# Patient Record
Sex: MALE | Race: WHITE | Employment: OTHER | URBAN - METROPOLITAN AREA
[De-identification: names, ages, dates, MRNs, and addresses within clinical notes are randomized per-mention and may not be internally consistent; named-entity substitution may affect disease eponyms.]

---

## 2017-03-25 ENCOUNTER — HOSPITAL ENCOUNTER (INPATIENT)
Age: 62
LOS: 2 days | Discharge: HOME | DRG: 282 | End: 2017-03-27
Attending: EMERGENCY MEDICINE | Admitting: INTERNAL MEDICINE
Payer: MEDICARE

## 2017-03-25 ENCOUNTER — APPOINTMENT (OUTPATIENT)
Dept: GENERAL RADIOLOGY | Age: 62
DRG: 282 | End: 2017-03-25
Attending: EMERGENCY MEDICINE
Payer: MEDICARE

## 2017-03-25 DIAGNOSIS — I24.9 ACS (ACUTE CORONARY SYNDROME) (HCC): Primary | ICD-10-CM

## 2017-03-25 PROBLEM — R07.9 CHEST PAIN: Status: ACTIVE | Noted: 2017-03-25

## 2017-03-25 LAB
ALBUMIN SERPL BCP-MCNC: 3.9 G/DL (ref 3.4–5)
ALBUMIN SERPL BCP-MCNC: 4.1 G/DL (ref 3.4–5)
ALBUMIN/GLOB SERPL: 1.2 {RATIO} (ref 0.8–1.7)
ALBUMIN/GLOB SERPL: 1.2 {RATIO} (ref 0.8–1.7)
ALP SERPL-CCNC: 70 U/L (ref 45–117)
ALP SERPL-CCNC: 78 U/L (ref 45–117)
ALT SERPL-CCNC: 31 U/L (ref 16–61)
ALT SERPL-CCNC: 36 U/L (ref 16–61)
ANION GAP BLD CALC-SCNC: 10 MMOL/L (ref 3–18)
ANION GAP BLD CALC-SCNC: 9 MMOL/L (ref 3–18)
APTT PPP: 107.4 SEC (ref 23–36.4)
APTT PPP: 30.2 SEC (ref 23–36.4)
AST SERPL W P-5'-P-CCNC: 18 U/L (ref 15–37)
AST SERPL W P-5'-P-CCNC: 60 U/L (ref 15–37)
BASOPHILS # BLD AUTO: 0 K/UL (ref 0–0.06)
BASOPHILS # BLD AUTO: 0.1 K/UL (ref 0–0.06)
BASOPHILS # BLD: 0 % (ref 0–2)
BASOPHILS # BLD: 1 % (ref 0–2)
BILIRUB DIRECT SERPL-MCNC: 0.1 MG/DL (ref 0–0.2)
BILIRUB SERPL-MCNC: 0.3 MG/DL (ref 0.2–1)
BILIRUB SERPL-MCNC: 0.5 MG/DL (ref 0.2–1)
BUN SERPL-MCNC: 17 MG/DL (ref 7–18)
BUN SERPL-MCNC: 18 MG/DL (ref 7–18)
BUN/CREAT SERPL: 18 (ref 12–20)
BUN/CREAT SERPL: 19 (ref 12–20)
CALCIUM SERPL-MCNC: 9.2 MG/DL (ref 8.5–10.1)
CALCIUM SERPL-MCNC: 9.6 MG/DL (ref 8.5–10.1)
CHLORIDE SERPL-SCNC: 104 MMOL/L (ref 100–108)
CHLORIDE SERPL-SCNC: 104 MMOL/L (ref 100–108)
CHOLEST SERPL-MCNC: 233 MG/DL
CK MB CFR SERPL CALC: 1.8 % (ref 0–4)
CK MB SERPL-MCNC: 2.1 NG/ML (ref 5–25)
CK SERPL-CCNC: 119 U/L (ref 39–308)
CO2 SERPL-SCNC: 26 MMOL/L (ref 21–32)
CO2 SERPL-SCNC: 28 MMOL/L (ref 21–32)
CREAT SERPL-MCNC: 0.94 MG/DL (ref 0.6–1.3)
CREAT SERPL-MCNC: 0.96 MG/DL (ref 0.6–1.3)
D DIMER PPP FEU-MCNC: 0.28 UG/ML(FEU)
DIFFERENTIAL METHOD BLD: ABNORMAL
DIFFERENTIAL METHOD BLD: ABNORMAL
EOSINOPHIL # BLD: 0 K/UL (ref 0–0.4)
EOSINOPHIL # BLD: 0.2 K/UL (ref 0–0.4)
EOSINOPHIL NFR BLD: 0 % (ref 0–5)
EOSINOPHIL NFR BLD: 2 % (ref 0–5)
ERYTHROCYTE [DISTWIDTH] IN BLOOD BY AUTOMATED COUNT: 12.7 % (ref 11.6–14.5)
ERYTHROCYTE [DISTWIDTH] IN BLOOD BY AUTOMATED COUNT: 12.7 % (ref 11.6–14.5)
EST. AVERAGE GLUCOSE BLD GHB EST-MCNC: 114 MG/DL
GLOBULIN SER CALC-MCNC: 3.3 G/DL (ref 2–4)
GLOBULIN SER CALC-MCNC: 3.4 G/DL (ref 2–4)
GLUCOSE SERPL-MCNC: 108 MG/DL (ref 74–99)
GLUCOSE SERPL-MCNC: 145 MG/DL (ref 74–99)
HBA1C MFR BLD: 5.6 % (ref 4.5–5.6)
HCT VFR BLD AUTO: 48.4 % (ref 36–48)
HCT VFR BLD AUTO: 48.7 % (ref 36–48)
HDLC SERPL-MCNC: 34 MG/DL (ref 40–60)
HDLC SERPL: 6.9 {RATIO} (ref 0–5)
HGB BLD-MCNC: 16.3 G/DL (ref 13–16)
HGB BLD-MCNC: 16.7 G/DL (ref 13–16)
INR PPP: 0.9 (ref 0.8–1.2)
INR PPP: 1 (ref 0.8–1.2)
LDLC SERPL CALC-MCNC: 163.6 MG/DL (ref 0–100)
LIPID PROFILE,FLP: ABNORMAL
LYMPHOCYTES # BLD AUTO: 12 % (ref 21–52)
LYMPHOCYTES # BLD AUTO: 14 % (ref 21–52)
LYMPHOCYTES # BLD: 1.1 K/UL (ref 0.9–3.6)
LYMPHOCYTES # BLD: 1.4 K/UL (ref 0.9–3.6)
MCH RBC QN AUTO: 31.3 PG (ref 24–34)
MCH RBC QN AUTO: 31.6 PG (ref 24–34)
MCHC RBC AUTO-ENTMCNC: 33.7 G/DL (ref 31–37)
MCHC RBC AUTO-ENTMCNC: 34.3 G/DL (ref 31–37)
MCV RBC AUTO: 92.1 FL (ref 74–97)
MCV RBC AUTO: 92.9 FL (ref 74–97)
MONOCYTES # BLD: 0.6 K/UL (ref 0.05–1.2)
MONOCYTES # BLD: 0.8 K/UL (ref 0.05–1.2)
MONOCYTES NFR BLD AUTO: 6 % (ref 3–10)
MONOCYTES NFR BLD AUTO: 9 % (ref 3–10)
NEUTS SEG # BLD: 7.6 K/UL (ref 1.8–8)
NEUTS SEG # BLD: 7.9 K/UL (ref 1.8–8)
NEUTS SEG NFR BLD AUTO: 77 % (ref 40–73)
NEUTS SEG NFR BLD AUTO: 79 % (ref 40–73)
PLATELET # BLD AUTO: 270 K/UL (ref 135–420)
PLATELET # BLD AUTO: 280 K/UL (ref 135–420)
PMV BLD AUTO: 9 FL (ref 9.2–11.8)
PMV BLD AUTO: 9.1 FL (ref 9.2–11.8)
POTASSIUM SERPL-SCNC: 4.1 MMOL/L (ref 3.5–5.5)
POTASSIUM SERPL-SCNC: 4.2 MMOL/L (ref 3.5–5.5)
PROT SERPL-MCNC: 7.2 G/DL (ref 6.4–8.2)
PROT SERPL-MCNC: 7.5 G/DL (ref 6.4–8.2)
PROTHROMBIN TIME: 12.3 SEC (ref 11.5–15.2)
PROTHROMBIN TIME: 12.9 SEC (ref 11.5–15.2)
RBC # BLD AUTO: 5.21 M/UL (ref 4.7–5.5)
RBC # BLD AUTO: 5.29 M/UL (ref 4.7–5.5)
SODIUM SERPL-SCNC: 140 MMOL/L (ref 136–145)
SODIUM SERPL-SCNC: 141 MMOL/L (ref 136–145)
TRIGL SERPL-MCNC: 177 MG/DL (ref ?–150)
TROPONIN I SERPL-MCNC: 0.25 NG/ML (ref 0–0.06)
TROPONIN I SERPL-MCNC: 22.43 NG/ML (ref 0–0.06)
TROPONIN I SERPL-MCNC: 9.91 NG/ML (ref 0–0.06)
VLDLC SERPL CALC-MCNC: 35.4 MG/DL
WBC # BLD AUTO: 10.1 K/UL (ref 4.6–13.2)
WBC # BLD AUTO: 9.6 K/UL (ref 4.6–13.2)

## 2017-03-25 PROCEDURE — 4A023N7 MEASUREMENT OF CARDIAC SAMPLING AND PRESSURE, LEFT HEART, PERCUTANEOUS APPROACH: ICD-10-PCS | Performed by: INTERNAL MEDICINE

## 2017-03-25 PROCEDURE — 74011000250 HC RX REV CODE- 250: Performed by: INTERNAL MEDICINE

## 2017-03-25 PROCEDURE — 85730 THROMBOPLASTIN TIME PARTIAL: CPT | Performed by: INTERNAL MEDICINE

## 2017-03-25 PROCEDURE — 74011250636 HC RX REV CODE- 250/636: Performed by: INTERNAL MEDICINE

## 2017-03-25 PROCEDURE — 71010 XR CHEST PORT: CPT

## 2017-03-25 PROCEDURE — 85610 PROTHROMBIN TIME: CPT | Performed by: EMERGENCY MEDICINE

## 2017-03-25 PROCEDURE — 85025 COMPLETE CBC W/AUTO DIFF WBC: CPT | Performed by: EMERGENCY MEDICINE

## 2017-03-25 PROCEDURE — 82550 ASSAY OF CK (CPK): CPT | Performed by: EMERGENCY MEDICINE

## 2017-03-25 PROCEDURE — 65660000000 HC RM CCU STEPDOWN

## 2017-03-25 PROCEDURE — 80061 LIPID PANEL: CPT | Performed by: EMERGENCY MEDICINE

## 2017-03-25 PROCEDURE — 74011250637 HC RX REV CODE- 250/637: Performed by: INTERNAL MEDICINE

## 2017-03-25 PROCEDURE — 74011636320 HC RX REV CODE- 636/320: Performed by: INTERNAL MEDICINE

## 2017-03-25 PROCEDURE — 74011250637 HC RX REV CODE- 250/637

## 2017-03-25 PROCEDURE — 80053 COMPREHEN METABOLIC PANEL: CPT | Performed by: EMERGENCY MEDICINE

## 2017-03-25 PROCEDURE — B2111ZZ FLUOROSCOPY OF MULTIPLE CORONARY ARTERIES USING LOW OSMOLAR CONTRAST: ICD-10-PCS | Performed by: INTERNAL MEDICINE

## 2017-03-25 PROCEDURE — 94762 N-INVAS EAR/PLS OXIMTRY CONT: CPT

## 2017-03-25 PROCEDURE — 4A033BC MEASUREMENT OF ARTERIAL PRESSURE, CORONARY, PERCUTANEOUS APPROACH: ICD-10-PCS | Performed by: INTERNAL MEDICINE

## 2017-03-25 PROCEDURE — 74011000258 HC RX REV CODE- 258: Performed by: INTERNAL MEDICINE

## 2017-03-25 PROCEDURE — 80048 BASIC METABOLIC PNL TOTAL CA: CPT | Performed by: INTERNAL MEDICINE

## 2017-03-25 PROCEDURE — 80076 HEPATIC FUNCTION PANEL: CPT | Performed by: INTERNAL MEDICINE

## 2017-03-25 PROCEDURE — 36415 COLL VENOUS BLD VENIPUNCTURE: CPT | Performed by: INTERNAL MEDICINE

## 2017-03-25 PROCEDURE — 74011250637 HC RX REV CODE- 250/637: Performed by: EMERGENCY MEDICINE

## 2017-03-25 PROCEDURE — 83036 HEMOGLOBIN GLYCOSYLATED A1C: CPT | Performed by: INTERNAL MEDICINE

## 2017-03-25 PROCEDURE — 85730 THROMBOPLASTIN TIME PARTIAL: CPT | Performed by: EMERGENCY MEDICINE

## 2017-03-25 PROCEDURE — 85379 FIBRIN DEGRADATION QUANT: CPT | Performed by: EMERGENCY MEDICINE

## 2017-03-25 PROCEDURE — C1894 INTRO/SHEATH, NON-LASER: HCPCS

## 2017-03-25 PROCEDURE — 99284 EMERGENCY DEPT VISIT MOD MDM: CPT

## 2017-03-25 PROCEDURE — 84443 ASSAY THYROID STIM HORMONE: CPT | Performed by: INTERNAL MEDICINE

## 2017-03-25 PROCEDURE — 74011250636 HC RX REV CODE- 250/636: Performed by: EMERGENCY MEDICINE

## 2017-03-25 PROCEDURE — 93005 ELECTROCARDIOGRAM TRACING: CPT

## 2017-03-25 RX ORDER — HEPARIN SODIUM 1000 [USP'U]/ML
4000 INJECTION, SOLUTION INTRAVENOUS; SUBCUTANEOUS
Status: DISCONTINUED | OUTPATIENT
Start: 2017-03-25 | End: 2017-03-25 | Stop reason: HOSPADM

## 2017-03-25 RX ORDER — ADHESIVE BANDAGE
30 BANDAGE TOPICAL DAILY PRN
Status: DISCONTINUED | OUTPATIENT
Start: 2017-03-25 | End: 2017-03-27 | Stop reason: HOSPADM

## 2017-03-25 RX ORDER — HEPARIN SODIUM 200 [USP'U]/100ML
500 INJECTION, SOLUTION INTRAVENOUS
Status: DISCONTINUED | OUTPATIENT
Start: 2017-03-25 | End: 2017-03-25 | Stop reason: HOSPADM

## 2017-03-25 RX ORDER — MORPHINE SULFATE 4 MG/ML
4 INJECTION, SOLUTION INTRAMUSCULAR; INTRAVENOUS
Status: COMPLETED | OUTPATIENT
Start: 2017-03-25 | End: 2017-03-25

## 2017-03-25 RX ORDER — METOPROLOL TARTRATE 25 MG/1
25 TABLET, FILM COATED ORAL EVERY 6 HOURS
Status: DISCONTINUED | OUTPATIENT
Start: 2017-03-25 | End: 2017-03-25 | Stop reason: SDUPTHER

## 2017-03-25 RX ORDER — SODIUM CHLORIDE 9 MG/ML
75 INJECTION, SOLUTION INTRAVENOUS CONTINUOUS
Status: DISCONTINUED | OUTPATIENT
Start: 2017-03-25 | End: 2017-03-27 | Stop reason: HOSPADM

## 2017-03-25 RX ORDER — DOCUSATE SODIUM 100 MG/1
100 CAPSULE, LIQUID FILLED ORAL 2 TIMES DAILY
Status: DISCONTINUED | OUTPATIENT
Start: 2017-03-25 | End: 2017-03-27 | Stop reason: HOSPADM

## 2017-03-25 RX ORDER — MELATONIN
5000
COMMUNITY

## 2017-03-25 RX ORDER — GUAIFENESIN 100 MG/5ML
LIQUID (ML) ORAL ONCE
Status: ON HOLD | COMMUNITY
End: 2017-03-25

## 2017-03-25 RX ORDER — SODIUM CHLORIDE 0.9 % (FLUSH) 0.9 %
5-10 SYRINGE (ML) INJECTION AS NEEDED
Status: DISCONTINUED | OUTPATIENT
Start: 2017-03-25 | End: 2017-03-27 | Stop reason: HOSPADM

## 2017-03-25 RX ORDER — HEPARIN SODIUM 10000 [USP'U]/100ML
12-25 INJECTION, SOLUTION INTRAVENOUS
Status: DISCONTINUED | OUTPATIENT
Start: 2017-03-25 | End: 2017-03-25

## 2017-03-25 RX ORDER — LIDOCAINE HYDROCHLORIDE 10 MG/ML
3-30 INJECTION INFILTRATION; PERINEURAL
Status: DISCONTINUED | OUTPATIENT
Start: 2017-03-25 | End: 2017-03-25 | Stop reason: HOSPADM

## 2017-03-25 RX ORDER — MIDAZOLAM HYDROCHLORIDE 1 MG/ML
.5-2 INJECTION, SOLUTION INTRAMUSCULAR; INTRAVENOUS
Status: DISCONTINUED | OUTPATIENT
Start: 2017-03-25 | End: 2017-03-25 | Stop reason: HOSPADM

## 2017-03-25 RX ORDER — CALCIUM CARBONATE 200(500)MG
2 TABLET,CHEWABLE ORAL ONCE
Status: ON HOLD | COMMUNITY
End: 2017-03-25

## 2017-03-25 RX ORDER — HEPARIN SODIUM 10000 [USP'U]/100ML
12-25 INJECTION, SOLUTION INTRAVENOUS
Status: DISCONTINUED | OUTPATIENT
Start: 2017-03-25 | End: 2017-03-25 | Stop reason: SDUPTHER

## 2017-03-25 RX ORDER — ATORVASTATIN CALCIUM 20 MG/1
40 TABLET, FILM COATED ORAL DAILY
Status: DISCONTINUED | OUTPATIENT
Start: 2017-03-26 | End: 2017-03-27 | Stop reason: HOSPADM

## 2017-03-25 RX ORDER — ACETAMINOPHEN 325 MG/1
650 TABLET ORAL
Status: DISCONTINUED | OUTPATIENT
Start: 2017-03-25 | End: 2017-03-27 | Stop reason: HOSPADM

## 2017-03-25 RX ORDER — CLOPIDOGREL 300 MG/1
300 TABLET, FILM COATED ORAL
Status: COMPLETED | OUTPATIENT
Start: 2017-03-25 | End: 2017-03-25

## 2017-03-25 RX ORDER — SODIUM CHLORIDE 0.9 % (FLUSH) 0.9 %
5-10 SYRINGE (ML) INJECTION EVERY 8 HOURS
Status: DISCONTINUED | OUTPATIENT
Start: 2017-03-25 | End: 2017-03-26 | Stop reason: SDUPTHER

## 2017-03-25 RX ORDER — GUAIFENESIN 100 MG/5ML
81 LIQUID (ML) ORAL DAILY
Status: DISCONTINUED | OUTPATIENT
Start: 2017-03-26 | End: 2017-03-27 | Stop reason: HOSPADM

## 2017-03-25 RX ORDER — AA/PROT/LYSINE/METHIO/VIT C/B6 50-12.5 MG
TABLET ORAL
COMMUNITY
End: 2022-10-31 | Stop reason: CLARIF

## 2017-03-25 RX ORDER — HEPARIN SODIUM 1000 [USP'U]/ML
55.1 INJECTION, SOLUTION INTRAVENOUS; SUBCUTANEOUS ONCE
Status: COMPLETED | OUTPATIENT
Start: 2017-03-25 | End: 2017-03-25

## 2017-03-25 RX ORDER — ATORVASTATIN CALCIUM 20 MG/1
20 TABLET, FILM COATED ORAL
Status: DISCONTINUED | OUTPATIENT
Start: 2017-03-25 | End: 2017-03-26

## 2017-03-25 RX ORDER — NITROGLYCERIN 0.4 MG/1
0.4 TABLET SUBLINGUAL
Status: DISCONTINUED | OUTPATIENT
Start: 2017-03-25 | End: 2017-03-27 | Stop reason: HOSPADM

## 2017-03-25 RX ORDER — GLUCOSAMINE/CHONDR SU A SOD 167-133 MG
500 CAPSULE ORAL
COMMUNITY
End: 2017-03-27

## 2017-03-25 RX ORDER — METOPROLOL TARTRATE 25 MG/1
25 TABLET, FILM COATED ORAL EVERY 6 HOURS
Status: DISCONTINUED | OUTPATIENT
Start: 2017-03-25 | End: 2017-03-26

## 2017-03-25 RX ORDER — HEPARIN SODIUM 1000 [USP'U]/ML
60 INJECTION, SOLUTION INTRAVENOUS; SUBCUTANEOUS ONCE
Status: DISCONTINUED | OUTPATIENT
Start: 2017-03-25 | End: 2017-03-25 | Stop reason: SDUPTHER

## 2017-03-25 RX ORDER — FENTANYL CITRATE 50 UG/ML
25-100 INJECTION, SOLUTION INTRAMUSCULAR; INTRAVENOUS
Status: DISCONTINUED | OUTPATIENT
Start: 2017-03-25 | End: 2017-03-25 | Stop reason: HOSPADM

## 2017-03-25 RX ORDER — GUAIFENESIN 100 MG/5ML
324 LIQUID (ML) ORAL
Status: DISCONTINUED | OUTPATIENT
Start: 2017-03-25 | End: 2017-03-25

## 2017-03-25 RX ORDER — LISINOPRIL 5 MG/1
5 TABLET ORAL DAILY
Status: DISCONTINUED | OUTPATIENT
Start: 2017-03-26 | End: 2017-03-27 | Stop reason: HOSPADM

## 2017-03-25 RX ORDER — ACETAMINOPHEN 500 MG
500 TABLET ORAL
Status: DISCONTINUED | OUTPATIENT
Start: 2017-03-25 | End: 2017-03-27 | Stop reason: HOSPADM

## 2017-03-25 RX ORDER — DIPHENHYDRAMINE HCL 25 MG
25 CAPSULE ORAL
COMMUNITY
End: 2017-03-27

## 2017-03-25 RX ORDER — LANOLIN ALCOHOL/MO/W.PET/CERES
500 CREAM (GRAM) TOPICAL AS NEEDED
COMMUNITY

## 2017-03-25 RX ORDER — METOPROLOL TARTRATE 25 MG/1
12.5 TABLET, FILM COATED ORAL
Status: COMPLETED | OUTPATIENT
Start: 2017-03-25 | End: 2017-03-25

## 2017-03-25 RX ORDER — CLOPIDOGREL 300 MG/1
TABLET, FILM COATED ORAL
Status: COMPLETED
Start: 2017-03-25 | End: 2017-03-25

## 2017-03-25 RX ORDER — DICLOFENAC SODIUM 75 MG/1
75 TABLET, DELAYED RELEASE ORAL 2 TIMES DAILY
COMMUNITY
End: 2017-03-27

## 2017-03-25 RX ORDER — CLOPIDOGREL BISULFATE 75 MG/1
75 TABLET ORAL DAILY
Status: DISCONTINUED | OUTPATIENT
Start: 2017-03-26 | End: 2017-03-27 | Stop reason: HOSPADM

## 2017-03-25 RX ORDER — SIMETHICONE 80 MG
80 TABLET,CHEWABLE ORAL ONCE
COMMUNITY
End: 2017-03-27

## 2017-03-25 RX ADMIN — Medication 5 ML: at 17:00

## 2017-03-25 RX ADMIN — HEPARIN SODIUM 4000 UNITS: 1000 INJECTION, SOLUTION INTRAVENOUS; SUBCUTANEOUS at 16:30

## 2017-03-25 RX ADMIN — HEPARIN SODIUM AND DEXTROSE 12 UNITS/KG/HR: 10000; 5 INJECTION INTRAVENOUS at 16:36

## 2017-03-25 RX ADMIN — CLOPIDOGREL 300 MG: 300 TABLET, FILM COATED ORAL at 21:08

## 2017-03-25 RX ADMIN — LIDOCAINE HYDROCHLORIDE 5 ML: 10 INJECTION, SOLUTION INFILTRATION; PERINEURAL at 20:15

## 2017-03-25 RX ADMIN — SODIUM CHLORIDE 75 ML/HR: 900 INJECTION, SOLUTION INTRAVENOUS at 18:20

## 2017-03-25 RX ADMIN — METOPROLOL TARTRATE 12.5 MG: 25 TABLET ORAL at 16:24

## 2017-03-25 RX ADMIN — CLOPIDOGREL BISULFATE 300 MG: 300 TABLET, FILM COATED ORAL at 21:08

## 2017-03-25 RX ADMIN — VERAPAMIL HYDROCHLORIDE 3 ML: 2.5 INJECTION INTRAVENOUS at 20:17

## 2017-03-25 RX ADMIN — FENTANYL CITRATE 25 MCG: 50 INJECTION, SOLUTION INTRAMUSCULAR; INTRAVENOUS at 20:43

## 2017-03-25 RX ADMIN — BIVALIRUDIN 1.75 MG/KG/HR: 250 INJECTION, POWDER, LYOPHILIZED, FOR SOLUTION INTRAVENOUS at 20:41

## 2017-03-25 RX ADMIN — MAGNESIUM HYDROXIDE 30 ML: 400 SUSPENSION ORAL at 23:09

## 2017-03-25 RX ADMIN — HEPARIN SODIUM 2000 UNITS: 200 INJECTION, SOLUTION INTRAVENOUS at 20:19

## 2017-03-25 RX ADMIN — IOPAMIDOL 100 ML: 612 INJECTION, SOLUTION INTRAVENOUS at 20:58

## 2017-03-25 RX ADMIN — MIDAZOLAM HYDROCHLORIDE 0.5 MG: 1 INJECTION, SOLUTION INTRAMUSCULAR; INTRAVENOUS at 20:43

## 2017-03-25 RX ADMIN — NITROGLYCERIN 1 INCH: 20 OINTMENT TOPICAL at 16:25

## 2017-03-25 RX ADMIN — FENTANYL CITRATE 25 MCG: 50 INJECTION, SOLUTION INTRAMUSCULAR; INTRAVENOUS at 20:56

## 2017-03-25 RX ADMIN — MIDAZOLAM HYDROCHLORIDE 1 MG: 1 INJECTION, SOLUTION INTRAMUSCULAR; INTRAVENOUS at 20:18

## 2017-03-25 RX ADMIN — MIDAZOLAM HYDROCHLORIDE 0.5 MG: 1 INJECTION, SOLUTION INTRAMUSCULAR; INTRAVENOUS at 20:56

## 2017-03-25 RX ADMIN — Medication 4 MG: at 15:42

## 2017-03-25 RX ADMIN — FENTANYL CITRATE 50 MCG: 50 INJECTION, SOLUTION INTRAMUSCULAR; INTRAVENOUS at 20:18

## 2017-03-25 NOTE — H&P
HISTORY AND PHYSICAL EXAMINATION      Assessment:     Patient Active Problem List    Diagnosis Date Noted    Chest pain 03/25/2017     1) ACS/ NSTEMI with elevated Trop  2) Nicotine use    Plan:   Admit to tele  IV hep, asa, b-blocker and statins, NTP  Cardiology consult - D/w Dr. Franklin Stroud  Further recommendation per cardiology  echo  Serial cardiac enzymes and labs as ordered  Smoking cessation d/w pt at length      GI/DVT Prophylaxis  hep  Code Status: full  D/w daughter at bedside    Subjective:     Ramo Denton is a 58 y.o. male being admitted to the hospital with chest pain. He states he started having CP and left arm pain 3-4 hours ago . He is currently cp free.  He continues to smoke    Past Medical History:   Diagnosis Date    Arthritis     Fibromyalgia     Heart attack [facility])        Past Surgical History:   Procedure Laterality Date    HX HERNIA REPAIR         Allergies   Allergen Reactions    Other Medication Rash     Unsure which medication, pain medication for knee pain       Current Facility-Administered Medications   Medication Dose Route Frequency Provider Last Rate Last Dose    [START ON 3/26/2017] atorvastatin (LIPITOR) tablet 40 mg  40 mg Oral DAILY Carole Reid MD        heparin 25,000 units in D5W 250 ml infusion  12-25 Units/kg/hr IntraVENous TITRATE Carole Reid MD        sodium chloride (NS) flush 5-10 mL  5-10 mL IntraVENous Q8H Tavares Terrell MD        sodium chloride (NS) flush 5-10 mL  5-10 mL IntraVENous PRN Tavares Terrell MD        magnesium hydroxide (MILK OF MAGNESIA) 400 mg/5 mL oral suspension 30 mL  30 mL Oral DAILY PRN Tavares Terrell MD        docusate sodium (COLACE) capsule 100 mg  100 mg Oral BID Tavares Terrell MD        0.9% sodium chloride infusion  75 mL/hr IntraVENous CONTINUOUS Tavares Terrell MD        acetaminophen (TYLENOL) tablet 500 mg  500 mg Oral Q6H PRN Tavares Terrell MD        nitroglycerin (NITROBID) 2 % ointment 1 Inch  1 Inch Topical Q6H Landon J Luis Elder MD        nitroglycerin (NITROSTAT) tablet 0.4 mg  0.4 mg SubLINGual Q5MIN PRN Abhilash Tang MD        metoprolol tartrate (LOPRESSOR) tablet 25 mg  25 mg Oral Q6H Abhilash Tang MD        heparin (porcine) 1,000 unit/mL injection 4,360 Units  60 Units/kg IntraVENous ONCE Abhilash Tang MD        heparin 25,000 units in D5W 250 ml infusion  12-25 Units/kg/hr IntraVENous TITRATE Abhilash Tang MD        [START ON 3/26/2017] aspirin chewable tablet 81 mg  81 mg Oral DAILY Abhilash Tang MD        [START ON 3/26/2017] clopidogrel (PLAVIX) tablet 75 mg  75 mg Oral DAILY Abhilash Tang MD       [facility] ON 3/26/2017] lisinopril (PRINIVIL, ZESTRIL) tablet 5 mg  5 mg Oral DAILY Abhilash Tang MD        atorvastatin (LIPITOR) tablet 20 mg  20 mg Oral QHS Abhilash Tang MD        acetaminophen (TYLENOL) tablet 650 mg  650 mg Oral Q4H PRN Abhilash Tang MD           Prior to Admission Medications   Prescriptions Last Dose Informant Patient Reported? Taking?   aspirin 81 mg chewable tablet 3/25/2017 at 1200  Yes Yes   Sig: Take  by mouth once. calcium carbonate (TUMS) 200 mg calcium (500 mg) chew 3/25/2017 at 1200  Yes Yes   Sig: Take 2 Tabs by mouth once. cholecalciferol (VITAMIN D3) 1,000 unit tablet   Yes Yes   Sig: Take 5,000 Units by mouth every seven (7) days. coenzyme q10 (CO Q-10) 10 mg cap   Yes Yes   Sig: Take  by mouth.   cyanocobalamin (VITAMIN B-12) 500 mcg tablet   Yes Yes   Sig: Take 500 mcg by mouth as needed. diclofenac EC (VOLTAREN) 75 mg EC tablet 3/25/2017 at Unknown time  Yes Yes   Sig: Take 75 mg by mouth two (2) times a day. diphenhydrAMINE (BENADRYL) 25 mg capsule 3/25/2017 at Unknown time  Yes Yes   Sig: Take 25 mg by mouth every six (6) hours as needed. nicotinic acid (NIACIN) 500 mg tablet   Yes Yes   Sig: Take 500 mg by mouth Daily (before breakfast). simethicone (GAS-X) 80 mg chewable tablet 3/25/2017 at 1200  Yes Yes   Sig: Take 80 mg by mouth once. Facility-Administered Medications: None       Social History     Social History    Marital status:      Spouse name: N/A    Number of children: N/A    Years of education: N/A     Occupational History    Not on file. Social History Main Topics    Smoking status: Current Every Day Smoker     Packs/day: 1.00    Smokeless tobacco: Not on file    Alcohol use No    Drug use: No    Sexual activity: Not on file     Other Topics Concern    Not on file     Social History Narrative    No narrative on file       History reviewed. No pertinent family history. Constitutional: negative for chills, fever and malaise/fatigue   Skin: negative for rash   HENT: negative for congestion, ear pain and headaches   Eyes: Negative for blurred vision   Cardiovascular: negative for, leg swelling, orthopnea   Respiratory: negative for cough, shortness of breath, sputum production or wheezing   Gastointestinal: Negative for abdominal pain, constipation, diarrhea, nausea and vomiting   Genitourinary: not assessed   Musculoskeletal: negative for back pain, falls and myalgias   Endo: negative for polydipsia and polyuria.    Heme: negative for anemia   Allergies: negative for environmental allergies and hay fever   Neurological: negative for dizziness and focal weakness   Psychiatric:  Negative for depression, insomnia and anxious         Objective:     Patient Vitals for the past 24 hrs:   BP Temp Pulse Resp SpO2 Height Weight   03/25/17 1609 124/86 98 °F (36.7 °C) 82 10 98 % - -   03/25/17 1428 (!) 145/91 - 65 20 98 % - -   03/25/17 1328 (!) 149/95 97.6 °F (36.4 °C) 65 20 98 % 5' 6\" (1.676 m) 72.6 kg (160 lb)           Extended / Orthostatic Vitals:    Vital Signs  Level of Consciousness: Alert (03/25/17 1609)  Temp: 98 °F (36.7 °C) (03/25/17 1609)  Temp Source: Oral (03/25/17 1609)  Pulse (Heart Rate): 82 (03/25/17 1609)  Cardiac Rhythm: Normal sinus rhythm (03/25/17 1613)  Resp Rate: 10 (03/25/17 1609)  BP: 124/86 (03/25/17 1609)  MAP (Calculated): 99 (03/25/17 1609)  BP 1 Location: Left arm (03/25/17 1609)  BP 1 Method: Automatic (03/25/17 1609)  BP Patient Position: Sitting (03/25/17 1609)  MEWS Score: 0 (03/25/17 1609)         Oxygen Therapy  O2 Sat (%): 98 % (03/25/17 1609)  O2 Device: Room air (03/25/17 1613)    General Appearance:   Appears in nacute distress. ,  Skin:   No rash, No jaundice,   Lymph: There is no lymphadenopathy,   HEENT:   PERRLA, EOMI, Dry oral mucous membranes, Sclera clear,   Neck:   Supple, Without masses, Without thyromegaly, Without bruits,   Lungs:   Clear,   Heart:   Regular rate and rhythm, No murmurs,   Breasts:   normal,   Abdomen:   Soft , Non-distended, Normal bowel sounds and No organomegaly or mass,   Extremities:   No edema of legs, Normal pedal and radial pulses,   Neuro:   alert, oriented, affect appropriate and speech fluent,       Laboratory:     Recent Results (from the past 24 hour(s))   EKG, 12 LEAD, INITIAL    Collection Time: 03/25/17 12:48 PM   Result Value Ref Range    Ventricular Rate 64 BPM    Atrial Rate 64 BPM    P-R Interval 152 ms    QRS Duration 104 ms    Q-T Interval 394 ms    QTC Calculation (Bezet) 406 ms    Calculated P Axis 62 degrees    Calculated R Axis -43 degrees    Calculated T Axis 57 degrees    Diagnosis       Normal sinus rhythm  Left axis deviation  Abnormal ECG  No previous ECGs available     CBC WITH AUTOMATED DIFF    Collection Time: 03/25/17  1:24 PM   Result Value Ref Range    WBC 10.1 4.6 - 13.2 K/uL    RBC 5.21 4.70 - 5.50 M/uL    HGB 16.3 (H) 13.0 - 16.0 g/dL    HCT 48.4 (H) 36.0 - 48.0 %    MCV 92.9 74.0 - 97.0 FL    MCH 31.3 24.0 - 34.0 PG    MCHC 33.7 31.0 - 37.0 g/dL    RDW 12.7 11.6 - 14.5 %    PLATELET 790 402 - 870 K/uL    MPV 9.0 (L) 9.2 - 11.8 FL    NEUTROPHILS 77 (H) 40 - 73 %    LYMPHOCYTES 14 (L) 21 - 52 %    MONOCYTES 6 3 - 10 %    EOSINOPHILS 2 0 - 5 %    BASOPHILS 1 0 - 2 %    ABS. NEUTROPHILS 7.9 1.8 - 8.0 K/UL    ABS.  LYMPHOCYTES 1.4 0.9 - 3.6 K/UL    ABS. MONOCYTES 0.6 0.05 - 1.2 K/UL    ABS. EOSINOPHILS 0.2 0.0 - 0.4 K/UL    ABS. BASOPHILS 0.1 (H) 0.0 - 0.06 K/UL    DF AUTOMATED     METABOLIC PANEL, COMPREHENSIVE    Collection Time: 03/25/17  1:24 PM   Result Value Ref Range    Sodium 141 136 - 145 mmol/L    Potassium 4.1 3.5 - 5.5 mmol/L    Chloride 104 100 - 108 mmol/L    CO2 28 21 - 32 mmol/L    Anion gap 9 3.0 - 18 mmol/L    Glucose 145 (H) 74 - 99 mg/dL    BUN 18 7.0 - 18 MG/DL    Creatinine 0.94 0.6 - 1.3 MG/DL    BUN/Creatinine ratio 19 12 - 20      GFR est AA >60 >60 ml/min/1.73m2    GFR est non-AA >60 >60 ml/min/1.73m2    Calcium 9.2 8.5 - 10.1 MG/DL    Bilirubin, total 0.3 0.2 - 1.0 MG/DL    ALT (SGPT) 31 16 - 61 U/L    AST (SGOT) 18 15 - 37 U/L    Alk.  phosphatase 70 45 - 117 U/L    Protein, total 7.2 6.4 - 8.2 g/dL    Albumin 3.9 3.4 - 5.0 g/dL    Globulin 3.3 2.0 - 4.0 g/dL    A-G Ratio 1.2 0.8 - 1.7     CARDIAC PANEL,(CK, CKMB & TROPONIN)    Collection Time: 03/25/17  1:24 PM   Result Value Ref Range     39 - 308 U/L    CK - MB 2.1 <3.6 ng/ml    CK-MB Index 1.8 0.0 - 4.0 %    Troponin-I, Qt. 0.25 (H) 0.00 - 0.06 NG/ML   PTT    Collection Time: 03/25/17  1:24 PM   Result Value Ref Range    aPTT 30.2 23.0 - 36.4 SEC   PROTHROMBIN TIME + INR    Collection Time: 03/25/17  1:24 PM   Result Value Ref Range    Prothrombin time 12.3 11.5 - 15.2 sec    INR 0.9 0.8 - 1.2     D DIMER    Collection Time: 03/25/17  1:24 PM   Result Value Ref Range    D DIMER 0.28 <0.46 ug/ml(FEU)   LIPID PANEL    Collection Time: 03/25/17  1:24 PM   Result Value Ref Range    LIPID PROFILE          Cholesterol, total 233 (H) <200 MG/DL    Triglyceride 177 (H) <150 MG/DL    HDL Cholesterol 34 (L) 40 - 60 MG/DL    LDL, calculated 163.6 (H) 0 - 100 MG/DL    VLDL, calculated 35.4 MG/DL    CHOL/HDL Ratio 6.9 (H) 0 - 5.0           Imaging/Procedures:     Chest X-ray:  SANDRA Bell MD    3/25/2017, 4:39 PM

## 2017-03-25 NOTE — ED TRIAGE NOTES
C/o midsternal CP radiating to LUE onset about 30 minutes PTA while pt was in shower. Denies SOB. States did break out into a sweat when pain started. Denies dizziness or nausea. Spouse gave pt two 81 mg aspirin prior to arrival.    Sepsis Screening completed    (  )Patient meets SIRS criteria. ( x )Patient does not meet SIRS criteria.       SIRS Criteria is achieved when two or more of the following are present   Temperature < 96.8°F (36°C) or > 100.9°F (38.3°C)   Heart Rate > 90 beats per minute   Respiratory Rate > 20 breaths per minute   WBC count > 12,000 or <4,000 or > 10% bands

## 2017-03-25 NOTE — ROUTINE PROCESS
TRANSFER - OUT REPORT:    Verbal report given to [facility], RN on [employer]  being transferred to [facility] for routine progression of care       Report consisted of patients Situation, Background, Assessment and   Recommendations(SBAR). Information from the following report(s) SBAR, ED Summary, [facility] and Cardiac Rhythm NSR was reviewed with the receiving nurse. Lines:   Peripheral IV 03/25/17 Right Antecubital (Active)   Site Assessment Clean, dry, & intact 3/25/2017  1:32 PM   Phlebitis Assessment 0 3/25/2017  1:32 PM   Infiltration Assessment 0 3/25/2017  1:32 PM   Dressing Status Clean, dry, & intact 3/25/2017  1:32 PM   Dressing Type Transparent 3/25/2017  1:32 PM   Hub Color/Line Status Pink 3/25/2017  1:32 PM        Opportunity for questions and clarification was provided.       Patient transported with:   Monitor  O2 @ 2 liters  Tech

## 2017-03-25 NOTE — ED NOTES
Patient resting quietly on stretcher in low and locked position. Call bell in reach, side rail up x1. Wife at bedside. Labs reviewed. Plan of care reviewed. Patient continues to experience pressure pain to left/ mid sternal region radiating to left shoulder. Also states that pain radiates through to the back. C/o bilateral lower arm pain described as similar to carpel tunnel pain associated with numbness.

## 2017-03-25 NOTE — PROGRESS NOTES
Cardiac Cath Lab:  Pre Procedure Chart Check    Patients chart was accessed and reviewed for possible and/or scheduled procedure. Creatinine Clearance:  CREATININE: 0.96 MG/DL (03/25/17 1800)  Estimated creatinine clearance: 72 mL/min    Total Contrast  Load:  3 x estimated clearance amount=   216  ml    75% of Contrast Load:  0.75 x Total Contrast Load=     162   ml    Recent Labs      03/25/17   1800  03/25/17   1324   WBC  9.6  10.1   RBC  5.29  5.21   HCT  48.7*  48.4*   HGB  16.7*  16.3*   PLT  280  270   INR  1.0  0.9   APTT  107.4*  30.2   PTP  12.9  12.3   NA  140  141   K  4.2  4.1   BUN  17  18   CREA  0.96  0.94   GFRAA  >60  >60   GFRNA  >60  >60   CA  9.6  9.2   CPK   --   119   CKMB   --   2.1   CKND1   --   1.8   TROIQ  9.91*  0.25*       BMI: Body mass index is 25.82 kg/(m^2). ALLERGIES:   Allergies   Allergen Reactions    Other Medication Rash     Unsure which medication, pain medication for knee pain       Lines:        Peripheral IV 03/25/17 Right Antecubital (Active)   Site Assessment Clean, dry, & intact 3/25/2017  6:18 PM   Phlebitis Assessment 0 3/25/2017  6:18 PM   Infiltration Assessment 0 3/25/2017  6:18 PM   Dressing Status Clean, dry, & intact 3/25/2017  6:18 PM   Dressing Type Tape;Transparent 3/25/2017  6:18 PM   Hub Color/Line Status Pink; Infusing 3/25/2017  6:18 PM   Action Taken Open ports on tubing capped 3/25/2017  6:18 PM   Alcohol Cap Used Yes 3/25/2017  6:18 PM       Peripheral IV 03/25/17 Right Forearm (Active)   Site Assessment Clean, dry, & intact 3/25/2017  6:18 PM   Phlebitis Assessment 0 3/25/2017  6:18 PM   Infiltration Assessment 0 3/25/2017  6:18 PM   Dressing Status Clean, dry, & intact 3/25/2017  6:18 PM   Dressing Type Tape;Transparent 3/25/2017  6:18 PM   Hub Color/Line Status Pink;Capped;Flushed 3/25/2017  6:18 PM   Action Taken Open ports on tubing capped 3/25/2017  6:18 PM   Alcohol Cap Used Yes 3/25/2017  6:18 PM          History:    Past Medical History:   Diagnosis Date    Arthritis     Fibromyalgia     Heart attack [facility])      Past Surgical History:   Procedure Laterality Date    HX HERNIA REPAIR       Patient Active Problem List   Diagnosis Code    Chest pain R07.9

## 2017-03-25 NOTE — PROGRESS NOTES
1600 Pt arrived to unit from ER alert and oriented. Introduced to room and surroundings. No complains voiced. 1630 Heparin drip initiated and bolus as per order  complete admission assessment and data obtained. 1845 Call from the lab with critical value of 9.91, same called to Dr Belén Villanueva and will come in to take pt to the cath lab. 1850 Pt made aware and informed of possible cath   this pm.   1900 Pt taken to the cath lab with Dr Belén Villanueva and the two cath lab on call via bed in stable condition. Left with heparin drip in place, rate verified with Meme GARCIA.

## 2017-03-25 NOTE — ED TRIAGE NOTES
Patient c/o pressure pain to midsternal area that began approx 45 minutes ago while stepping out of shower. States he didn't feel good when he got into the shower, unable to clarify. States that pain radiates down left arm with tingling.

## 2017-03-25 NOTE — ROUTINE PROCESS
TRANSFER - IN REPORT:    Verbal report received from Kathryn Pfeiffer RN(name) on Cono A Jordana  being received from ER(unit) for routine progression of care      Report consisted of patients Situation, Background, Assessment and   Recommendations(SBAR). Information from the following report(s) SBAR, Kardex, ED Summary and Recent Results was reviewed with the receiving nurse. Opportunity for questions and clarification was provided. Assessment completed upon patients arrival to unit and care assumed.

## 2017-03-25 NOTE — ED PROVIDER NOTES
HPI Comments: 12:49 PM  Jackeline Silveira is a 58 y.o. male presenting to the ED c/o midsternal, pressure-like CP radiating to the left arm onset approximately 30-45 minutes ago after showering. Pt has had 2 81 mg ASA pta. Pt states he thinks it is stress. Reports pt this has happened in the past and he was not evaluated at that time. Denies hx of MI or Cardiac STENTs. Associated sxs include diaphoresis and upper back pain. Reports he last saw a cardiologist 5 years ago, he had a stress test done, but was unable to complete due to other ailments. PMHx include arthritis, fibromyalgia, stenosis, and OCD. Admits to smoking 1 ppd since age 13. Denies SOB, lightheadedness, dizziness, HA, abd pain, nvd, and any other sxs or complaints. The history is provided by the patient. Past Medical History:   Diagnosis Date    Arthritis     Fibromyalgia     Heart attack [facility])        Past Surgical History:   Procedure Laterality Date    HX HERNIA REPAIR           History reviewed. No pertinent family history. Social History     Social History    Marital status:      Spouse name: N/A    Number of children: N/A    Years of education: N/A     Occupational History    Not on file. Social History Main Topics    Smoking status: Current Every Day Smoker     Packs/day: 1.00    Smokeless tobacco: Not on file    Alcohol use No    Drug use: No    Sexual activity: Not on file     Other Topics Concern    Not on file     Social History Narrative    No narrative on file         ALLERGIES: Other medication    Review of Systems   Constitutional: Positive for diaphoresis. Respiratory: Negative for shortness of breath. Cardiovascular: Positive for chest pain. Gastrointestinal: Negative for abdominal pain, diarrhea, nausea and vomiting. Musculoskeletal: Positive for back pain and myalgias. Skin: Negative for wound. Neurological: Negative for dizziness and headaches.    All other systems reviewed and are negative. Vitals:    03/25/17 1328 03/25/17 1428   BP: (!) 149/95 (!) 145/91   Pulse: 65 65   Resp: 20 20   Temp: 97.6 °F (36.4 °C)    SpO2: 98% 98%   Weight: 72.6 kg (160 lb)    Height: 5' 6\" (1.676 m)             Physical Exam   Constitutional: He is oriented to person, place, and time. He appears well-developed and well-nourished. No distress. HENT:   Head: Normocephalic and atraumatic. Head is without right periorbital erythema and without left periorbital erythema. Right Ear: External ear normal. No drainage or swelling. Tympanic membrane is not perforated, not erythematous and not bulging. Left Ear: External ear normal. No drainage or swelling. Tympanic membrane is not perforated, not erythematous and not bulging. Nose: Nose normal. No mucosal edema or rhinorrhea. Right sinus exhibits no maxillary sinus tenderness and no frontal sinus tenderness. Left sinus exhibits no maxillary sinus tenderness and no frontal sinus tenderness. Mouth/Throat: Uvula is midline, oropharynx is clear and moist and mucous membranes are normal. No oral lesions. No trismus in the jaw. No dental abscesses or uvula swelling. No oropharyngeal exudate, posterior oropharyngeal edema, posterior oropharyngeal erythema or tonsillar abscesses. Eyes: Conjunctivae are normal. Right eye exhibits no discharge. Left eye exhibits no discharge. No scleral icterus. Neck: Normal range of motion. Neck supple. Cardiovascular: Normal rate, regular rhythm, normal heart sounds and intact distal pulses. Exam reveals no gallop and no friction rub. No murmur heard. Pulmonary/Chest: Effort normal and breath sounds normal. No accessory muscle usage. No tachypnea. No respiratory distress. He has no decreased breath sounds. He has no wheezes. He has no rhonchi. He has no rales. Abdominal: Soft. Bowel sounds are normal. He exhibits no distension. There is no tenderness. Musculoskeletal: Normal range of motion.  He exhibits no edema or tenderness. Lymphadenopathy:     He has no cervical adenopathy. Neurological: He is alert and oriented to person, place, and time. Skin: Skin is warm and dry. He is not diaphoretic. Psychiatric: He has a normal mood and affect. Judgment normal.   Nursing note and vitals reviewed. RESULTS:    CARDIAC MONITOR NOTE:  Cardiac Rhythm: NSR  Rate: 65 bpm     PULSE OXIMETRY NOTE:  Pulse-ox is 100% on room air  Interpretation: wnl     EKG interpretation: (Preliminary)  Rate 64 bpm. NSR. Left axis deviation. EKG read by Gila Hardy MD at 12:48 PM    XR CHEST PORT   Final Result   IMPRESSION:     No acute cardiopulmonary findings. As read by the radiologist.         Labs Reviewed   CBC WITH AUTOMATED DIFF - Abnormal; Notable for the following:        Result Value    HGB 16.3 (*)     HCT 48.4 (*)     MPV 9.0 (*)     NEUTROPHILS 77 (*)     LYMPHOCYTES 14 (*)     ABS.  BASOPHILS 0.1 (*)     All other components within normal limits   METABOLIC PANEL, COMPREHENSIVE - Abnormal; Notable for the following:     Glucose 145 (*)     All other components within normal limits   CARDIAC PANEL,(CK, CKMB & TROPONIN) - Abnormal; Notable for the following:     Troponin-I, Qt. 0.25 (*)     All other components within normal limits   PTT   PROTHROMBIN TIME + INR   D DIMER   HEPATIC FUNCTION PANEL   METABOLIC PANEL, BASIC   LIPID PANEL       Recent Results (from the past 12 hour(s))   EKG, 12 LEAD, INITIAL    Collection Time: 03/25/17 12:48 PM   Result Value Ref Range    Ventricular Rate 64 BPM    Atrial Rate 64 BPM    P-R Interval 152 ms    QRS Duration 104 ms    Q-T Interval 394 ms    QTC Calculation (Bezet) 406 ms    Calculated P Axis 62 degrees    Calculated R Axis -43 degrees    Calculated T Axis 57 degrees    Diagnosis       Normal sinus rhythm  Left axis deviation  Abnormal ECG  No previous ECGs available     CBC WITH AUTOMATED DIFF    Collection Time: 03/25/17  1:24 PM   Result Value Ref Range    WBC 10.1 4.6 - 13.2 K/uL RBC 5.21 4.70 - 5.50 M/uL    HGB 16.3 (H) 13.0 - 16.0 g/dL    HCT 48.4 (H) 36.0 - 48.0 %    MCV 92.9 74.0 - 97.0 FL    MCH 31.3 24.0 - 34.0 PG    MCHC 33.7 31.0 - 37.0 g/dL    RDW 12.7 11.6 - 14.5 %    PLATELET 019 727 - 127 K/uL    MPV 9.0 (L) 9.2 - 11.8 FL    NEUTROPHILS 77 (H) 40 - 73 %    LYMPHOCYTES 14 (L) 21 - 52 %    MONOCYTES 6 3 - 10 %    EOSINOPHILS 2 0 - 5 %    BASOPHILS 1 0 - 2 %    ABS. NEUTROPHILS 7.9 1.8 - 8.0 K/UL    ABS. LYMPHOCYTES 1.4 0.9 - 3.6 K/UL    ABS. MONOCYTES 0.6 0.05 - 1.2 K/UL    ABS. EOSINOPHILS 0.2 0.0 - 0.4 K/UL    ABS. BASOPHILS 0.1 (H) 0.0 - 0.06 K/UL    DF AUTOMATED     METABOLIC PANEL, COMPREHENSIVE    Collection Time: 03/25/17  1:24 PM   Result Value Ref Range    Sodium 141 136 - 145 mmol/L    Potassium 4.1 3.5 - 5.5 mmol/L    Chloride 104 100 - 108 mmol/L    CO2 28 21 - 32 mmol/L    Anion gap 9 3.0 - 18 mmol/L    Glucose 145 (H) 74 - 99 mg/dL    BUN 18 7.0 - 18 MG/DL    Creatinine 0.94 0.6 - 1.3 MG/DL    BUN/Creatinine ratio 19 12 - 20      GFR est AA >60 >60 ml/min/1.73m2    GFR est non-AA >60 >60 ml/min/1.73m2    Calcium 9.2 8.5 - 10.1 MG/DL    Bilirubin, total 0.3 0.2 - 1.0 MG/DL    ALT (SGPT) 31 16 - 61 U/L    AST (SGOT) 18 15 - 37 U/L    Alk.  phosphatase 70 45 - 117 U/L    Protein, total 7.2 6.4 - 8.2 g/dL    Albumin 3.9 3.4 - 5.0 g/dL    Globulin 3.3 2.0 - 4.0 g/dL    A-G Ratio 1.2 0.8 - 1.7     CARDIAC PANEL,(CK, CKMB & TROPONIN)    Collection Time: 03/25/17  1:24 PM   Result Value Ref Range     39 - 308 U/L    CK - MB 2.1 <3.6 ng/ml    CK-MB Index 1.8 0.0 - 4.0 %    Troponin-I, Qt. 0.25 (H) 0.00 - 0.06 NG/ML   PTT    Collection Time: 03/25/17  1:24 PM   Result Value Ref Range    aPTT 30.2 23.0 - 36.4 SEC   PROTHROMBIN TIME + INR    Collection Time: 03/25/17  1:24 PM   Result Value Ref Range    Prothrombin time 12.3 11.5 - 15.2 sec    INR 0.9 0.8 - 1.2     D DIMER    Collection Time: 03/25/17  1:24 PM   Result Value Ref Range    D DIMER 0.28 <0.46 ug/ml(FEU) MDM  Number of Diagnoses or Management Options  Diagnosis management comments: DDx - MI, PE, musculoskeletal pain, Aortic dissection        Amount and/or Complexity of Data Reviewed  Clinical lab tests: ordered and reviewed  Tests in the radiology section of CPT®: ordered and reviewed (CXR)  Tests in the medicine section of CPT®: ordered and reviewed (EKG)      ED Course     MEDICATIONS GIVEN:  Medications   nitroglycerin (NITROBID) 2 % ointment 1 Inch (not administered)   metoprolol tartrate (LOPRESSOR) tablet 12.5 mg (not administered)   atorvastatin (LIPITOR) tablet 40 mg (not administered)   heparin (porcine) 1,000 unit/mL injection 4,000 Units (not administered)   heparin 25,000 units in D5W 250 ml infusion (not administered)   morphine injection 4 mg (4 mg IntraVENous Given 3/25/17 1542)       Procedures    PROGRESS NOTE:  12:49 PM  Initial assessment performed. CONSULT NOTE:   3:11 PM  Luz Elena Torres MD spoke with Sudarshan Pfeiffer. Graciela Mcintyre MD   Specialty: Internal Medicine  Discussed pt's hx, disposition, and available diagnostic and imaging results. Reviewed care plans. Consulting physician agrees to admit to telemetry. ADMISSION NOTE:  3:11 PM  Patient is being admitted to the hospital by Sudarshan Pfeiffer. Graciela Mcintyre MD. The results of their tests and reasons for their admission have been discussed with them and/or available family. They convey agreement and understanding for the need to be admitted and for their admission diagnosis. CONSULT NOTE:   3:29 PM  Luz Elena Torres MD spoke with Valerie Ace MD    Specialty: Cardiology  Discussed pt's hx, disposition, and available diagnostic and imaging results over the telephone. Reviewed care plans. Consulting physician would like pt to be started on Heparin. CONDITIONS ON ADMISSION:  Deep Vein Thrombosis is not present at the time of admission. Thrombosis is not present at the time of admission. Pneumonia is not present at the time of admission.  MRSA is not present at the time of admission. Wound infection is not present at the time of admission. Pressure Ulcer is not present at the time of admission. CLINICAL IMPRESSION    No diagnosis found. SCRIBE ATTESTATION STATEMENT  Documented by:Shemar Aquino for and in the presence of Bian John MD.     PROVIDER ATTESTATION STATEMENT  I personally performed the services described in the documentation, reviewed the documentation, as recorded by the scribe in my presence, and it accurately and completely records my words and actions.   Bina John MD.

## 2017-03-25 NOTE — IP AVS SNAPSHOT
[address] [address] 
[phone] Patient: Manjeet Armendariz 
MRN: [id number] You are allergic to the following Allergen Reactions Other Medication Rash Unsure which medication, pain medication for knee pain Recent Documentation Height Weight BMI Smoking Status 1.676 m 70.8 kg 25.18 kg/m2 Current Every Day Smoker Emergency Contacts Name Discharge Info Relation Home Work Mobile [address] CAREGIVER [3] Spouse [3] [phone] About your hospitalization You were admitted on:  March 25, 2017 You last received care in the:  [facility] You were discharged on:  March 27, 2017 Unit phone number:  [phone] Why you were hospitalized Your primary diagnosis was:  Not on File Your diagnoses also included:  Chest Pain, Cad (Coronary Artery Disease), Hyperlipidemia, Nstemi (Non-St Elevated Myocardial Infarction) ([facility]) Providers Seen During Your Hospitalizations Provider Role Specialty Primary office phone Karmen Treadwell MD Attending Provider Emergency Medicine [phone] Fatoumata Gonzalez MD Attending Provider Internal Medicine [phone] Your Primary Care Physician (PCP) Primary Care Physician Office Phone Office Fax OTHER, PHYS ** None ** ** None ** Follow-up Information Follow up With Details Comments Contact Info Duran Haque MD   Patient can only remember the practice name and not the physician Current Discharge Medication List  
  
START taking these medications Dose & Instructions Dispensing Information Comments Morning Noon Evening Bedtime  
 aspirin 81 mg chewable tablet Your last dose was: Your next dose is:    
   
   
 Dose:  81 mg Take 1 Tab by mouth daily. Quantity:  30 Tab Refills:  2 atorvastatin 40 mg tablet Commonly known as:  LIPITOR Your last dose was: Your next dose is:    
   
   
 Dose:  40 mg Take 1 Tab by mouth daily. Quantity:  30 Tab Refills:  2  
     
   
   
   
  
 clopidogrel 75 mg Tab Commonly known as:  PLAVIX Your last dose was: Your next dose is:    
   
   
 Dose:  75 mg Take 1 Tab by mouth daily. Quantity:  30 Tab Refills:  2  
     
   
   
   
  
 famotidine 20 mg tablet Commonly known as:  PEPCID Your last dose was: Your next dose is:    
   
   
 Dose:  20 mg Take 1 Tab by mouth two (2) times a day. Quantity:  60 Tab Refills:  2  
     
   
   
   
  
 lisinopril 5 mg tablet Commonly known as:  Mollie Ripa Your last dose was: Your next dose is:    
   
   
 Dose:  5 mg Take 1 Tab by mouth daily. Quantity:  30 Tab Refills:  2  
     
   
   
   
  
 metoprolol tartrate 25 mg tablet Commonly known as:  LOPRESSOR Your last dose was: Your next dose is:    
   
   
 Dose:  12.5 mg Take 0.5 Tabs by mouth every twelve (12) hours. Quantity:  30 Tab Refills:  2 CONTINUE these medications which have NOT CHANGED Dose & Instructions Dispensing Information Comments Morning Noon Evening Bedtime CO Q-10 10 mg Cap Generic drug:  coenzyme q10 Your last dose was: Your next dose is: Take  by mouth. Refills:  0  
     
   
   
   
  
 VITAMIN B-12 500 mcg tablet Generic drug:  cyanocobalamin Your last dose was: Your next dose is:    
   
   
 Dose:  500 mcg Take 500 mcg by mouth as needed. Refills:  0  
     
   
   
   
  
 VITAMIN D3 1,000 unit tablet Generic drug:  cholecalciferol Your last dose was: Your next dose is:    
   
   
 Dose:  5000 Units Take 5,000 Units by mouth every seven (7) days. Refills:  0 STOP taking these medications BENADRYL 25 mg capsule Generic drug:  diphenhydrAMINE  
   
  
 diclofenac EC 75 mg EC tablet Commonly known as:  VOLTAREN  
   
  
 GAS-X 80 mg chewable tablet Generic drug:  simethicone  
   
  
 nicotinic acid 500 mg tablet Commonly known as:  NIACIN Where to Get Your Medications Information on where to get these meds will be given to you by the nurse or doctor. ! Ask your nurse or doctor about these medications  
  aspirin 81 mg chewable tablet  
 atorvastatin 40 mg tablet  
 clopidogrel 75 mg Tab  
 famotidine 20 mg tablet  
 lisinopril 5 mg tablet  
 metoprolol tartrate 25 mg tablet Discharge Instructions DISCHARGE SUMMARY from Nurse The following personal items are in your possession at time of discharge: 
 
Dental Appliances: None Visual Aid: Glasses, With patient Home Medications: None Jewelry: None Clothing: Pants, Shirt, Undergarments Other Valuables: None Personal Items Sent to Safe: n/a PATIENT INSTRUCTIONS: 
 
 
F-face looks uneven A-arms unable to move or move unevenly S-speech slurred or non-existent T-time-call 911 as soon as signs and symptoms begin-DO NOT go Back to bed or wait to see if you get better-TIME IS BRAIN. Warning Signs of HEART ATTACK Call 911 if you have these symptoms: 
? Chest discomfort. Most heart attacks involve discomfort in the center of the chest that lasts more than a few minutes, or that goes away and comes back. It can feel like uncomfortable pressure, squeezing, fullness, or pain. ? Discomfort in other areas of the upper body.  Symptoms can include pain or discomfort in one or both arms, the back, neck, jaw, or stomach. ? Shortness of breath with or without chest discomfort. ? Other signs may include breaking out in a cold sweat, nausea, or lightheadedness. Don't wait more than five minutes to call [address]! Fast action can save your life. Calling 911 is almost always the fastest way to get lifesaving treatment. Emergency Medical Services staff can begin treatment when they arrive  up to an hour sooner than if someone gets to the hospital by car. The discharge information has been reviewed with the patient. The patient verbalized understanding. Discharge medications reviewed with the patient and appropriate educational materials and side effects teaching were provided. Patient armband removed and shredded Discharge Instructions Attachments/References ANGINA (ENGLISH) CHEST PAIN (ENGLISH) CORONARY ARTERY DISEASE (ENGLISH) Discharge Orders None [employer] Announcement We are excited to announce that we are making your provider's discharge notes available to you in [employer]. You will see these notes when they are completed and signed by the physician that discharged you from your recent hospital stay. If you have any questions or concerns about any information you see in [employer], please call the Health Information Department where you were seen or reach out to your Primary Care Provider for more information about your plan of care. Introducing \[facility]\"" & HEALTH SERVICES! [facility] introduces [employer] patient portal. Now you can access parts of your medical record, email your doctor's office, and request medication refills online. 1. In your internet browser, go to [email or web address] 2. Click on the First Time User? Click Here link in the Sign In box. You will see the New Member Sign Up page. 3. Enter your [employer] Access Code exactly as it appears below.  You will not need to use this code after youve completed the sign-up process. If you do not sign up before the expiration date, you must request a new code. · Soum Access Code: [email or web address] Expires: 6/23/2017 12:52 PM 
 
4. Enter the last four digits of your Social Security Number (xxxx) and Date of Birth (mm/dd/yyyy) as indicated and click Submit. You will be taken to the next sign-up page. 5. Create a Soum ID. This will be your Soum login ID and cannot be changed, so think of one that is secure and easy to remember. 6. Create a Soum password. You can change your password at any time. 7. Enter your Password Reset Question and Answer. This can be used at a later time if you forget your password. 8. Enter your e-mail address. You will receive e-mail notification when new information is available in [address] 9. Click Sign Up. You can now view and download portions of your medical record. 10. Click the Download Summary menu link to download a portable copy of your medical information. If you have questions, please visit the Frequently Asked Questions section of the Soum website. Remember, Soum is NOT to be used for urgent needs. For medical emergencies, dial 911. Now available from your iPhone and Android! General Information Please provide this summary of care documentation to your next provider. Patient Signature:  ____________________________________________________________ Date:  ____________________________________________________________  
  
Tennille Valle Provider Signature:  ____________________________________________________________ Date:  ____________________________________________________________ More Information Angina: Care Instructions Your Care Instructions You have a problem called angina. Angina happens when there is not enough blood flow to your heart muscle.  Angina is a sign of coronary artery disease (CAD). CAD occurs when blood vessels that supply the heart become narrowed. Having CAD increases your risk of a heart attack. Chest pain or pressure is the most common symptom of angina. But some people have other symptoms, like: 
· Pain, pressure, or a strange feeling in the back, neck, jaw, or upper belly, or in one or both shoulders or arms. · Shortness of breath. · Nausea or vomiting. · Lightheadedness or sudden weakness. · Fast or irregular heartbeat. Women are somewhat more likely than men to have angina symptoms like shortness of breath, nausea, and back or jaw pain. Angina can be dangerous. That's why it is important to pay attention to your symptoms. Know what is typical for you, learn how to control your symptoms, and understand when you need to get treatment. A change in your usual pattern of symptoms is an emergency. It may mean that you are having a heart attack. The doctor has checked you carefully, but problems can develop later. If you notice any problems or new symptoms, get medical treatment right away. Follow-up care is a key part of your treatment and safety. Be sure to make and go to all appointments, and call your doctor if you are having problems. It's also a good idea to know your test results and keep a list of the medicines you take. How can you care for yourself at home? Medicines · If your doctor has given you nitroglycerin for angina symptoms, keep it with you at all times. If you have symptoms, sit down and rest, and take the first dose of nitroglycerin as directed. If your symptoms get worse or are not getting better within 5 minutes, call 911 right away. Stay on the phone. The emergency  will give you further instructions. · If your doctor advises it, take 1 low-dose aspirin a day to prevent heart attack. · Be safe with medicines. Take your medicines exactly as prescribed. Call your doctor if you think you are having a problem with your medicine.  You will get more details on the specific medicines your doctor prescribes. Lifestyle changes · Do not smoke. If you need help quitting, talk to your doctor about stop-smoking programs and medicines. These can increase your chances of quitting for good. · Eat a heart-healthy diet that is low in saturated fat and salt, and is high in fiber. Talk to your doctor or a dietitian about healthy eating. · Stay at a healthy weight. Or lose weight if you need to. Activity · Talk to your doctor about a level of activity that is safe for you. · If an activity causes angina symptoms, stop and rest. 
When should you call for help? Call 911 anytime you think you may need emergency care. For example, call if: 
· You passed out (lost consciousness). · You have symptoms of a heart attack. These may include: ¨ Chest pain or pressure, or a strange feeling in the chest. 
¨ Sweating. ¨ Shortness of breath. ¨ Nausea or vomiting. ¨ Pain, pressure, or a strange feeling in the back, neck, jaw, or upper belly or in one or both shoulders or arms. ¨ Lightheadedness or sudden weakness. ¨ A fast or irregular heartbeat. After you call 911, the  may tell you to chew 1 adult-strength or 2 to 4 low-dose aspirin. Wait for an ambulance. Do not try to drive yourself. · You have angina symptoms that do not go away with rest or are not getting better within 5 minutes after you take a dose of nitroglycerin. Call your doctor now or seek immediate medical care if: 
· You are having angina symptoms more often than usual, or they are different or worse than usual. 
· You feel dizzy or lightheaded, or you feel like you may faint. Watch closely for changes in your health, and be sure to contact your doctor if you have any problems. Where can you learn more? Go to http://jean-pierre-sherif.info/. Enter H129 in the search box to learn more about \"Angina: Care Instructions. \" Current as of: January 27, 2016 Content Version: 11.1 © 4946-1146 [employer]. Care instructions adapted under license by [email or web address] (which disclaims liability or warranty for this information). If you have questions about a medical condition or this instruction, always ask your healthcare professional. [address] any warranty or liability for your use of this information. Chest Pain: Care Instructions Your Care Instructions There are many things that can cause chest pain. Some are not serious and will get better on their own in a few days. But some kinds of chest pain need more testing and treatment. Your doctor may have recommended a follow-up visit in the next 8 to 12 hours. If you are not getting better, you may need more tests or treatment. Even though your doctor has released you, you still need to watch for any problems. The doctor carefully checked you, but sometimes problems can develop later. If you have new symptoms or if your symptoms do not get better, get medical care right away. If you have worse or different chest pain or pressure that lasts more than 5 minutes or you passed out (lost consciousness), call 911 or seek other emergency help right away. A medical visit is only one step in your treatment. Even if you feel better, you still need to do what your doctor recommends, such as going to all suggested follow-up appointments and taking medicines exactly as directed. This will help you recover and help prevent future problems. How can you care for yourself at home? · Rest until you feel better. · Take your medicine exactly as prescribed. Call your doctor if you think you are having a problem with your medicine. · Do not drive after taking a prescription pain medicine. When should you call for help? Call 911 if: 
· You passed out (lost consciousness). · You have severe difficulty breathing. · You have symptoms of a heart attack. These may include: ¨ Chest pain or pressure, or a strange feeling in your chest. 
¨ Sweating. ¨ Shortness of breath. ¨ Nausea or vomiting. ¨ Pain, pressure, or a strange feeling in your back, neck, jaw, or upper belly or in one or both shoulders or arms. ¨ Lightheadedness or sudden weakness. ¨ A fast or irregular heartbeat. After you call 911, the  may tell you to chew 1 adult-strength or 2 to 4 low-dose aspirin. Wait for an ambulance. Do not try to drive yourself. Call your doctor today if: 
· You have any trouble breathing. · Your chest pain gets worse. · You are dizzy or lightheaded, or you feel like you may faint. · You are not getting better as expected. · You are having new or different chest pain. Where can you learn more? Go to http://jean-pierre-sherif.info/. Enter A120 in the search box to learn more about \"Chest Pain: Care Instructions. \" Current as of: May 27, 2016 Content Version: 11.1 © 6352-7648 [employer]. Care instructions adapted under license by [employer] (which disclaims liability or warranty for this information). If you have questions about a medical condition or this instruction, always ask your healthcare professional. [address] any warranty or liability for your use of this information. Coronary Artery Disease: Care Instructions Your Care Instructions The heart is a muscle, and like any muscle, it needs blood to work well. Coronary artery disease occurs when the arteries that bring oxygen-rich blood to your heart have a buildup of plaquedeposits of fats and other substances. Plaque can reduce blood flow to the heart muscle. This can cause angina symptoms such as chest pain or pressure. A heart attack can happen if blood flow is completely blocked. You can do a lot to improve your health and prevent a heart attack.  Eating healthy food, not smoking, getting regular exercise, and taking your medicine are the main things you can do every day to stay healthy. Follow-up care is a key part of your treatment and safety. Be sure to make and go to all appointments, and call your doctor if you are having problems. It's also a good idea to know your test results and keep a list of the medicines you take. How can you care for yourself at home? Medicines · Be safe with medicines. Take your medicines exactly as prescribed. Call your doctor if you think you are having a problem with your medicine. You will get more details on the specific medicines your doctor prescribes. You may need several medicines. ¨ Angiotensin-converting enzyme (ACE) inhibitors, angiotensin II receptor blockers (ARBs), beta-blockers, and statins can help prevent a heart attack. ACE inhibitors, ARBs, and beta-blockers help lower your blood pressure. Statins help lower cholesterol, which is a type of fat that can clog your arteries. ¨ Nitrates can help make chest pain happen less often. ¨ Aspirin and other blood thinners help prevent heart attacks and strokes. · If your doctor has given you nitroglycerin for angina symptoms (such as chest pain or pressure) keep it with you at all times. If you have symptoms, sit down and rest, and take the first dose of nitroglycerin as directed. If your symptoms get worse or are not getting better within 5 minutes, call 911 right away. Stay on the phone. The emergency  will give you further instructions. · Be sure to tell your doctor about any angina symptoms that you have had, even if they went away. · Do not take any over-the-counter medicines, vitamins, or herbal products without talking to your doctor first. 
Lifestyle Ask your doctor if a cardiac rehab program is right for you. Cardiac rehab can help you make lifestyle changes. In cardiac rehab, a team of health professionals provides education and support to help you make new, healthy habits. · Do not smoke. Avoid secondhand smoke too. Smoking can increase your risk of a heart attack or stroke. If you need help quitting, talk to your doctor about stop-smoking programs and medicines. These can increase your chances of quitting for good. · Eat a heart-healthy diet that is high in fiber and low in saturated fat and sodium. ¨ Learn what a serving is. A \"serving\" and a \"portion\" are not always the same thing. Make sure that you are not eating larger portions than recommended. For example, a serving of pasta is ½ cup. A serving size of meat is 2 to 3 ounces; a 3-ounce serving is about the size of a deck of cards. ¨ Eat a variety of grain products every day. Include whole-grain foods such as oats, whole wheat bread, and brown rice. ¨ Eat fish, skinless poultry, lean meats, and soy products such as tofu instead of high-fat meats. Cut out all visible fat when you prepare meat. Eat at least 2 servings of fish a week. ¨ Eat a variety of fruit and vegetables every day. They have lots of nutrients that help protect against heart disease, and they have littleif anyfat. Keep carrots, celery, and other veggies handy for snacks. Buy fruit that is in season and store it where you can see it so that you will be tempted to eat it. Cook dishes that have a lot of veggies in them, such as stir-fried dishes and soups. ¨ Read food labels and try to avoid saturated fat and trans fat. They increase your risk of heart disease. Bake, broil, grill, or steam foods instead of frying them. Use olive or canola oil when you cook. Try cholesterol-lowering spreads, such as Benecol or Take Control. ¨ Limit sodium. Your doctor may recommend that you limit sodium to less than 1,500 mg a day. ¨ Limit processed foods, including cookies and crackers. ¨ Limit drinks and foods with added sugar. ¨ Choose low-fat or fat-free milk and dairy products. ¨ Limit alcohol to 2 drinks a day for men and 1 drink a day for women.  Too much alcohol can cause health problems. · If your doctor recommends it, get more exercise. Walking is a good choice. Bit by bit, increase the amount you walk every day. Try for at least 30 minutes on most days of the week. You also may want to swim, bike, or do other activities. · Stay at a healthy weight. Lose weight if you need to. · Talk to your family, friends, or a therapist about your feelings. It is normal to feel upset about having this disease and to feel afraid of having a heart attack. Talking openly about your feelings can help you cope. If you think you have symptoms of depression, talk to your doctor. · Avoid colds and flu. Get a pneumococcal vaccine shot. If you have had one before, ask your doctor whether you need another dose. Get a flu vaccine every year. If you must be around people with colds or flu, wash your hands often. When should you call for help? Call 911 anytime you think you may need emergency care. For example, call if: 
· You have symptoms of a heart attack. These may include: ¨ Chest pain or pressure, or a strange feeling in the chest. 
¨ Sweating. ¨ Shortness of breath. ¨ Nausea or vomiting. ¨ Pain, pressure, or a strange feeling in the back, neck, jaw, or upper belly or in one or both shoulders or arms. ¨ Lightheadedness or sudden weakness. ¨ A fast or irregular heartbeat. After you call 911, the  may tell you to chew 1 adult-strength or 2 to 4 low-dose aspirin. Wait for an ambulance. Do not try to drive yourself. · You have angina symptoms (such as chest pain or pressure) that do not go away with rest or are not getting better within 5 minutes after you take a dose of nitroglycerin. · You passed out (lost consciousness). Call your doctor now or seek immediate medical care if: 
· You are having angina symptoms, such as chest pain or pressure, more often than usual, or they are different or worse than usual. 
· You have new or increased shortness of breath. · You are dizzy or lightheaded, or you feel like you may faint. Watch closely for changes in your health, and be sure to contact your doctor if you have any problems. Where can you learn more? Go to http://jean-pierre-sherif.info/. Enter M187 in the search box to learn more about \"Coronary Artery Disease: Care Instructions. \" Current as of: January 27, 2016 Content Version: 11.1 © [id number] [employer]. Care instructions adapted under license by [employer] (which disclaims liability or warranty for this information). If you have questions about a medical condition or this instruction, always ask your healthcare professional. [address] any warranty or liability for your use of this information.

## 2017-03-26 LAB
CHOLEST SERPL-MCNC: 204 MG/DL
HDLC SERPL-MCNC: 36 MG/DL (ref 40–60)
HDLC SERPL: 5.7 {RATIO} (ref 0–5)
LDLC SERPL CALC-MCNC: 150.2 MG/DL (ref 0–100)
LIPID PROFILE,FLP: ABNORMAL
TRIGL SERPL-MCNC: 89 MG/DL (ref ?–150)
TROPONIN I SERPL-MCNC: 23.22 NG/ML (ref 0–0.06)
TSH SERPL DL<=0.05 MIU/L-ACNC: 0.93 UIU/ML (ref 0.36–3.74)
VLDLC SERPL CALC-MCNC: 17.8 MG/DL

## 2017-03-26 PROCEDURE — 80061 LIPID PANEL: CPT | Performed by: INTERNAL MEDICINE

## 2017-03-26 PROCEDURE — 74011250636 HC RX REV CODE- 250/636: Performed by: INTERNAL MEDICINE

## 2017-03-26 PROCEDURE — 65660000000 HC RM CCU STEPDOWN

## 2017-03-26 PROCEDURE — 36415 COLL VENOUS BLD VENIPUNCTURE: CPT | Performed by: INTERNAL MEDICINE

## 2017-03-26 PROCEDURE — 74011250637 HC RX REV CODE- 250/637: Performed by: EMERGENCY MEDICINE

## 2017-03-26 PROCEDURE — 93306 TTE W/DOPPLER COMPLETE: CPT

## 2017-03-26 PROCEDURE — 74011250637 HC RX REV CODE- 250/637: Performed by: INTERNAL MEDICINE

## 2017-03-26 PROCEDURE — 84484 ASSAY OF TROPONIN QUANT: CPT | Performed by: INTERNAL MEDICINE

## 2017-03-26 RX ORDER — ENOXAPARIN SODIUM 100 MG/ML
1 INJECTION SUBCUTANEOUS EVERY 12 HOURS
Status: DISCONTINUED | OUTPATIENT
Start: 2017-03-26 | End: 2017-03-27 | Stop reason: HOSPADM

## 2017-03-26 RX ORDER — METOPROLOL TARTRATE 25 MG/1
12.5 TABLET, FILM COATED ORAL EVERY 12 HOURS
Status: DISCONTINUED | OUTPATIENT
Start: 2017-03-26 | End: 2017-03-27 | Stop reason: HOSPADM

## 2017-03-26 RX ORDER — FAMOTIDINE 20 MG/1
20 TABLET, FILM COATED ORAL 2 TIMES DAILY
Status: DISCONTINUED | OUTPATIENT
Start: 2017-03-26 | End: 2017-03-27 | Stop reason: HOSPADM

## 2017-03-26 RX ORDER — SODIUM CHLORIDE 0.9 % (FLUSH) 0.9 %
5-10 SYRINGE (ML) INJECTION AS NEEDED
Status: DISCONTINUED | OUTPATIENT
Start: 2017-03-26 | End: 2017-03-26 | Stop reason: SDUPTHER

## 2017-03-26 RX ORDER — ENOXAPARIN SODIUM 100 MG/ML
40 INJECTION SUBCUTANEOUS EVERY 24 HOURS
Status: DISCONTINUED | OUTPATIENT
Start: 2017-03-26 | End: 2017-03-26

## 2017-03-26 RX ORDER — SODIUM CHLORIDE 0.9 % (FLUSH) 0.9 %
5-10 SYRINGE (ML) INJECTION EVERY 8 HOURS
Status: DISCONTINUED | OUTPATIENT
Start: 2017-03-26 | End: 2017-03-27 | Stop reason: HOSPADM

## 2017-03-26 RX ADMIN — Medication 10 ML: at 01:42

## 2017-03-26 RX ADMIN — ASPIRIN 81 MG: 81 TABLET, CHEWABLE ORAL at 10:03

## 2017-03-26 RX ADMIN — Medication 10 ML: at 00:18

## 2017-03-26 RX ADMIN — NITROGLYCERIN 1 INCH: 20 OINTMENT TOPICAL at 07:14

## 2017-03-26 RX ADMIN — FAMOTIDINE 20 MG: 20 TABLET ORAL at 01:42

## 2017-03-26 RX ADMIN — Medication 10 ML: at 07:15

## 2017-03-26 RX ADMIN — DOCUSATE SODIUM 100 MG: 100 CAPSULE, LIQUID FILLED ORAL at 00:16

## 2017-03-26 RX ADMIN — DOCUSATE SODIUM 100 MG: 100 CAPSULE, LIQUID FILLED ORAL at 10:03

## 2017-03-26 RX ADMIN — CLOPIDOGREL BISULFATE 75 MG: 75 TABLET, FILM COATED ORAL at 10:03

## 2017-03-26 RX ADMIN — FAMOTIDINE 20 MG: 20 TABLET ORAL at 22:06

## 2017-03-26 RX ADMIN — ENOXAPARIN SODIUM 70 MG: 80 INJECTION SUBCUTANEOUS at 13:55

## 2017-03-26 RX ADMIN — SODIUM CHLORIDE 75 ML/HR: 900 INJECTION, SOLUTION INTRAVENOUS at 14:34

## 2017-03-26 RX ADMIN — ATORVASTATIN CALCIUM 40 MG: 20 TABLET, FILM COATED ORAL at 10:03

## 2017-03-26 RX ADMIN — FAMOTIDINE 20 MG: 20 TABLET ORAL at 10:03

## 2017-03-26 RX ADMIN — DOCUSATE SODIUM 100 MG: 100 CAPSULE, LIQUID FILLED ORAL at 22:06

## 2017-03-26 RX ADMIN — METOPROLOL TARTRATE 25 MG: 25 TABLET ORAL at 13:54

## 2017-03-26 RX ADMIN — SODIUM CHLORIDE 75 ML/HR: 900 INJECTION, SOLUTION INTRAVENOUS at 10:06

## 2017-03-26 RX ADMIN — NITROGLYCERIN 1 INCH: 20 OINTMENT TOPICAL at 13:54

## 2017-03-26 RX ADMIN — Medication 10 ML: at 17:37

## 2017-03-26 RX ADMIN — METOPROLOL TARTRATE 25 MG: 25 TABLET ORAL at 07:14

## 2017-03-26 RX ADMIN — Medication 10 ML: at 22:06

## 2017-03-26 RX ADMIN — ENOXAPARIN SODIUM 70 MG: 80 INJECTION SUBCUTANEOUS at 01:41

## 2017-03-26 RX ADMIN — ACETAMINOPHEN 650 MG: 325 TABLET ORAL at 00:27

## 2017-03-26 RX ADMIN — METOPROLOL TARTRATE 12.5 MG: 25 TABLET, FILM COATED ORAL at 22:06

## 2017-03-26 RX ADMIN — ATORVASTATIN CALCIUM 20 MG: 20 TABLET, FILM COATED ORAL at 00:17

## 2017-03-26 RX ADMIN — NITROGLYCERIN 1 INCH: 20 OINTMENT TOPICAL at 00:17

## 2017-03-26 RX ADMIN — ACETAMINOPHEN 500 MG: 500 TABLET ORAL at 10:02

## 2017-03-26 RX ADMIN — METOPROLOL TARTRATE 25 MG: 25 TABLET ORAL at 00:16

## 2017-03-26 RX ADMIN — LISINOPRIL 5 MG: 5 TABLET ORAL at 10:03

## 2017-03-26 RX ADMIN — ACETAMINOPHEN 650 MG: 325 TABLET ORAL at 14:34

## 2017-03-26 NOTE — PROCEDURES
[address] [facility] CATH LAB    Name:  Maggie Snyder  MR#:  [id number]  :  1955  Account #:  [de-identified]  Date of Adm:  2017  Date of Service:  2017      ESTIMATED BLOOD LOSS: none    SPECIMENS REMOVED: none    REASON FOR PROCEDURE: Non-ST elevation myocardial infarction. PROCEDURES PERFORMED  1. Left heart catheterization. 2. Selective coronary angiogram.  3. Fractional flow reserve measurement of the mid left circumflex  artery, which was nonischemic 0.90, IFFR. COUNSELING: Risks, benefits, and alternatives were discussed in  detail with the patient and wife and family. They all agreed to proceed. COUNSELING: Risks, benefits, and alternatives were discussed in  detail with the patient. The patient agreed to proceed for the above  procedure. PROCEDURE AND TECHNIQUE: The patient was brought to the  cardiac catheterization lab in a fasting and nonsedated state. The  patient was prepped and draped in the usual sterilized fashion. The  right radial area was anesthetized with local anesthetic. A 6-Tamazight  sheath was placed under fluoroscopic guidance without any  complication, and a 5-Tamazight JR4 catheter was used to perform the left  heart catheterization and LV gram. LVEDP was 8 mmHg, ejection  fraction was 50% to 55% without any obvious wall motion abnormality. A 5-Tamazight JL3.5 diagnostic catheter was used to perform the  selective angiography of the left coronary system, and multiple  angiographic views were acquired. The patient was given Angiomax  during the procedure, and then EBU 6-Tamazight 3.0 guide catheter was  used to engage the left coronary system. Standard FFR wire protocol  was used to normalize  and then FFR wire was placed distal to the  mid left circumflex lesion. IFFR was measured multiple times and  came back nonischemic, 0.90. On pullback, there was not a major  gradient as well.     The procedure was completed without any complication. Radial sheath  was pulled out. TR band was applied. FINDINGS  1. This is a right dominant coronary anatomy. LVEDP is 8 to 10 mmHg  without any gradient from LV to aorta. 2. The left main artery is normal in size without any disease, divides  into LAD and left circumflex artery. Left main artery is patent in its  ostium, body, and at the site of bifurcation. Left main artery trifurcates  into large LAD, a very small ramus intermedius branch, and a  moderate-sized left circumflex artery. 3. The LAD is patent in its ostium, proximal mid and distal area, with  mild luminal irregularity. Mid LAD has mild bridging, and small diagonal  branches also have D1, D2, and D3 has minimal ostial disease. 4. Left circumflex artery is a moderate-sized vessel, patent in ostium,  proximal area, very tortuous vessel. In the mid left circumflex artery,  there is a 50% to 60% lesion, and distal left circumflex artery and large  OM branch is patent with minimal luminal irregularity. 5. Right coronary artery. The RCA is a large vessel, patent in ostium,  proximal area. Mid area, there is a 20% to 30% lesion. Distal RCA is  patent, divides into small PLV and PDA branches, which are also  patent. FINAL IMPRESSION  1. Moderate disease in the mid left circumflex artery with a  nonischemic fractional flow reserve of 0.90 IFFR. 2. Mild plaquing in 3 coronary arteries. RECOMMENDATIONS: At this point, continue aspirin, Plavix, statin,  beta blocker, cessation of smoking, and risk factor management. If the  patient has recurrent chest pain, we will consider a possible stress test  in future versus fixing the left circumflex artery in future if the patient  has symptoms on maximal anti-ischemic treatment. Treatment plan  was discussed with the patient and family.         Dereje Barajas MD MA / ANA  D:  03/25/2017   21:07  T:  03/26/2017   05:34  Job #:  [id number]

## 2017-03-26 NOTE — PROGRESS NOTES
1930: Assumed patient care, he was resting quietly in bed with no signs of distress noted. Patient was alert and orientated to person, place, time and situation. Respiratory status remained stable on room air. Vital signs remained stable. MEWS score was a one. Patient denied any pain, discomfort, nausea, vomiting, dizziness or anxiety. White board was updated and explained. Bed was locked and in lowest position. Call bell, water and personal belongings were within reach. Patient had no questions comments or concerns after bedside shift report. Patient was taken to the Cath Lab during bedside shift report. Heparin was rate verified with JERARDO Moser, the off going nurse. 2215: Patient returned from the Cath Lab. Respiratory status, vital signs and MEWS score were stable. Patient had no complaints of pain. Cath site was clean, dry and intact with a wrist immobilizer and TR band in place. No signs of active bleeding or hematoma noted. 0700: Patient had an uneventful shift. Respiratory status, vital signs and MEWS score remain stable. Bed was locked and in lowest position. Patient had no questions, comments or concerns after bedside shift report.

## 2017-03-26 NOTE — CONSULTS
[address] [place] [address] Rd    Name:  Herberth Bush  MR#:  [id number]  :  1955  Account #:  [de-identified]  Date of Adm:  2017  Date of Consultation:  2017      PRIMARY CARE PHYSICIAN: The patient does not have any primary  care physician. REASON FOR CONSULTATION: Chest pain with troponin elevation of  0.25 with normal CK-MB. HISTORY OF PRESENT ILLNESS: This patient is a 40-year-old  gentleman with history of smoking. He has been smoking more than a  pack per day from the age of 16+ years and has some history of  arthritis, has not seen any primary care physician in many years. [facility]ses  [facility] on as-needed basis. Came to the hospital when he had  chest pain at 11:00, precordial chest pain, chest tightness, associated  with mild sweating. At this point, the patient is almost chest pain free. He is anxious. He initially expressed the wish to go home, and at this  point, denied fever, cough, or pleuritic chest pain, denied any swelling  in the lower extremities, also. The chest pain was dull in character,  precordial, with minimal back radiation, with mild sweating. The patient  is at this point chest pain free, comfortable, not in any distress, not  using any accessory muscles of respiration. PAST MEDICAL HISTORY: Significant for smoking history, and  arthritis. ALLERGIES: REVIEWED AND DISCUSSED WITH THE PATIENT. REVIEW OF SYSTEMS: A 10-point review of systems completed and  positive pertinent findings discussed in the history of present illness. SOCIAL HISTORY: He has been smoking at the age of 12. Denied  any alcohol or any drug abuse in the past also. FAMILY HISTORY: Noncontributory for premature coronary artery  disease. Parents  because of heart attack in their 62s and 76s. PHYSICAL EXAMINATION  VITAL SIGNS: Temperature is 98, heart rate is 82, respiration rate is  12 per minute, blood pressure is 124/86, weight is 72.6 kg.  Height is 5  feet 6 inches. HEENT: Head is atraumatic, normocephalic. NECK: Supple. No JVD, no carotid bruits. HEART: S1, S2 audible. LUNGS: Bilateral air entry positive, no added sound. ABDOMEN: Soft, nontender. Bowel sounds audible. EXTREMITIES: No edema. Pulses are palpable. NEUROLOGIC: No focal motor or sensory deficit. DERMATOLOGIC: No skin rash. MUSCULOSKELETAL: No obvious joint deformity. LABORATORY DATA: EKG sinus rhythm, no significant ST and T  changes except borderline ST changes in V1, V2, and mild left axis  deviation. Troponin is 0.25, CK-MB is 2.1, total CK is 119. Creatinine is  0.94. Cholesterol is 233, LDL is 163. ASSESSMENT AND PLAN  1. Chest pain. 2. Acute coronary syndrome/non ST elevation myocardial infarction. 3. Dyslipidemia. 4. Arthritis. 5. Chronic smoking history. RECOMMENDATIONS: We will treat the patient as acute coronary  syndrome with antiplatelet treatment with aspirin, heparin, nitroglycerin  paste, beta blocker. Will get the echocardiogram, serial cardiac  troponin. I have discussed in detail with the patient and wife and the  family, if troponin trending down, we may consider possible stress test;  if troponin continues to trend up, and depending upon the  echocardiogram, then we will consider possible cardiac catheterization. They all agreed to proceed. Will monitor the patient in the hospital.  Treatment plan was discussed also with Dr. Cori Arellano.         Rashmi Mccarthy MD MA / ANA  D:  03/25/2017   17:34  T:  03/25/2017   23:12  Job #:  [id number]

## 2017-03-26 NOTE — PROGRESS NOTES
0800 Assumed pt care. Pt alert and oriented. Denied any complains @ this time. Pt S/P Cardia cath , Rt wrist accessed. Same dry and intact, immobilizer in,place and Tr band. Good peripheral check. 1000 Pt called c/o HA 5/10, medicated with tylenol. Pt on nitro paste. [address] is resolved    1430 Pt HA returned 5/10, tylenol adm as per MAR.     1530 Pt's BRUSH relieved. Dr Rickey Montgomery, D/cd nitropaste. Immobilizer removed from Rt wrist. Transparent dressing dry and intact, good pulses. B/P 80's/50's Pt asymptomatic-Dr Grajeda notified and made some changes to meds. 1830 Pt left in bed resting, maintained on ASA and Plavix post cath. No further CP. HA is relieved well. No acute distress.

## 2017-03-26 NOTE — PROGRESS NOTES
Hospitalist Progress Note    Patient: Vanessa Escalera MRN: [id number]  CSN: [id number]    YOB: 1955  Age: 58 y.o. Sex: male    DOA: 3/25/2017 LOS:  LOS: 1 day              IMPRESSION and Plan:    Vanessa Escalera is a 58 y.o. male with   Patient Active Problem List    Diagnosis Date Noted    Chest pain 03/25/2017     Active Problems:    Chest pain (3/25/2017)      1) ACS/ NSTEMI with elevated Trop  - s/p cath -- moderate left cirumflex ds and mild plaques in 3 coronary arteries. Cont curretn rx with plavix, asa, staign, and b-blocker. Ntp. Await cardiology input. Echo today    2) Nicotine use  -- extensive d/w pt regarding cessation. 3) HTN - cont current rx    4) HLD - lipitor started    Patient's condition is improving        Recommend to continue hospitalization. Discussed with patient. Chief Complaints:   Chief Complaint   Patient presents with    Chest Pain     SUBJECTIVE:  Pt is seen and examined. He states he had cp early this am. Currently cp free    No SOB  No Fever, chills, Nausea, vomitting. Review of systems:    General: No fevers or chills. Cardiovascular: No chest pain or pressure. No palpitations. Pulmonary: shortness of breath.    Gastrointestinal: No nausea, vomiting    PE:  Patient Vitals for the past 24 hrs:   BP Temp Pulse Resp SpO2 Height Weight   03/26/17 0238 108/68 98.2 °F (36.8 °C) 64 16 97 % - 73 kg (161 lb)   03/25/17 2230 118/78 98.1 °F (36.7 °C) 64 15 100 % - -   03/25/17 2215 106/76 98.3 °F (36.8 °C) 62 23 98 % 5' 6\" (1.676 m) 73 kg (161 lb)   03/25/17 2102 115/80 98.1 °F (36.7 °C) 61 15 97 % - -   03/25/17 1930 - - - - 100 % 5' 6\" (1.676 m) 72.6 kg (160 lb)   03/25/17 1609 124/86 98 °F (36.7 °C) 82 10 98 % - -   03/25/17 1428 (!) 145/91 - 65 20 98 % - -   03/25/17 1328 (!) 149/95 97.6 °F (36.4 °C) 65 20 98 % 5' 6\" (1.676 m) 72.6 kg (160 lb)       Intake/Output Summary (Last 24 hours) at 03/26/17 0801  Last data filed at 03/26/17 9389   Gross per 24 hour   Intake              480 ml   Output              200 ml   Net              280 ml     Patient Vitals for the past 120 hrs:   Weight   03/25/17 1328 72.6 kg (160 lb)   03/25/17 1930 72.6 kg (160 lb)   03/25/17 2215 73 kg (161 lb)   03/26/17 0238 73 kg (161 lb)           HEENT: Perrla, EOMI; oral mucosa well prefused; Conjunctiva not injected  Neck: No JVD, Negative carotid bruits, normal pulses; No thyromegaly  Resp: CTA bilaterally; No wheezes or rales  CV: RRR s1s2 No murmur; No rubs; PMI not displaced  Abd: Positive Bowel Sounds, Soft, Nontender  Ext: No clubbing; No cyanosis;  No edema  Neuro: Alert and oriented; Nonfocal  Skin: Warm, Dry, Intact  Pulses: 2+ DP/PT/Rad      Intake and Output:  Current Shift:     Last three shifts:  03/24 1901 - 03/26 0700  In: 480 [P.O.:480]  Out: 200 [Urine:200]    Lab/Data Reviewed:  Recent Results (from the past 8 hour(s))   TROPONIN I    Collection Time: 03/26/17  3:00 AM   Result Value Ref Range    Troponin-I, Qt. 23.22 (HH) 0.00 - 0.06 NG/ML   LIPID PANEL    Collection Time: 03/26/17  3:00 AM   Result Value Ref Range    LIPID PROFILE          Cholesterol, total 204 (H) <200 MG/DL    Triglyceride 89 <150 MG/DL    HDL Cholesterol 36 (L) 40 - 60 MG/DL    LDL, calculated 150.2 (H) 0 - 100 MG/DL    VLDL, calculated 17.8 MG/DL    CHOL/HDL Ratio 5.7 (H) 0 - 5.0       Medications:  Current Facility-Administered Medications   Medication Dose Route Frequency    sodium chloride (NS) flush 5-10 mL  5-10 mL IntraVENous Q8H    sodium chloride (NS) flush 5-10 mL  5-10 mL IntraVENous PRN    enoxaparin (LOVENOX) injection 70 mg  1 mg/kg SubCUTAneous Q12H    famotidine (PEPCID) tablet 20 mg  20 mg Oral BID    atorvastatin (LIPITOR) tablet 40 mg  40 mg Oral DAILY    sodium chloride (NS) flush 5-10 mL  5-10 mL IntraVENous Q8H    sodium chloride (NS) flush 5-10 mL  5-10 mL IntraVENous PRN    magnesium hydroxide (MILK OF MAGNESIA) 400 mg/5 mL oral suspension 30 mL  30 mL Oral DAILY PRN    docusate sodium (COLACE) capsule 100 mg  100 mg Oral BID    0.9% sodium chloride infusion  75 mL/hr IntraVENous CONTINUOUS    acetaminophen (TYLENOL) tablet 500 mg  500 mg Oral Q6H PRN    nitroglycerin (NITROSTAT) tablet 0.4 mg  0.4 mg SubLINGual Q5MIN PRN    aspirin chewable tablet 81 mg  81 mg Oral DAILY    clopidogrel (PLAVIX) tablet 75 mg  75 mg Oral DAILY    lisinopril (PRINIVIL, ZESTRIL) tablet 5 mg  5 mg Oral DAILY    atorvastatin (LIPITOR) tablet 20 mg  20 mg Oral QHS    acetaminophen (TYLENOL) tablet 650 mg  650 mg Oral Q4H PRN    nitroglycerin (NITROBID) 2 % ointment 1 Inch  1 Inch Topical Q6H    metoprolol tartrate (LOPRESSOR) tablet 25 mg  25 mg Oral Q6H       Recent Results (from the past 24 hour(s))   EKG, 12 LEAD, INITIAL    Collection Time: 03/25/17 12:48 PM   Result Value Ref Range    Ventricular Rate 64 BPM    Atrial Rate 64 BPM    P-R Interval 152 ms    QRS Duration 104 ms    Q-T Interval 394 ms    QTC Calculation (Bezet) 406 ms    Calculated P Axis 62 degrees    Calculated R Axis -43 degrees    Calculated T Axis 57 degrees    Diagnosis       Normal sinus rhythm  Left axis deviation  Abnormal ECG  No previous ECGs available     CBC WITH AUTOMATED DIFF    Collection Time: 03/25/17  1:24 PM   Result Value Ref Range    WBC 10.1 4.6 - 13.2 K/uL    RBC 5.21 4.70 - 5.50 M/uL    HGB 16.3 (H) 13.0 - 16.0 g/dL    HCT 48.4 (H) 36.0 - 48.0 %    MCV 92.9 74.0 - 97.0 FL    MCH 31.3 24.0 - 34.0 PG    MCHC 33.7 31.0 - 37.0 g/dL    RDW 12.7 11.6 - 14.5 %    PLATELET 354 156 - 937 K/uL    MPV 9.0 (L) 9.2 - 11.8 FL    NEUTROPHILS 77 (H) 40 - 73 %    LYMPHOCYTES 14 (L) 21 - 52 %    MONOCYTES 6 3 - 10 %    EOSINOPHILS 2 0 - 5 %    BASOPHILS 1 0 - 2 %    ABS. NEUTROPHILS 7.9 1.8 - 8.0 K/UL    ABS. LYMPHOCYTES 1.4 0.9 - 3.6 K/UL    ABS. MONOCYTES 0.6 0.05 - 1.2 K/UL    ABS. EOSINOPHILS 0.2 0.0 - 0.4 K/UL    ABS.  BASOPHILS 0.1 (H) 0.0 - 0.06 K/UL    DF AUTOMATED     METABOLIC PANEL, COMPREHENSIVE Collection Time: 03/25/17  1:24 PM   Result Value Ref Range    Sodium 141 136 - 145 mmol/L    Potassium 4.1 3.5 - 5.5 mmol/L    Chloride 104 100 - 108 mmol/L    CO2 28 21 - 32 mmol/L    Anion gap 9 3.0 - 18 mmol/L    Glucose 145 (H) 74 - 99 mg/dL    BUN 18 7.0 - 18 MG/DL    Creatinine 0.94 0.6 - 1.3 MG/DL    BUN/Creatinine ratio 19 12 - 20      GFR est AA >60 >60 ml/min/1.73m2    GFR est non-AA >60 >60 ml/min/1.73m2    Calcium 9.2 8.5 - 10.1 MG/DL    Bilirubin, total 0.3 0.2 - 1.0 MG/DL    ALT (SGPT) 31 16 - 61 U/L    AST (SGOT) 18 15 - 37 U/L    Alk.  phosphatase 70 45 - 117 U/L    Protein, total 7.2 6.4 - 8.2 g/dL    Albumin 3.9 3.4 - 5.0 g/dL    Globulin 3.3 2.0 - 4.0 g/dL    A-G Ratio 1.2 0.8 - 1.7     CARDIAC PANEL,(CK, CKMB & TROPONIN)    Collection Time: 03/25/17  1:24 PM   Result Value Ref Range     39 - 308 U/L    CK - MB 2.1 <3.6 ng/ml    CK-MB Index 1.8 0.0 - 4.0 %    Troponin-I, Qt. 0.25 (H) 0.00 - 0.06 NG/ML   PTT    Collection Time: 03/25/17  1:24 PM   Result Value Ref Range    aPTT 30.2 23.0 - 36.4 SEC   PROTHROMBIN TIME + INR    Collection Time: 03/25/17  1:24 PM   Result Value Ref Range    Prothrombin time 12.3 11.5 - 15.2 sec    INR 0.9 0.8 - 1.2     D DIMER    Collection Time: 03/25/17  1:24 PM   Result Value Ref Range    D DIMER 0.28 <0.46 ug/ml(FEU)   LIPID PANEL    Collection Time: 03/25/17  1:24 PM   Result Value Ref Range    LIPID PROFILE          Cholesterol, total 233 (H) <200 MG/DL    Triglyceride 177 (H) <150 MG/DL    HDL Cholesterol 34 (L) 40 - 60 MG/DL    LDL, calculated 163.6 (H) 0 - 100 MG/DL    VLDL, calculated 35.4 MG/DL    CHOL/HDL Ratio 6.9 (H) 0 - 5.0     METABOLIC PANEL, BASIC    Collection Time: 03/25/17  6:00 PM   Result Value Ref Range    Sodium 140 136 - 145 mmol/L    Potassium 4.2 3.5 - 5.5 mmol/L    Chloride 104 100 - 108 mmol/L    CO2 26 21 - 32 mmol/L    Anion gap 10 3.0 - 18 mmol/L    Glucose 108 (H) 74 - 99 mg/dL    BUN 17 7.0 - 18 MG/DL    Creatinine 0.96 0.6 - 1.3 MG/DL    BUN/Creatinine ratio 18 12 - 20      GFR est AA >60 >60 ml/min/1.73m2    GFR est non-AA >60 >60 ml/min/1.73m2    Calcium 9.6 8.5 - 10.1 MG/DL   HEPATIC FUNCTION PANEL    Collection Time: 03/25/17  6:00 PM   Result Value Ref Range    Protein, total 7.5 6.4 - 8.2 g/dL    Albumin 4.1 3.4 - 5.0 g/dL    Globulin 3.4 2.0 - 4.0 g/dL    A-G Ratio 1.2 0.8 - 1.7      Bilirubin, total 0.5 0.2 - 1.0 MG/DL    Bilirubin, direct 0.1 0.0 - 0.2 MG/DL    Alk. phosphatase 78 45 - 117 U/L    AST (SGOT) 60 (H) 15 - 37 U/L    ALT (SGPT) 36 16 - 61 U/L   TROPONIN I    Collection Time: 03/25/17  6:00 PM   Result Value Ref Range    Troponin-I, Qt. 9.91 (HH) 0.00 - 0.06 NG/ML   PROTHROMBIN TIME + INR    Collection Time: 03/25/17  6:00 PM   Result Value Ref Range    Prothrombin time 12.9 11.5 - 15.2 sec    INR 1.0 0.8 - 1.2     CBC WITH AUTOMATED DIFF    Collection Time: 03/25/17  6:00 PM   Result Value Ref Range    WBC 9.6 4.6 - 13.2 K/uL    RBC 5.29 4.70 - 5.50 M/uL    HGB 16.7 (H) 13.0 - 16.0 g/dL    HCT 48.7 (H) 36.0 - 48.0 %    MCV 92.1 74.0 - 97.0 FL    MCH 31.6 24.0 - 34.0 PG    MCHC 34.3 31.0 - 37.0 g/dL    RDW 12.7 11.6 - 14.5 %    PLATELET 228 153 - 695 K/uL    MPV 9.1 (L) 9.2 - 11.8 FL    NEUTROPHILS 79 (H) 40 - 73 %    LYMPHOCYTES 12 (L) 21 - 52 %    MONOCYTES 9 3 - 10 %    EOSINOPHILS 0 0 - 5 %    BASOPHILS 0 0 - 2 %    ABS. NEUTROPHILS 7.6 1.8 - 8.0 K/UL    ABS. LYMPHOCYTES 1.1 0.9 - 3.6 K/UL    ABS. MONOCYTES 0.8 0.05 - 1.2 K/UL    ABS. EOSINOPHILS 0.0 0.0 - 0.4 K/UL    ABS.  BASOPHILS 0.0 0.0 - 0.06 K/UL    DF AUTOMATED     PTT    Collection Time: 03/25/17  6:00 PM   Result Value Ref Range    aPTT 107.4 (H) 23.0 - 36.4 SEC   HEMOGLOBIN A1C WITH EAG    Collection Time: 03/25/17  6:00 PM   Result Value Ref Range    Hemoglobin A1c 5.6 4.5 - 5.6 %    Est. average glucose 114 mg/dL   TROPONIN I    Collection Time: 03/25/17 11:00 PM   Result Value Ref Range    Troponin-I, Qt. 22.43 (HH) 0.00 - 0.06 NG/ML   TROPONIN I Collection Time: 03/26/17  3:00 AM   Result Value Ref Range    Troponin-I, Qt. 23.22 (HH) 0.00 - 0.06 NG/ML   LIPID PANEL    Collection Time: 03/26/17  3:00 AM   Result Value Ref Range    LIPID PROFILE          Cholesterol, total 204 (H) <200 MG/DL    Triglyceride 89 <150 MG/DL    HDL Cholesterol 36 (L) 40 - 60 MG/DL    LDL, calculated 150.2 (H) 0 - 100 MG/DL    VLDL, calculated 17.8 MG/DL    CHOL/HDL Ratio 5.7 (H) 0 - 5.0     Cath:  FINAL IMPRESSION  1. Moderate disease in the mid left circumflex artery with a  nonischemic fractional flow reserve of 0.90 IFFR. 2. Mild plaquing in 3 coronary arteries.   Procedures/imaging: see electronic medical records for all procedures/Xrays and details which were not copied into this note but were reviewed prior to creation of Peng Lewis MD   3/26/2017, 8:01 AM

## 2017-03-26 NOTE — ROUTINE PROCESS
Bedside and Verbal shift change report given to Titus Jackson RN (oncoming nurse) by Belen Brady RN  (offgoing nurse).  Report included the following information SBAR, Kardex and Cardiac Rhythm SR.

## 2017-03-26 NOTE — PROGRESS NOTES
TRANSFER - OUT REPORT:  Verbal report given to Dario Chen RN on [employer] being transferred to  for routine progression of care   Report consisted of patients Situation, Background, Assessment and   Recommendations(SBAR). Information from the following report(s) Procedure Summary was reviewed with the receiving nurse. Cath Lab Report:    Procedure:  [x ] LHC  [ ] RHC  [ ] PTCA   [ ] Peripheral   [ ] Pacemaker [ ] JACINTA  [ ] [address]    Access site:   [x ] Radial     [ ] Brachial     [ ] Femoral    [ ] Jugular   [ ] Chest Wall       Sheath:           [x ] Pulled in Cath Lab   [ ] In place   [ ] To be pulled after:         Closure:          [x ] TR Band [ ] Radial Band  [x ] Right [ ] Left    [ ] Manual Pressure     [ ] Samanta Scarlet     [ ] Star Close    [ ] Per Close    [ ] Safe Guard    Site Assessment:   [x ] Clean, Dry, No bleeding    [ ] Minor oozing          [ ] Hematoma: Description:    Stents(s) Placement:  [ ] Left Main:                 [ ] LAD:                [ ] Circ:                [ ] RCA:                [ ] EF:     [ ] Peripheral:      [x ] N/A    Infusion [ ]Angiomax [ ] Integrelin [x ] Heparin d/c'd:  1947    Intra procedure Medications:    Fentanyl:  100 mcg  Versed: 2 mg  Other: Plavix 300 mg  Antiplatelet:    Lines:        Peripheral IV 03/25/17 Right Antecubital (Active)   Site Assessment Clean, dry, & intact 3/25/2017  6:18 PM   Phlebitis Assessment 0 3/25/2017  6:18 PM   Infiltration Assessment 0 3/25/2017  6:18 PM   Dressing Status Clean, dry, & intact 3/25/2017  6:18 PM   Dressing Type Tape;Transparent 3/25/2017  6:18 PM   Hub Color/Line Status Pink; Infusing 3/25/2017  6:18 PM   Action Taken Open ports on tubing capped 3/25/2017  6:18 PM   Alcohol Cap Used Yes 3/25/2017  6:18 PM       Peripheral IV 03/25/17 Left Forearm (Active)   Site Assessment Clean, dry, & intact 3/25/2017  6:18 PM   Phlebitis Assessment 0 3/25/2017  6:18 PM   Infiltration Assessment 0 3/25/2017  6:18 PM   Dressing Status Clean, dry, & intact 3/25/2017  6:18 PM   Dressing Type Tape;Transparent 3/25/2017  6:18 PM   Hub Color/Line Status Pink;Capped;Flushed 3/25/2017  6:18 PM   Action Taken Open ports on tubing capped 3/25/2017  6:18 PM   Alcohol Cap Used Yes 3/25/2017  6:18 PM          Patient Vitals for the past 4 hrs:   Temp Pulse Resp BP SpO2   03/25/17 2102 98.1 °F (36.7 °C) 61 15 115/80 97 %         Extended / Orthostatic Vitals:    Vital Signs  Level of Consciousness: Alert (03/25/17 2102)  Temp: 98.1 °F (36.7 °C) (03/25/17 2102)  Temp Source: Oral (03/25/17 2102)  Pulse (Heart Rate): 61 (03/25/17 2102)  Heart Rate Source: Monitor (03/25/17 2102)  Cardiac Rhythm: Normal sinus rhythm (03/25/17 2102)  Resp Rate: 15 (03/25/17 2102)  BP: 115/80 (03/25/17 2102)  MAP (Calculated): 92 (03/25/17 2102)  BP 1 Location: Left arm (03/25/17 2102)  BP 1 Method: Automatic (03/25/17 2102)  BP Patient Position: At rest;Supine (03/25/17 2102)  MEWS Score: 1 (03/25/17 2102)         Oxygen Therapy  O2 Sat (%): 97 % (03/25/17 2102)  O2 Device: Room air (03/25/17 1613)          Opportunity for questions and clarification was provided.

## 2017-03-26 NOTE — PROGRESS NOTES
Cardiology Progress Note        Patient: Jackeline Silveira        Sex: male          DOA: 3/25/2017  YOB: 1955      Age:  58 y.o.        LOS:  LOS: 1 day   Assessment/Plan     Active Problems:    Chest pain (3/25/2017)      NSTEMI  Plan:  [address],  sinus bradycardia  CAD  Etiology of NSTEMI is spasm with CAD  Moderate disease in mid LCX and small vessel CAD. Continue with aspirin and Plavix, statin, metoprolol and life style changes with smoking cessation. EF 55%  Possible DC home in AM  Discussed with pt and wife. Subjective:    cc:  NO and SOB      REVIEW OF SYSTEMS: NO CP, SOB, N,V,D, F,C  Objective:      Visit Vitals    BP (!) 83/57 (BP 1 Location: Right arm, BP Patient Position: Sitting)    Pulse 60    Temp 98.1 °F (36.7 °C)    Resp 18    Ht 5' 6\" (1.676 m)    Wt 73 kg (161 lb)    SpO2 97%    BMI 25.99 kg/m2     Body mass index is 25.99 kg/(m^2). Physical Exam:  General Appearance: Comfortable, not using accessory muscles of respiration. HEENT: JASPER. HEAD: Atraumatic  NECK: No JVD,   LUNGS: Clear bilaterally. HEART: S1+S2 audible, no murmur, no pericardial rub. ABD: Non-tender, BS Audible    EXT: No edema, and no cysnosis. VASCULAR EXAM: Pulses are intact. PSYCHIATRIC EXAM: Mood is appropriate. Right radial no hematoma, good pulse.     Medication:  Current Facility-Administered Medications   Medication Dose Route Frequency    sodium chloride (NS) flush 5-10 mL  5-10 mL IntraVENous Q8H    enoxaparin (LOVENOX) injection 70 mg  1 mg/kg SubCUTAneous Q12H    famotidine (PEPCID) tablet 20 mg  20 mg Oral BID    atorvastatin (LIPITOR) tablet 40 mg  40 mg Oral DAILY    sodium chloride (NS) flush 5-10 mL  5-10 mL IntraVENous PRN    magnesium hydroxide (MILK OF MAGNESIA) 400 mg/5 mL oral suspension 30 mL  30 mL Oral DAILY PRN    docusate sodium (COLACE) capsule 100 mg  100 mg Oral BID    0.9% sodium chloride infusion 75 mL/hr IntraVENous CONTINUOUS    acetaminophen (TYLENOL) tablet 500 mg  500 mg Oral Q6H PRN    nitroglycerin (NITROSTAT) tablet 0.4 mg  0.4 mg SubLINGual Q5MIN PRN    aspirin chewable tablet 81 mg  81 mg Oral DAILY    clopidogrel (PLAVIX) tablet 75 mg  75 mg Oral DAILY    lisinopril (PRINIVIL, ZESTRIL) tablet 5 mg  5 mg Oral DAILY    acetaminophen (TYLENOL) tablet 650 mg  650 mg Oral Q4H PRN    metoprolol tartrate (LOPRESSOR) tablet 25 mg  25 mg Oral Q6H               Lab/Data Reviewed: All lab results for the last 24 hours reviewed.      Recent Labs      03/25/17   1800  03/25/17   1324   WBC  9.6  10.1   HGB  16.7*  16.3*   HCT  48.7*  48.4*   PLT  280  270     Recent Labs      03/25/17   1800  03/25/17   1324   NA  140  141   K  4.2  4.1   CL  104  104   CO2  26  28   GLU  108*  145*   BUN  17  18   CREA  0.96  0.94   CA  9.6  9.2       Signed By: Thera Sicard, MD     March 26, 2017

## 2017-03-26 NOTE — ROUTINE PROCESS
Bedside and Verbal shift change report given to FRANCINE Spangler RN (oncoming nurse) by Renuka Rodriguez RN  (offgoing nurse). Report included the following information SBAR, Kardex, Cardiac Rhythm SR and Alarm Parameters .

## 2017-03-27 VITALS
TEMPERATURE: 97.4 F | OXYGEN SATURATION: 99 % | DIASTOLIC BLOOD PRESSURE: 58 MMHG | WEIGHT: 156 LBS | HEART RATE: 60 BPM | SYSTOLIC BLOOD PRESSURE: 90 MMHG | RESPIRATION RATE: 17 BRPM | BODY MASS INDEX: 25.07 KG/M2 | HEIGHT: 66 IN

## 2017-03-27 PROBLEM — I21.4 NSTEMI (NON-ST ELEVATED MYOCARDIAL INFARCTION) (HCC): Status: ACTIVE | Noted: 2017-03-27

## 2017-03-27 PROBLEM — I25.10 CAD (CORONARY ARTERY DISEASE): Status: ACTIVE | Noted: 2017-03-27

## 2017-03-27 PROBLEM — E78.5 HYPERLIPIDEMIA: Status: ACTIVE | Noted: 2017-03-27

## 2017-03-27 LAB
ALBUMIN SERPL BCP-MCNC: 3 G/DL (ref 3.4–5)
ALBUMIN/GLOB SERPL: 1 {RATIO} (ref 0.8–1.7)
ALP SERPL-CCNC: 68 U/L (ref 45–117)
ALT SERPL-CCNC: 33 U/L (ref 16–61)
ANION GAP BLD CALC-SCNC: 3 MMOL/L (ref 3–18)
AST SERPL W P-5'-P-CCNC: 84 U/L (ref 15–37)
BASOPHILS # BLD AUTO: 0 K/UL (ref 0–0.06)
BASOPHILS # BLD: 1 % (ref 0–2)
BILIRUB SERPL-MCNC: 0.7 MG/DL (ref 0.2–1)
BUN SERPL-MCNC: 11 MG/DL (ref 7–18)
BUN/CREAT SERPL: 13 (ref 12–20)
CALCIUM SERPL-MCNC: 8.1 MG/DL (ref 8.5–10.1)
CHLORIDE SERPL-SCNC: 108 MMOL/L (ref 100–108)
CK MB CFR SERPL CALC: 6.9 % (ref 0–4)
CK MB SERPL-MCNC: 27.7 NG/ML (ref 5–25)
CK SERPL-CCNC: 402 U/L (ref 39–308)
CO2 SERPL-SCNC: 28 MMOL/L (ref 21–32)
CREAT SERPL-MCNC: 0.88 MG/DL (ref 0.6–1.3)
DIFFERENTIAL METHOD BLD: ABNORMAL
EOSINOPHIL # BLD: 0.2 K/UL (ref 0–0.4)
EOSINOPHIL NFR BLD: 2 % (ref 0–5)
ERYTHROCYTE [DISTWIDTH] IN BLOOD BY AUTOMATED COUNT: 12.8 % (ref 11.6–14.5)
GLOBULIN SER CALC-MCNC: 2.9 G/DL (ref 2–4)
GLUCOSE SERPL-MCNC: 97 MG/DL (ref 74–99)
HCT VFR BLD AUTO: 42.8 % (ref 36–48)
HGB BLD-MCNC: 13.9 G/DL (ref 13–16)
LYMPHOCYTES # BLD AUTO: 18 % (ref 21–52)
LYMPHOCYTES # BLD: 1.6 K/UL (ref 0.9–3.6)
MCH RBC QN AUTO: 30.5 PG (ref 24–34)
MCHC RBC AUTO-ENTMCNC: 32.5 G/DL (ref 31–37)
MCV RBC AUTO: 93.9 FL (ref 74–97)
MONOCYTES # BLD: 1.1 K/UL (ref 0.05–1.2)
MONOCYTES NFR BLD AUTO: 13 % (ref 3–10)
NEUTS SEG # BLD: 5.7 K/UL (ref 1.8–8)
NEUTS SEG NFR BLD AUTO: 66 % (ref 40–73)
PHOSPHATE SERPL-MCNC: 2.1 MG/DL (ref 2.5–4.9)
PLATELET # BLD AUTO: 221 K/UL (ref 135–420)
PMV BLD AUTO: 9.1 FL (ref 9.2–11.8)
POTASSIUM SERPL-SCNC: 4.5 MMOL/L (ref 3.5–5.5)
PROT SERPL-MCNC: 5.9 G/DL (ref 6.4–8.2)
RBC # BLD AUTO: 4.56 M/UL (ref 4.7–5.5)
SODIUM SERPL-SCNC: 139 MMOL/L (ref 136–145)
TROPONIN I SERPL-MCNC: 14.33 NG/ML (ref 0–0.06)
TROPONIN I SERPL-MCNC: ABNORMAL NG/ML (ref 0–0.04)
WBC # BLD AUTO: 8.6 K/UL (ref 4.6–13.2)

## 2017-03-27 PROCEDURE — 74011250637 HC RX REV CODE- 250/637: Performed by: INTERNAL MEDICINE

## 2017-03-27 PROCEDURE — 74011250637 HC RX REV CODE- 250/637: Performed by: EMERGENCY MEDICINE

## 2017-03-27 PROCEDURE — 84100 ASSAY OF PHOSPHORUS: CPT | Performed by: INTERNAL MEDICINE

## 2017-03-27 PROCEDURE — 85025 COMPLETE CBC W/AUTO DIFF WBC: CPT | Performed by: INTERNAL MEDICINE

## 2017-03-27 PROCEDURE — 74011250636 HC RX REV CODE- 250/636: Performed by: INTERNAL MEDICINE

## 2017-03-27 PROCEDURE — 84484 ASSAY OF TROPONIN QUANT: CPT | Performed by: INTERNAL MEDICINE

## 2017-03-27 PROCEDURE — 82550 ASSAY OF CK (CPK): CPT | Performed by: INTERNAL MEDICINE

## 2017-03-27 PROCEDURE — 80053 COMPREHEN METABOLIC PANEL: CPT | Performed by: INTERNAL MEDICINE

## 2017-03-27 PROCEDURE — 36415 COLL VENOUS BLD VENIPUNCTURE: CPT | Performed by: INTERNAL MEDICINE

## 2017-03-27 RX ORDER — CLOPIDOGREL BISULFATE 75 MG/1
75 TABLET ORAL DAILY
Qty: 30 TAB | Refills: 2 | Status: SHIPPED | OUTPATIENT
Start: 2017-03-27 | End: 2022-10-31 | Stop reason: CLARIF

## 2017-03-27 RX ORDER — FAMOTIDINE 20 MG/1
20 TABLET, FILM COATED ORAL 2 TIMES DAILY
Qty: 60 TAB | Refills: 2 | Status: SHIPPED | OUTPATIENT
Start: 2017-03-27

## 2017-03-27 RX ORDER — LISINOPRIL 5 MG/1
5 TABLET ORAL DAILY
Qty: 30 TAB | Refills: 2 | Status: SHIPPED | OUTPATIENT
Start: 2017-03-27 | End: 2022-10-31 | Stop reason: CLARIF

## 2017-03-27 RX ORDER — METOPROLOL TARTRATE 25 MG/1
12.5 TABLET, FILM COATED ORAL EVERY 12 HOURS
Qty: 30 TAB | Refills: 2 | Status: SHIPPED | OUTPATIENT
Start: 2017-03-27 | End: 2022-10-31 | Stop reason: CLARIF

## 2017-03-27 RX ORDER — ATORVASTATIN CALCIUM 40 MG/1
40 TABLET, FILM COATED ORAL DAILY
Qty: 30 TAB | Refills: 2 | Status: SHIPPED | OUTPATIENT
Start: 2017-03-27

## 2017-03-27 RX ORDER — GUAIFENESIN 100 MG/5ML
81 LIQUID (ML) ORAL DAILY
Qty: 30 TAB | Refills: 2 | Status: SHIPPED | OUTPATIENT
Start: 2017-03-27 | End: 2022-10-31 | Stop reason: CLARIF

## 2017-03-27 RX ADMIN — ENOXAPARIN SODIUM 70 MG: 80 INJECTION SUBCUTANEOUS at 01:37

## 2017-03-27 RX ADMIN — Medication 10 ML: at 07:02

## 2017-03-27 RX ADMIN — METOPROLOL TARTRATE 12.5 MG: 25 TABLET, FILM COATED ORAL at 09:37

## 2017-03-27 RX ADMIN — ASPIRIN 81 MG: 81 TABLET, CHEWABLE ORAL at 09:37

## 2017-03-27 RX ADMIN — LISINOPRIL 5 MG: 5 TABLET ORAL at 09:37

## 2017-03-27 RX ADMIN — FAMOTIDINE 20 MG: 20 TABLET ORAL at 09:37

## 2017-03-27 RX ADMIN — CLOPIDOGREL BISULFATE 75 MG: 75 TABLET, FILM COATED ORAL at 09:38

## 2017-03-27 RX ADMIN — ATORVASTATIN CALCIUM 40 MG: 20 TABLET, FILM COATED ORAL at 09:37

## 2017-03-27 RX ADMIN — DOCUSATE SODIUM 100 MG: 100 CAPSULE, LIQUID FILLED ORAL at 09:38

## 2017-03-27 NOTE — PROGRESS NOTES
Dual AVS reviewed with Ar Kent RN. All medications reviewed individually with patient. Opportunities for questions and concerns provided. Patient discharged via (mode of transport ie. Car, ambulance or air transport) car  Patient's arm band appropriately discarded.

## 2017-03-27 NOTE — DISCHARGE SUMMARY
Discharge Summary    Patient: Manjeet Armendariz MRN: [id number]  CSN: [id number]    YOB: 1955  Age: 58 y.o. Sex: male    DOA: 3/25/2017 LOS:  LOS: 2 days   Discharge Date:      Primary Care Provider:  Duran Haque MD    Admission Diagnoses: Chest pain    Discharge Diagnoses:    Problem List as of 3/27/2017  Never Reviewed          Codes Class Noted - Resolved    CAD (coronary artery disease) ICD-10-CM: I25.10  ICD-9-CM: 414.00  3/27/2017 - Present        Hyperlipidemia ICD-10-CM: E78.5  ICD-9-CM: 272.4  3/27/2017 - Present        NSTEMI (non-ST elevated myocardial infarction) [facility]) ICD-10-CM: I21.4  ICD-9-CM: 410.70  3/27/2017 - Present        Chest pain ICD-10-CM: R07.9  ICD-9-CM: 786.50  3/25/2017 - Present              Discharge Medications:     Current Discharge Medication List      START taking these medications    Details   aspirin 81 mg chewable tablet Take 1 Tab by mouth daily. Qty: 30 Tab, Refills: 2      atorvastatin (LIPITOR) 40 mg tablet Take 1 Tab by mouth daily. Qty: 30 Tab, Refills: 2      clopidogrel (PLAVIX) 75 mg tab Take 1 Tab by mouth daily. Qty: 30 Tab, Refills: 2      lisinopril (PRINIVIL, ZESTRIL) 5 mg tablet Take 1 Tab by mouth daily. Qty: 30 Tab, Refills: 2      metoprolol tartrate (LOPRESSOR) 25 mg tablet Take 0.5 Tabs by mouth every twelve (12) hours. Qty: 30 Tab, Refills: 2      famotidine (PEPCID) 20 mg tablet Take 1 Tab by mouth two (2) times a day. Qty: 60 Tab, Refills: 2         CONTINUE these medications which have NOT CHANGED    Details   coenzyme q10 (CO Q-10) 10 mg cap Take  by mouth. cholecalciferol (VITAMIN D3) 1,000 unit tablet Take 5,000 Units by mouth every seven (7) days. cyanocobalamin (VITAMIN B-12) 500 mcg tablet Take 500 mcg by mouth as needed.          STOP taking these medications       simethicone (GAS-X) 80 mg chewable tablet Comments:   Reason for Stopping:         diclofenac EC (VOLTAREN) 75 mg EC tablet Comments:   Reason for Stopping:         diphenhydrAMINE (BENADRYL) 25 mg capsule Comments:   Reason for Stopping:         nicotinic acid (NIACIN) 500 mg tablet Comments:   Reason for Stopping:               Discharge Condition: Good    Procedures : cardiac cath    Consults: Cardiology      PHYSICAL EXAM   Visit Vitals    BP 90/58 (BP 1 Location: Left arm, BP Patient Position: At rest)    Pulse 60    Temp 97.4 °F (36.3 °C)    Resp 17    Ht 5' 6\" (1.676 m)    Wt 70.8 kg (156 lb)    SpO2 99%    BMI 25.18 kg/m2     General: Awake, cooperative, no acute distress    HEENT: NC, Atraumatic. PERRLA, EOMI. Anicteric sclerae. Lungs:  CTA Bilaterally. No Wheezing/Rhonchi/Rales. Heart:  Regular  rhythm,  No murmur, No Rubs, No Gallops  Abdomen: Soft, Non distended, Non tender. +Bowel sounds,   Extremities: No c/c/e  Psych:   Not anxious or agitated. Neurologic:  No acute neurological deficits. Admission HPI : per Dr. Diane Edward is a 58 y.o. male being admitted to the hospital with chest pain. He states he started having CP and left arm pain 3-4 hours ago . He is currently cp free. He continues to smoke. Hospital Course :   NSTEMI - patient admitted to telemetry, he was seen and followed by cardiology, his troponin peaked at 23. He was started on aspirin, plavix, full dose lovenox, beta blocker and lisinopril He underwent cardiac cath with findings of moderate disease in mid LCX and small vessel CAD. Cardiology recommended continuing on aspirin, plavix, beta blocker and lisinopril. Strongly advised to stop smoking. Etiology of his NSTEMI is spasm with CAD. He had mildly elevated lipid panel, started on statin.    He will follow up with PCP and cardiology     Activity: Activity as tolerated    Diet: Cardiac Diet    Follow-up: PCP, cardiology    Disposition: home    Minutes spent on discharge: 45       Labs: Results:       Chemistry Recent Labs      03/27/17   0234  03/25/17   1800 03/25/17   1324   GLU  97  108*  145*   NA  139  140  141   K  4.5  4.2  4.1   CL  108  104  104   CO2  28  26  28   BUN  11  17  18   CREA  0.88  0.96  0.94   CA  8.1*  9.6  9.2   AGAP  3  10  9   BUCR  13  18  19   AP  68  78  70   TP  5.9*  7.5  7.2   ALB  3.0*  4.1  3.9   GLOB  2.9  3.4  3.3   AGRAT  1.0  1.2  1.2      CBC w/Diff Recent Labs      03/27/17   0234  03/25/17   1800  03/25/17   1324   WBC  8.6  9.6  10.1   RBC  4.56*  5.29  5.21   HGB  13.9  16.7*  16.3*   HCT  42.8  48.7*  48.4*   PLT  221  280  270   GRANS  66  79*  77*   LYMPH  18*  12*  14*   EOS  2  0  2      Cardiac Enzymes Recent Labs      03/27/17   0234  03/25/17   1324   CPK  402*  119   CKND1  6.9*  1.8      Coagulation Recent Labs      03/25/17   1800  03/25/17   1324   PTP  12.9  12.3   INR  1.0  0.9   APTT  107.4*  30.2       Lipid Panel Lab Results   Component Value Date/Time    Cholesterol, total 204 03/26/2017 03:00 AM    HDL Cholesterol 36 03/26/2017 03:00 AM    LDL, calculated 150.2 03/26/2017 03:00 AM    VLDL, calculated 17.8 03/26/2017 03:00 AM    Triglyceride 89 03/26/2017 03:00 AM    CHOL/HDL Ratio 5.7 03/26/2017 03:00 AM      BNP No results for input(s): BNPP in the last 72 hours. Liver Enzymes Recent Labs      03/27/17   0234   TP  5.9*   ALB  3.0*   AP  68   SGOT  84*      Thyroid Studies Lab Results   Component Value Date/Time    TSH 0.93 03/25/2017 06:00 PM            Significant Diagnostic Studies: Xr Chest Port    Result Date: 3/25/2017  EXAM: AP portable view of the chest INDICATION: Chest pain COMPARISON: None. _______________ FINDINGS: Cardiac silhouette is normal in size. Normal mediastinal contour and pulmonary vasculature. The lungs are without focal airspace consolidation, large pleural effusion, or pneumothoraces. There are degenerative changes within the right acromioclavicular joint. _______________     IMPRESSION: No acute cardiopulmonary findings.          No results found for this or any previous visit.        CC: Duran Haque MD

## 2017-03-27 NOTE — PROGRESS NOTES
0730 Assumed care of pt from Deepak Knight RN. Pt resting quietly in bed eating breakfast , no signs of distress, call bell within reach. Assessment completed, denies pain, RA, AOx4, pt states he's \"excited to go home\" rt radial wrist +2 pulse, no hematoma or bleeding noted, tegaderm clean dry and intact, pt to walk around nurses station after breakfast.     0937 Pt in and out of room to ambulate around nurses station denies any chest pain, shortness of breath, fatigue or feeling light headed, educated pt of medication and smoking cessation pt states \"he is done with cigarettes, and stopping cold turkey\" declines Colace, had BM this morning    1113 IDR at bedside, pt did not have any additional questions at this time. Shift Summary- Shift uneventful. Pt did not complain of chest pain, discomfort, shortness of breath or dizziness. Spent majority of the shift walking the nurses station waiting to get discharged.

## 2017-03-27 NOTE — DISCHARGE INSTRUCTIONS
DISCHARGE SUMMARY from Nurse    The following personal items are in your possession at time of discharge:    Dental Appliances: None  Visual Aid: Glasses, With patient     Home Medications: None  Jewelry: None  Clothing: Pants, Shirt, Undergarments  Other Valuables: None  Personal Items Sent to Safe: n/a          PATIENT INSTRUCTIONS:    After general anesthesia or intravenous sedation, for 24 hours or while taking prescription Narcotics:  · Limit your activities  · Do not drive and operate hazardous machinery  · Do not make important personal or business decisions  · Do  not drink alcoholic beverages  · If you have not urinated within 8 hours after discharge, please contact your surgeon on call. Report the following to your surgeon:  · Excessive pain, swelling, redness or odor of or around the surgical area  · Temperature over 100.5  · Nausea and vomiting lasting longer than 4 hours or if unable to take medications  · Any signs of decreased circulation or nerve impairment to extremity: change in color, persistent  numbness, tingling, coldness or increase pain  · Any questions        What to do at Home:  Recommended activity: Activity as tolerated     please follow up with primary care physician and cardiologist in two weeks. *  Please give a list of your current medications to your Primary Care Provider. *  Please update this list whenever your medications are discontinued, doses are      changed, or new medications (including over-the-counter products) are added. *  Please carry medication information at all times in case of emergency situations. These are general instructions for a healthy lifestyle:    No smoking/ No tobacco products/ Avoid exposure to second hand smoke    Surgeon General's Warning:  Quitting smoking now greatly reduces serious risk to your health.     Obesity, smoking, and sedentary lifestyle greatly increases your risk for illness    A healthy diet, regular physical exercise & weight monitoring are important for maintaining a healthy lifestyle    You may be retaining fluid if you have a history of heart failure or if you experience any of the following symptoms:  Weight gain of 3 pounds or more overnight or 5 pounds in a week, increased swelling in our hands or feet or shortness of breath while lying flat in bed. Please call your doctor as soon as you notice any of these symptoms; do not wait until your next office visit. Recognize signs and symptoms of STROKE:    F-face looks uneven    A-arms unable to move or move unevenly    S-speech slurred or non-existent    T-time-call 911 as soon as signs and symptoms begin-DO NOT go       Back to bed or wait to see if you get better-TIME IS BRAIN. Warning Signs of HEART ATTACK     Call 911 if you have these symptoms:   Chest discomfort. Most heart attacks involve discomfort in the center of the chest that lasts more than a few minutes, or that goes away and comes back. It can feel like uncomfortable pressure, squeezing, fullness, or pain.  Discomfort in other areas of the upper body. Symptoms can include pain or discomfort in one or both arms, the back, neck, jaw, or stomach.  Shortness of breath with or without chest discomfort.  Other signs may include breaking out in a cold sweat, nausea, or lightheadedness. Don't wait more than five minutes to call 911 - MINUTES MATTER! Fast action can save your life. Calling 911 is almost always the fastest way to get lifesaving treatment. Emergency Medical Services staff can begin treatment when they arrive -- up to an hour sooner than if someone gets to the hospital by car. The discharge information has been reviewed with the patient. The patient verbalized understanding. Discharge medications reviewed with the patient and appropriate educational materials and side effects teaching were provided.     Patient armband removed and shredded

## 2017-03-27 NOTE — PROGRESS NOTES
Patient discharged will be returning back home,plans to follow up with cardiac rehab as recommended pt also states he will be following up with patient first,no further cm needs addressed. Care Management Interventions  PCP Verified by CM: Yes  Palliative Care Consult (Criteria: CHF and RRAT>21): No  Reason for No Palliative Care Consult: Other (see comment)  Mode of Transport at Discharge: Self  Transition of Care Consult (CM Consult): Discharge Planning  MyChart Signup: No  Discharge Durable Medical Equipment: No  Health Maintenance Reviewed: Yes  Physical Therapy Consult: No  Occupational Therapy Consult: No  Speech Therapy Consult: No  Current Support Network:  Other  Confirm Follow Up Transport: Self  Plan discussed with Pt/Family/Caregiver: Yes  Discharge Location  Discharge Placement: Home

## 2017-03-27 NOTE — PROGRESS NOTES
1930: Assumed patient care, he was resting quietly in bed with no signs of distress noted. Patient was alert and orientated to person, place, time and situation. Respiratory status remained stable on room air. Vital signs remained stable. MEWS score was a one. Right radial cath site was clean, dry and intact with no active bleeding or signs of infection noted. Patient denied any pain, discomfort, nausea, vomiting, dizziness or anxiety. White board was updated and explained. Bed was locked and in lowest position. Call bell, water and personal belongings were within reach. Patient had no questions comments or concerns after bedside shift report. 0700: Patient had an uneventful shift. Respiratory status, vital signs and MEWS score remain stable. Bed was locked and in lowest position. Patient had no questions, comments or concerns after bedside shift report.

## 2017-04-02 LAB
ATRIAL RATE: 64 BPM
CALCULATED P AXIS, ECG09: 62 DEGREES
CALCULATED R AXIS, ECG10: -43 DEGREES
CALCULATED T AXIS, ECG11: 57 DEGREES
DIAGNOSIS, 93000: NORMAL
P-R INTERVAL, ECG05: 152 MS
Q-T INTERVAL, ECG07: 394 MS
QRS DURATION, ECG06: 104 MS
QTC CALCULATION (BEZET), ECG08: 406 MS
VENTRICULAR RATE, ECG03: 64 BPM

## 2019-10-06 NOTE — IP AVS SNAPSHOT
Summary of Care Report The Summary of Care report has been created to help improve care coordination. Users with access to [employer] or [address] (Web-based application) may access additional patient information including the Discharge Summary. If you are not currently a [address] user and need more information, please call the number listed below in the [place] [address] section and ask to be connected with Medical Records. Facility Information Name Address Phone [address] [address] [phone] Patient Information Patient Name Sex  Ana Lennon ([id number]) Male 1955 Discharge Information Admitting Provider Service Area Unit Ashwin Rick MD / [address] [facility]/Med / [phone] Discharge Provider Discharge Date/Time Discharge Disposition Destination (none) 3/27/2017 (Pending) AHR (none) Patient Language Language ENGLISH [13] Hospital Problems as of 3/27/2017  Never Reviewed Class Noted - Resolved Last Modified POA Active Problems Chest pain  3/25/2017 - Present 3/25/2017 by Hans Patel MD Unknown Entered by Hans Patel MD  
  CAD (coronary artery disease)  3/27/2017 - Present 3/27/2017 by Caroline Lewis MD Unknown Entered by Caroline Lewis MD  
  Hyperlipidemia  3/27/2017 - Present 3/27/2017 by Caroline Lewis MD Unknown Entered by Caroline Lewis MD  
  NSTEMI (non-ST elevated myocardial infarction) ([facility] [address]  3/27/2017 - Present 3/27/2017 by Caroline Lewis MD Unknown Entered by Caroline Lewis MD  
  
You are allergic to the following Allergen Reactions Other Medication Rash Unsure which medication, pain medication for knee pain Current Discharge Medication List  
  
START taking these medications Dose & Instructions Dispensing Information Comments  
 aspirin 81 mg chewable tablet Dose:  81 mg Take 1 Tab by mouth daily. Quantity:  30 Tab Refills:  2  
   
 atorvastatin 40 mg tablet Commonly known as:  LIPITOR Dose:  40 mg Take 1 Tab by mouth daily. Quantity:  30 Tab Refills:  2  
   
 clopidogrel 75 mg Tab Commonly known as:  PLAVIX Dose:  75 mg Take 1 Tab by mouth daily. Quantity:  30 Tab Refills:  2  
   
 famotidine 20 mg tablet Commonly known as:  PEPCID Dose:  20 mg Take 1 Tab by mouth two (2) times a day. Quantity:  60 Tab Refills:  2  
   
 lisinopril 5 mg tablet Commonly known as:  [place] Dose:  5 mg Take 1 Tab by mouth daily. Quantity:  30 Tab Refills:  2  
   
 metoprolol tartrate 25 mg tablet Commonly known as:  LOPRESSOR Dose:  12.5 mg Take 0.5 Tabs by mouth every twelve (12) hours. Quantity:  30 Tab Refills:  2 CONTINUE these medications which have NOT CHANGED Dose & Instructions Dispensing Information Comments CO Q-10 10 mg Cap Generic drug:  coenzyme q10 Take  by mouth. Refills:  0  
   
 VITAMIN B-12 500 mcg tablet Generic drug:  cyanocobalamin Dose:  500 mcg Take 500 mcg by mouth as needed. Refills:  0  
   
 VITAMIN D3 1,000 unit tablet Generic drug:  cholecalciferol Dose:  5000 Units Take 5,000 Units by mouth every seven (7) days. Refills:  0 STOP taking these medications Comments BENADRYL 25 mg capsule Generic drug:  diphenhydrAMINE  
   
   
 diclofenac EC 75 mg EC tablet Commonly known as:  VOLTAREN  
   
   
 GAS-X 80 mg chewable tablet Generic drug:  simethicone  
   
   
 nicotinic acid 500 mg tablet Commonly known as:  NIACIN Current Immunizations Name Date Influenza Vaccine 9/25/2016 Follow-up Information Follow up With Details Comments Contact Info Duran Haque MD   Patient can only remember the practice name and not the physician Discharge Instructions DISCHARGE SUMMARY from Nurse The following personal items are in your possession at time of discharge: 
 
Dental Appliances: None Visual Aid: Glasses, With patient Home Medications: None Jewelry: None Clothing: Pants, Shirt, Undergarments Other Valuables: None Personal Items Sent to Safe: n/a PATIENT INSTRUCTIONS: 
 
After general anesthesia or intravenous sedation, for 24 hours or while taking prescription Narcotics: · Limit your activities · Do not drive and operate hazardous machinery · Do not make important personal or business decisions · Do  not drink alcoholic beverages · If you have not urinated within 8 hours after discharge, please contact your surgeon on call. Report the following to your surgeon: 
· Excessive pain, swelling, redness or odor of or around the surgical area · Temperature over 100.5 · Nausea and vomiting lasting longer than 4 hours or if unable to take medications · Any signs of decreased circulation or nerve impairment to extremity: change in color, persistent  numbness, tingling, coldness or increase pain · Any questions What to do at Home: 
Recommended activity: Activity as tolerated 
 
 please follow up with primary care physician and cardiologist in two weeks. *  Please give a list of your current medications to your Primary Care Provider. *  Please update this list whenever your medications are discontinued, doses are 
    changed, or new medications (including over-the-counter products) are added. *  Please carry medication information at all times in case of emergency situations. These are general instructions for a healthy lifestyle: No smoking/ No tobacco products/ Avoid exposure to second hand smoke Surgeon General's Warning:  Quitting smoking now greatly reduces serious risk to your health. Obesity, smoking, and sedentary lifestyle greatly increases your risk for illness A healthy diet, regular physical exercise & weight monitoring are important for maintaining a healthy lifestyle You may be retaining fluid if you have a history of heart failure or if you experience any of the following symptoms:  Weight gain of 3 pounds or more overnight or 5 pounds in a week, increased swelling in our hands or feet or shortness of breath while lying flat in bed. Please call your doctor as soon as you notice any of these symptoms; do not wait until your next office visit. Recognize signs and symptoms of STROKE: 
 
F-face looks uneven A-arms unable to move or move unevenly S-speech slurred or non-existent T-time-call 911 as soon as signs and symptoms begin-DO NOT go Back to bed or wait to see if you get better-TIME IS BRAIN. Warning Signs of HEART ATTACK Call 911 if you have these symptoms: 
? Chest discomfort. Most heart attacks involve discomfort in the center of the chest that lasts more than a few minutes, or that goes away and comes back. It can feel like uncomfortable pressure, squeezing, fullness, or pain. ? Discomfort in other areas of the upper body. Symptoms can include pain or discomfort in one or both arms, the back, neck, jaw, or stomach. ? Shortness of breath with or without chest discomfort. ? Other signs may include breaking out in a cold sweat, nausea, or lightheadedness. Don't wait more than five minutes to call [address]! Fast action can save your life. Calling 911 is almost always the fastest way to get lifesaving treatment. Emergency Medical Services staff can begin treatment when they arrive  up to an hour sooner than if someone gets to the hospital by car. The discharge information has been reviewed with the patient. The patient verbalized understanding.  
 
Discharge medications reviewed with the patient and appropriate educational materials and side effects teaching were provided. Patient armband removed and shredded Chart Review Routing History No Routing History on File no

## 2021-03-17 ENCOUNTER — HOSPITAL ENCOUNTER (OUTPATIENT)
Dept: LAB | Age: 66
Discharge: HOME | End: 2021-03-17
Payer: MEDICARE

## 2021-03-17 ENCOUNTER — TRANSCRIBE ORDER (OUTPATIENT)
Dept: REGISTRATION | Age: 66
End: 2021-03-17

## 2021-03-17 DIAGNOSIS — M54.50 CHRONIC BILATERAL LOW BACK PAIN WITHOUT SCIATICA: ICD-10-CM

## 2021-03-17 DIAGNOSIS — M25.50 POLYARTHRALGIA: ICD-10-CM

## 2021-03-17 DIAGNOSIS — G89.29 CHRONIC BILATERAL LOW BACK PAIN WITHOUT SCIATICA: ICD-10-CM

## 2021-03-17 DIAGNOSIS — M25.50 POLYARTHRALGIA: Primary | ICD-10-CM

## 2021-03-17 DIAGNOSIS — M19.011 OSTEOARTHRITIS OF RIGHT SHOULDER, UNSPECIFIED OSTEOARTHRITIS TYPE: ICD-10-CM

## 2021-03-17 LAB
ALBUMIN SERPL-MCNC: 4 G/DL (ref 3.4–5)
ALBUMIN/GLOB SERPL: 1.4 {RATIO} (ref 0.8–1.7)
ALP SERPL-CCNC: 88 U/L (ref 45–117)
ALT SERPL-CCNC: 27 U/L (ref 16–61)
ANION GAP SERPL CALC-SCNC: 2 MMOL/L (ref 3–18)
APPEARANCE UR: CLEAR
AST SERPL-CCNC: 14 U/L (ref 10–38)
BASOPHILS # BLD: 0.1 K/UL (ref 0–0.1)
BASOPHILS NFR BLD: 2 % (ref 0–2)
BILIRUB SERPL-MCNC: 0.4 MG/DL (ref 0.2–1)
BILIRUB UR QL: NEGATIVE
BUN SERPL-MCNC: 27 MG/DL (ref 7–18)
BUN/CREAT SERPL: 28 (ref 12–20)
CALCIUM SERPL-MCNC: 9.3 MG/DL (ref 8.5–10.1)
CHLORIDE SERPL-SCNC: 110 MMOL/L (ref 100–111)
CO2 SERPL-SCNC: 32 MMOL/L (ref 21–32)
COLOR UR: YELLOW
CREAT SERPL-MCNC: 0.95 MG/DL (ref 0.6–1.3)
CRP SERPL-MCNC: <0.3 MG/DL (ref 0–0.3)
DIFFERENTIAL METHOD BLD: ABNORMAL
EOSINOPHIL # BLD: 0.3 K/UL (ref 0–0.4)
EOSINOPHIL NFR BLD: 4 % (ref 0–5)
ERYTHROCYTE [DISTWIDTH] IN BLOOD BY AUTOMATED COUNT: 12.7 % (ref 11.6–14.5)
GLOBULIN SER CALC-MCNC: 2.9 G/DL (ref 2–4)
GLUCOSE SERPL-MCNC: 62 MG/DL (ref 74–99)
GLUCOSE UR STRIP.AUTO-MCNC: NEGATIVE MG/DL
HCT VFR BLD AUTO: 46.4 % (ref 36–48)
HGB BLD-MCNC: 15.2 G/DL (ref 13–16)
HGB UR QL STRIP: NEGATIVE
KETONES UR QL STRIP.AUTO: ABNORMAL MG/DL
LEUKOCYTE ESTERASE UR QL STRIP.AUTO: NEGATIVE
LYMPHOCYTES # BLD: 1.7 K/UL (ref 0.9–3.6)
LYMPHOCYTES NFR BLD: 25 % (ref 21–52)
MCH RBC QN AUTO: 31.7 PG (ref 24–34)
MCHC RBC AUTO-ENTMCNC: 32.8 G/DL (ref 31–37)
MCV RBC AUTO: 96.9 FL (ref 74–97)
MONOCYTES # BLD: 0.9 K/UL (ref 0.05–1.2)
MONOCYTES NFR BLD: 13 % (ref 3–10)
NEUTS SEG # BLD: 3.7 K/UL (ref 1.8–8)
NEUTS SEG NFR BLD: 56 % (ref 40–73)
NITRITE UR QL STRIP.AUTO: NEGATIVE
PH UR STRIP: 5 [PH] (ref 5–8)
PLATELET # BLD AUTO: 251 K/UL (ref 135–420)
PMV BLD AUTO: 9.2 FL (ref 9.2–11.8)
POTASSIUM SERPL-SCNC: 4.7 MMOL/L (ref 3.5–5.5)
PROT SERPL-MCNC: 6.9 G/DL (ref 6.4–8.2)
PROT UR STRIP-MCNC: NEGATIVE MG/DL
RBC # BLD AUTO: 4.79 M/UL (ref 4.7–5.5)
SODIUM SERPL-SCNC: 144 MMOL/L (ref 136–145)
SP GR UR REFRACTOMETRY: 1.02 (ref 1–1.03)
UROBILINOGEN UR QL STRIP.AUTO: 0.2 EU/DL (ref 0.2–1)
WBC # BLD AUTO: 6.7 K/UL (ref 4.6–13.2)

## 2021-03-17 PROCEDURE — 81003 URINALYSIS AUTO W/O SCOPE: CPT

## 2021-03-17 PROCEDURE — 36415 COLL VENOUS BLD VENIPUNCTURE: CPT

## 2021-03-17 PROCEDURE — 86140 C-REACTIVE PROTEIN: CPT

## 2021-03-17 PROCEDURE — 85025 COMPLETE CBC W/AUTO DIFF WBC: CPT

## 2021-03-17 PROCEDURE — 80053 COMPREHEN METABOLIC PANEL: CPT

## 2021-03-18 LAB
FAX TO INFO,FAXT: NORMAL
FAX TO NUMBER,FAXN: NORMAL

## 2021-09-22 ENCOUNTER — HOSPITAL ENCOUNTER (OUTPATIENT)
Dept: LAB | Age: 66
Discharge: HOME | End: 2021-09-22
Payer: MEDICARE

## 2021-09-22 ENCOUNTER — TRANSCRIBE ORDER (OUTPATIENT)
Dept: REGISTRATION | Age: 66
End: 2021-09-22

## 2021-09-22 DIAGNOSIS — G89.29 CHRONIC PAIN: ICD-10-CM

## 2021-09-22 DIAGNOSIS — M25.50 PAIN IN JOINT, MULTIPLE SITES: ICD-10-CM

## 2021-09-22 DIAGNOSIS — M54.50 LOW BACK PAIN: ICD-10-CM

## 2021-09-22 DIAGNOSIS — M19.011 ARTHRITIS OF RIGHT SHOULDER REGION: ICD-10-CM

## 2021-09-22 DIAGNOSIS — M25.50 PAIN IN JOINT, MULTIPLE SITES: Primary | ICD-10-CM

## 2021-09-22 LAB
ALBUMIN SERPL-MCNC: 3.7 G/DL (ref 3.4–5)
ALBUMIN/GLOB SERPL: 1.3 {RATIO} (ref 0.8–1.7)
ALP SERPL-CCNC: 83 U/L (ref 45–117)
ALT SERPL-CCNC: 19 U/L (ref 16–61)
ANION GAP SERPL CALC-SCNC: 5 MMOL/L (ref 3–18)
APPEARANCE UR: CLEAR
AST SERPL-CCNC: 16 U/L (ref 10–38)
BASOPHILS # BLD: 0.1 K/UL (ref 0–0.1)
BASOPHILS NFR BLD: 1 % (ref 0–2)
BILIRUB SERPL-MCNC: 0.6 MG/DL (ref 0.2–1)
BILIRUB UR QL: NEGATIVE
BUN SERPL-MCNC: 15 MG/DL (ref 7–18)
BUN/CREAT SERPL: 17 (ref 12–20)
CALCIUM SERPL-MCNC: 9.3 MG/DL (ref 8.5–10.1)
CHLORIDE SERPL-SCNC: 108 MMOL/L (ref 100–111)
CO2 SERPL-SCNC: 30 MMOL/L (ref 21–32)
COLOR UR: YELLOW
CREAT SERPL-MCNC: 0.9 MG/DL (ref 0.6–1.3)
CRP SERPL-MCNC: 0.3 MG/DL (ref 0–0.3)
DIFFERENTIAL METHOD BLD: ABNORMAL
EOSINOPHIL # BLD: 0.3 K/UL (ref 0–0.4)
EOSINOPHIL NFR BLD: 4 % (ref 0–5)
ERYTHROCYTE [DISTWIDTH] IN BLOOD BY AUTOMATED COUNT: 12.6 % (ref 11.6–14.5)
ERYTHROCYTE [SEDIMENTATION RATE] IN BLOOD: 47 MM/HR (ref 0–20)
GLOBULIN SER CALC-MCNC: 2.9 G/DL (ref 2–4)
GLUCOSE SERPL-MCNC: 94 MG/DL (ref 74–99)
GLUCOSE UR STRIP.AUTO-MCNC: NEGATIVE MG/DL
HCT VFR BLD AUTO: 45.7 % (ref 36–48)
HGB BLD-MCNC: 14.9 G/DL (ref 13–16)
HGB UR QL STRIP: NEGATIVE
KETONES UR QL STRIP.AUTO: NEGATIVE MG/DL
LEUKOCYTE ESTERASE UR QL STRIP.AUTO: NEGATIVE
LYMPHOCYTES # BLD: 1.7 K/UL (ref 0.9–3.6)
LYMPHOCYTES NFR BLD: 24 % (ref 21–52)
MCH RBC QN AUTO: 31.2 PG (ref 24–34)
MCHC RBC AUTO-ENTMCNC: 32.6 G/DL (ref 31–37)
MCV RBC AUTO: 95.8 FL (ref 78–100)
MONOCYTES # BLD: 0.7 K/UL (ref 0.05–1.2)
MONOCYTES NFR BLD: 10 % (ref 3–10)
NEUTS SEG # BLD: 4.3 K/UL (ref 1.8–8)
NEUTS SEG NFR BLD: 61 % (ref 40–73)
NITRITE UR QL STRIP.AUTO: NEGATIVE
PH UR STRIP: 5.5 [PH] (ref 5–8)
PLATELET # BLD AUTO: 250 K/UL (ref 135–420)
PMV BLD AUTO: 8.7 FL (ref 9.2–11.8)
POTASSIUM SERPL-SCNC: 4.4 MMOL/L (ref 3.5–5.5)
PROT SERPL-MCNC: 6.6 G/DL (ref 6.4–8.2)
PROT UR STRIP-MCNC: NEGATIVE MG/DL
RBC # BLD AUTO: 4.77 M/UL (ref 4.35–5.65)
SODIUM SERPL-SCNC: 143 MMOL/L (ref 136–145)
SP GR UR REFRACTOMETRY: 1.02 (ref 1–1.03)
UROBILINOGEN UR QL STRIP.AUTO: 1 EU/DL (ref 0.2–1)
WBC # BLD AUTO: 7.1 K/UL (ref 4.6–13.2)

## 2021-09-22 PROCEDURE — 86140 C-REACTIVE PROTEIN: CPT

## 2021-09-22 PROCEDURE — 81003 URINALYSIS AUTO W/O SCOPE: CPT

## 2021-09-22 PROCEDURE — 80053 COMPREHEN METABOLIC PANEL: CPT

## 2021-09-22 PROCEDURE — 85025 COMPLETE CBC W/AUTO DIFF WBC: CPT

## 2021-09-22 PROCEDURE — 36415 COLL VENOUS BLD VENIPUNCTURE: CPT

## 2021-09-22 PROCEDURE — 85652 RBC SED RATE AUTOMATED: CPT

## 2021-09-24 LAB
FAX TO INFO,FAXT: NORMAL
FAX TO NUMBER,FAXN: NORMAL

## 2022-02-02 ENCOUNTER — HOSPITAL ENCOUNTER (OUTPATIENT)
Dept: LAB | Age: 67
Discharge: HOME | End: 2022-02-02
Payer: MEDICARE

## 2022-02-02 LAB
ALBUMIN SERPL-MCNC: 3.6 G/DL (ref 3.4–5)
ALBUMIN/GLOB SERPL: 1.2 {RATIO} (ref 0.8–1.7)
ALP SERPL-CCNC: 88 U/L (ref 45–117)
ALT SERPL-CCNC: 28 U/L (ref 16–61)
ANION GAP SERPL CALC-SCNC: 2 MMOL/L (ref 3–18)
APPEARANCE UR: NORMAL
AST SERPL-CCNC: 18 U/L (ref 10–38)
BASOPHILS # BLD: 0.1 K/UL (ref 0–0.1)
BASOPHILS NFR BLD: 1 % (ref 0–2)
BILIRUB SERPL-MCNC: 0.4 MG/DL (ref 0.2–1)
BILIRUB UR QL: NEGATIVE
BUN SERPL-MCNC: 22 MG/DL (ref 7–18)
BUN/CREAT SERPL: 24 (ref 12–20)
CALCIUM SERPL-MCNC: 9.1 MG/DL (ref 8.5–10.1)
CHLORIDE SERPL-SCNC: 111 MMOL/L (ref 100–111)
CO2 SERPL-SCNC: 31 MMOL/L (ref 21–32)
COLOR UR: YELLOW
CREAT SERPL-MCNC: 0.92 MG/DL (ref 0.6–1.3)
CRP SERPL-MCNC: <0.3 MG/DL (ref 0–0.3)
DIFFERENTIAL METHOD BLD: ABNORMAL
EOSINOPHIL # BLD: 0.3 K/UL (ref 0–0.4)
EOSINOPHIL NFR BLD: 4 % (ref 0–5)
ERYTHROCYTE [DISTWIDTH] IN BLOOD BY AUTOMATED COUNT: 12.4 % (ref 11.6–14.5)
GLOBULIN SER CALC-MCNC: 2.9 G/DL (ref 2–4)
GLUCOSE SERPL-MCNC: 103 MG/DL (ref 74–99)
GLUCOSE UR STRIP.AUTO-MCNC: NEGATIVE MG/DL
HCT VFR BLD AUTO: 46.5 % (ref 36–48)
HGB BLD-MCNC: 14.9 G/DL (ref 13–16)
HGB UR QL STRIP: NEGATIVE
IMM GRANULOCYTES # BLD AUTO: 0 K/UL (ref 0–0.04)
IMM GRANULOCYTES NFR BLD AUTO: 1 % (ref 0–0.5)
KETONES UR QL STRIP.AUTO: NEGATIVE MG/DL
LEUKOCYTE ESTERASE UR QL STRIP.AUTO: NEGATIVE
LYMPHOCYTES # BLD: 1.7 K/UL (ref 0.9–3.6)
LYMPHOCYTES NFR BLD: 25 % (ref 21–52)
MCH RBC QN AUTO: 30.8 PG (ref 24–34)
MCHC RBC AUTO-ENTMCNC: 32 G/DL (ref 31–37)
MCV RBC AUTO: 96.1 FL (ref 78–100)
MONOCYTES # BLD: 0.7 K/UL (ref 0.05–1.2)
MONOCYTES NFR BLD: 10 % (ref 3–10)
NEUTS SEG # BLD: 3.9 K/UL (ref 1.8–8)
NEUTS SEG NFR BLD: 59 % (ref 40–73)
NITRITE UR QL STRIP.AUTO: NEGATIVE
NRBC # BLD: 0 K/UL (ref 0–0.01)
NRBC BLD-RTO: 0 PER 100 WBC
PH UR STRIP: 5 [PH] (ref 5–8)
PLATELET # BLD AUTO: 273 K/UL (ref 135–420)
PMV BLD AUTO: 8.8 FL (ref 9.2–11.8)
POTASSIUM SERPL-SCNC: 4.5 MMOL/L (ref 3.5–5.5)
PROT SERPL-MCNC: 6.5 G/DL (ref 6.4–8.2)
PROT UR STRIP-MCNC: NEGATIVE MG/DL
RBC # BLD AUTO: 4.84 M/UL (ref 4.35–5.65)
SODIUM SERPL-SCNC: 144 MMOL/L (ref 136–145)
SP GR UR REFRACTOMETRY: 1.02 (ref 1–1.03)
UROBILINOGEN UR QL STRIP.AUTO: 0.2 EU/DL (ref 0.2–1)
WBC # BLD AUTO: 6.7 K/UL (ref 4.6–13.2)

## 2022-02-02 PROCEDURE — 36415 COLL VENOUS BLD VENIPUNCTURE: CPT

## 2022-02-02 PROCEDURE — 85025 COMPLETE CBC W/AUTO DIFF WBC: CPT

## 2022-02-02 PROCEDURE — 80053 COMPREHEN METABOLIC PANEL: CPT

## 2022-02-02 PROCEDURE — 87086 URINE CULTURE/COLONY COUNT: CPT

## 2022-02-02 PROCEDURE — 86140 C-REACTIVE PROTEIN: CPT

## 2022-02-02 PROCEDURE — 81003 URINALYSIS AUTO W/O SCOPE: CPT

## 2022-02-03 LAB
BACTERIA SPEC CULT: NORMAL
SERVICE CMNT-IMP: NORMAL

## 2022-02-04 LAB
FAX TO INFO,FAXT: NORMAL
FAX TO NUMBER,FAXN: NORMAL

## 2022-03-18 PROBLEM — I25.10 CAD (CORONARY ARTERY DISEASE): Status: ACTIVE | Noted: 2017-03-27

## 2022-03-18 PROBLEM — I21.4 NSTEMI (NON-ST ELEVATED MYOCARDIAL INFARCTION) (HCC): Status: ACTIVE | Noted: 2017-03-27

## 2022-03-19 PROBLEM — E78.5 HYPERLIPIDEMIA: Status: ACTIVE | Noted: 2017-03-27

## 2022-03-19 PROBLEM — R07.9 CHEST PAIN: Status: ACTIVE | Noted: 2017-03-25

## 2022-10-27 ENCOUNTER — TRANSCRIBE ORDER (OUTPATIENT)
Dept: REGISTRATION | Age: 67
End: 2022-10-27

## 2022-10-27 ENCOUNTER — HOSPITAL ENCOUNTER (OUTPATIENT)
Dept: PREADMISSION TESTING | Age: 67
Discharge: HOME | End: 2022-10-27
Payer: MEDICARE

## 2022-10-27 DIAGNOSIS — D49.4 BLADDER NEOPLASM: ICD-10-CM

## 2022-10-27 DIAGNOSIS — D49.4 BLADDER NEOPLASM: Primary | ICD-10-CM

## 2022-10-27 LAB
ATRIAL RATE: 53 BPM
CALCULATED P AXIS, ECG09: 62 DEGREES
CALCULATED R AXIS, ECG10: -59 DEGREES
CALCULATED T AXIS, ECG11: 61 DEGREES
DIAGNOSIS, 93000: NORMAL
P-R INTERVAL, ECG05: 148 MS
Q-T INTERVAL, ECG07: 410 MS
QRS DURATION, ECG06: 100 MS
QTC CALCULATION (BEZET), ECG08: 384 MS
VENTRICULAR RATE, ECG03: 53 BPM

## 2022-10-27 PROCEDURE — 93005 ELECTROCARDIOGRAM TRACING: CPT

## 2022-10-31 ENCOUNTER — HOSPITAL ENCOUNTER (OUTPATIENT)
Dept: PREADMISSION TESTING | Age: 67
Discharge: HOME | End: 2022-10-31

## 2022-10-31 VITALS — HEIGHT: 67 IN | WEIGHT: 158 LBS | BODY MASS INDEX: 24.8 KG/M2

## 2022-10-31 RX ORDER — LOSARTAN POTASSIUM 25 MG/1
TABLET ORAL DAILY
COMMUNITY

## 2022-10-31 RX ORDER — DICLOFENAC SODIUM 75 MG/1
TABLET, DELAYED RELEASE ORAL 2 TIMES DAILY
COMMUNITY

## 2022-10-31 RX ORDER — SODIUM CHLORIDE, SODIUM LACTATE, POTASSIUM CHLORIDE, CALCIUM CHLORIDE 600; 310; 30; 20 MG/100ML; MG/100ML; MG/100ML; MG/100ML
125 INJECTION, SOLUTION INTRAVENOUS CONTINUOUS
Status: CANCELLED | OUTPATIENT
Start: 2022-10-31

## 2022-10-31 NOTE — PERIOP NOTES
Leave all valuables at home or loved ones;to include wallets/purse, money/credit cards, electronics  such as laptops and tablets. If you want to have your prescriptions filled here, please have some form of payment with your . Please arrange for your transportation home  Denies any prosthetics. Patient states family physician is aware of upcoming procedure/surgery. Stop nsaids 7 days prior to [place]. Do not put any lotion, jewelry, makeup, fingernail or toenail polish; no wigs, no private piercings; no tictac,gum and mouthwash. Denies sleep apnea. Denies family history of anesthesia complications. Please be aware that due to unforeseen circumstances, delays may occur and your patience will be appreciated. If you ae scheduled to be discharged the same day, please plan to be with us for most of the day. If an inpatient, room assignments may be delayed as well. Our priority is to make you as comfortable as possible and to keep your family informed of your status when possible. Denies shortness of breath nor chest pain while climbing stairs.   No dnr

## 2022-11-02 NOTE — H&P
[facility] [address] [address]  Tel: [phone]  Fax: [phone]    Patient : Miguel Feliz   YOB: 1955   Birth Sex: Male      Current Gender: Male      Date:  10/18/2022 1:20 PM    Visit Type:   Office Visit   Assessment/Plan  # Detail Type Description    1. Assessment Gross hematuria (R31.0). Patient Plan Patient with gross hematuria. This occurred 3 times. CT scan revealed evidence of a large mass in bladder consistent with bladder tumor I reviewed the CT films with the patient and his wife. He also has evidence of left hydronephrosis. I am concerned that this could be muscle invasive since he has hydronephrosis. I reviewed options the patient recommended he undergo cystoscopy transurethral resection of bladder tumor instillation of gemcitabine risks including pain infection bleeding bladder injury need for possible stent on the left side were reviewed he understands will proceed. 2. Assessment Bladder neoplasm (D49.4). 3. Assessment Hydronephrosis, left (N13.30). 4. Assessment Cigarette smoker (F17.210). 5. Other Orders Orders not associated to today's assessments. Plan Orders The patient had the following order(s) completed today: UA In Office No Micro. Obtained on 10/18/2022, Interpretation: See module. Additional Visit Information      This 79year old male presents for Hematuria. History of Present Illness  1. Hematuria   Pertinent history includes being at least 36years of age and smoking. Additional information: Pt had 3 episodes off gross hematuria, last episode was in September of 2022. CT scan was obtained 10/12/2022 revealing 3.3 cm enhancing solid mass level of the left ureterovesical junction urinary bladder suspicious for primary urothelial neoplasm. Patient has been a heavy smoker continues to smoke at this time and smokes cigarettes large part of his adult life. Medical/Surgical/Interim History  Reviewed, no change. Last detailed document date:08/09/2022. Problem List  Problem List reviewed. Medications (active prior to today)  Medication Instructions Start Date Stop Date Refilled Elsewhere   Aspir-81 81 mg tablet,delayed release take 1 tablet by oral route  every day 04/25/2017   N   coenzyme Q10 10 mg capsule  04/25/2017   N   famotidine 20 mg tablet TAKE ONE TABLET BY MOUTH TWICE A DAY 10/18/2021  10/18/2021 N   Cardizem  mg capsule,extended release TAKE 1 CAPSULE BY MOUTH EVERY DAY 10/18/2021  10/18/2021 N   losartan 25 mg tablet TAKE 1 TABLET BY ORAL ROUTE EVERY DAY 04/19/2022 04/19/2022 N   atorvastatin 40 mg tablet TAKE 1 TABLET BY ORAL ROUTE EVERY DAY 07/18/2022 07/18/2022 N   diclofenac sodium 75 mg tablet,delayed release take 1 tablet by oral route 2 times every day 08/23/2022   N   albuterol sulfate HFA 90 mcg/actuation aerosol inhaler inhale 2 puff by inhalation route  every 4 - 6 hours as needed 08/23/2022   N   cephalexin 500 mg capsule take 1 capsule by oral route  every 8 hours 09/20/2022   N       Medication Reconciliation  Medications reconciled today.     Medication Reviewed  Adherence Medication Name Sig Desc Elsewhere Status   taking as directed Aspir-81 81 mg tablet,delayed release take 1 tablet by oral route  every day N Verified   taking as directed coenzyme Q10 10 mg capsule  N Verified   taking as directed Cardizem  mg capsule,extended release TAKE 1 CAPSULE BY MOUTH EVERY DAY N Verified   taking as directed famotidine 20 mg tablet TAKE ONE TABLET BY MOUTH TWICE A DAY N Verified   taking as directed losartan 25 mg tablet TAKE 1 TABLET BY ORAL ROUTE EVERY DAY N Verified   taking as directed atorvastatin 40 mg tablet TAKE 1 TABLET BY ORAL ROUTE EVERY DAY N Verified   taking as directed diclofenac sodium 75 mg tablet,delayed release take 1 tablet by oral route 2 times every day N Verified   taking as directed albuterol sulfate HFA 90 mcg/actuation aerosol inhaler inhale 2 puff by inhalation route  every 4 - 6 hours as needed N Verified   taking as directed cephalexin 500 mg capsule take 1 capsule by oral route  every 8 hours N Verified     Allergies  Ingredient Reaction (Severity) Medication Name Comment   NO KNOWN ALLERGIES        Reviewed, no changes. Family History  Reviewed, no changes. Last detailed document date:08/09/2022. Social History  Reviewed, no changes. Last detailed document date: 08/09/2022. Review of Systems  System Neg/Pos Details   Constitutional Negative Chills and Fever. ENMT Negative Ear infections and Sore throat. Eyes Negative Blurred vision, Double vision and Eye pain. Respiratory Negative Asthma, Chronic cough, Dyspnea and Wheezing. Cardio Negative Chest pain. GI Negative Constipation, Decreased appetite, Diarrhea, Nausea and Vomiting. Endocrine Negative Cold intolerance, Heat intolerance, Increased thirst and Weight loss. Neuro Negative Headache and Tremors. Psych Negative Anxiety and Depression. Integumentary Negative Itching skin and Rash. MS Negative Back pain and Joint pain. Hema/Lymph Negative Easy bleeding. Reproductive Negative Sexual dysfunction.        Vital Signs   Height  Time ft in cm Last Measured Height Position   2:49 PM 5.0 6.50 168.91 10/18/2022 Standing     Weight/BSA/BMI  Time lb oz kg Context BMI kg/m2 BSA m2   2:49 .50  70.987 dressed with shoes 24.88      Measured by  Time Measured by   2:49 PM Juarez Flores Flurry     Medications (added, continued, or stopped today)  Start Date Medication Directions PRN Status PRN Reason Instruction Stop Date   08/23/2022 albuterol sulfate HFA 90 mcg/actuation aerosol inhaler inhale 2 puff by inhalation route  every 4 - 6 hours as needed N      04/25/2017 Aspir-81 81 mg tablet,delayed release take 1 tablet by oral route  every day N      07/18/2022 atorvastatin 40 mg tablet TAKE 1 TABLET BY ORAL ROUTE EVERY DAY N 10/18/2021 Cardizem  mg capsule,extended release TAKE 1 CAPSULE BY MOUTH EVERY DAY N      09/20/2022 cephalexin 500 mg capsule take 1 capsule by oral route  every 8 hours N      04/25/2017 coenzyme Q10 10 mg capsule  N      08/23/2022 diclofenac sodium 75 mg tablet,delayed release take 1 tablet by oral route 2 times every day N      10/18/2021 famotidine 20 mg tablet TAKE ONE TABLET BY MOUTH TWICE A DAY N      04/19/2022 losartan 25 mg tablet TAKE 1 TABLET BY ORAL ROUTE EVERY DAY N          Active Patient Care Team Members  Name Contact Agency Type Support Role Relationship Active Date Inactive Date Specialty   Cono Jordana   Emergency Contact Child, Father is the Patient      William Howard   Patient provider PCP   Internal Medicine       Provider:    Raffi Crane MD 10/18/2022 6:13 PM     Document generated by:  Baljit Lemos 10/18/2022 06:13 PM      ----------------------------------------------------------------------------------------------------------------------------------------------------------------------      Electronically signed by Raffi Crane MD on 10/18/2022 07:02 PM

## 2022-11-03 ENCOUNTER — HOSPITAL ENCOUNTER (OUTPATIENT)
Age: 67
Setting detail: OUTPATIENT SURGERY
Discharge: HOME | End: 2022-11-03
Attending: UROLOGY | Admitting: UROLOGY
Payer: MEDICARE

## 2022-11-03 ENCOUNTER — ANESTHESIA (OUTPATIENT)
Dept: SURGERY | Age: 67
End: 2022-11-03
Payer: MEDICARE

## 2022-11-03 ENCOUNTER — ANESTHESIA EVENT (OUTPATIENT)
Dept: SURGERY | Age: 67
End: 2022-11-03
Payer: MEDICARE

## 2022-11-03 VITALS
HEART RATE: 70 BPM | OXYGEN SATURATION: 99 % | RESPIRATION RATE: 16 BRPM | SYSTOLIC BLOOD PRESSURE: 120 MMHG | HEIGHT: 67 IN | TEMPERATURE: 97.5 F | DIASTOLIC BLOOD PRESSURE: 73 MMHG | BODY MASS INDEX: 24.84 KG/M2 | WEIGHT: 158.3 LBS

## 2022-11-03 PROCEDURE — 88305 TISSUE EXAM BY PATHOLOGIST: CPT

## 2022-11-03 PROCEDURE — 77030020782 HC GWN BAIR PAWS FLX 3M -B: Performed by: UROLOGY

## 2022-11-03 PROCEDURE — 74011250636 HC RX REV CODE- 250/636: Performed by: UROLOGY

## 2022-11-03 PROCEDURE — 77030005546 HC CATH URETH FOL 3W BARD -A: Performed by: UROLOGY

## 2022-11-03 PROCEDURE — 74011250636 HC RX REV CODE- 250/636

## 2022-11-03 PROCEDURE — 77030040361 HC SLV COMPR DVT MDII -B: Performed by: UROLOGY

## 2022-11-03 PROCEDURE — 76210000006 HC OR PH I REC 0.5 TO 1 HR: Performed by: UROLOGY

## 2022-11-03 PROCEDURE — 77030012508 HC MSK AIRWY LMA AMBU -A: Performed by: SPECIALIST

## 2022-11-03 PROCEDURE — 74011000250 HC RX REV CODE- 250

## 2022-11-03 PROCEDURE — 76060000032 HC ANESTHESIA 0.5 TO 1 HR: Performed by: UROLOGY

## 2022-11-03 PROCEDURE — 88307 TISSUE EXAM BY PATHOLOGIST: CPT

## 2022-11-03 PROCEDURE — 74011000258 HC RX REV CODE- 258: Performed by: UROLOGY

## 2022-11-03 PROCEDURE — 76010000138 HC OR TIME 0.5 TO 1 HR: Performed by: UROLOGY

## 2022-11-03 PROCEDURE — 76210000026 HC REC RM PH II 1 TO 1.5 HR: Performed by: UROLOGY

## 2022-11-03 PROCEDURE — 2709999900 HC NON-CHARGEABLE SUPPLY: Performed by: UROLOGY

## 2022-11-03 RX ORDER — ONDANSETRON 2 MG/ML
INJECTION INTRAMUSCULAR; INTRAVENOUS AS NEEDED
Status: DISCONTINUED | OUTPATIENT
Start: 2022-11-03 | End: 2022-11-03 | Stop reason: HOSPADM

## 2022-11-03 RX ORDER — NALOXONE HYDROCHLORIDE 0.4 MG/ML
0.1 INJECTION, SOLUTION INTRAMUSCULAR; INTRAVENOUS; SUBCUTANEOUS
Status: DISCONTINUED | OUTPATIENT
Start: 2022-11-03 | End: 2022-11-03 | Stop reason: HOSPADM

## 2022-11-03 RX ORDER — EPHEDRINE SULFATE/0.9% NACL/PF 50 MG/5 ML
SYRINGE (ML) INTRAVENOUS AS NEEDED
Status: DISCONTINUED | OUTPATIENT
Start: 2022-11-03 | End: 2022-11-03 | Stop reason: HOSPADM

## 2022-11-03 RX ORDER — DEXAMETHASONE SODIUM PHOSPHATE 4 MG/ML
INJECTION, SOLUTION INTRA-ARTICULAR; INTRALESIONAL; INTRAMUSCULAR; INTRAVENOUS; SOFT TISSUE AS NEEDED
Status: DISCONTINUED | OUTPATIENT
Start: 2022-11-03 | End: 2022-11-03 | Stop reason: HOSPADM

## 2022-11-03 RX ORDER — MIDAZOLAM HYDROCHLORIDE 1 MG/ML
INJECTION, SOLUTION INTRAMUSCULAR; INTRAVENOUS AS NEEDED
Status: DISCONTINUED | OUTPATIENT
Start: 2022-11-03 | End: 2022-11-03 | Stop reason: HOSPADM

## 2022-11-03 RX ORDER — SODIUM CHLORIDE, SODIUM LACTATE, POTASSIUM CHLORIDE, CALCIUM CHLORIDE 600; 310; 30; 20 MG/100ML; MG/100ML; MG/100ML; MG/100ML
125 INJECTION, SOLUTION INTRAVENOUS CONTINUOUS
Status: DISCONTINUED | OUTPATIENT
Start: 2022-11-03 | End: 2022-11-03 | Stop reason: HOSPADM

## 2022-11-03 RX ORDER — LIDOCAINE HYDROCHLORIDE 20 MG/ML
INJECTION, SOLUTION EPIDURAL; INFILTRATION; INTRACAUDAL; PERINEURAL AS NEEDED
Status: DISCONTINUED | OUTPATIENT
Start: 2022-11-03 | End: 2022-11-03 | Stop reason: HOSPADM

## 2022-11-03 RX ORDER — FENTANYL CITRATE 50 UG/ML
25 INJECTION, SOLUTION INTRAMUSCULAR; INTRAVENOUS
Status: DISCONTINUED | OUTPATIENT
Start: 2022-11-03 | End: 2022-11-03 | Stop reason: HOSPADM

## 2022-11-03 RX ORDER — SODIUM CHLORIDE, SODIUM LACTATE, POTASSIUM CHLORIDE, CALCIUM CHLORIDE 600; 310; 30; 20 MG/100ML; MG/100ML; MG/100ML; MG/100ML
50 INJECTION, SOLUTION INTRAVENOUS CONTINUOUS
Status: DISCONTINUED | OUTPATIENT
Start: 2022-11-03 | End: 2022-11-03 | Stop reason: HOSPADM

## 2022-11-03 RX ORDER — FENTANYL CITRATE 50 UG/ML
INJECTION, SOLUTION INTRAMUSCULAR; INTRAVENOUS AS NEEDED
Status: DISCONTINUED | OUTPATIENT
Start: 2022-11-03 | End: 2022-11-03 | Stop reason: HOSPADM

## 2022-11-03 RX ORDER — ROCURONIUM BROMIDE 10 MG/ML
INJECTION, SOLUTION INTRAVENOUS AS NEEDED
Status: DISCONTINUED | OUTPATIENT
Start: 2022-11-03 | End: 2022-11-03 | Stop reason: HOSPADM

## 2022-11-03 RX ORDER — LEVOFLOXACIN 5 MG/ML
500 INJECTION, SOLUTION INTRAVENOUS ONCE
Status: COMPLETED | OUTPATIENT
Start: 2022-11-03 | End: 2022-11-03

## 2022-11-03 RX ORDER — ONDANSETRON 2 MG/ML
4 INJECTION INTRAMUSCULAR; INTRAVENOUS ONCE
Status: DISCONTINUED | OUTPATIENT
Start: 2022-11-03 | End: 2022-11-03 | Stop reason: HOSPADM

## 2022-11-03 RX ORDER — HYDROMORPHONE HYDROCHLORIDE 1 MG/ML
0.5 INJECTION, SOLUTION INTRAMUSCULAR; INTRAVENOUS; SUBCUTANEOUS
Status: DISCONTINUED | OUTPATIENT
Start: 2022-11-03 | End: 2022-11-03 | Stop reason: HOSPADM

## 2022-11-03 RX ORDER — PROPOFOL 10 MG/ML
INJECTION, EMULSION INTRAVENOUS AS NEEDED
Status: DISCONTINUED | OUTPATIENT
Start: 2022-11-03 | End: 2022-11-03 | Stop reason: HOSPADM

## 2022-11-03 RX ORDER — ASPIRIN 81 MG/1
81 TABLET ORAL DAILY
COMMUNITY

## 2022-11-03 RX ORDER — DEXTROMETHORPHAN HYDROBROMIDE, GUAIFENESIN 5; 100 MG/5ML; MG/5ML
650 LIQUID ORAL EVERY 8 HOURS
COMMUNITY

## 2022-11-03 RX ORDER — OXYCODONE AND ACETAMINOPHEN 5; 325 MG/1; MG/1
1 TABLET ORAL AS NEEDED
Status: DISCONTINUED | OUTPATIENT
Start: 2022-11-03 | End: 2022-11-03 | Stop reason: HOSPADM

## 2022-11-03 RX ADMIN — FENTANYL CITRATE 25 MCG: 50 INJECTION, SOLUTION INTRAMUSCULAR; INTRAVENOUS at 12:21

## 2022-11-03 RX ADMIN — PROPOFOL 160 MG: 10 INJECTION, EMULSION INTRAVENOUS at 11:50

## 2022-11-03 RX ADMIN — FLUORESCEIN SODIUM 25 MG: 100 INJECTION INTRAVENOUS at 12:05

## 2022-11-03 RX ADMIN — LEVOFLOXACIN 500 MG: 5 INJECTION, SOLUTION INTRAVENOUS at 11:53

## 2022-11-03 RX ADMIN — FENTANYL CITRATE 25 MCG: 50 INJECTION, SOLUTION INTRAMUSCULAR; INTRAVENOUS at 12:37

## 2022-11-03 RX ADMIN — Medication 10 MG: at 11:53

## 2022-11-03 RX ADMIN — FENTANYL CITRATE 25 MCG: 50 INJECTION, SOLUTION INTRAMUSCULAR; INTRAVENOUS at 12:13

## 2022-11-03 RX ADMIN — MIDAZOLAM 2 MG: 1 INJECTION INTRAMUSCULAR; INTRAVENOUS at 12:43

## 2022-11-03 RX ADMIN — SODIUM CHLORIDE, SODIUM LACTATE, POTASSIUM CHLORIDE, AND CALCIUM CHLORIDE 125 ML/HR: 600; 310; 30; 20 INJECTION, SOLUTION INTRAVENOUS at 09:44

## 2022-11-03 RX ADMIN — FENTANYL CITRATE 100 MCG: 50 INJECTION, SOLUTION INTRAMUSCULAR; INTRAVENOUS at 11:44

## 2022-11-03 RX ADMIN — ROCURONIUM BROMIDE 30 MG: 10 INJECTION, SOLUTION INTRAVENOUS at 12:15

## 2022-11-03 RX ADMIN — GEMCITABINE HYDROCHLORIDE 1000 MG: 1 INJECTION, SOLUTION INTRAVENOUS at 12:33

## 2022-11-03 RX ADMIN — ONDANSETRON HYDROCHLORIDE 4 MG: 2 INJECTION INTRAMUSCULAR; INTRAVENOUS at 11:53

## 2022-11-03 RX ADMIN — SUGAMMADEX 200 MG: 100 INJECTION, SOLUTION INTRAVENOUS at 12:30

## 2022-11-03 RX ADMIN — DEXAMETHASONE SODIUM PHOSPHATE 4 MG: 4 INJECTION, SOLUTION INTRAMUSCULAR; INTRAVENOUS at 11:53

## 2022-11-03 RX ADMIN — LIDOCAINE HYDROCHLORIDE 80 MG: 20 INJECTION, SOLUTION EPIDURAL; INFILTRATION; INTRACAUDAL; PERINEURAL at 11:50

## 2022-11-03 RX ADMIN — SODIUM CHLORIDE, SODIUM LACTATE, POTASSIUM CHLORIDE, AND CALCIUM CHLORIDE 125 ML/HR: 600; 310; 30; 20 INJECTION, SOLUTION INTRAVENOUS at 13:06

## 2022-11-03 RX ADMIN — FENTANYL CITRATE 25 MCG: 50 INJECTION, SOLUTION INTRAMUSCULAR; INTRAVENOUS at 12:19

## 2022-11-03 NOTE — INTERVAL H&P NOTE
Update History & Physical    The Patient's History and Physical of October 18, 2022 was reviewed with the patient and I examined the patient. There was no change. The surgical site was confirmed by the patient and me. Plan:  The risk, benefits, expected outcome, and alternative to the recommended procedure have been discussed with the patient. Patient understands and wants to proceed with the procedure.     Electronically signed by Ca Hernandez MD on 11/3/2022 at 10:23 AM

## 2022-11-03 NOTE — PERIOP NOTES
Discharge instructions reviewed with patient and family. Opportunity for questions and answers given. No further questions at this time.    Pt and wife instructed on morocho care - verbalizes understanding

## 2022-11-03 NOTE — INTERVAL H&P NOTE
Update History & Physical    The Patient's History and Physical of October 18, 2022 was reviewed with the patient and I examined the patient. There was no change. The surgical site was confirmed by the patient and me. Plan:  The risk, benefits, expected outcome, and alternative to the recommended procedure have been discussed with the patient. Patient understands and wants to proceed with the procedure.     Electronically signed by Jessenia Candelaria MD on 11/3/2022 at 10:25 AM

## 2022-11-03 NOTE — PERIOP NOTES
TRANSFER - OUT REPORT:    Verbal report given to Bryan Viveros RN (name) on [address]  being transferred to Phase 2 (unit) for routine post - op       Report consisted of patients Situation, Background, Assessment and   Recommendations(SBAR). Information from the following report(s) SBAR, OR Summary, Procedure Summary, Intake/Output, and Cardiac Rhythm NSR  was reviewed with the receiving nurse. Lines:   Peripheral IV 11/03/22 Posterior;Right Forearm (Active)   Site Assessment Clean, dry, & intact 11/03/22 1320   Phlebitis Assessment 0 11/03/22 1320   Infiltration Assessment 0 11/03/22 1320   Dressing Status Clean, dry, & intact 11/03/22 1320   Dressing Type Transparent 11/03/22 1320   Hub Color/Line Status Infusing 11/03/22 1320        Opportunity for questions and clarification was provided.       Patient transported with:   Registered Nurse

## 2022-11-03 NOTE — PERIOP NOTES
Reviewed PTA medication list with patient/caregiver and patient/caregiver denies any additional medications. Patient admits to having a responsible adult care for them at home for at least 24 hours after surgery. Patient encouraged to use gown warming system and informed that using said warming gown to regulate body temperature prior to a procedure has been shown to help reduce the risks of blood clots and infection. Patient's pharmacy of choice verified and documented in PTA medication section. Dual skin assessment & fall risk band verification completed with Juan Jose Dominguez RN.

## 2022-11-03 NOTE — ANESTHESIA POSTPROCEDURE EVALUATION
Post-Anesthesia Evaluation and Assessment    Cardiovascular Function/Vital Signs  Visit Vitals  BP (!) 141/73   Pulse 80   Temp 36.4 °C (97.6 °F)   Resp 18   Ht 5' 6.5\" (1.689 m)   Wt 71.8 kg (158 lb 4.8 oz)   SpO2 100%   BMI 25.17 kg/m²       Patient is status post Procedure(s):  CYSTOSCOPY TRANSURETHRAL RESECTION OF BLADDER TUMOR- LARGE WITH GEMCITABINE. Nausea/Vomiting: Controlled. Postoperative hydration reviewed and adequate. Pain:  Pain Scale 1: Numeric (0 - 10) (11/03/22 1318)  Pain Intensity 1: 0 (11/03/22 1318)   Managed. Neurological Status:   Neuro (WDL): Within Defined Limits (11/03/22 1318)   At baseline. Mental Status and Level of Consciousness: Baseline and appropriate for discharge. Pulmonary Status:   O2 Device: None (Room air) (11/03/22 1311)   Adequate oxygenation and airway patent. Complications related to anesthesia: None    Post-anesthesia assessment completed. No concerns. Patient has met all discharge requirements.     Signed By: Eliza Manzanares MD    November 3, 2022

## 2022-11-03 NOTE — ANESTHESIA PREPROCEDURE EVALUATION
Relevant Problems   CARDIOVASCULAR   (+) CAD (coronary artery disease)   (+) NSTEMI (non-ST elevated myocardial infarction) [facility])       Anesthetic History   No history of anesthetic complications            Review of Systems / Medical History  Patient summary reviewed, nursing notes reviewed and pertinent labs reviewed    Pulmonary          Smoker (quit 18 days ago)         Neuro/Psych   Within defined limits           Cardiovascular    Hypertension          Past MI, CAD and hyperlipidemia    Exercise tolerance: >4 METS  Comments: Active - no angina   GI/Hepatic/Renal     GERD           Endo/Other        Arthritis     Other Findings              Physical Exam    Airway  Mallampati: II  TM Distance: 4 - 6 cm  Neck ROM: normal range of motion   Mouth opening: Normal     Cardiovascular               Dental  No notable dental hx       Pulmonary                 Abdominal         Other Findings            Anesthetic Plan    ASA: 3  Anesthesia type: general          Induction: Intravenous  Anesthetic plan and risks discussed with: Patient

## 2022-11-03 NOTE — PERIOP NOTES
TRANSFER - IN REPORT:    Verbal report received from FIORELLA Ho RN(name) on Cono A Jordana  being received from [facility]) for routine post - op      Report consisted of patients Situation, Background, Assessment and   Recommendations(SBAR). Information from the following report(s) SBAR, Procedure Summary, and MAR was reviewed with the receiving nurse. Opportunity for questions and clarification was provided. Assessment completed upon patients arrival to unit and care assumed.

## 2022-11-03 NOTE — PERIOP NOTES
TRANSFER - IN REPORT:    Verbal report received from [address] [facility] (name) on [address] [facility]  being received from OR (unit) for routine post - op      Report consisted of patients Situation, Background, Assessment and   Recommendations(SBAR). Information from the following report(s) SBAR, Kardex, OR Summary, Procedure Summary, Intake/Output, MAR, and Cardiac Rhythm NSR  was reviewed with the receiving nurse. Opportunity for questions and clarification was provided. Assessment completed upon patients arrival to unit and care assumed.

## 2022-11-03 NOTE — PERIOP NOTES
Arrived to Phase 2 - morocho cath draining into gravity bag- Chemo precautions taken - urine emptied and disposed of after diluting with Bleach - covered and double flushed. Assisted to recliner.

## 2022-11-03 NOTE — OP NOTES
Operative Note    Patient: Ky Gil  YOB: 1955  MRN: [id number]    Date of Procedure: 11/3/2022     Pre-Op Diagnosis: BLADDER NEOPLASM    Post-Op Diagnosis:  same       Procedure(s):  CYSTOSCOPY TRANSURETHRAL RESECTION OF BLADDER TUMOR- LARGE WITH GEMCITABINE    Surgeon(s):  Clinton Miner MD    Surgical Assistant: None    Anesthesia: General     Estimated Blood Loss (mL):  Minimal    Complications: None    Specimens:   ID Type Source Tests Collected by Time Destination   1 : BLADDER TUMOR Preservative Bladder  Clinton Miner MD 11/3/2022 1236 Pathology        Implants: * No implants in log *    Drains: * No LDAs found *    Findings: Large papillary tumor involving left side of bladder a few small satellite lesions on the right sidewall. Detailed Description of Procedure:   `  Procedure Details: The patient was brought in the operating room and placed in the supine position. After the administration of general anesthesia, was placed in lithotomy position. I performed a bimanual exam.  The bladder mass was palpable the bladder was mobile. .  At this point, I then began by placing a 21-Azerbaijani cystoscope into the bladder. Cystourethroscopy was performed. The LEFT ureteral orifices could not be identified I and the RIGHT was normal at this point I asked anesthesia to give the patient 25 mg of fluorescein intravenously to see if we would be able to identify the left ureteral orifice during the procedure. .  I identified the tumor, which was papillary in nature large extending from the left trigone towards the left lateral wall. There were also a few satellite lesions on the right side. I removed the cystoscope and then placed a 26-Azerbaijani resectoscope into the bladder. Using the resection loop, I began to resect the entire tumor, down to muscle. Once the entire left side was taken down I resected all the satellite lesions. .  Once there was no evidence of any obvious residual tumor, I cauterized the entire base of the tumor. There were no residual lesions. There was no evidence of any obvious bleeding at this point. The right ureteral orifice remained intact, however at no point did I see any urine draining from the left side of the bladder there was no evidence of any ureteral orifice on the left side. The patient's bladder was then emptied and I instilled 200 mg of gemcitabine into patient's bladder, which was left instilled for 1 hour. The Huddleston catheter was then clamped and the patient was then extubated. Patient was transferred to recovery room in stable condition.      Electronically Signed by Kashif Mcgregor MD on 11/3/2022 at 12:57 PM

## 2022-11-03 NOTE — DISCHARGE INSTRUCTIONS
Sanjeev Abreu. Holger Daugherty M.D. [facility]  [address], [address] [place], [facility]  Office: [phone]  Fax:    [phone]: Procedure(s):  [address]    Notify [facility]MM[facility]ATELY if any of the following occur: You are unable to urinate. Urgency to urinate is not uncommon. You find yourself urinating small frequent amounts associated with severe lower abdominal discomfort. Bright red blood with clots in the urine. Some reddish urine is not uncommon and should be treated with increasing the amount of fluids you drink. Temperature above 101.5° and / or chills. You are nauseous and / or vomiting and you cannot hold down any fluids. Your pain is not controlled with the pain medication prescribed. Special Considerations:     Do not drive for at least 24 hours after the procedure and until you are no longer taking narcotic pain medication and you are able to move and react without hesitation. MEDICATIONS:  Pain   []  Norco®   []  Percocet®  [] Dilaudid®    [x]  Tramadol  [] Ketorolac   Antibiotics   []  Cipro   [x]  Keflex    [] Levaquin   []  Bactrim DS®      [] Cefuroxime   Urination   []  Vesicare®   []  Flomax      Burning   []  Pyridium®   []  UribelTM      Nausea   []  Zofran®   []  Phenergan®      Miscellaneous   []            [] Prescriptions Written on Chart    [x] Prescriptions sent Electronically           Our office will call you tomorrow to schedule your first follow-up appointment. Please contact [facility]. Urology at [phone] or go to the nearest Emergency Department / Urgent Care facility for any other medical questions or concerns. For the next 48 hours:     If possible use separate toilet from others in house  Urinate in toilet - add 1 cup of BLEACH - close  Lid and double flush  Clean urine spills on self with soap and water , urine spills on toilet with Bleach wipes  Wash clothes separate from others in house         DISCHARGE SUMMARY from Nurse    PATIENT INSTRUCTIONS:    After general anesthesia or intravenous sedation, for 24 hours or while taking prescription Narcotics:  Limit your activities  Do not drive and operate hazardous machinery  Do not make important personal or business decisions  Do  not drink alcoholic beverages  If you have not urinated within 8 hours after discharge, please contact your surgeon on call. Report the following to your surgeon:  Excessive pain, swelling, redness or odor of or around the surgical area  Temperature over 100.5  Nausea and vomiting lasting longer than 4 hours or if unable to take medications  Any signs of decreased circulation or nerve impairment to extremity: change in color, persistent  numbness, tingling, coldness or increase pain  Any questions    What to do at Home:  Recommended activity: Ambulate in house,     If you experience any of the following symptoms above, please follow up with Dr. Amanda Seay. *  Please give a list of your current medications to your Primary Care Provider. *  Please update this list whenever your medications are discontinued, doses are      changed, or new medications (including over-the-counter products) are added. *  Please carry medication information at all times in case of emergency situations. These are general instructions for a healthy lifestyle:    No smoking/ No tobacco products/ Avoid exposure to second hand smoke  Surgeon General's Warning:  Quitting smoking now greatly reduces serious risk to your health.     Obesity, smoking, and sedentary lifestyle greatly increases your risk for illness    A healthy diet, regular physical exercise & weight monitoring are important for maintaining a healthy lifestyle    You may be retaining fluid if you have a history of heart failure or if you experience any of the following symptoms:  Weight gain of 3 pounds or more overnight or 5 pounds in a week, increased swelling in our hands or feet or shortness of breath while lying flat in bed. Please call your doctor as soon as you notice any of these symptoms; do not wait until your next office visit. Patient armband removed and shredded     The discharge information has been reviewed with the patient and caregiver. The patient and caregiver verbalized understanding. Discharge medications reviewed with the patient and caregiver and appropriate educational materials and side effects teaching were provided.   ___________________________________________________________________________________________________________________________________

## 2022-11-30 PROBLEM — I25.10 ARTERIOSCLEROSIS OF CORONARY ARTERY: Status: ACTIVE | Noted: 2017-03-27

## 2022-11-30 PROBLEM — C67.9 PRIMARY MALIGNANT NEOPLASM OF BLADDER (HCC): Status: ACTIVE | Noted: 2022-11-03

## 2023-03-10 ENCOUNTER — HOSPITAL ENCOUNTER (OUTPATIENT)
Facility: HOSPITAL | Age: 68
Discharge: HOME | End: 2023-03-10
Payer: MEDICARE

## 2023-03-10 DIAGNOSIS — C67.2 MALIGNANT NEOPLASM OF LATERAL WALL OF URINARY BLADDER (HCC): ICD-10-CM

## 2023-03-10 PROCEDURE — A4641 RADIOPHARM DX AGENT NOC: HCPCS | Performed by: PHYSICIAN ASSISTANT

## 2023-03-10 PROCEDURE — 6360000004 HC RX CONTRAST MEDICATION: Performed by: PHYSICIAN ASSISTANT

## 2023-03-10 PROCEDURE — 74177 CT ABD & PELVIS W/CONTRAST: CPT

## 2023-03-10 RX ADMIN — BARIUM SULFATE 900 ML: 21 SUSPENSION ORAL at 08:58

## 2023-03-10 RX ADMIN — IOPAMIDOL 100 ML: 612 INJECTION, SOLUTION INTRAVENOUS at 08:58

## 2023-03-22 PROBLEM — R53.83 FATIGUE: Status: ACTIVE | Noted: 2023-03-22

## 2023-03-22 PROBLEM — I25.10 ARTERIOSCLEROSIS OF CORONARY ARTERY: Status: ACTIVE | Noted: 2017-03-27

## 2023-03-22 PROBLEM — D64.9 ANEMIA: Status: ACTIVE | Noted: 2023-03-22

## 2023-05-03 PROBLEM — C67.9 MALIGNANT NEOPLASM OF URINARY BLADDER (HCC): Status: ACTIVE | Noted: 2022-11-03

## 2023-05-03 PROBLEM — C61 PROSTATE CANCER (HCC): Status: ACTIVE | Noted: 2023-04-07

## 2023-06-05 ENCOUNTER — HOSPITAL ENCOUNTER (OUTPATIENT)
Facility: HOSPITAL | Age: 68
Discharge: HOME | End: 2023-06-08
Payer: MEDICARE

## 2023-06-05 DIAGNOSIS — C67.2 MALIGNANT NEOPLASM OF LATERAL WALL OF URINARY BLADDER (HCC): ICD-10-CM

## 2023-06-05 DIAGNOSIS — R91.1 PULMONARY NODULE: ICD-10-CM

## 2023-06-05 DIAGNOSIS — R10.9 ABDOMINAL PAIN, UNSPECIFIED ABDOMINAL LOCATION: ICD-10-CM

## 2023-06-05 PROCEDURE — 74177 CT ABD & PELVIS W/CONTRAST: CPT

## 2023-06-05 PROCEDURE — 6360000004 HC RX CONTRAST MEDICATION: Performed by: PHYSICIAN ASSISTANT

## 2023-06-05 RX ADMIN — IOPAMIDOL 100 ML: 612 INJECTION, SOLUTION INTRAVENOUS at 11:57

## 2023-07-05 PROBLEM — C61 PRIMARY MALIGNANT NEOPLASM OF PROSTATE (HCC): Status: ACTIVE | Noted: 2023-03-28

## 2023-08-30 PROBLEM — R60.0 EDEMA OF LOWER EXTREMITY: Status: ACTIVE | Noted: 2023-08-30

## 2023-08-30 PROBLEM — E03.2 HYPOTHYROIDISM DUE TO DRUGS: Status: ACTIVE | Noted: 2023-08-30

## 2023-08-30 PROBLEM — R94.6 ABNORMAL FINDING ON THYROID FUNCTION TEST: Status: ACTIVE | Noted: 2023-08-30

## 2023-08-30 PROBLEM — E88.09 HYPOALBUMINEMIA: Status: ACTIVE | Noted: 2023-08-30

## 2023-08-30 PROBLEM — R79.89 HIGH SERUM CREATININE: Status: ACTIVE | Noted: 2023-08-30

## 2023-09-05 ENCOUNTER — HOSPITAL ENCOUNTER (OUTPATIENT)
Facility: HOSPITAL | Age: 68
Discharge: HOME | End: 2023-09-08
Payer: MEDICARE

## 2023-09-05 DIAGNOSIS — R91.1 PULMONARY NODULE: ICD-10-CM

## 2023-09-05 DIAGNOSIS — R10.9 ABDOMINAL PAIN, UNSPECIFIED ABDOMINAL LOCATION: ICD-10-CM

## 2023-09-05 DIAGNOSIS — C67.2 MALIGNANT NEOPLASM OF LATERAL WALL OF BLADDER (HCC): ICD-10-CM

## 2023-09-05 LAB — CREAT UR-MCNC: 1.1 MG/DL (ref 0.6–1.3)

## 2023-09-05 PROCEDURE — 82565 ASSAY OF CREATININE: CPT

## 2023-09-05 PROCEDURE — 74177 CT ABD & PELVIS W/CONTRAST: CPT

## 2023-09-05 PROCEDURE — 6360000004 HC RX CONTRAST MEDICATION: Performed by: PHYSICIAN ASSISTANT

## 2023-09-05 RX ADMIN — IOPAMIDOL 100 ML: 612 INJECTION, SOLUTION INTRAVENOUS at 12:52

## 2023-09-06 ENCOUNTER — HOSPITAL ENCOUNTER (OUTPATIENT)
Facility: HOSPITAL | Age: 68
Discharge: HOME | End: 2023-09-09
Attending: UROLOGY
Payer: MEDICARE

## 2023-09-06 DIAGNOSIS — Z98.890 HISTORY OF ILEAL CONDUIT: ICD-10-CM

## 2023-09-06 DIAGNOSIS — N13.30 HYDRONEPHROSIS, UNSPECIFIED HYDRONEPHROSIS TYPE: ICD-10-CM

## 2023-09-06 DIAGNOSIS — C67.9 MALIGNANT NEOPLASM OF URINARY BLADDER, UNSPECIFIED SITE (HCC): ICD-10-CM

## 2023-09-06 PROCEDURE — 76770 US EXAM ABDO BACK WALL COMP: CPT

## 2023-10-26 ENCOUNTER — HOSPITAL ENCOUNTER (OUTPATIENT)
Facility: HOSPITAL | Age: 68
Discharge: HOME | End: 2023-10-26
Payer: MEDICARE

## 2023-10-26 DIAGNOSIS — R31.21 ASYMPTOMATIC MICROSCOPIC HEMATURIA: ICD-10-CM

## 2023-10-26 DIAGNOSIS — R60.0 LOCALIZED EDEMA: ICD-10-CM

## 2023-10-26 DIAGNOSIS — R80.9 PROTEINURIA, UNSPECIFIED TYPE: ICD-10-CM

## 2023-10-26 LAB
ALBUMIN SERPL-MCNC: 1.9 G/DL (ref 3.4–5)
ANION GAP SERPL CALC-SCNC: 3 MMOL/L (ref 3–18)
BUN SERPL-MCNC: 21 MG/DL (ref 7–18)
BUN/CREAT SERPL: 17 (ref 12–20)
CALCIUM SERPL-MCNC: 8.3 MG/DL (ref 8.5–10.1)
CHLORIDE SERPL-SCNC: 106 MMOL/L (ref 100–111)
CO2 SERPL-SCNC: 33 MMOL/L (ref 21–32)
COLLECT DURATION TIME UR: 24 HR
CREAT SERPL-MCNC: 1.27 MG/DL (ref 0.6–1.3)
ERYTHROCYTE [DISTWIDTH] IN BLOOD BY AUTOMATED COUNT: 15 % (ref 11.6–14.5)
GLUCOSE SERPL-MCNC: 78 MG/DL (ref 74–99)
HCT VFR BLD AUTO: 42.5 % (ref 36–48)
HGB BLD-MCNC: 13.5 G/DL (ref 13–16)
MCH RBC QN AUTO: 30.3 PG (ref 24–34)
MCHC RBC AUTO-ENTMCNC: 31.8 G/DL (ref 31–37)
MCV RBC AUTO: 95.5 FL (ref 78–100)
NRBC # BLD: 0 K/UL (ref 0–0.01)
NRBC BLD-RTO: 0 PER 100 WBC
PHOSPHATE SERPL-MCNC: 2.6 MG/DL (ref 2.5–4.9)
PLATELET # BLD AUTO: 284 K/UL (ref 135–420)
PMV BLD AUTO: 8.4 FL (ref 9.2–11.8)
POTASSIUM SERPL-SCNC: 4.3 MMOL/L (ref 3.5–5.5)
PROT 24H UR-MRATE: 2030 MG/24HR
RBC # BLD AUTO: 4.45 M/UL (ref 4.35–5.65)
SODIUM SERPL-SCNC: 142 MMOL/L (ref 136–145)
SPECIMEN VOL ?TM UR: 1400 ML
WBC # BLD AUTO: 5.6 K/UL (ref 4.6–13.2)

## 2023-10-26 PROCEDURE — 82784 ASSAY IGA/IGD/IGG/IGM EACH: CPT

## 2023-10-26 PROCEDURE — 87340 HEPATITIS B SURFACE AG IA: CPT

## 2023-10-26 PROCEDURE — 80069 RENAL FUNCTION PANEL: CPT

## 2023-10-26 PROCEDURE — 86803 HEPATITIS C AB TEST: CPT

## 2023-10-26 PROCEDURE — 86706 HEP B SURFACE ANTIBODY: CPT

## 2023-10-26 PROCEDURE — 86431 RHEUMATOID FACTOR QUANT: CPT

## 2023-10-26 PROCEDURE — 36415 COLL VENOUS BLD VENIPUNCTURE: CPT

## 2023-10-26 PROCEDURE — 86225 DNA ANTIBODY NATIVE: CPT

## 2023-10-26 PROCEDURE — 83516 IMMUNOASSAY NONANTIBODY: CPT

## 2023-10-26 PROCEDURE — 86235 NUCLEAR ANTIGEN ANTIBODY: CPT

## 2023-10-26 PROCEDURE — 86060 ANTISTREPTOLYSIN O TITER: CPT

## 2023-10-26 PROCEDURE — 86160 COMPLEMENT ANTIGEN: CPT

## 2023-10-26 PROCEDURE — 84156 ASSAY OF PROTEIN URINE: CPT

## 2023-10-26 PROCEDURE — 81050 URINALYSIS VOLUME MEASURE: CPT

## 2023-10-26 PROCEDURE — 86037 ANCA TITER EACH ANTIBODY: CPT

## 2023-10-26 PROCEDURE — 84165 PROTEIN E-PHORESIS SERUM: CPT

## 2023-10-26 PROCEDURE — 86334 IMMUNOFIX E-PHORESIS SERUM: CPT

## 2023-10-26 PROCEDURE — 86162 COMPLEMENT TOTAL (CH50): CPT

## 2023-10-26 PROCEDURE — 85027 COMPLETE CBC AUTOMATED: CPT

## 2023-10-27 LAB
C-ANCA TITR SER IF: NORMAL TITER
CENTROMERE B AB SER-ACNC: NORMAL AI
CHROMATIN AB SERPL-ACNC: NORMAL AI
DSDNA AB SER-ACNC: NORMAL IU/ML
ENA JO1 AB SER-ACNC: NORMAL AI
ENA RNP AB SER-ACNC: NORMAL AI
ENA SCL70 AB SER-ACNC: NORMAL AI
ENA SM AB SER-ACNC: NORMAL AI
ENA SS-A AB SER-ACNC: NORMAL AI
ENA SS-B AB SER-ACNC: NORMAL AI
GBM IGG SER-ACNC: <0.2 UNITS (ref 0–0.9)
Lab: NORMAL
MYELOPEROXIDASE AB SER IA-ACNC: <0.2 UNITS (ref 0–0.9)
P-ANCA ATYPICAL TITR SER IF: NORMAL TITER
P-ANCA TITR SER IF: NORMAL TITER
PROTEINASE3 AB SER IA-ACNC: <0.2 UNITS (ref 0–0.9)

## 2023-10-28 LAB
ASO AB SERPL-ACNC: <20 IU/ML (ref 0–200)
C3 SERPL-MCNC: 225 MG/DL (ref 82–167)
C4 SERPL-MCNC: 35 MG/DL (ref 12–38)
CH50 SERPL-ACNC: 55 U/ML
IGA SERPL-MCNC: 105 MG/DL (ref 61–437)
IGG SERPL-MCNC: 474 MG/DL (ref 603–1613)
IGM SERPL-MCNC: 368 MG/DL (ref 20–172)
PROT PATTERN SERPL IFE-IMP: ABNORMAL

## 2023-10-31 LAB
ALBUMIN SERPL ELPH-MCNC: 2.2 G/DL (ref 2.9–4.4)
ALBUMIN/GLOB SERPL: 0.8 (ref 0.7–1.7)
ALPHA1 GLOB SERPL ELPH-MCNC: 0.3 G/DL (ref 0–0.4)
ALPHA2 GLOB SERPL ELPH-MCNC: 1.1 G/DL (ref 0.4–1)
B-GLOBULIN SERPL ELPH-MCNC: 1 G/DL (ref 0.7–1.3)
GAMMA GLOB SERPL ELPH-MCNC: 0.6 G/DL (ref 0.4–1.8)
GLOBULIN SER CALC-MCNC: 2.9 G/DL (ref 2.2–3.9)
M PROTEIN SERPL ELPH-MCNC: ABNORMAL G/DL
PROT SERPL-MCNC: 5.1 G/DL (ref 6–8.5)

## 2023-11-01 LAB
CENTROMERE B AB SER-ACNC: <0.2 AI (ref 0–0.9)
CHROMATIN AB SERPL-ACNC: <0.2 AI (ref 0–0.9)
DSDNA AB SER-ACNC: <1 IU/ML (ref 0–9)
ENA JO1 AB SER-ACNC: <0.2 AI (ref 0–0.9)
ENA RNP AB SER-ACNC: 2.2 AI (ref 0–0.9)
ENA SCL70 AB SER-ACNC: <0.2 AI (ref 0–0.9)
ENA SM AB SER-ACNC: <0.2 AI (ref 0–0.9)
ENA SS-A AB SER-ACNC: <0.2 AI (ref 0–0.9)
ENA SS-B AB SER-ACNC: <0.2 AI (ref 0–0.9)
HBV SURFACE AB SER QL IA: POSITIVE
HBV SURFACE AB SERPL IA-ACNC: 13.06 MIU/ML
HBV SURFACE AG SER QL: 0.17 INDEX
HBV SURFACE AG SER QL: NEGATIVE
HCV AB SER IA-ACNC: 0.03 INDEX
HCV AB SERPL QL IA: NEGATIVE
HEP BS AB COMMENT: NORMAL
HEPATITIS C COMMENT: NORMAL
IGA SERPL-MCNC: 105 MG/DL (ref 61–437)
IGG SERPL-MCNC: 474 MG/DL (ref 603–1613)
IGM SERPL-MCNC: 368 MG/DL (ref 20–172)
Lab: ABNORMAL
PROT PATTERN SERPL IFE-IMP: ABNORMAL
RHEUMATOID FACT SERPL-ACNC: <10 IU/ML

## 2023-11-28 ENCOUNTER — HOSPITAL ENCOUNTER (OUTPATIENT)
Facility: HOSPITAL | Age: 68
Discharge: HOME | End: 2023-12-01
Payer: MEDICARE

## 2023-11-28 DIAGNOSIS — R80.9 PROTEINURIA, UNSPECIFIED TYPE: ICD-10-CM

## 2023-11-28 LAB
ALBUMIN SERPL-MCNC: 1.7 G/DL (ref 3.4–5)
ANION GAP SERPL CALC-SCNC: 2 MMOL/L (ref 3–18)
BUN SERPL-MCNC: 21 MG/DL (ref 7–18)
BUN/CREAT SERPL: 18 (ref 12–20)
CALCIUM SERPL-MCNC: 7.9 MG/DL (ref 8.5–10.1)
CHLORIDE SERPL-SCNC: 107 MMOL/L (ref 100–111)
CO2 SERPL-SCNC: 32 MMOL/L (ref 21–32)
COLLECT DURATION TIME UR: 24 HR
CREAT SERPL-MCNC: 1.14 MG/DL (ref 0.6–1.3)
GLUCOSE SERPL-MCNC: 99 MG/DL (ref 74–99)
PHOSPHATE SERPL-MCNC: 3 MG/DL (ref 2.5–4.9)
POTASSIUM SERPL-SCNC: 4.4 MMOL/L (ref 3.5–5.5)
PROT 24H UR-MRATE: ABNORMAL MG/24HR
SODIUM SERPL-SCNC: 141 MMOL/L (ref 136–145)
SPECIMEN VOL ?TM UR: 1350 ML

## 2023-11-28 PROCEDURE — 80048 BASIC METABOLIC PNL TOTAL CA: CPT

## 2023-11-28 PROCEDURE — 36415 COLL VENOUS BLD VENIPUNCTURE: CPT

## 2023-11-28 PROCEDURE — 81050 URINALYSIS VOLUME MEASURE: CPT

## 2023-11-28 PROCEDURE — 84100 ASSAY OF PHOSPHORUS: CPT

## 2023-11-28 PROCEDURE — 84156 ASSAY OF PROTEIN URINE: CPT

## 2023-11-28 PROCEDURE — 82040 ASSAY OF SERUM ALBUMIN: CPT

## 2023-12-11 ENCOUNTER — HOSPITAL ENCOUNTER (OUTPATIENT)
Facility: HOSPITAL | Age: 68
Discharge: HOME | End: 2023-12-14
Payer: MEDICARE

## 2023-12-11 DIAGNOSIS — C67.2 MALIGNANT NEOPLASM OF LATERAL WALL OF BLADDER (HCC): ICD-10-CM

## 2023-12-11 DIAGNOSIS — R91.1 PULMONARY NODULE: ICD-10-CM

## 2023-12-11 DIAGNOSIS — R10.9 ABDOMINAL PAIN, UNSPECIFIED ABDOMINAL LOCATION: ICD-10-CM

## 2023-12-11 PROCEDURE — 6360000004 HC RX CONTRAST MEDICATION: Performed by: PHYSICIAN ASSISTANT

## 2023-12-11 PROCEDURE — 74177 CT ABD & PELVIS W/CONTRAST: CPT

## 2023-12-11 RX ADMIN — IOPAMIDOL 100 ML: 612 INJECTION, SOLUTION INTRAVENOUS at 10:31

## 2023-12-28 ENCOUNTER — HOSPITAL ENCOUNTER (OUTPATIENT)
Facility: HOSPITAL | Age: 68
Discharge: HOME | End: 2023-12-28
Attending: INTERNAL MEDICINE
Payer: MEDICARE

## 2023-12-28 ENCOUNTER — HOSPITAL ENCOUNTER (OUTPATIENT)
Facility: HOSPITAL | Age: 68
End: 2023-12-28
Attending: INTERNAL MEDICINE
Payer: MEDICARE

## 2023-12-28 ENCOUNTER — TRANSCRIBE ORDERS (OUTPATIENT)
Facility: HOSPITAL | Age: 68
End: 2023-12-28

## 2023-12-28 DIAGNOSIS — C67.9 MALIGNANT NEOPLASM OF URINARY BLADDER, UNSPECIFIED SITE (HCC): Primary | ICD-10-CM

## 2023-12-28 DIAGNOSIS — C67.2 MALIGNANT NEOPLASM OF LATERAL WALL OF URINARY BLADDER (HCC): ICD-10-CM

## 2023-12-28 DIAGNOSIS — R80.9 PROTEINURIA, UNSPECIFIED TYPE: ICD-10-CM

## 2023-12-28 DIAGNOSIS — C61 MALIGNANT NEOPLASM OF PROSTATE (HCC): ICD-10-CM

## 2023-12-28 DIAGNOSIS — I10 ESSENTIAL HYPERTENSION, MALIGNANT: ICD-10-CM

## 2023-12-28 LAB
ALBUMIN SERPL-MCNC: 1.2 G/DL (ref 3.4–5)
ANION GAP SERPL CALC-SCNC: 3 MMOL/L (ref 3–18)
BUN SERPL-MCNC: 27 MG/DL (ref 7–18)
BUN/CREAT SERPL: 21 (ref 12–20)
CALCIUM SERPL-MCNC: 8.1 MG/DL (ref 8.5–10.1)
CHLORIDE SERPL-SCNC: 106 MMOL/L (ref 100–111)
CO2 SERPL-SCNC: 32 MMOL/L (ref 21–32)
CREAT SERPL-MCNC: 1.28 MG/DL (ref 0.6–1.3)
ERYTHROCYTE [DISTWIDTH] IN BLOOD BY AUTOMATED COUNT: 13.5 % (ref 11.6–14.5)
GLUCOSE SERPL-MCNC: 134 MG/DL (ref 74–99)
HCT VFR BLD AUTO: 45.9 % (ref 36–48)
HGB BLD-MCNC: 14.9 G/DL (ref 13–16)
MCH RBC QN AUTO: 31.2 PG (ref 24–34)
MCHC RBC AUTO-ENTMCNC: 32.5 G/DL (ref 31–37)
MCV RBC AUTO: 96 FL (ref 78–100)
NRBC # BLD: 0 K/UL (ref 0–0.01)
NRBC BLD-RTO: 0 PER 100 WBC
PHOSPHATE SERPL-MCNC: 2.5 MG/DL (ref 2.5–4.9)
PLATELET # BLD AUTO: 330 K/UL (ref 135–420)
PMV BLD AUTO: 8.9 FL (ref 9.2–11.8)
POTASSIUM SERPL-SCNC: 4.3 MMOL/L (ref 3.5–5.5)
RBC # BLD AUTO: 4.78 M/UL (ref 4.35–5.65)
SODIUM SERPL-SCNC: 141 MMOL/L (ref 136–145)
WBC # BLD AUTO: 9.7 K/UL (ref 4.6–13.2)

## 2023-12-28 PROCEDURE — 85027 COMPLETE CBC AUTOMATED: CPT

## 2023-12-28 PROCEDURE — 80069 RENAL FUNCTION PANEL: CPT

## 2023-12-28 PROCEDURE — 78306 BONE IMAGING WHOLE BODY: CPT | Performed by: INTERNAL MEDICINE

## 2023-12-28 PROCEDURE — A9503 TC99M MEDRONATE: HCPCS | Performed by: INTERNAL MEDICINE

## 2023-12-28 PROCEDURE — 36415 COLL VENOUS BLD VENIPUNCTURE: CPT

## 2023-12-28 PROCEDURE — 3430000000 HC RX DIAGNOSTIC RADIOPHARMACEUTICAL: Performed by: INTERNAL MEDICINE

## 2023-12-28 RX ORDER — TC 99M MEDRONATE 20 MG/10ML
30 INJECTION, POWDER, LYOPHILIZED, FOR SOLUTION INTRAVENOUS
Status: COMPLETED | OUTPATIENT
Start: 2023-12-28 | End: 2023-12-28

## 2023-12-28 RX ADMIN — TC 99M MEDRONATE 30 MILLICURIE: 20 INJECTION, POWDER, LYOPHILIZED, FOR SOLUTION INTRAVENOUS at 11:02

## 2023-12-29 LAB
FAX TO NUMBER: NORMAL
TEST RESULTS FAXED TO: NORMAL

## 2024-02-21 ENCOUNTER — HOSPITAL ENCOUNTER (OUTPATIENT)
Facility: HOSPITAL | Age: 69
Setting detail: RECURRING SERIES
Discharge: HOME | End: 2024-02-24
Payer: MEDICARE

## 2024-02-21 PROCEDURE — 97162 PT EVAL MOD COMPLEX 30 MIN: CPT

## 2024-02-21 PROCEDURE — 97535 SELF CARE MNGMENT TRAINING: CPT

## 2024-02-21 NOTE — PROGRESS NOTES
PT DAILY TREATMENT NOTE/LYMPHLEG EVAL 10-18    Patient Name: Efrain Mayorga    Date: 2024    : 1955  Insurance: Payor: MEDICARE / Plan: MEDICARE PART A AND B / Product Type: *No Product type* /      Patient  verified yes     Visit #   Current / Total 1 16   Time   In / Out 1040 1150     TREATMENT AREA =  Lymphedema, not elsewhere classified [I89.0]    SUBJECTIVE  Pain Level (0-10 scale): 6/10  []constant [x]intermittent []improving []worsening []no change since onset    Any medication changes, allergies to medications, adverse drug reactions, diagnosis change, or new procedure performed?: [x] No    [] Yes (see summary sheet for update)  Subjective functional status/changes:     PLOF: functionally independent, no AD, active lifestyle  Limitations to PLOF: difficulty wearing shoes, difficulty with walking, difficulty with wearing clothes    PMHx/Surgical Hx: urostomy , MI  s/p cardiac cath, bladder cancer s/p removal with lymph node removal      Surgery: bladder tumor removal  Node Biopsy: Location abdomen, Number 72     Cancer treatments:  Radiation: number of treatments: none Date Completed NA     Chemotherapy [] Agents: currently attempting treatment and on hold due to blistering on feet.     Lymphedema History: [] Primary [x] Secondary Cause: Pt has a significant PMH of bladder cancer s/p removal of bladder 72 lymph nodes and urostomy placement in .  Pt reported that the swelling started after he started taking some chemo drugs; sysplatin and gesplantin.  Pt was taken off of those drugs and was placed on another drug that he is unable to remember and it was continuing to get worse.  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  Upon taking levothyroxin and stopping chemo pt had improvements in swelling.  Pt was then started on new chemo drug.  And is now having blisters on feet and return of lymphedema.  Pt reports that swelling is worse in evening 
Patient has lymphedema through out quyen LE from feet to hips and shows abdominal lymphedema with fibrosis along pannus. Pt gave pt tubigrip today to initiate compression from feet to knee today.  Pt was given a spare set at this time.   Patient demonstrates  decreased strength, increased fluid retention, pain and decreased functional mobility tolerance.  Pt would benefit from lymphatic pump referral, Self MLD education, multilayer compression bandaging, education on lymphedema risk management and exercise.       Patient will continue to benefit from skilled PT services to modify and progress therapeutic interventions, analyze and address soft tissue restrictions and decrease fliud retension and improve tissue healing to attain remaining goals.  Evaluation Complexity HistoryHIGH Complexity :3+ comorbidities / personal factors will impact the outcome/ POC  ; Examination HIGH Complexity : 4+ Standardized tests and measures addressing body structure, function, activity limitation and / or participation in recreation  ;Presentation MEDIUM Complexity : Evolving with changing characteristics  ;Clinical Decision Making MEDIUM Complexity : FOTO score of 26-74 FOTO score = an established functional score where 100 = no disability  Overall Complexity Rating: MEDIUM  Problem List: pain affecting function, decrease strength, edema affection function, impaired gait/balance, decrease ADL/functional abilities, decrease activity tolerance, decrease flexibility/joint mobility, and decrease transfer abilities    Treatment Plan may include any combination of the followin Therapeutic Exercise, 94290 Neuromuscular Re-Education, 92282 Manual Therapy, 16866 Therapeutic Activity, 72450 Self Care/Home Management, and 30485 Gait Training  Vasopnuematic compression justification:  Per bilateral girth measures taken and listed above the edema is considered significant and having an impact on the patient's strength, balance, gait,

## 2024-02-23 ENCOUNTER — TELEPHONE (OUTPATIENT)
Facility: HOSPITAL | Age: 69
End: 2024-02-23

## 2024-02-23 ENCOUNTER — HOSPITAL ENCOUNTER (OUTPATIENT)
Facility: HOSPITAL | Age: 69
Setting detail: RECURRING SERIES
End: 2024-02-23
Payer: MEDICARE

## 2024-02-23 NOTE — TELEPHONE ENCOUNTER
Pt called because they are unsure if they shpuld attend their f/u due to issue with raw skin and wrapping. Advised pt to tryto come in and get therapist's opinion; therapist busy at time of call. Pt will attempt to wrap and call back if they'd like to cxl

## 2024-02-26 ENCOUNTER — HOSPITAL ENCOUNTER (OUTPATIENT)
Facility: HOSPITAL | Age: 69
Setting detail: RECURRING SERIES
Discharge: HOME | End: 2024-02-29
Payer: MEDICARE

## 2024-02-26 PROCEDURE — 97110 THERAPEUTIC EXERCISES: CPT

## 2024-02-26 PROCEDURE — 97535 SELF CARE MNGMENT TRAINING: CPT

## 2024-02-26 NOTE — PROGRESS NOTES
PHYSICAL / OCCUPATIONAL THERAPY - DAILY TREATMENT NOTE     Patient Name: Efrain Mayorga    Date: 2024    : 1955  Insurance: Payor: MEDICARE / Plan: MEDICARE PART A AND B / Product Type: *No Product type* /      Patient  verified Yes     Visit #   Current / Total 2 24   Time   In / Out 910 952   Pain   In / Out 6 6   Subjective Functional Status/Changes: Pt reported that he got a call on Friday stating that his lab levels were off and his MD was questioning his kidney function.  Pt is going for lab tests again today    Changes to:  Allergies, Med Hx, Sx Hx?   yes  see above      TREATMENT AREA =  Lymphedema, not elsewhere classified [I89.0]    OBJECTIVE    Therapeutic Procedures:  Tx Min Billable or 1:1 Min (if diff from Tx Min) Procedure, Rationale, Specifics   27 27 95532 Therapeutic Exercise (timed):  increase ROM, strength, coordination, balance, and proprioception to improve patient's ability to progress to PLOF and address remaining functional goals. (see flow sheet as applicable)    Details if applicable:       15 15 19108 Self Care/Home Management (timed):  improve patient knowledge and understanding of activity modification, diagnosis/prognosis, physical therapy expectations, procedures and progression, and lymphedema and progression  to improve patient's ability to progress to PLOF and address remaining functional goals.  (see flow sheet as applicable)    Details if applicable:     42 42 [facility] Totals Reminder: bill using total billable min of TIMED therapeutic procedures (example: do not include dry needle or estim unattended, both untimed codes, in totals to left)  8-22 min = 1 unit; 23-37 min = 2 units; 38-52 min = 3 units; 53-67 min = 4 units; 68-82 min = 5 units   Total Total     TOTAL TREATMENT TIME:        42     [x]  Patient Education billed concurrently with other procedures   [x] Review HEP    [] Progressed/Changed HEP, detail:    [] Other detail:       Objective

## 2024-02-29 ENCOUNTER — HOSPITAL ENCOUNTER (OUTPATIENT)
Facility: HOSPITAL | Age: 69
Setting detail: RECURRING SERIES
End: 2024-02-29
Payer: MEDICARE

## 2024-02-29 PROCEDURE — 97535 SELF CARE MNGMENT TRAINING: CPT

## 2024-02-29 PROCEDURE — 97110 THERAPEUTIC EXERCISES: CPT

## 2024-02-29 NOTE — PROGRESS NOTES
PHYSICAL / OCCUPATIONAL THERAPY - DAILY TREATMENT NOTE     Patient Name: Efrain Mayorga    Date: 2024    : 1955  Insurance: Payor: MEDICARE / Plan: MEDICARE PART A AND B / Product Type: *No Product type* /      Patient  verified Yes     Visit #   Current / Total 3 24   Time   In / Out 225 308   Pain   In / Out 5 5   Subjective Functional Status/Changes: Pt continued to report high kidney lab levels.  Cont to hold bandaging and MLD.  Pt is now on another steroid pack. Will return to MD next Tuesday.    Changes to:  Allergies, Med Hx, Sx Hx?   yes       TREATMENT AREA =  Lymphedema, not elsewhere classified [I89.0]    OBJECTIVE    Therapeutic Procedures:  Tx Min Billable or 1:1 Min (if diff from Tx Min) Procedure, Rationale, Specifics   10 10 80729 Therapeutic Exercise (timed):  increase ROM, strength, coordination, balance, and proprioception to improve patient's ability to progress to PLOF and address remaining functional goals. (see flow sheet as applicable)     Details if applicable:        33 42 47123 Self Care/Home Management (timed):  improve patient knowledge and understanding of activity modification, diagnosis/prognosis, physical therapy expectations, procedures and progression, and lymphedema and progression  to improve patient's ability to progress to PLOF and address remaining functional goals.  (see flow sheet as applicable)     Details if applicable:  Pt educated extensively on long term management of lymphedema.  Pt reported that he is not wearing the tubi  due to healing blisters.  PT educated pt to continue to wear compression due to risk of future blisters if he doesn't wear them.  PT educated on long term compression.  PT educated on continued need to hold MLD and Multilayer compression bandaging due to Kidney lab levels and not wanting to further exacerbate possible kidney failure.    43 43 [facility] Totals Reminder: bill using total billable min of TIMED therapeutic procedures

## 2024-03-04 ENCOUNTER — HOSPITAL ENCOUNTER (OUTPATIENT)
Facility: HOSPITAL | Age: 69
Setting detail: RECURRING SERIES
Discharge: HOME | End: 2024-03-07
Payer: MEDICARE

## 2024-03-04 PROCEDURE — 97535 SELF CARE MNGMENT TRAINING: CPT

## 2024-03-04 PROCEDURE — 97112 NEUROMUSCULAR REEDUCATION: CPT

## 2024-03-04 PROCEDURE — 97110 THERAPEUTIC EXERCISES: CPT

## 2024-03-04 NOTE — PROGRESS NOTES
independent with teach back of lymphedema risk reduction techniques in order to self manage lymphedema at Discharge with decreased likelihood of return to swollen state.  Eval Status: given today     Pt will decrease quyen figure 8 leg circumference measurement by 4 centimeters  in order to improve pts ability to wear shoes.   Eval Status:   Circumference measurements Left (cm) Right (cm)   Figure 8 58 59      Pt and/or caregiver will be able to carine/doff compression wraps for increased independence with Lymphedema management and decrease circumferential measurements to improve dressing.        Eval status: to be given at future visit         2/26/24: held today due to possible poor kidney function         Long Term Goals: To be accomplished in 16 treatments::  Patient will improve LLIS score by 20 % in order to maximize function and promote patient satisfaction with overall outcome.  Eval Status: LLIS 47%     Pt will have 5/5 quyen strength to return to goals of improved standing and walking.  Eval Status: to be assessed at future visit     Pt will be able to carine/doff compression garment with mod ind in order to self manage lymphedema in the future with maintaining circumferential measurements needed to wear shoes.   Eval status: to be assessed at future visit     Pt will decrease total circumference measurements of quyen leg by 10 cm in order to improve pts ability for dressing and walking.   Eval status:   Circumference measurements Left (cm) Right (cm)   Forefoot  25.2 26.5   Figure 8 58 59   Malleoli  28 29   10 cm proximal to malleoli 31.5 30.1   20 cm proximal to malleoli 42 41.1   10 cm distal to knee 41.2 42   Patella  41 42   10cm proximal to knee 46 46.5   20cm proximal to knee  51.5 50   30 cm proximal to knee 60.5 60   Hip circumference 99.5                 3/4/24 Progressing   Circumference measurements Left (cm) Right (cm)   Forefoot  23.5 24.5   Figure 8 57. 57   Malleoli  30 29   10 cm proximal to

## 2024-03-07 ENCOUNTER — APPOINTMENT (OUTPATIENT)
Facility: HOSPITAL | Age: 69
DRG: 683 | End: 2024-03-07
Payer: MEDICARE

## 2024-03-07 ENCOUNTER — HOSPITAL ENCOUNTER (INPATIENT)
Facility: HOSPITAL | Age: 69
LOS: 8 days | Discharge: HOME | DRG: 683 | End: 2024-03-15
Attending: INTERNAL MEDICINE | Admitting: INTERNAL MEDICINE
Payer: MEDICARE

## 2024-03-07 ENCOUNTER — HOSPITAL ENCOUNTER (OUTPATIENT)
Facility: HOSPITAL | Age: 69
Setting detail: RECURRING SERIES
Discharge: HOME | End: 2024-03-10
Payer: MEDICARE

## 2024-03-07 DIAGNOSIS — N18.9 ACUTE RENAL FAILURE SUPERIMPOSED ON CHRONIC KIDNEY DISEASE, UNSPECIFIED ACUTE RENAL FAILURE TYPE, UNSPECIFIED CKD STAGE (HCC): Primary | ICD-10-CM

## 2024-03-07 DIAGNOSIS — N17.9 ACUTE RENAL FAILURE SUPERIMPOSED ON CHRONIC KIDNEY DISEASE, UNSPECIFIED ACUTE RENAL FAILURE TYPE, UNSPECIFIED CKD STAGE (HCC): Primary | ICD-10-CM

## 2024-03-07 DIAGNOSIS — E87.5 HYPERKALEMIA: ICD-10-CM

## 2024-03-07 DIAGNOSIS — I89.0 LYMPHEDEMA: ICD-10-CM

## 2024-03-07 LAB
ALBUMIN SERPL-MCNC: 1.5 G/DL (ref 3.4–5)
ALBUMIN/GLOB SERPL: 0.5 (ref 0.8–1.7)
ALP SERPL-CCNC: 134 U/L (ref 45–117)
ALT SERPL-CCNC: 25 U/L (ref 16–61)
ANION GAP SERPL CALC-SCNC: 7 MMOL/L (ref 3–18)
APPEARANCE UR: ABNORMAL
AST SERPL-CCNC: 30 U/L (ref 10–38)
BACTERIA URNS QL MICRO: ABNORMAL /HPF
BASOPHILS # BLD: 0 K/UL (ref 0–0.1)
BASOPHILS NFR BLD: 0 % (ref 0–2)
BILIRUB SERPL-MCNC: 0.3 MG/DL (ref 0.2–1)
BILIRUB UR QL: NEGATIVE
BUN SERPL-MCNC: 123 MG/DL (ref 7–18)
BUN/CREAT SERPL: 37 (ref 12–20)
CALCIUM SERPL-MCNC: 7.7 MG/DL (ref 8.5–10.1)
CHLORIDE SERPL-SCNC: 114 MMOL/L (ref 100–111)
CO2 SERPL-SCNC: 22 MMOL/L (ref 21–32)
COLOR UR: YELLOW
CREAT SERPL-MCNC: 3.29 MG/DL (ref 0.6–1.3)
DIFFERENTIAL METHOD BLD: ABNORMAL
ECHO BSA: 2 M2
EOSINOPHIL # BLD: 0 K/UL (ref 0–0.4)
EOSINOPHIL NFR BLD: 0 % (ref 0–5)
EPITH CASTS URNS QL MICRO: NEGATIVE /LPF (ref 0–5)
ERYTHROCYTE [DISTWIDTH] IN BLOOD BY AUTOMATED COUNT: 15.9 % (ref 11.6–14.5)
GLOBULIN SER CALC-MCNC: 3.2 G/DL (ref 2–4)
GLUCOSE SERPL-MCNC: 119 MG/DL (ref 74–99)
GLUCOSE UR STRIP.AUTO-MCNC: NEGATIVE MG/DL
HCT VFR BLD AUTO: 41.9 % (ref 36–48)
HGB BLD-MCNC: 13.8 G/DL (ref 13–16)
HGB UR QL STRIP: ABNORMAL
HYALINE CASTS URNS QL MICRO: ABNORMAL /LPF (ref 0–2)
IMM GRANULOCYTES # BLD AUTO: 0.1 K/UL (ref 0–0.04)
IMM GRANULOCYTES NFR BLD AUTO: 1 % (ref 0–0.5)
KETONES UR QL STRIP.AUTO: NEGATIVE MG/DL
LEUKOCYTE ESTERASE UR QL STRIP.AUTO: ABNORMAL
LYMPHOCYTES # BLD: 0.4 K/UL (ref 0.9–3.6)
LYMPHOCYTES NFR BLD: 5 % (ref 21–52)
MAGNESIUM SERPL-MCNC: 1.8 MG/DL (ref 1.6–2.6)
MCH RBC QN AUTO: 30.7 PG (ref 24–34)
MCHC RBC AUTO-ENTMCNC: 32.9 G/DL (ref 31–37)
MCV RBC AUTO: 93.3 FL (ref 78–100)
MONOCYTES # BLD: 0.4 K/UL (ref 0.05–1.2)
MONOCYTES NFR BLD: 5 % (ref 3–10)
NEUTS SEG # BLD: 6.5 K/UL (ref 1.8–8)
NEUTS SEG NFR BLD: 89 % (ref 40–73)
NITRITE UR QL STRIP.AUTO: NEGATIVE
NRBC # BLD: 0 K/UL (ref 0–0.01)
NRBC BLD-RTO: 0 PER 100 WBC
NT PRO BNP: 1216 PG/ML (ref 0–900)
PH UR STRIP: 8 (ref 5–8)
PLATELET # BLD AUTO: 427 K/UL (ref 135–420)
PMV BLD AUTO: 8.7 FL (ref 9.2–11.8)
POTASSIUM SERPL-SCNC: 5.8 MMOL/L (ref 3.5–5.5)
PROT SERPL-MCNC: 4.7 G/DL (ref 6.4–8.2)
PROT UR STRIP-MCNC: >1000 MG/DL
RBC # BLD AUTO: 4.49 M/UL (ref 4.35–5.65)
RBC #/AREA URNS HPF: ABNORMAL /HPF (ref 0–5)
SODIUM SERPL-SCNC: 143 MMOL/L (ref 136–145)
SP GR UR REFRACTOMETRY: 1.02 (ref 1–1.03)
TRI-PHOS CRY URNS QL MICRO: ABNORMAL
UROBILINOGEN UR QL STRIP.AUTO: 0.2 EU/DL (ref 0.2–1)
WBC # BLD AUTO: 7.3 K/UL (ref 4.6–13.2)
WBC URNS QL MICRO: ABNORMAL /HPF (ref 0–5)

## 2024-03-07 PROCEDURE — 99222 1ST HOSP IP/OBS MODERATE 55: CPT | Performed by: INTERNAL MEDICINE

## 2024-03-07 PROCEDURE — 76770 US EXAM ABDO BACK WALL COMP: CPT

## 2024-03-07 PROCEDURE — 83880 ASSAY OF NATRIURETIC PEPTIDE: CPT

## 2024-03-07 PROCEDURE — 96365 THER/PROPH/DIAG IV INF INIT: CPT

## 2024-03-07 PROCEDURE — 81001 URINALYSIS AUTO W/SCOPE: CPT

## 2024-03-07 PROCEDURE — 99285 EMERGENCY DEPT VISIT HI MDM: CPT

## 2024-03-07 PROCEDURE — 83735 ASSAY OF MAGNESIUM: CPT

## 2024-03-07 PROCEDURE — 85025 COMPLETE CBC W/AUTO DIFF WBC: CPT

## 2024-03-07 PROCEDURE — 93005 ELECTROCARDIOGRAM TRACING: CPT | Performed by: PHYSICIAN ASSISTANT

## 2024-03-07 PROCEDURE — 93970 EXTREMITY STUDY: CPT

## 2024-03-07 PROCEDURE — P9047 ALBUMIN (HUMAN), 25%, 50ML: HCPCS | Performed by: PHYSICIAN ASSISTANT

## 2024-03-07 PROCEDURE — 71045 X-RAY EXAM CHEST 1 VIEW: CPT

## 2024-03-07 PROCEDURE — 97110 THERAPEUTIC EXERCISES: CPT

## 2024-03-07 PROCEDURE — 1100000000 HC RM PRIVATE

## 2024-03-07 PROCEDURE — 97112 NEUROMUSCULAR REEDUCATION: CPT

## 2024-03-07 PROCEDURE — 97535 SELF CARE MNGMENT TRAINING: CPT

## 2024-03-07 PROCEDURE — 6360000002 HC RX W HCPCS: Performed by: PHYSICIAN ASSISTANT

## 2024-03-07 PROCEDURE — 80053 COMPREHEN METABOLIC PANEL: CPT

## 2024-03-07 RX ORDER — LORATADINE 10 MG/1
10 TABLET ORAL DAILY PRN
COMMUNITY

## 2024-03-07 RX ORDER — ONDANSETRON 4 MG/1
4 TABLET, ORALLY DISINTEGRATING ORAL EVERY 8 HOURS PRN
Status: DISCONTINUED | OUTPATIENT
Start: 2024-03-07 | End: 2024-03-15 | Stop reason: HOSPADM

## 2024-03-07 RX ORDER — LEVOTHYROXINE SODIUM 137 UG/1
137 TABLET ORAL DAILY
COMMUNITY

## 2024-03-07 RX ORDER — DILTIAZEM HYDROCHLORIDE 120 MG/1
120 CAPSULE, COATED, EXTENDED RELEASE ORAL DAILY
Status: DISCONTINUED | OUTPATIENT
Start: 2024-03-08 | End: 2024-03-15 | Stop reason: HOSPADM

## 2024-03-07 RX ORDER — HEPARIN SODIUM 5000 [USP'U]/ML
5000 INJECTION, SOLUTION INTRAVENOUS; SUBCUTANEOUS EVERY 8 HOURS SCHEDULED
Status: DISCONTINUED | OUTPATIENT
Start: 2024-03-07 | End: 2024-03-15 | Stop reason: HOSPADM

## 2024-03-07 RX ORDER — SODIUM CHLORIDE 0.9 % (FLUSH) 0.9 %
5-40 SYRINGE (ML) INJECTION EVERY 12 HOURS SCHEDULED
Status: DISCONTINUED | OUTPATIENT
Start: 2024-03-07 | End: 2024-03-15 | Stop reason: HOSPADM

## 2024-03-07 RX ORDER — ALBUMIN (HUMAN) 12.5 G/50ML
25 SOLUTION INTRAVENOUS ONCE
Status: COMPLETED | OUTPATIENT
Start: 2024-03-07 | End: 2024-03-07

## 2024-03-07 RX ORDER — ACETAMINOPHEN 650 MG/1
650 SUPPOSITORY RECTAL EVERY 6 HOURS PRN
Status: DISCONTINUED | OUTPATIENT
Start: 2024-03-07 | End: 2024-03-15 | Stop reason: HOSPADM

## 2024-03-07 RX ORDER — ATORVASTATIN CALCIUM 20 MG/1
40 TABLET, FILM COATED ORAL DAILY
Status: DISCONTINUED | OUTPATIENT
Start: 2024-03-08 | End: 2024-03-15 | Stop reason: HOSPADM

## 2024-03-07 RX ORDER — POLYETHYLENE GLYCOL 3350 17 G/17G
17 POWDER, FOR SOLUTION ORAL DAILY PRN
Status: DISCONTINUED | OUTPATIENT
Start: 2024-03-07 | End: 2024-03-15 | Stop reason: HOSPADM

## 2024-03-07 RX ORDER — FAMOTIDINE 20 MG/1
20 TABLET, FILM COATED ORAL DAILY
Status: DISCONTINUED | OUTPATIENT
Start: 2024-03-08 | End: 2024-03-15 | Stop reason: HOSPADM

## 2024-03-07 RX ORDER — ACETAMINOPHEN 325 MG/1
650 TABLET ORAL EVERY 6 HOURS PRN
Status: DISCONTINUED | OUTPATIENT
Start: 2024-03-07 | End: 2024-03-15 | Stop reason: HOSPADM

## 2024-03-07 RX ORDER — SODIUM CHLORIDE 0.9 % (FLUSH) 0.9 %
5-40 SYRINGE (ML) INJECTION PRN
Status: DISCONTINUED | OUTPATIENT
Start: 2024-03-07 | End: 2024-03-15 | Stop reason: HOSPADM

## 2024-03-07 RX ORDER — LIDOCAINE AND PRILOCAINE 25; 25 MG/G; MG/G
2 CREAM TOPICAL PRN
Status: ON HOLD | COMMUNITY
End: 2024-03-15 | Stop reason: HOSPADM

## 2024-03-07 RX ORDER — ONDANSETRON 2 MG/ML
4 INJECTION INTRAMUSCULAR; INTRAVENOUS EVERY 6 HOURS PRN
Status: DISCONTINUED | OUTPATIENT
Start: 2024-03-07 | End: 2024-03-15 | Stop reason: HOSPADM

## 2024-03-07 RX ORDER — PREDNISONE 50 MG/1
50 TABLET ORAL DAILY
Status: ON HOLD | COMMUNITY
End: 2024-03-15 | Stop reason: HOSPADM

## 2024-03-07 RX ORDER — ASPIRIN 81 MG/1
81 TABLET ORAL DAILY
Status: DISCONTINUED | OUTPATIENT
Start: 2024-03-08 | End: 2024-03-07

## 2024-03-07 RX ORDER — SODIUM CHLORIDE 9 MG/ML
INJECTION, SOLUTION INTRAVENOUS PRN
Status: DISCONTINUED | OUTPATIENT
Start: 2024-03-07 | End: 2024-03-15 | Stop reason: HOSPADM

## 2024-03-07 RX ADMIN — ALBUMIN (HUMAN) 25 G: 0.25 INJECTION, SOLUTION INTRAVENOUS at 19:34

## 2024-03-07 ASSESSMENT — PAIN - FUNCTIONAL ASSESSMENT
PAIN_FUNCTIONAL_ASSESSMENT: NONE - DENIES PAIN
PAIN_FUNCTIONAL_ASSESSMENT: NONE - DENIES PAIN

## 2024-03-07 NOTE — ED PROVIDER NOTES
[facility] EMERGENCY DEPT  EMERGENCY DEPARTMENT ENCOUNTER       Pt Name: Efrain Mayorga  MRN: [id number]  Birthdate 1955  Date of evaluation: 3/7/2024  PCP: Arnoldo St DO  Note Started: 7:51 PM 3/7/24     CHIEF COMPLAINT       CC: WERO, leg swelling       HISTORY OF PRESENT ILLNESS: 1 or more elements      History From: Patient  HPI Limitations: None  Chronic Conditions: HTN, CAD, GERD, fibromyalgia, bladder urothelial CA, also adenocarcinoma of prostate, tobacco use, anxiety  Social Determinants affecting Dx or Tx: none      Efrain Mayorga is a 69 y.o. male who presents to ED c/o abnormal labs. Associated sxs are BLE lymphedema. Most recent blood draw reveals CR 3.27 and .. Pt was sent to ED by ELDA Louie from [facility]. Pt is followed by Dr. Francisco Garza. Pt has neoadjuvant therapy with cisplatin/gemcitabine until 03/2023. Pt had cystectomy/prostatectomy with ileostomy. Pt was then placed on Opdivo (immunotherapy) with last dose 09/2023. Pt was halted due to LE lymphedema, abnormal thyroid, and rising creatinine. Pt was sent to ED as creatinine continues to rise and has gained 8 pounds in one week. No fever, chills, SOB, CP, abdominal pain        Nursing Notes were all reviewed and agreed with or any disagreements were addressed in the HPI.    PAST HISTORY     Past Medical History:  Past Medical History:   Diagnosis Date    Arthritis     Bladder cancer (HCC)     Fibromyalgia     GERD (gastroesophageal reflux disease)     Heart attack (HCC) 03/2017    Hematuria, gross     Hypertension 2017       Past Surgical History:  Past Surgical History:   Procedure Laterality Date    HERNIA REPAIR Right     inguinal    LEFT HEART PERCUTANEOUS  03/29/2017    ERNESTO CORONARY ARTERIOGRAPH  03/29/2017    TRANSURETHRAL RESEC BLADDER NECK         Family History:  History reviewed. No pertinent family history.    Social History:  Social History     Socioeconomic History    Marital status:      Spouse name: None    Number of    I have spent 34 minutes of critical care time involved in lab review, consultations with specialist, family decision-making, and documentation.  During this entire length of time I was immediately available to the patient.  This time excludes separately billable procedures.    Critical Care:  The reason for providing this level of medical care for this critically ill patient was due a critical illness that impaired one or more vital organ systems such that there was a high probability of imminent or life threatening deterioration in the patients condition. This care involved high complexity decision making to assess, manipulate, and support vital system functions, to treat this degreee vital organ system failure and to prevent further life threatening deterioration of the patient’s condition.   EMERGENCY DEPARTMENT COURSE and DIFFERENTIAL DIAGNOSIS/MDM   Vitals:    Vitals:    03/07/24 1741 03/07/24 1858   BP: (!) 140/76    Pulse: 70    Resp: 18    Temp: 98 °F (36.7 °C)    SpO2: 97%    Weight: 86.2 kg (190 lb) 86.2 kg (190 lb)   Height:  1.676 m (5' 6\")       Patient was given the following medications:  Medications   albumin human 25% IV solution 25 g (25 g IntraVENous New Bag 3/7/24 1934)           Records Reviewed (source and summary): Old medical records.  Nursing notes.    CLINICAL MANAGEMENT TOOLS:  Not Applicable      ED COURSE       Medial Decision Making:  DDX: renal failure/WERO, DVT, CHF, lymphedema.     7:20 PM EST   Consult: Discussed with Dr. Wallis (on call [facility]). Admit for nephrology consult given worsening sxs. Will consult [facility] nephrology    7:37 PM EST   Consult: Spoke to Dr. Sofya Newton (on call [facility]). Admit and he will see patient in AM. Wife and family agree.    7:42 PM EST   Consult: Spoke to Dr. Harmon (on call medicine). Admit for nephrology and [facility] consult. Pt stable.         FINAL IMPRESSION     1. Acute renal failure superimposed on chronic kidney disease, unspecified acute renal

## 2024-03-07 NOTE — ED NOTES
Patient arrives to ED with report of elevated creatinine from MD office. +generalizes swelling noted to body and lower extremities. Patient has urostomy in place.

## 2024-03-07 NOTE — PROGRESS NOTES
units; 68-82 min = 5 units   Total Total     TOTAL TREATMENT TIME:        44     [x]  Patient Education billed concurrently with other procedures   [x] Review HEP    [] Progressed/Changed HEP, detail:    [] Other detail:       Objective Information/Functional Measures/Assessment    Patient tolerated treatment session well today. Patient had no complaints with addition of supine AROM exercises to exercise program to accomplish improved lymphatic activation through hips and abdomen. PT called oncologist to ask about Self MLD over abdomen as pt is continuing to be assessed for kidney dysfunction.  Patient continues to make steady progress toward goals and would benefit from continued skilled PT intervention to address remaining deficits outlined in goals below.    Patient will continue to benefit from skilled PT services to modify and progress therapeutic interventions, analyze and address soft tissue restrictions and decrease fliud retension and improve tissue healing to attain remaining goals.    Progress toward goals / Updated goals:  []  See Progress Note/Recertification    Short Term Goals: To be accomplished in 8 treatments:  Patient will be independent and compliant with HEP to progress toward goals and restore functional mobility.   Eval Status: issued at eval  2/26/24: gave AROM exercises      Patient will be independent and compliant with Self MLD to progress toward goals and improve independent management of Lymphedema in order to improve pain and dressing.     Eval Status: to be issued at future visit        2/26/24: held today due to possible poor kidney function      Patient will improve LLIS score by 10 %  in order to maximize function and promote patient satisfaction with overall outcome.  Eval Status: LLIS 47%     Patient will improve pain in quyen  LE to 1/10  to improve standing and walking tolerance and restore prior level of function.  Eval Status: 6/10 at worst  3/4/24:3/10 at worst PROGRESSING

## 2024-03-08 ENCOUNTER — TELEPHONE (OUTPATIENT)
Facility: HOSPITAL | Age: 69
End: 2024-03-08

## 2024-03-08 LAB
ALBUMIN SERPL-MCNC: 1.6 G/DL (ref 3.4–5)
ALBUMIN/GLOB SERPL: 0.6 (ref 0.8–1.7)
ALP SERPL-CCNC: 96 U/L (ref 45–117)
ALT SERPL-CCNC: 17 U/L (ref 16–61)
ANION GAP SERPL CALC-SCNC: 4 MMOL/L (ref 3–18)
ANION GAP SERPL CALC-SCNC: 6 MMOL/L (ref 3–18)
AST SERPL-CCNC: 25 U/L (ref 10–38)
BASOPHILS # BLD: 0 K/UL (ref 0–0.1)
BASOPHILS NFR BLD: 0 % (ref 0–2)
BILIRUB SERPL-MCNC: 0.4 MG/DL (ref 0.2–1)
BUN SERPL-MCNC: 119 MG/DL (ref 7–18)
BUN SERPL-MCNC: 129 MG/DL (ref 7–18)
BUN/CREAT SERPL: 36 (ref 12–20)
BUN/CREAT SERPL: 40 (ref 12–20)
CALCIUM SERPL-MCNC: 7.9 MG/DL (ref 8.5–10.1)
CALCIUM SERPL-MCNC: 8 MG/DL (ref 8.5–10.1)
CHLORIDE SERPL-SCNC: 114 MMOL/L (ref 100–111)
CHLORIDE SERPL-SCNC: 115 MMOL/L (ref 100–111)
CO2 SERPL-SCNC: 20 MMOL/L (ref 21–32)
CO2 SERPL-SCNC: 24 MMOL/L (ref 21–32)
CREAT SERPL-MCNC: 3.24 MG/DL (ref 0.6–1.3)
CREAT SERPL-MCNC: 3.29 MG/DL (ref 0.6–1.3)
DIFFERENTIAL METHOD BLD: ABNORMAL
EKG ATRIAL RATE: 59 BPM
EKG DIAGNOSIS: NORMAL
EKG P AXIS: 81 DEGREES
EKG P-R INTERVAL: 140 MS
EKG Q-T INTERVAL: 388 MS
EKG QRS DURATION: 90 MS
EKG QTC CALCULATION (BAZETT): 384 MS
EKG R AXIS: 4 DEGREES
EKG T AXIS: 50 DEGREES
EKG VENTRICULAR RATE: 59 BPM
EOSINOPHIL # BLD: 0 K/UL (ref 0–0.4)
EOSINOPHIL NFR BLD: 0 % (ref 0–5)
ERYTHROCYTE [DISTWIDTH] IN BLOOD BY AUTOMATED COUNT: 15.8 % (ref 11.6–14.5)
ERYTHROCYTE [DISTWIDTH] IN BLOOD BY AUTOMATED COUNT: 15.9 % (ref 11.6–14.5)
GLOBULIN SER CALC-MCNC: 2.9 G/DL (ref 2–4)
GLUCOSE BLD STRIP.AUTO-MCNC: 111 MG/DL (ref 70–110)
GLUCOSE BLD STRIP.AUTO-MCNC: 129 MG/DL (ref 70–110)
GLUCOSE SERPL-MCNC: 109 MG/DL (ref 74–99)
GLUCOSE SERPL-MCNC: 127 MG/DL (ref 74–99)
HCT VFR BLD AUTO: 30.9 % (ref 36–48)
HCT VFR BLD AUTO: 31.4 % (ref 36–48)
HGB BLD-MCNC: 10.3 G/DL (ref 13–16)
HGB BLD-MCNC: 9.9 G/DL (ref 13–16)
IMM GRANULOCYTES # BLD AUTO: 0.1 K/UL (ref 0–0.04)
IMM GRANULOCYTES NFR BLD AUTO: 1 % (ref 0–0.5)
LYMPHOCYTES # BLD: 0.8 K/UL (ref 0.9–3.6)
LYMPHOCYTES NFR BLD: 11 % (ref 21–52)
MCH RBC QN AUTO: 30.1 PG (ref 24–34)
MCH RBC QN AUTO: 30.9 PG (ref 24–34)
MCHC RBC AUTO-ENTMCNC: 32 G/DL (ref 31–37)
MCHC RBC AUTO-ENTMCNC: 32.8 G/DL (ref 31–37)
MCV RBC AUTO: 93.9 FL (ref 78–100)
MCV RBC AUTO: 94.3 FL (ref 78–100)
MONOCYTES # BLD: 0.3 K/UL (ref 0.05–1.2)
MONOCYTES NFR BLD: 4 % (ref 3–10)
NEUTS SEG # BLD: 5.9 K/UL (ref 1.8–8)
NEUTS SEG NFR BLD: 84 % (ref 40–73)
NRBC # BLD: 0 K/UL (ref 0–0.01)
NRBC # BLD: 0 K/UL (ref 0–0.01)
NRBC BLD-RTO: 0 PER 100 WBC
NRBC BLD-RTO: 0 PER 100 WBC
PLATELET # BLD AUTO: 315 K/UL (ref 135–420)
PLATELET # BLD AUTO: 367 K/UL (ref 135–420)
PMV BLD AUTO: 8.8 FL (ref 9.2–11.8)
PMV BLD AUTO: 9 FL (ref 9.2–11.8)
POTASSIUM SERPL-SCNC: 5.9 MMOL/L (ref 3.5–5.5)
POTASSIUM SERPL-SCNC: 6 MMOL/L (ref 3.5–5.5)
POTASSIUM SERPL-SCNC: 6.4 MMOL/L (ref 3.5–5.5)
PROT SERPL-MCNC: 4.5 G/DL (ref 6.4–8.2)
RBC # BLD AUTO: 3.29 M/UL (ref 4.35–5.65)
RBC # BLD AUTO: 3.33 M/UL (ref 4.35–5.65)
SODIUM SERPL-SCNC: 141 MMOL/L (ref 136–145)
SODIUM SERPL-SCNC: 142 MMOL/L (ref 136–145)
SODIUM UR-SCNC: 22 MMOL/L (ref 20–110)
WBC # BLD AUTO: 7.1 K/UL (ref 4.6–13.2)
WBC # BLD AUTO: 8.6 K/UL (ref 4.6–13.2)

## 2024-03-08 PROCEDURE — 84132 ASSAY OF SERUM POTASSIUM: CPT

## 2024-03-08 PROCEDURE — 85025 COMPLETE CBC W/AUTO DIFF WBC: CPT

## 2024-03-08 PROCEDURE — P9047 ALBUMIN (HUMAN), 25%, 50ML: HCPCS | Performed by: INTERNAL MEDICINE

## 2024-03-08 PROCEDURE — 6360000002 HC RX W HCPCS: Performed by: INTERNAL MEDICINE

## 2024-03-08 PROCEDURE — 2580000003 HC RX 258: Performed by: INTERNAL MEDICINE

## 2024-03-08 PROCEDURE — 6370000000 HC RX 637 (ALT 250 FOR IP): Performed by: INTERNAL MEDICINE

## 2024-03-08 PROCEDURE — 86038 ANTINUCLEAR ANTIBODIES: CPT

## 2024-03-08 PROCEDURE — 93010 ELECTROCARDIOGRAM REPORT: CPT | Performed by: INTERNAL MEDICINE

## 2024-03-08 PROCEDURE — 2500000003 HC RX 250 WO HCPCS: Performed by: INTERNAL MEDICINE

## 2024-03-08 PROCEDURE — 82962 GLUCOSE BLOOD TEST: CPT

## 2024-03-08 PROCEDURE — 1100000000 HC RM PRIVATE

## 2024-03-08 PROCEDURE — 80053 COMPREHEN METABOLIC PANEL: CPT

## 2024-03-08 PROCEDURE — 84156 ASSAY OF PROTEIN URINE: CPT

## 2024-03-08 PROCEDURE — 36415 COLL VENOUS BLD VENIPUNCTURE: CPT

## 2024-03-08 PROCEDURE — 84300 ASSAY OF URINE SODIUM: CPT

## 2024-03-08 PROCEDURE — 85027 COMPLETE CBC AUTOMATED: CPT

## 2024-03-08 RX ORDER — CALCIUM GLUCONATE 94 MG/ML
1000 INJECTION, SOLUTION INTRAVENOUS ONCE
Status: DISCONTINUED | OUTPATIENT
Start: 2024-03-08 | End: 2024-03-08

## 2024-03-08 RX ORDER — DEXTROSE MONOHYDRATE 25 G/50ML
25 INJECTION, SOLUTION INTRAVENOUS ONCE
Status: DISCONTINUED | OUTPATIENT
Start: 2024-03-08 | End: 2024-03-13 | Stop reason: RX

## 2024-03-08 RX ORDER — ALBUMIN (HUMAN) 12.5 G/50ML
25 SOLUTION INTRAVENOUS EVERY 6 HOURS
Status: COMPLETED | OUTPATIENT
Start: 2024-03-08 | End: 2024-03-10

## 2024-03-08 RX ORDER — CALCIUM GLUCONATE 20 MG/ML
1000 INJECTION, SOLUTION INTRAVENOUS ONCE
Status: COMPLETED | OUTPATIENT
Start: 2024-03-08 | End: 2024-03-08

## 2024-03-08 RX ORDER — CETIRIZINE HYDROCHLORIDE 10 MG/1
10 TABLET ORAL DAILY
Status: DISCONTINUED | OUTPATIENT
Start: 2024-03-08 | End: 2024-03-15 | Stop reason: HOSPADM

## 2024-03-08 RX ADMIN — ATORVASTATIN CALCIUM 40 MG: 20 TABLET, FILM COATED ORAL at 09:25

## 2024-03-08 RX ADMIN — CALCIUM GLUCONATE 1000 MG: 20 INJECTION, SOLUTION INTRAVENOUS at 05:26

## 2024-03-08 RX ADMIN — SODIUM ZIRCONIUM CYCLOSILICATE 5 G: 5 POWDER, FOR SUSPENSION ORAL at 14:45

## 2024-03-08 RX ADMIN — SODIUM CHLORIDE, PRESERVATIVE FREE 10 ML: 5 INJECTION INTRAVENOUS at 21:44

## 2024-03-08 RX ADMIN — ALBUMIN (HUMAN) 25 G: 0.25 INJECTION, SOLUTION INTRAVENOUS at 11:22

## 2024-03-08 RX ADMIN — HEPARIN SODIUM 5000 UNITS: 5000 INJECTION INTRAVENOUS; SUBCUTANEOUS at 14:45

## 2024-03-08 RX ADMIN — SODIUM ZIRCONIUM CYCLOSILICATE 10 G: 5 POWDER, FOR SUSPENSION ORAL at 05:24

## 2024-03-08 RX ADMIN — LEVOTHYROXINE SODIUM 137.5 MCG: 0.07 TABLET ORAL at 06:51

## 2024-03-08 RX ADMIN — PREDNISONE 50 MG: 20 TABLET ORAL at 11:21

## 2024-03-08 RX ADMIN — FAMOTIDINE 20 MG: 20 TABLET, FILM COATED ORAL at 09:25

## 2024-03-08 RX ADMIN — DILTIAZEM HYDROCHLORIDE 120 MG: 120 CAPSULE, COATED, EXTENDED RELEASE ORAL at 09:25

## 2024-03-08 RX ADMIN — CETIRIZINE HYDROCHLORIDE 10 MG: 10 TABLET, FILM COATED ORAL at 09:25

## 2024-03-08 RX ADMIN — SODIUM ZIRCONIUM CYCLOSILICATE 5 G: 5 POWDER, FOR SUSPENSION ORAL at 21:44

## 2024-03-08 RX ADMIN — ALBUMIN (HUMAN) 25 G: 0.25 INJECTION, SOLUTION INTRAVENOUS at 21:47

## 2024-03-08 RX ADMIN — SODIUM CHLORIDE, PRESERVATIVE FREE 10 ML: 5 INJECTION INTRAVENOUS at 09:24

## 2024-03-08 RX ADMIN — ALBUMIN (HUMAN) 25 G: 0.25 INJECTION, SOLUTION INTRAVENOUS at 17:16

## 2024-03-08 RX ADMIN — HEPARIN SODIUM 5000 UNITS: 5000 INJECTION INTRAVENOUS; SUBCUTANEOUS at 21:44

## 2024-03-08 RX ADMIN — HEPARIN SODIUM 5000 UNITS: 5000 INJECTION INTRAVENOUS; SUBCUTANEOUS at 05:34

## 2024-03-08 NOTE — H&P
HISTORY & PHYSICAL      Patient: Efrain Mayorga MRN: [id number]  CSN:       YOB: 1955  Age: 69 y.o.  Sex: male    DOA: 3/7/2024 LOS:  LOS: 0 days        DOA: 3/7/2024        Assessment/Plan     69 years old referred from the clinic due to acute renal failure, likely related to chemotherapy    -Acute renal failure, suspect related to cancer therapy  -Mild hyperkalemia  -Nephrotic syndrome picture, with anasarca, hypoalbuminemia and proteinuria  -History of bladder and prostate cancer, with history of cystectomy/prostatectomy with ileostomy  -Hypertension  -CAD  -Hyperlipidemia  -Other medical problems as below    The patient is [facility] telemetry  Trend renal function  Monitor intake and output  Further recommendation per nephrology, consulted by ED  Continue recently started prednisone 50 mg daily, until further recommendation  VOA was consulted  Hold home losartan  Avoid NSAIDs, nephrotoxic drugs  Continue other home meds    Heparin for DVT prophylaxis    Admission plan was discussed    Patient Active Problem List   Diagnosis    NSTEMI (non-ST elevated myocardial infarction) (HCC)    Hyperlipidemia    Chest pain    Arteriosclerosis of coronary artery    Malignant neoplasm of urinary bladder (HCC)    Anemia    Fatigue    Prostate cancer (HCC)    Primary malignant neoplasm of prostate (HCC)    Abnormal finding on thyroid function test    Edema of lower extremity    High serum creatinine    Hypoalbuminemia    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 male who who is admitted for acute renal failure with nephrotic picture including anasarca, proteinuria and hypoalbuminemia.  The patient was referred to the ED from the clinic due to rising creatinine.  He presented to ED with symptoms of weight gain, bilateral lower extremity edema and abdominal swelling.  Denies having change in breathing, cough, fever, abdominal pain,  38 U/L    Alk Phosphatase 134 (H) 45 - 117 U/L    Total Protein 4.7 (L) 6.4 - 8.2 g/dL    Albumin 1.5 (L) 3.4 - 5.0 g/dL    Globulin 3.2 2.0 - 4.0 g/dL    Albumin/Globulin Ratio 0.5 (L) 0.8 - 1.7     Urinalysis    Collection Time: 03/07/24  6:10 PM   Result Value Ref Range    Color, UA YELLOW      Appearance CLOUDY      Specific Gravity, UA 1.020 1.005 - 1.030      pH, Urine 8.0 5.0 - 8.0      Protein, UA >1000 (A) NEG mg/dL    Glucose, UA Negative NEG mg/dL    Ketones, Urine Negative NEG mg/dL    Bilirubin Urine Negative NEG      Blood, Urine MODERATE (A) NEG      Urobilinogen, Urine 0.2 0.2 - 1.0 EU/dL    Nitrite, Urine Negative NEG      Leukocyte Esterase, Urine MODERATE (A) NEG     Brain Natriuretic Peptide    Collection Time: 03/07/24  6:10 PM   Result Value Ref Range    NT Pro-BNP 1,216 (H) 0 - 900 PG/ML   Magnesium    Collection Time: 03/07/24  6:10 PM   Result Value Ref Range    Magnesium 1.8 1.6 - 2.6 mg/dL   Urinalysis, Micro    Collection Time: 03/07/24  6:10 PM   Result Value Ref Range    WBC, UA 4 to 10 0 - 5 /hpf    RBC, UA 4 to 10 0 - 5 /hpf    Epithelial Cells UA Negative 0 - 5 /lpf    BACTERIA, URINE FEW (A) NEG /hpf    TRIPLE PHOSPHATE CRYSTALS 3+ (A) NEG    Hyaline Casts, UA 4 to 10 0 - 2 /lpf   Vascular duplex lower extremity venous bilateral    Collection Time: 03/07/24  7:10 PM   Result Value Ref Range    Body Surface Area 2 m2       Imaging Reviewed:  US RETROPERITONEAL COMPLETE    Result Date: 3/7/2024  Evidence of medical renal disease, without hydronephrosis.      XR CHEST PORTABLE    Result Date: 3/7/2024  No acute cardiopulmonary disease.              Da Carvalho MD  3/7/2024, 7:51 PM        Disclaimer: Sections of this note are dictated using utilizing voice recognition software.  Minor typographical errors may be present. If questions arise, please do not hesitate to contact me or call our department.

## 2024-03-08 NOTE — CONSULTS
Wound care nurse in to assess for ostomy supply needs and to assess blisters on legs.  Patient has urostomy that was created in March of 2023 related to bladder and prostate cancer.  In January patient developed blistering related to biologic therapy to treat his cancer.  Patient follows in [facility] for lymphedema treatments.  Blisters are all scabbed over with stable scabs.  Patient uses Aquaphor daily at home and wound care nurse instructed patient to apply lotion as he would at home.  Patient chooses to use his own ostomy supplies and wife stated she will bring supplies from home.

## 2024-03-08 NOTE — PROGRESS NOTES
Hospitalist Progress Note    Patient: Efrain Mayorga MRN: [id number]  CSN: [id number]    YOB: 1955  Age: 69 y.o.  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 Joiner -  IV alb Q6hrs x2 days  Lokelma 5g tid x2 days   Low K and Na diet  Check serum K Q8hrs  Limit fluid intake to <1.5L/day  Avoid Lasix for now as long as pt is has no resp complaints   Monitor UOP and trend renal function   EDILMA w/ am   Urine electrolytes   No urgent indication for RRT  Repeat 24 hrs urine   Possible Kidney Biopsy     Per oncology, all meds have been held related to his immunotherapy for his disease   Will be getting lymphedema suite for anarsarca soon     Continue recently started prednisone 50 mg daily, continue     Hold home losartan  Avoid NSAIDs, nephrotoxic drugs  Continue other home meds      Active Hospital Problems    Diagnosis Date Noted    Anasarca [R60.1] 03/09/2024    Hyperkalemia [E87.5] 03/09/2024    ARF (acute renal failure) (HCC) [N17.9] 03/07/2024    Hypoalbuminemia [E88.09] 08/30/2023    Prostate cancer (HCC) [C61] 04/07/2023    Anemia [D64.9] 03/22/2023    Malignant neoplasm of urinary bladder (HCC) [C67.9] 11/03/2022       Disposition : TBD      Subjective:    In good spirits   Eating meal  Wife at bedside   No immediate complaints or issues    Review of  systems:    Constitutional: denies fevers, chills, myalgias  Respiratory: denies SOB, cough  Cardiovascular: denies chest pain, palpitations  Gastrointestinal: denies nausea, vomiting, diarrhea      Vital signs/Intake and Output:  Visit Vitals  /61   Pulse 65   Temp 97.9 °F (36.6 °C) (Oral)   Resp 18   Ht 1.702 m (5' 7\")   Wt 87.7 kg (193 lb 5.5 oz)   SpO2 95%   BMI 30.28 kg/m²     Current Shift:  No intake/output data recorded.  Last three shifts:  03/07 1901 - 03/09 0700  In: 400 [P.O.:400]  Out: 1550 [Urine:1550]    Exam:    General: Well developed, alert, NAD, OX3  Head/Neck: NCAT, supple, No masses, No lymphadenopathy  CVS:Regular rate and rhythm, no M/R/G, S1/S2 heard, no thrill  Lungs:Clear to auscultation bilaterally, no wheezes, rhonchi, or rales  Abdomen: Soft, Nontender, No distention, Normal Bowel sounds, No hepatomegaly  Extremities: No C/C/E, pulses palpable 2+  Skin:normal texture and turgor, no rashes, no lesions  Neuro:grossly normal , follows commands  Psych:appropriate    Labs: Results:       Chemistry Recent Labs     03/07/24  1810 03/08/24  0304 03/08/24  1010 03/08/24  1920 03/09/24  0349   GLUCOSE 119* 109* 127*  --   --     141 142  --   --    K 5.8* 6.4* 6.0* 5.9* 5.8*   * 115* 114*  --   --    CO2 22 20* 24  --   --    * 119* 129*  --   --    CREATININE 3.29* 3.29* 3.24*  --   --    CALCIUM 7.7* 7.9* 8.0*  --   --    BILITOT 0.3 0.4  --   --   --    AST 30 25  --   --   --    ALT 25 17  --   --   --    ALKPHOS 134* 96  --   --   --    PROT 4.7* 4.5*  --   --   --    GLOB 3.2 2.9  --   --   --    AGRATIO 0.5* 0.6*  --   --   --    LABGLOM 20* 20* 20*  --   --       CBC w/Diff Recent Labs     03/07/24  1810 03/08/24  0304 03/08/24  1010   WBC 7.3 7.1 8.6   RBC 4.49 3.29* 3.33*   HGB 13.8 9.9* 10.3*   HCT 41.9 30.9* 31.4*   * 315 367   LYMPHOPCT 5* 11*  --       Cardiac Enzymes No results for input(s): \"CKTOTAL\", \"CKMB\", \"CKMBINDEX\", \"TROPONINI\", \"CHRISTIANO\" in the

## 2024-03-08 NOTE — ED NOTES
TRANSFER - OUT REPORT:    Verbal report given to Aguilar KING RN  on Cono Mukesh  being transferred to remote [facility]  for routine progression of patient care       Report consisted of patient's Situation, Background, Assessment and   Recommendations(SBAR).     Information from the following report(s) ED Encounter Summary, Intake/Output, MAR, Recent Results, and Cardiac Rhythm sinus  was reviewed with the receiving nurse.    [place] Assessment:    Presents to emergency department  because of falls (Syncope, seizure, or loss of consciousness): No  Age > 70: No  Altered Mental Status, Intoxication with alcohol or substance confusion (Disorientation, impaired judgment, poor safety awaremess, or inability to follow instructions): No  Impaired Mobility: Ambulates or transfers with assistive devices or assistance; Unable to ambulate or transer.: Yes             Lines:   Peripheral IV 03/07/24 Left Antecubital (Active)   Site Assessment Clean, dry & intact 03/07/24 6727        Opportunity for questions and clarification was provided.      Patient transported with:  Monitor and Registered Nurse

## 2024-03-08 NOTE — ACP (ADVANCE CARE PLANNING)
Patient's wife states that DNR order \"held\" at this time.   Other info on file is up to date in regarding ACP documents

## 2024-03-08 NOTE — PROGRESS NOTES
Not taking baby aspriin  Patient states he was informed to Hold Enfortumab per oncology.  Taking prednisone per oncolcgy for 12.   Patient's wife states that DNR order \"held\" at this time.   Voltaren being held due to kidney function per Dr. Carvalho

## 2024-03-08 NOTE — CARE COORDINATION
03/08/24 1807   Social/Functional History   Lives With Spouse   Type of Home House   Home Layout One level   Home Access Stairs to enter with rails   Entrance Stairs - Number of Steps 4   Bathroom Equipment Grab bars in shower;Shower chair   Home Equipment None   Receives Help From Family   ADL Assistance Independent   Homemaking Assistance Independent   Ambulation Assistance Independent   Transfer Assistance Independent   Active  Yes   Mode of Transportation Car   Occupation Retired   Discharge Planning   Type of Residence House   Living Arrangements Spouse/Significant Other   Current Services Prior To Admission Other (Comment)  (Outpt. therapy at [facility])   Potential Assistance Needed Home Care   Potential Assistance Purchasing Medications No   Type of Home Care Services None   Patient expects to be discharged to: House   One/Two Story Residence One story   History of falls? 0   Services At/After Discharge   Transition of Care Consult (CM Consult) Discharge Planning   Mode of Transport at Discharge Other (see comment)  (family)   Confirm Follow Up Transport Family   Condition of Participation: Discharge Planning   The Plan for Transition of Care is related to the following treatment goals: \"I will be walking in the halls today and I will discharge home with my wife\"   The Patient and/or Patient Representative was provided with a Choice of Provider? Patient   The Patient and/Or Patient Representative agree with the Discharge Plan? Yes   Freedom of Choice list was provided with basic dialogue that supports the patient's individualized plan of care/goals, treatment preferences, and shares the quality data associated with the providers?  Yes     CM met with patient at bedside. Patient verified facesheet, PCP and that he lives with his spouse, does not use DME and is active with [facility] outpt. Physical therapy.  Patient states he has tried several types of chemotherapy for cancer tx. Patient states his PCP  is Dr. Holloway and Dr. Garza is his oncologist. The patient states \"they are trying to get me some equipment to help with my (lymphedema).\" When asked what his goals for discharge were, patient replied \"I will walk the hallways today, and I will discharge home with my wife.\" Patient states he does not think he will need HH. CM anticipates discharge plan to be home with physician follow up and family to drive. CM notes patient has urostomy and spouse plans to bring urostomy supplies to the hospital.

## 2024-03-08 NOTE — PROGRESS NOTES
Bedside and Verbal shift change report given to Mago Graham RN (oncoming nurse) by JUSTIN Sheikh (offgoing nurse). Report included the following information Nurse Handoff Report, Intake/Output, MAR, Recent Results, and Cardiac Rhythm SR .

## 2024-03-08 NOTE — CONSULTS
Nephrology Consult Note    Consult requesting Physican:   Dr BOOKER Carvalho     Reason for Consult:   WERO  Anasarca     HPI:   Mr PATTI Mayorga is 70 yo male with PMHx of HTN, CAD, CKD w/ proteinuria urothelial carcinoma of the bladder and prostate ca s/p cystectomy/prostatectomy and ilial conduit who was admitted due to worse kidney function and LE edema. Pt was initially on cisplatin and gemcitabine, later on adjuvant nivolumab (6-10/23) which was stopped due to SE. In Jan this yr he was started on weekly Enfortumab, and stopped after 3 doses due to SE. Pt was was seen in Oct by Dr Newton/[facility] for proteinuria and 24 hrs urine showed nephrotic range proteinuria (11g)w/ hypoalbuminemia. Primary GN serology was unremarkable. His Cr then was 1.1-1.2.   On admission his Cr is elevated at 3.2 and K at 5.8. Serum Alb is 1.5. Renal US was unremarkable. He has been off any immunotherapy since January and is on Prednisone. Today his K went up to 4.8, and Cr remained at 3.2. He has no SOB or CP.       Impression:   -Acute Kidney Injury: Prob pre-renal +/- related to pathology of nephrotic syndrome, ??immunotherapy   -Hyperkalemia  -Nephrotic syndrome: Likely related to immunotherapy, recent out pt 24hr urine Pr 11g.  -HTN: BP controlled   -Anemia  -Urothelial carcinoma of the bladder and prostate ca s/p cystectomy/prostatectomy and ilial conduit    -CAD    Plan:   -IV alb Q6hrs x2 days  -Lokelma 5g tid x2 days   -Low K and Na diet  -Check serum K Q8hrs  -Limit fluid intake to <1.5L/day  -Avoid Lasix for now as long as pt is has no resp complaints   -Monitor UOP   -EDILMA w/ am   -Urine electrolytes   -Avoid Nephrotoxins  -No urgent indication for RRT  -Repeat 24 hrs urine pr (pt doubts if the recent out pt result was accurate)  -Possible Kidney Biopsy       Medications:     Current Facility-Administered Medications:     levothyroxine (SYNTHROID) tablet 137.5 mcg, 137.5 mcg, Oral, Daily, Da Carvalho MD, 137.5 mcg at 03/08/24  0651    cetirizine (ZYRTEC) tablet 10 mg, 10 mg, Oral, Daily, Da Carvalho MD, 10 mg at 03/08/24 0925    dextrose 50 % IV solution, 25 g, IntraVENous, Once, Da Carvalho MD    insulin regular (HUMULIN R;NOVOLIN R) injection 8 Units, 8 Units, IntraVENous, Once, Da Carvalho MD    sodium chloride flush 0.9 % injection 5-40 mL, 5-40 mL, IntraVENous, 2 times per day, Da Carvalho MD, 10 mL at 03/08/24 0924    sodium chloride flush 0.9 % injection 5-40 mL, 5-40 mL, IntraVENous, PRN, Da Carvalho MD    0.9 % sodium chloride infusion, , IntraVENous, PRN, Da Carvalho MD    ondansetron (ZOFRAN-ODT) disintegrating tablet 4 mg, 4 mg, Oral, Q8H PRN **OR** ondansetron (ZOFRAN) injection 4 mg, 4 mg, IntraVENous, Q6H PRN, Da Carvalho MD    polyethylene glycol (GLYCOLAX) packet 17 g, 17 g, Oral, Daily PRN, Da Carvalho MD    acetaminophen (TYLENOL) tablet 650 mg, 650 mg, Oral, Q6H PRN **OR** acetaminophen (TYLENOL) suppository 650 mg, 650 mg, Rectal, Q6H PRN, Da Carvalho MD    heparin (porcine) injection 5,000 Units, 5,000 Units, SubCUTAneous, 3 times per day, Da Carvalho MD, 5,000 Units at 03/08/24 0534    famotidine (PEPCID) tablet 20 mg, 20 mg, Oral, Daily, Da Carvalho MD, 20 mg at 03/08/24 0925    dilTIAZem (CARDIZEM CD) extended release capsule 120 mg, 120 mg, Oral, Daily, Da Carvalho MD, 120 mg at 03/08/24 0925    atorvastatin (LIPITOR) tablet 40 mg, 40 mg, Oral, Daily, Da Carvalho MD, 40 mg at 03/08/24 0925    predniSONE (DELTASONE) tablet 50 mg, 50 mg, Oral, Daily, Da Carvalho MD    PM/SHx:     Active Ambulatory Problems     Diagnosis Date Noted    NSTEMI (non-ST elevated myocardial infarction) (HCC) 03/27/2017    Hyperlipidemia 03/27/2017    Chest pain 03/25/2017    Arteriosclerosis of coronary artery 03/27/2017    Malignant neoplasm of urinary bladder (HCC) 11/03/2022    Anemia 03/22/2023    Fatigue 03/22/2023    Prostate cancer  (HCC) 2023    Primary malignant neoplasm of prostate (HCC) 2023    Abnormal finding on thyroid function test 2023    Edema of lower extremity 2023    High serum creatinine 2023    Hypoalbuminemia 2023    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     Resolved Ambulatory Problems     Diagnosis Date Noted    No Resolved Ambulatory Problems     Past Medical History:   Diagnosis Date    Arthritis     Bladder cancer (HCC)     Fibromyalgia     GERD (gastroesophageal reflux disease)     Heart attack (HCC) 2017    Hematuria, gross     Hypertension        SHx:     Social History     Socioeconomic History    Marital status:      Spouse name: Not on file    Number of children: Not on file    Years of education: Not on file    Highest education level: Not on file   Occupational History    Not on file   Tobacco Use    Smoking status: Former     Current packs/day: 0.00     Types: Cigarettes     Quit date: 10/18/2022     Years since quittin.3    Smokeless tobacco: Never   Substance and Sexual Activity    Alcohol use: No    Drug use: No    Sexual activity: Not on file   Other Topics Concern    Not on file   Social History Narrative    Not on file     Social Determinants of Health     Financial Resource Strain: Not on file   Food Insecurity: No Food Insecurity (3/7/2024)    Hunger Vital Sign     Worried About Running Out of Food in the Last Year: Never true     Ran Out of Food in the Last Year: Never true   Transportation Needs: No Transportation Needs (3/7/2024)    PRAPARE - Transportation     Lack of Transportation (Medical): No     Lack of Transportation (Non-Medical): No   Physical Activity: Not on file   Stress: Not on file   Social Connections: Not on file   Intimate Partner Violence: Not on file   Housing Stability: Low Risk  (3/7/2024)    Housing Stability Vital Sign     Unable to Pay for Housing in the Last Year: No     Number of Places Lived in the Last Year: 1     Unstable  Housing in the Last Year: No       Allergies:   No Known Allergies    Review of Systems:   Review of System is negative except per HPI    Physical Assessment:     Vitals:    03/08/24 0745   BP: 125/80   Pulse: 64   Resp: 16   Temp: 97.8 °F (36.6 °C)   SpO2: 100%       Intake/Output Summary (Last 24 hours) at 3/8/2024 1038  Last data filed at 3/8/2024 0720  Gross per 24 hour   Intake 400 ml   Output 750 ml   Net -350 ml       General Appearance: no pain, no distress  HEENT: no jaundice, moist oral mucosa  Chest: LS clear to auscultation bilaterally  Heart: S1/S2, no murmur, RRR  Abdomen: soft, nontender, BS active   : no flank tenderness, no anne catheter  Skin: intact, no rash, pedal wounds  Extremity: +++LE edema  Neuro: conscious, alert, oriented x 3, no focal deficit        Padmini Joiner MD  [facility]  Work Number: [phone]

## 2024-03-08 NOTE — PROGRESS NOTES
Bedside and Verbal shift change report given to JUSTIN Redding (oncoming nurse) by Mago Graham RN (offgoing nurse). Report included the following information Nurse Handoff Report, Intake/Output, MAR, Recent Results, and Cardiac Rhythm SR .

## 2024-03-08 NOTE — CONSULTS
LOGO HERE    [facility]  Phone: [phone]  Paging : ([place]) [phone] ([facility]) [phone]  ([place]) [phone] ([facility]) [phone]     Admission History and Physical    Chief Complaint:  Efrain Mayorga is a 69 y.o. male who is being admitted for acute renal failure    .  The patient is a 69-year-old man followed by Dr. Lewis.  He was diagnosed with a clinical T2 N0 M0 high-grade urothelial carcinoma of the bladder.  He presented with symptoms of increasing frequency of urination and gross hematuria.  He was seen by Dr. Ya.  CT scan in October 2022 showed a 3.3 cm solid mass tumor at the level of the left ureteral vesicular junction.  The patient has a history of heavy tobacco use.  He was taken to the operating room in November 2022 and underwent a transurethral resection of the bladder tumor which showed a high-grade urothelial carcinoma of the bladder with muscle invasion.    He was seen by Dr. Lewis and started on neoadjuvant chemotherapy with split dose cisplatin and gemcitabine starting in December 2022.  A midcycle cystoscopy with transurethral resection of the bladder tumor on January 2023 showed him predominantly necrotic tissue with no viable visible tumor no viable invasive tumor.  CT scan from March 2023 showed a subtle right lateral bladder wall mass measuring 1.2 x 0.6 x 1.5 cm in size.  There was no evidence of metastatic disease in the chest abdomen or pelvis.    He underwent a cystoprostatectomy in March 2023 showing a Y pTis N3 M0 urothelial carcinoma with an incidental finding of a Cole score 3+3 adenocarcinoma the prostate.  He was started on adjuvant nivolumab starting on June 2023 stopping on October 17, 2023 due to side effects including generalized swelling chronic kidney disease pain in his legs generalized weakness debility.  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    He stayed off all  R;NOVOLIN R) injection 8 Units, 8 Units, IntraVENous, Once, Da Carvalho MD    sodium zirconium cyclosilicate (LOKELMA) oral suspension 10 g, 10 g, Oral, Once, Da Carvalho MD    calcium gluconate 10 % injection 1,000 mg, 1,000 mg, IntraVENous, Once, Da Carvalho MD    sodium chloride flush 0.9 % injection 5-40 mL, 5-40 mL, IntraVENous, 2 times per day, Da Carvalho MD    sodium chloride flush 0.9 % injection 5-40 mL, 5-40 mL, IntraVENous, PRN, Da Carvalho MD    0.9 % sodium chloride infusion, , IntraVENous, PRN, Da Carvalho MD    ondansetron (ZOFRAN-ODT) disintegrating tablet 4 mg, 4 mg, Oral, Q8H PRN **OR** ondansetron (ZOFRAN) injection 4 mg, 4 mg, IntraVENous, Q6H PRN, Da Carvalho MD    polyethylene glycol (GLYCOLAX) packet 17 g, 17 g, Oral, Daily PRN, Da Carvalho MD    acetaminophen (TYLENOL) tablet 650 mg, 650 mg, Oral, Q6H PRN **OR** acetaminophen (TYLENOL) suppository 650 mg, 650 mg, Rectal, Q6H PRN, Da Carvalho MD    heparin (porcine) injection 5,000 Units, 5,000 Units, SubCUTAneous, 3 times per day, Da Carvalho MD    famotidine (PEPCID) tablet 20 mg, 20 mg, Oral, Daily, Da Carvalho MD    dilTIAZem (CARDIZEM CD) extended release capsule 120 mg, 120 mg, Oral, Daily, Da Carvalho MD    atorvastatin (LIPITOR) tablet 40 mg, 40 mg, Oral, Daily, Da Carvalho MD    predniSONE (DELTASONE) tablet 50 mg, 50 mg, Oral, Daily, Da Carvalho MD    Home Medications:   @[facility]@ (Medications Prior to Admission: levothyroxine (SYNTHROID) 137 MCG tablet, Take 1 tablet by mouth Daily  predniSONE (DELTASONE) 50 MG tablet, Take 1 tablet by mouth daily Per  for 12 days on day 6/7  loratadine (CLARITIN) 10 MG tablet, Take 1 tablet by mouth daily as needed  lidocaine-prilocaine (EMLA) 2.5-2.5 % cream, Apply 2 inches topically as needed for Pain Apply topically as needed.  enfortumab vedotin-ejfv (PADCEV) 20 MG SOLR, Infuse 9.9  96         Impression:   1.  Progressive acute renal failure  Increasing peripheral edema  Hypoalbuminemia  All meds have been held related to his immunotherapy for his disease    Plan:   Continue to withhold enfortumab  Diuresis  Got albumen yest and would probably benefit with a few more doses to help with diuresis  He remains on 50 mg of prednisone a day.  HE was on this as an outpatient.   Physical therapy and rehab  Trend renal function  Avoid nephrotoxic drugs  Continue supportive care  HE is in the process of getting fit for a lymphedema suite to help with his anasarca.  The papers have been sent to Medicare for approval   The necrosis on his feet is resolving and well healed from the enfortumab.  All of the skin rash is improving. The bulla have healed and are resolved.        Vasquez Robins MD MD  [facility]

## 2024-03-09 PROBLEM — R60.1 ANASARCA: Status: ACTIVE | Noted: 2024-03-09

## 2024-03-09 PROBLEM — E87.5 HYPERKALEMIA: Status: ACTIVE | Noted: 2024-03-09

## 2024-03-09 LAB
ALBUMIN SERPL-MCNC: 2.7 G/DL (ref 3.4–5)
ANION GAP SERPL CALC-SCNC: 7 MMOL/L (ref 3–18)
BUN SERPL-MCNC: 123 MG/DL (ref 7–18)
BUN/CREAT SERPL: 38 (ref 12–20)
CALCIUM SERPL-MCNC: 8 MG/DL (ref 8.5–10.1)
CHLORIDE SERPL-SCNC: 115 MMOL/L (ref 100–111)
CO2 SERPL-SCNC: 23 MMOL/L (ref 21–32)
COLLECT DURATION TIME UR: 24 HR
CREAT SERPL-MCNC: 3.28 MG/DL (ref 0.6–1.3)
GLUCOSE SERPL-MCNC: 113 MG/DL (ref 74–99)
PHOSPHATE SERPL-MCNC: 5.2 MG/DL (ref 2.5–4.9)
POTASSIUM SERPL-SCNC: 5.3 MMOL/L (ref 3.5–5.5)
POTASSIUM SERPL-SCNC: 5.5 MMOL/L (ref 3.5–5.5)
POTASSIUM SERPL-SCNC: 5.8 MMOL/L (ref 3.5–5.5)
PROT 24H UR-MRATE: ABNORMAL MG/24HR
PROT UR-MCNC: 1634 MG/DL
SODIUM SERPL-SCNC: 145 MMOL/L (ref 136–145)
SPECIMEN VOL ?TM UR: 1000 ML

## 2024-03-09 PROCEDURE — 6370000000 HC RX 637 (ALT 250 FOR IP): Performed by: INTERNAL MEDICINE

## 2024-03-09 PROCEDURE — 36415 COLL VENOUS BLD VENIPUNCTURE: CPT

## 2024-03-09 PROCEDURE — 1100000000 HC RM PRIVATE

## 2024-03-09 PROCEDURE — P9047 ALBUMIN (HUMAN), 25%, 50ML: HCPCS | Performed by: INTERNAL MEDICINE

## 2024-03-09 PROCEDURE — 6360000002 HC RX W HCPCS: Performed by: INTERNAL MEDICINE

## 2024-03-09 PROCEDURE — 84132 ASSAY OF SERUM POTASSIUM: CPT

## 2024-03-09 PROCEDURE — 2580000003 HC RX 258: Performed by: INTERNAL MEDICINE

## 2024-03-09 PROCEDURE — 80069 RENAL FUNCTION PANEL: CPT

## 2024-03-09 RX ADMIN — SODIUM CHLORIDE, PRESERVATIVE FREE 10 ML: 5 INJECTION INTRAVENOUS at 09:30

## 2024-03-09 RX ADMIN — PREDNISONE 50 MG: 20 TABLET ORAL at 09:27

## 2024-03-09 RX ADMIN — HEPARIN SODIUM 5000 UNITS: 5000 INJECTION INTRAVENOUS; SUBCUTANEOUS at 06:23

## 2024-03-09 RX ADMIN — ALBUMIN (HUMAN) 25 G: 0.25 INJECTION, SOLUTION INTRAVENOUS at 17:13

## 2024-03-09 RX ADMIN — ALBUMIN (HUMAN) 25 G: 0.25 INJECTION, SOLUTION INTRAVENOUS at 04:48

## 2024-03-09 RX ADMIN — HEPARIN SODIUM 5000 UNITS: 5000 INJECTION INTRAVENOUS; SUBCUTANEOUS at 15:54

## 2024-03-09 RX ADMIN — SODIUM ZIRCONIUM CYCLOSILICATE 5 G: 5 POWDER, FOR SUSPENSION ORAL at 15:54

## 2024-03-09 RX ADMIN — DILTIAZEM HYDROCHLORIDE 120 MG: 120 CAPSULE, COATED, EXTENDED RELEASE ORAL at 09:27

## 2024-03-09 RX ADMIN — ALBUMIN (HUMAN) 25 G: 0.25 INJECTION, SOLUTION INTRAVENOUS at 12:03

## 2024-03-09 RX ADMIN — SODIUM CHLORIDE, PRESERVATIVE FREE 10 ML: 5 INJECTION INTRAVENOUS at 22:28

## 2024-03-09 RX ADMIN — ALBUMIN (HUMAN) 25 G: 0.25 INJECTION, SOLUTION INTRAVENOUS at 22:38

## 2024-03-09 RX ADMIN — HEPARIN SODIUM 5000 UNITS: 5000 INJECTION INTRAVENOUS; SUBCUTANEOUS at 21:42

## 2024-03-09 RX ADMIN — CETIRIZINE HYDROCHLORIDE 10 MG: 10 TABLET, FILM COATED ORAL at 09:28

## 2024-03-09 RX ADMIN — SODIUM ZIRCONIUM CYCLOSILICATE 5 G: 5 POWDER, FOR SUSPENSION ORAL at 09:28

## 2024-03-09 RX ADMIN — FAMOTIDINE 20 MG: 20 TABLET, FILM COATED ORAL at 09:27

## 2024-03-09 RX ADMIN — SODIUM ZIRCONIUM CYCLOSILICATE 5 G: 5 POWDER, FOR SUSPENSION ORAL at 22:28

## 2024-03-09 RX ADMIN — LEVOTHYROXINE SODIUM 137.5 MCG: 0.07 TABLET ORAL at 06:23

## 2024-03-09 RX ADMIN — POLYETHYLENE GLYCOL 3350 17 G: 17 POWDER, FOR SOLUTION ORAL at 06:31

## 2024-03-09 RX ADMIN — ATORVASTATIN CALCIUM 40 MG: 20 TABLET, FILM COATED ORAL at 09:28

## 2024-03-09 NOTE — PROGRESS NOTES
Bedside and Verbal shift change report given to Chanelle (oncoming nurse) by Jeanne (offgoing nurse). Report included the following information Nurse Handoff Report, Index, MAR, and Cardiac Rhythm   .

## 2024-03-09 NOTE — PROGRESS NOTES
3187  Change of shift report received from JUSTIN Angel    9425  Patient AAOx4. PIV is C/D/I and SL, no s/s of infiltration or phlebitis. VSS on RA. Breath sounds clear bilaterally. Patient has no c/o pain at this time. Urostomy to RLQ of abdomen intact. Edema +3 noted to BLE. No other concerns at this time. Safety measures intact, call bell within reach. Will continue to monitor.     1039  MD Bermudez paged for patient HR dropping to high 40-low 50. Patient HR maintaining in 40-50 range with stimulation. Awaiting call back.     1044  MD Bermudez on unit to address above and evaluate patient.    1125  24 hour urine collection completed and taken to lab.     7889  Change of shift report given to JUSTIN Walter

## 2024-03-09 NOTE — PROGRESS NOTES
Nephrology Progress note    Efrain Maoyrga  MRN: [id number]  : 1955    Admit Date: 3/7/2024      Impression:   -Acute Kidney Injury: Prob pre-renal +/- related to pathology of nephrotic syndrome, ??immunotherapy. U Na 22   -Hyperkalemia  K 5.8  -Nephrotic syndrome: Likely related to immunotherapy, recent out pt 24hr urine Pr 11g. Now off Immunotherapy.  -HTN: BP controlled   -Anemia  -Urothelial carcinoma of the bladder and prostate ca s/p cystectomy/prostatectomy and ilial conduit    -CAD     Plan:   -Continue IV alb Q6hrs x 2 days  -Continue Lokelma 5g tid x2 days   -Low K and Na diet  -Check serum K Q8hrs  -Limit fluid intake to <1.5L/day  -Avoid Lasix for now as long as pt is has no resp complaints   -Monitor UOP   -F/u on EDILMA   -Avoid Nephrotoxins  -No urgent indication for RRT  -Follow upon Repeat 24 hrs urine Protein  -Possible Kidney Biopsy   - No ACEI/ARB due to Hyperkalemia    Subjective:     Efrain Mayorga is a 69 y.o. male  with PMHx of HTN, CAD, CKD w/ Proteinuria , Urothelial carcinoma of the bladder and prostate ca s/p cystectomy/prostatectomy and ilial conduit who was admitted due to worse kidney function and LE edema. Pt was initially on cisplatin and gemcitabine, later on adjuvant immunotherapy: Nivolumab (6-10/23) which was stopped due to Side effects. In  this yr he was started on weekly Enfortumab, and stopped after 3 doses due to side effect including WERO and Proteinuria. Pt was was seen in Oct by Dr Newton/[facility] for proteinuria and 24 hrs urine showed nephrotic range proteinuria (11g)w/ hypoalbuminemia. Primary GN serology was unremarkable. His Cr then was 1.1-1.2.   On admission his Cr is elevated at 3.2 and K at 5.8. Serum Alb is 1.5. Renal US was unremarkable. He has been off any immunotherapy since January and is on Prednisone. Today his K went up to 4.8, and Cr remained at 3.2. He has no SOB or CP.          Principal Problem:    ARF (acute renal failure) (HCC)  Active

## 2024-03-10 LAB
POTASSIUM SERPL-SCNC: 5.2 MMOL/L (ref 3.5–5.5)
POTASSIUM SERPL-SCNC: 5.2 MMOL/L (ref 3.5–5.5)
POTASSIUM SERPL-SCNC: 5.3 MMOL/L (ref 3.5–5.5)

## 2024-03-10 PROCEDURE — 1100000000 HC RM PRIVATE

## 2024-03-10 PROCEDURE — P9047 ALBUMIN (HUMAN), 25%, 50ML: HCPCS | Performed by: INTERNAL MEDICINE

## 2024-03-10 PROCEDURE — 36415 COLL VENOUS BLD VENIPUNCTURE: CPT

## 2024-03-10 PROCEDURE — 6370000000 HC RX 637 (ALT 250 FOR IP): Performed by: INTERNAL MEDICINE

## 2024-03-10 PROCEDURE — 6360000002 HC RX W HCPCS: Performed by: INTERNAL MEDICINE

## 2024-03-10 PROCEDURE — 2580000003 HC RX 258: Performed by: INTERNAL MEDICINE

## 2024-03-10 PROCEDURE — 84132 ASSAY OF SERUM POTASSIUM: CPT

## 2024-03-10 RX ADMIN — SODIUM CHLORIDE, PRESERVATIVE FREE 10 ML: 5 INJECTION INTRAVENOUS at 08:19

## 2024-03-10 RX ADMIN — PREDNISONE 50 MG: 20 TABLET ORAL at 08:19

## 2024-03-10 RX ADMIN — CETIRIZINE HYDROCHLORIDE 10 MG: 10 TABLET, FILM COATED ORAL at 08:20

## 2024-03-10 RX ADMIN — SODIUM CHLORIDE, PRESERVATIVE FREE 10 ML: 5 INJECTION INTRAVENOUS at 20:33

## 2024-03-10 RX ADMIN — HEPARIN SODIUM 5000 UNITS: 5000 INJECTION INTRAVENOUS; SUBCUTANEOUS at 04:24

## 2024-03-10 RX ADMIN — SODIUM ZIRCONIUM CYCLOSILICATE 5 G: 5 POWDER, FOR SUSPENSION ORAL at 08:18

## 2024-03-10 RX ADMIN — LEVOTHYROXINE SODIUM 137.5 MCG: 0.07 TABLET ORAL at 06:54

## 2024-03-10 RX ADMIN — ALBUMIN (HUMAN) 25 G: 0.25 INJECTION, SOLUTION INTRAVENOUS at 04:25

## 2024-03-10 RX ADMIN — ATORVASTATIN CALCIUM 40 MG: 20 TABLET, FILM COATED ORAL at 08:19

## 2024-03-10 RX ADMIN — DILTIAZEM HYDROCHLORIDE 120 MG: 120 CAPSULE, COATED, EXTENDED RELEASE ORAL at 08:19

## 2024-03-10 RX ADMIN — FAMOTIDINE 20 MG: 20 TABLET, FILM COATED ORAL at 08:19

## 2024-03-10 NOTE — PROGRESS NOTES
Hospitalist Progress Note    Patient: Efrain Mayorga MRN: [id number]  CSN: [id number]    YOB: 1955  Age: 69 y.o.  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 Joiner -  IV alb Q6hrs x2 days   Lokelma 5g tid x2 days   Low K and Na diet  Check serum K Q8hrs  Limit fluid intake to <1.5L/day  Avoid Lasix for now as long as pt is has no resp complaints   Monitor UOP and trend renal function   EDILMA w/ am   Urine electrolytes   No urgent indication for RRT  24 hrs urine collection results almost completed   Possible Kidney Biopsy   Avoid acei/arbs     Per oncology, all meds have been held related to his immunotherapy for his disease   Will be getting lymphedema suite for anarsarca soon     Continue recently started prednisone 50 mg daily, continue     Hold home losartan  Avoid NSAIDs, nephrotoxic drugs  Continue other home meds    Intermittent bradycardia -  Asymptomatic   Telemetry monitoring    Active Hospital Problems    Diagnosis Date Noted    Anasarca [R60.1] 03/09/2024    Hyperkalemia [E87.5] 03/09/2024    ARF (acute renal failure) (HCC) [N17.9] 03/07/2024    Hypoalbuminemia [E88.09] 08/30/2023    Prostate cancer (HCC) [C61] 04/07/2023    Anemia [D64.9] 03/22/2023    Malignant neoplasm of urinary bladder (HCC) [C67.9] 11/03/2022       Disposition :  coordination and discharge planning with Case Management.

## 2024-03-10 NOTE — PROGRESS NOTES
Hospitalist Progress Note    Patient: Efrain Mayorga MRN: [id number]  [facility]: [id number]    YOB: 1955  Age: 69 y.o.  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 Joiner -  IV alb Q6hrs x2 days   Lokelma 5g tid x2 days   Low K and Na diet  Check serum K Q8hrs  Limit fluid intake to <1.5L/day  Avoid Lasix for now as long as pt is has no resp complaints   Monitor UOP and trend renal function   EDILMA w/ am   Urine electrolytes   No urgent indication for RRT  24 hrs urine collection results pending nephrology review  Possible Kidney Biopsy   Avoid acei/arbs     Per oncology, all meds have been held related to his immunotherapy for his disease   Will be getting lymphedema suite for anarsarca soon     Continue recently started prednisone 50 mg daily, continue     Hold home losartan  Avoid NSAIDs, nephrotoxic drugs  Continue other home meds    Intermittent bradycardia -  Asymptomatic   Telemetry monitoring    Active Hospital Problems    Diagnosis Date Noted    Anasarca [R60.1] 03/09/2024    Hyperkalemia [E87.5] 03/09/2024    ARF (acute renal failure) (HCC) [N17.9] 03/07/2024    Hypoalbuminemia [E88.09] 08/30/2023    Prostate cancer (HCC) [C61] 04/07/2023    Anemia [D64.9] 03/22/2023    Malignant neoplasm of urinary bladder (HCC) [C67.9] 11/03/2022       Disposition :    --   --   --    AGRATIO 0.5* 0.6*  --   --   --   --   --    LABGLOM 20* 20* 20*  --  20*  --   --     < > = values in this interval not displayed.        CBC w/Diff Recent Labs     03/07/24  1810 03/08/24  0304 03/08/24  1010   WBC 7.3 7.1 8.6   RBC 4.49 3.29* 3.33*   HGB 13.8 9.9* 10.3*   HCT 41.9 30.9* 31.4*   * 315 367   LYMPHOPCT 5* 11*  --         Cardiac Enzymes No results for input(s): \"CKTOTAL\", \"CKMB\", \"CKMBINDEX\", \"TROPONINI\", \"CHRISTIANO\" in the last 72 hours.   Coagulation No results for input(s): \"PROTIME\", \"INR\", \"APTT\" in the last 72 hours.    Lipid Panel No results found for: \"CHOL\", \"TRIG\", \"HDL\", \"LDLCHOLESTEROL\", \"LDLCALC\", \"VLDL\", \"CHOLHDLRATIO\"   BNP No results for input(s): \"BNP\" in the last 72 hours.   Liver Enzymes Recent Labs     03/08/24  0304   ALT 17   AST 25   ALKPHOS 96   BILITOT 0.4        Thyroid Studies No results found for: \"K0FGRFX\", \"W8RBXLS\", \"FT3\", \"T4FREE\", \"TSH\"       Procedures/imaging: see electronic medical records for all procedures/Xrays and details which were not copied into this note but were reviewed prior to creation of Plan      Quin Bermudez MD    TIME: E/M Time spent with patient and patient care issues: [] 15-20 min  [] 21-30 min  [x] 31-44 min  [] 45 min or more.     This time also includes physician non-face-to-face service time visit on the date of service such as  Preparing to see the patient (eg, review of tests)  Obtaining and/or reviewing separately obtained history  Performing a medically necessary appropriate examination and/or evaluation  Counseling and educating the patient/family/caregiver  Ordering medications, tests, or procedures  Referring and communicating with other health care professionals as needed  Documenting clinical information in the electronic or other health record  Independently interpreting results (not reported separately) and communicating results to the patient/family/caregiver  Care coordination and discharge planning

## 2024-03-10 NOTE — PROGRESS NOTES
19:45 Assessment completed. Lungs are clear bilat but are slightly decreased in the bases. Offers 0 c/o CP,pressure, or SOB. Continues to have 3+ edema in LE's. Urostomy is patent.Resting quietly in bed.    22:40 Shift assessment completed. See [facility] flowsheet for details.    03:10 Reassessed with 0 changes noted. Resting quietly in bed with eyes closed between cares.    07:15 Bedside and Verbal shift change report given to JODI Zavala RN (oncoming nurse) by AINSLEY Mendoza RN (offgoing nurse). Report included the following information Nurse Handoff Report.

## 2024-03-10 NOTE — PROGRESS NOTES
0712  Change of shift report received from JUSTIN Walter    0890  Patient AAOx4. PIV is C/D/I and SL, no s/s of infiltration or phlebitis. VSS on RA. Breath sounds clear bilaterally. Patient has no c/o pain at this time. Urostomy to RLQ of abdomen intact. Edema +3 noted to BLE. TEDs intact to BLE. No other concerns at this time. Safety measures intact, call bell within reach. Will continue to monitor.      1240  Patient ambulating in hallways around unit    1451  Patient ambulating in hallways around unit    1543  Patient ambulating in hallways around unit    1915  Change of shift report given to JUSTIN Hernandez    1942  This RN was not made aware of 1500 VS. VS retaken at 1942 to reflect recheck.

## 2024-03-10 NOTE — PROGRESS NOTES
[facility]  Phone: [phone]  Paging : ([place]) [phone] (Hospital) [phone]  ([place]) [phone] ([facility]) [phone]     Progress Note      Assessment:   Progressive acute renal failure- Nephrotic range proteinuria  Increasing peripheral edema  Hypoalbuminemia    Plan:   On 3/09/2023- /Cr 3.28 (baseline Cr 1.1-1.2). Continue to withhold weekly enfortumab (received 3 weekly doses)  Diuresis  Albumen 2.7. Receiving Alb Q 6 hrs.  He remains on 50 mg of prednisone a day.  HE was on this as an outpatient.   Physical therapy and rehab  Avoid nephrotoxic drugs  Continue supportive care  He is in the process of getting fit for a lymphedema suite to help with his anasarca.  The papers have been sent to Medicare for approval  The necrosis on his feet is resolving and well healed from the enfortumab.  All of the skin rash is improving. The bulla have healed and are resolved.  All meds have been held related to his immunotherapy for his disease    Synopsis:  The patient is a 69-year-old man followed by Dr. Lewis.  He was diagnosed with a clinical T2 N0 M0 high-grade urothelial carcinoma of the bladder.  He presented with symptoms of increasing frequency of urination and gross hematuria.  He was seen by Dr. Ya.  CT scan in October 2022 showed a 3.3 cm solid mass tumor at the level of the left ureteral vesicular junction.  The patient has a history of heavy tobacco use.  He was taken to the operating room in November 2022 and underwent a transurethral resection of the bladder tumor which showed a high-grade urothelial carcinoma of the bladder with muscle invasion.    He was seen by Dr. Lewis and started on neoadjuvant chemotherapy with split dose cisplatin and gemcitabine starting in December 2022.  A midcycle cystoscopy with transurethral resection of the bladder tumor on January 2023 showed him predominantly necrotic tissue with no viable visible tumor no viable invasive  Other problems include hypertension coronary artery disease and hyperlipidemia.    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  Peripheral edema  Worsening acute renal failure  Hypoalbuminemia      Review of Systems:   Constitutional:   Fever: Negative, Chills: Negative, Weight Loss: Negative, Malaise/Fatigue: yes   Diaphoresis: Negative,  Weakness: Negative  Skin:   Rash: Negative,  Itching: Negative  HENT:   Headache:Negative, Hearing Loss: Negative, Tinnitus: Negative, Ear Pain: Negative   Nosebleeds: Negative, Congestion: Negative, Stridor: Negative,   Sore Throat: Negative  Eyes:    Blurred Vision: Negative, Double Vision: Negative, Photophobia: Negative   Eye Pain: Negative, Eye Discharge: Negative, Eye Redness: Negative  Cardiovascular:    Chest Pain: Negative, Palpitations: Negative, Orthopnea: Negative   Claudication: Negative, Leg Swelling: yes PND: Negative  Respiratory:   Cough: Negative, Hemoptysis: Negative, Sputum Production: Negative   Shortness of Breath: Negative, Wheezing: Negative  Gastrointestinal:   Heartburn: Negative, Nausea: Negative, Vomiting: Negative   Abdominal Pain: Negative, Diarrhea: Negative, Constipation: yes   Blood in Stool: Negative, Melena: Negative   Genitourinary:    Dysuria: Negative, Urgency: Negative, Frequency: Negative   Hematuria: Negative, Flank Pain: Negative  Musculoskeletal:    Myalgias: Negative, Neck Pain: Negative, Back Pain: Negative   Joint Pain: Negative, Falls: Negative  Endo/Heme/Allergies:    Easy Bruise/Blood: Negative, Env. Allergies: Negative, Polydipsia: Negative   Neurological:    Dizziness: Negative, Tingling: Negative. Tremor: Negative,    Sensory Change: Negative. Speech Change: Negative, Focal Weakness: Negative     Seizures:  Da ZAPATA MD, 10 mg at 03/10/24 0820    dextrose 50 % IV solution, 25 g, IntraVENous, Once, Da Carvalho MD    insulin regular (HUMULIN R;NOVOLIN R) injection 8 Units, 8 Units, IntraVENous, Once, Da Carvalho MD    sodium chloride flush 0.9 % injection 5-40 mL, 5-40 mL, IntraVENous, 2 times per day, Da Carvalho MD, 10 mL at 03/10/24 0819    sodium chloride flush 0.9 % injection 5-40 mL, 5-40 mL, IntraVENous, PRN, Da Carvalho MD    0.9 % sodium chloride infusion, , IntraVENous, PRN, Da Carvalho MD    ondansetron (ZOFRAN-ODT) disintegrating tablet 4 mg, 4 mg, Oral, Q8H PRN **OR** ondansetron (ZOFRAN) injection 4 mg, 4 mg, IntraVENous, Q6H PRN, Da Carvalho MD    polyethylene glycol (GLYCOLAX) packet 17 g, 17 g, Oral, Daily PRN, Da Carvalho MD, 17 g at 03/09/24 0631    acetaminophen (TYLENOL) tablet 650 mg, 650 mg, Oral, Q6H PRN **OR** acetaminophen (TYLENOL) suppository 650 mg, 650 mg, Rectal, Q6H PRN, Da Carvalho MD    heparin (porcine) injection 5,000 Units, 5,000 Units, SubCUTAneous, 3 times per day, Da Carvalho MD, 5,000 Units at 03/10/24 0424    famotidine (PEPCID) tablet 20 mg, 20 mg, Oral, Daily, Da Carvalho MD, 20 mg at 03/10/24 0819    dilTIAZem (CARDIZEM CD) extended release capsule 120 mg, 120 mg, Oral, Daily, Da Carvalho MD, 120 mg at 03/10/24 0819    atorvastatin (LIPITOR) tablet 40 mg, 40 mg, Oral, Daily, Da Carvalho MD, 40 mg at 03/10/24 0819    predniSONE (DELTASONE) tablet 50 mg, 50 mg, Oral, Daily, Da Carvalho MD, 50 mg at 03/10/24 0819        VICKY DEE MD   [phone]  [facility]

## 2024-03-10 NOTE — PLAN OF CARE
Problem: Safety - Adult  Goal: Free from fall injury  3/10/2024 0003 by Saba Mendoza, RN  Outcome: Progressing  Flowsheets (Taken 3/9/2024 2240)  Free From Fall Injury: Instruct family/caregiver on patient safety  3/9/2024 1409 by Chanelle Zavala RN  Outcome: Progressing     Problem: ABCDS Injury Assessment  Goal: Absence of physical injury  3/10/2024 0003 by Saba Mendoza, RN  Outcome: Progressing  Flowsheets (Taken 3/9/2024 2240)  Absence of Physical Injury: Implement safety measures based on patient assessment  3/9/2024 1409 by Chanelle Zavala RN  Outcome: Progressing     Problem: Metabolic/Fluid and Electrolytes - Adult  Goal: Hemodynamic stability and optimal renal function maintained  3/10/2024 0003 by Saba Mendoza, RN  Outcome: Progressing  3/9/2024 1409 by Chanelle Zavala RN  Outcome: Progressing

## 2024-03-10 NOTE — PROGRESS NOTES
Nephrology Progress note    Efrain Mayorga  MRN: [id number]  : 1955    Admit Date: 3/7/2024      Impression:   -Acute Kidney Injury: Prob pre-renal +/- related to pathology of nephrotic syndrome, ??immunotherapy. U Na 22   -Hyperkalemia, improved K 5.2  s/p Lokelma  -Nephrotic syndrome: Likely related to immunotherapy, recent out pt 24hr urine Pr 11g. Repeat 24hr Urine Protein of 3/8/24  16.34gms.  Now off Immunotherapy.  -HTN: BP controlled   -Anemia  -Urothelial carcinoma of the bladder and prostate ca s/p cystectomy/prostatectomy and ilial conduit    -CAD     Plan:   -Start Lasix 40mg IV bid  -Low K and Na diet  -Limit fluid intake to <1.5L/day  -Monitor UOP   -F/u on EDILMA .     -Avoid Nephrotoxins  -No urgent indication for RRT  -Follow upon Repeat 24 hrs urine Protein  -? Kidney Biopsy  - No ACEI/ARB due to Hyperkalemia    Subjective:     Efrain Mayorga is a 69 y.o. male  with PMHx of HTN, CAD, CKD w/ Proteinuria , Urothelial carcinoma of the bladder and prostate ca s/p cystectomy/prostatectomy and ilial conduit who was admitted due to worse kidney function and LE edema. Pt was initially on cisplatin and gemcitabine, later on adjuvant immunotherapy: Nivolumab (6-10/23) which was stopped due to Side effects. In  this yr he was started on weekly Enfortumab, and stopped after 3 doses due to side effect including WERO and Proteinuria. Pt was was seen in Oct by Dr Newton/\[facility]\"" for proteinuria and 24 hrs urine showed nephrotic range proteinuria (11g)w/ hypoalbuminemia. Primary GN serology was unremarkable. His Cr then was 1.1-1.2.   On admission his Cr is elevated at 3.2 and K at 5.8. Serum Alb is 1.5. Renal US was unremarkable. He has been off any immunotherapy since January and is on Prednisone. Today his K went up to 4.8, and Cr remained at 3.2.     He has no SOB or CP.    C/o swelling legs Abd wall and lower extremities      Principal Problem:    ARF (acute renal failure) (HCC)  Active Problems:

## 2024-03-11 ENCOUNTER — APPOINTMENT (OUTPATIENT)
Facility: HOSPITAL | Age: 69
End: 2024-03-11
Payer: MEDICARE

## 2024-03-11 ENCOUNTER — TELEPHONE (OUTPATIENT)
Facility: HOSPITAL | Age: 69
End: 2024-03-11

## 2024-03-11 LAB
ALBUMIN SERPL-MCNC: 3 G/DL (ref 3.4–5)
ANA TITR SER IF: NEGATIVE
ANION GAP SERPL CALC-SCNC: 8 MMOL/L (ref 3–18)
APTT PPP: 30.7 SEC (ref 23–36.4)
BUN SERPL-MCNC: 134 MG/DL (ref 7–18)
BUN/CREAT SERPL: 41 (ref 12–20)
CALCIUM SERPL-MCNC: 8.3 MG/DL (ref 8.5–10.1)
CHLORIDE SERPL-SCNC: 116 MMOL/L (ref 100–111)
CHOLEST SERPL-MCNC: 239 MG/DL
CO2 SERPL-SCNC: 22 MMOL/L (ref 21–32)
CREAT SERPL-MCNC: 3.29 MG/DL (ref 0.6–1.3)
GLUCOSE SERPL-MCNC: 115 MG/DL (ref 74–99)
HDLC SERPL-MCNC: 40 MG/DL (ref 40–60)
HDLC SERPL: 6 (ref 0–5)
INR PPP: 1.1 (ref 0.9–1.1)
LABORATORY COMMENT REPORT: NORMAL
LDLC SERPL CALC-MCNC: 151 MG/DL (ref 0–100)
LIPID PANEL: ABNORMAL
PHOSPHATE SERPL-MCNC: 5.8 MG/DL (ref 2.5–4.9)
POTASSIUM SERPL-SCNC: 5 MMOL/L (ref 3.5–5.5)
PROTHROMBIN TIME: 13.9 SEC (ref 11.9–14.7)
SODIUM SERPL-SCNC: 146 MMOL/L (ref 136–145)
TRIGL SERPL-MCNC: 240 MG/DL
VLDLC SERPL CALC-MCNC: 48 MG/DL

## 2024-03-11 PROCEDURE — 2580000003 HC RX 258: Performed by: INTERNAL MEDICINE

## 2024-03-11 PROCEDURE — 6370000000 HC RX 637 (ALT 250 FOR IP): Performed by: INTERNAL MEDICINE

## 2024-03-11 PROCEDURE — 85610 PROTHROMBIN TIME: CPT

## 2024-03-11 PROCEDURE — 80069 RENAL FUNCTION PANEL: CPT

## 2024-03-11 PROCEDURE — 1100000000 HC RM PRIVATE

## 2024-03-11 PROCEDURE — 36415 COLL VENOUS BLD VENIPUNCTURE: CPT

## 2024-03-11 PROCEDURE — 85730 THROMBOPLASTIN TIME PARTIAL: CPT

## 2024-03-11 PROCEDURE — 80061 LIPID PANEL: CPT

## 2024-03-11 RX ADMIN — ATORVASTATIN CALCIUM 40 MG: 20 TABLET, FILM COATED ORAL at 09:19

## 2024-03-11 RX ADMIN — PREDNISONE 50 MG: 20 TABLET ORAL at 09:20

## 2024-03-11 RX ADMIN — SODIUM CHLORIDE, PRESERVATIVE FREE 10 ML: 5 INJECTION INTRAVENOUS at 21:04

## 2024-03-11 RX ADMIN — SODIUM CHLORIDE, PRESERVATIVE FREE 10 ML: 5 INJECTION INTRAVENOUS at 09:21

## 2024-03-11 RX ADMIN — LEVOTHYROXINE SODIUM 137.5 MCG: 0.07 TABLET ORAL at 06:29

## 2024-03-11 RX ADMIN — CETIRIZINE HYDROCHLORIDE 10 MG: 10 TABLET, FILM COATED ORAL at 09:20

## 2024-03-11 RX ADMIN — FAMOTIDINE 20 MG: 20 TABLET, FILM COATED ORAL at 09:21

## 2024-03-11 NOTE — PROGRESS NOTES
0759 patient sitting on chair having breakfast. No sign of distress at this time. Rn instructed patient to collect urine on his urinal to be able to record I/Os.  1134 patient sitting in chair eating lunch. Denies pain, cheat pain and SOB.    1628 patient sitting in bed. Wife in room with patient. Patient was reminded to collect urine to measure for I/Os. Patient denies pain, SOB, chest pain.    1749 RN spoke to Radiology nurse Shanda. I was notified radiologist will not be available tomorrow, next day radiologist will available will be Thursday. Radiology RN to follow up tomorrow.  1917 RN reached out to nuclear med in regards of full body scan. Tech stated they don't do Stat scans. And it will have to wait until tomorrow    1945 Bedside shift change report given to katy ANDERSON (oncoming nurse) by Ernestine Finn RN   (offgoing nurse). Report included the following information Nurse Handoff Report, Index, Adult Overview, Intake/Output, MAR, and Recent Results.

## 2024-03-11 NOTE — PROGRESS NOTES
Spoke to on call [facility] staff, Omar about stat bone scan. He said they will do it tomorrow since there is no such thing as stat bone scan, only routine.

## 2024-03-11 NOTE — PROGRESS NOTES
Hospitalist Progress Note    Patient: Efrain Mayorga MRN: [id number]  CSN: [id number]    YOB: 1955  Age: 69 y.o.  Sex: male    DOA: 3/7/2024 LOS:  LOS: 4 days          Chief Complaint:    acute renal failure     Assessment/Plan     69 years old referred from the clinic due to acute renal failure, likely related to chemotherapy     -Acute renal failure :  Likely related to immunotherapy   BUN and Scr inc  Lasix rec'd yest but not in orders -defer start to nephrology   Fluid restriction to <1.5/day rec, but not in orders -added today     -Hyperkalemia -improved, s/p lokelma   Low K and Na diet     -Nephrotic syndrome:  with anasarca, hypoalbuminemia and proteinuria  Likely associated with immunotherapy, recent OP 24hr urine showed protein of 11g, repeat done in hospital on 3/8/24 showed an inc to 16.34g  off immunotherapy now  Management per nephrology   -renal bx in AM -CT guided via IR  -holding heparin for bx    -History of bladder and prostate cancer, with history of cystectomy/prostatectomy with ileostomy:   oncology following while inpt    -Hypertension  Well controlled     -CAD  No acute issues    -Hyperlipidemia  No acute issues     -Anemia   Stable  Transfuse if <7    -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 s/p cystectomy/prostatectomy and ilial conduit:   oncology following while inpt     Continue recently started prednisone 50 mg daily, continue     Hold home losartan  Avoid NSAIDs, nephrotoxic drugs  Continue other home meds    Intermittent bradycardia -  Asymptomatic   Telemetry monitoring    Active Hospital Problems    Diagnosis Date Noted    Anasarca [R60.1] 03/09/2024    Hyperkalemia [E87.5] 03/09/2024    ARF (acute renal failure) (HCC) [N17.9] 03/07/2024    Hypoalbuminemia [E88.09] 08/30/2023    Prostate cancer (HCC) [C61] 04/07/2023    Anemia [D64.9] 03/22/2023    Malignant neoplasm of urinary bladder (HCC) [C67.9] 11/03/2022       Disposition :  results for input(s): \"PROTIME\", \"INR\", \"APTT\" in the last 72 hours.    Lipid Panel No results found for: \"CHOL\", \"TRIG\", \"HDL\", \"LDLCHOLESTEROL\", \"LDLCALC\", \"VLDL\", \"CHOLHDLRATIO\"   BNP No results for input(s): \"BNP\" in the last 72 hours.   Liver Enzymes No results for input(s): \"ALT\", \"AST\", \"GGT\", \"ALKPHOS\", \"BILITOT\", \"BILIDIR\" in the last 72 hours.     Thyroid Studies No results found for: \"T5KVVOS\", \"G7RHEPH\", \"FT3\", \"T4FREE\", \"TSH\"       Procedures/imaging: see electronic medical records for all procedures/Xrays and details which were not copied into this note but were reviewed prior to creation of Plan      Quin Bermudez MD    TIME: E/M Time spent with patient and patient care issues: [] 15-20 min  [] 21-30 min  [x] 31-44 min  [] 45 min or more.     This time also includes physician non-face-to-face service time visit on the date of service such as  Preparing to see the patient (eg, review of tests)  Obtaining and/or reviewing separately obtained history  Performing a medically necessary appropriate examination and/or evaluation  Counseling and educating the patient/family/caregiver  Ordering medications, tests, or procedures  Referring and communicating with other health care professionals as needed  Documenting clinical information in the electronic or other health record  Independently interpreting results (not reported separately) and communicating results to the patient/family/caregiver  Care coordination and discharge planning with Case Management.   Patient contacted

## 2024-03-11 NOTE — PROGRESS NOTES
Nephrology Progress note    Efrain Mayorga  MRN: [id number]  : 1955    Admit Date: 3/7/2024      Impression:   -Acute Kidney Injury: Prob pre-renal +/- related to pathology of nephrotic syndrome, ??immunotherapy. U Na 22   -Hyperkalemia, improved K 5.2  s/p Lokelma  -Nephrotic syndrome: Likely related to immunotherapy, recent out pt 24hr urine Pr 11g. Repeat 24hr Urine Protein of 3/8/24  16.34gms.  Now off Immunotherapy. ? Minimal change disease, ? Membranous GN  -HTN: BP controlled   -Anemia  -Urothelial carcinoma of the bladder and prostate ca s/p cystectomy/prostatectomy and ilial conduit    -CAD  -Lower ext edema,    U/S of 3/7/24, no DVT     Plan:   -Continue Lasix 40mg IV bid  -Low K and Na diet  -Limit fluid intake to <1.5L/day  -Monitor UOP   -F/u on EDILMA .     -Avoid Nephrotoxins  -No urgent indication for RRT  - No ACEI/ARB due to Hyperkalemia  - Would likely need oral anticoagulation in view of increased risk of DVT in setting of Nephrotic Proteinuria >10gms and Hypoalbuminemia  -Recommend Percutaneous Kidney Biopsy to evaluate worsening Nephrotic range Proteinuria. Discussed with patient and Spouse. Patient agrees.  Would schedule CT guided Renal Bx by IR  - Check PT,PTT, INR today  -Check lipid panel  -Hold heparin SC ahead of Biopsy    Subjective:     Efrain Mayorga is a 69 y.o. male  with PMHx of HTN, CAD, CKD w/ Proteinuria , Urothelial carcinoma of the bladder and prostate ca s/p cystectomy/prostatectomy and ilial conduit who was admitted due to worse kidney function and LE edema. Pt was initially on cisplatin and gemcitabine, later on adjuvant immunotherapy: Nivolumab (6-10/23) which was stopped due to Side effects. In  this yr he was started on weekly Enfortumab, and stopped after 3 doses due to side effect including WERO and Proteinuria. Pt was was seen in Oct by Dr Newton/[facility] for proteinuria and 24 hrs urine showed nephrotic range proteinuria (11g)w/ hypoalbuminemia. Primary GN  (Oral)   Resp 18   Ht 1.702 m (5' 7\")   Wt 87.7 kg (193 lb 5.5 oz)   SpO2 100%   BMI 30.28 kg/m²       Intake/Output Summary (Last 24 hours) at 3/11/2024 1243  Last data filed at 3/11/2024 0818  Gross per 24 hour   Intake 120 ml   Output --   Net 120 ml         Physical Exam:       General: No acute distress   HENT: Atraumatic and normocephalic   Eyes: Sclera clear, normal conjunctiva   Neck: Supple, No JVD   Cardiovascular: Normal S1 & S2, RRR, no m/r/g   Pulmonary/Chest Wall: Clear to auscultation bilaterally   Abdominal: Soft and non-tender, Bowel sounds normal. + ileostomy    Extremity +++ Lower ext edema   Neurological: AA and O X 3. No focal deficits     Data Review:  Lab Results   Component Value Date/Time     03/11/2024 04:30 AM    K 5.0 03/11/2024 04:30 AM     03/11/2024 04:30 AM    CO2 22 03/11/2024 04:30 AM     03/11/2024 04:30 AM    CREATININE 3.29 03/11/2024 04:30 AM    GFRAA >60 02/02/2022 09:14 AM     Lab Results   Component Value Date/Time    WBC 8.6 03/08/2024 10:10 AM    HGB 10.3 03/08/2024 10:10 AM    HCT 31.4 03/08/2024 10:10 AM     03/08/2024 10:10 AM    MCV 94.3 03/08/2024 10:10 AM     Lab Results   Component Value Date/Time    PHOS 5.8 03/11/2024 04:30 AM     No results found for: \"IRON\", \"TIBC\", \"IBCT\", \"FERR\"  No results found for: \"FERR\"          HIGINIO HERNANDEZ MD, MPH [facility]  [phone]

## 2024-03-12 ENCOUNTER — TELEPHONE (OUTPATIENT)
Facility: HOSPITAL | Age: 69
End: 2024-03-12

## 2024-03-12 ENCOUNTER — APPOINTMENT (OUTPATIENT)
Facility: HOSPITAL | Age: 69
DRG: 683 | End: 2024-03-12
Payer: MEDICARE

## 2024-03-12 PROBLEM — N04.9 NEPHROTIC SYNDROME: Status: ACTIVE | Noted: 2024-03-12

## 2024-03-12 LAB
ALBUMIN SERPL-MCNC: 2.4 G/DL (ref 3.4–5)
ANION GAP SERPL CALC-SCNC: 9 MMOL/L (ref 3–18)
BUN SERPL-MCNC: 128 MG/DL (ref 7–18)
BUN/CREAT SERPL: 38 (ref 12–20)
CALCIUM SERPL-MCNC: 8.1 MG/DL (ref 8.5–10.1)
CHLORIDE SERPL-SCNC: 118 MMOL/L (ref 100–111)
CO2 SERPL-SCNC: 19 MMOL/L (ref 21–32)
CREAT SERPL-MCNC: 3.34 MG/DL (ref 0.6–1.3)
GLUCOSE SERPL-MCNC: 130 MG/DL (ref 74–99)
PHOSPHATE SERPL-MCNC: 5.7 MG/DL (ref 2.5–4.9)
POTASSIUM SERPL-SCNC: 4.9 MMOL/L (ref 3.5–5.5)
SODIUM SERPL-SCNC: 146 MMOL/L (ref 136–145)

## 2024-03-12 PROCEDURE — 6370000000 HC RX 637 (ALT 250 FOR IP): Performed by: INTERNAL MEDICINE

## 2024-03-12 PROCEDURE — 2580000003 HC RX 258: Performed by: INTERNAL MEDICINE

## 2024-03-12 PROCEDURE — 36415 COLL VENOUS BLD VENIPUNCTURE: CPT

## 2024-03-12 PROCEDURE — 80069 RENAL FUNCTION PANEL: CPT

## 2024-03-12 PROCEDURE — 1100000000 HC RM PRIVATE

## 2024-03-12 RX ORDER — SODIUM BICARBONATE 650 MG/1
650 TABLET ORAL 2 TIMES DAILY
Status: DISCONTINUED | OUTPATIENT
Start: 2024-03-12 | End: 2024-03-15 | Stop reason: HOSPADM

## 2024-03-12 RX ADMIN — FAMOTIDINE 20 MG: 20 TABLET, FILM COATED ORAL at 08:28

## 2024-03-12 RX ADMIN — ATORVASTATIN CALCIUM 40 MG: 20 TABLET, FILM COATED ORAL at 08:28

## 2024-03-12 RX ADMIN — PREDNISONE 50 MG: 20 TABLET ORAL at 08:27

## 2024-03-12 RX ADMIN — CETIRIZINE HYDROCHLORIDE 10 MG: 10 TABLET, FILM COATED ORAL at 08:28

## 2024-03-12 RX ADMIN — SODIUM CHLORIDE, PRESERVATIVE FREE 10 ML: 5 INJECTION INTRAVENOUS at 08:30

## 2024-03-12 RX ADMIN — SODIUM BICARBONATE 650 MG TABLET 650 MG: at 20:41

## 2024-03-12 RX ADMIN — SODIUM CHLORIDE, PRESERVATIVE FREE 5 ML: 5 INJECTION INTRAVENOUS at 23:22

## 2024-03-12 RX ADMIN — SODIUM BICARBONATE 650 MG TABLET 650 MG: at 10:41

## 2024-03-12 NOTE — PROGRESS NOTES
0784 Dr Bermudez paged patient requesting to be discharge soon or he plans to leave AMA.  0035 Dr luis waterman made aware.  1920 Bedside shift change report given to Aguilar richardson (oncoming nurse) by Ernestine Finn RN   (offgoing nurse). Report included the following information Nurse Handoff Report, Index, MAR, and Recent Results.

## 2024-03-12 NOTE — PROGRESS NOTES
Nephrology Progress note    Efrain Mayorga  MRN: [id number]  : 1955    Admit Date: 3/7/2024      Impression:   -Acute Kidney Injury: Prob pre-renal +/- related to pathology of nephrotic syndrome, ??immunotherapy. U Na 22   -Hyperkalemia, improved s/p Lokelma  -Nephrotic syndrome: Likely related to immunotherapy, recent out pt 24hr urine Pr 11g. Repeat 24hr Urine Protein of 3/8/24  16.34gms.  Now off Immunotherapy. ? Minimal change disease, ? 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     -Start NaBicarb po bid.  -Avoid Nephrotoxins  -No urgent indication for RRT  -No ACEI/ARB due to Hyperkalemia    -Would likely need oral anticoagulation in view of increased risk of DVT in setting of Nephrotic Proteinuria >10gms and Hypoalbuminemia  -Discussed with patient again regarding the importance of doing a kidney bx during this hospital admission.  High risk of worsening kidney failure, leading to dialysis.  Pt initially wanted to leave AMA, now agreed to stay for his Kidney Biopsy.   -Notified hospitalist, to resume the request for his C- guided Renal Bx by IR, ASAP.  -Pending lipid panel  -Hold heparin SC ahead of Biopsy  -AM renal panel    Subjective:     Efrain Mayorga is a 69 y.o. 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 Pt was initially on cisplatin and gemcitabine, later on adjuvant immunotherapy: Nivolumab (6-10/23) which was stopped due to side effects. In  he was started on weekly Enfortumab, and stopped after 3 doses due to side effect  including WERO and Proteinuria. Pt was was seen in Oct by Dr Newton/[facility] for proteinuria and 24 hrs urine showed nephrotic range proteinuria (11g) w/ hypoalbuminemia. There was a question on the accuracy of this urine sample due to collection technicality.  Primary GN serology was unremarkable. His Cr then was 1.1-1.2.  Biopsy was offered in outpt setting but pt refused.  On admission his Cr is elevated at 3.2 and K at 5.8. Serum Alb is 1.5. Renal US was unremarkable. He has been off any immunotherapy since January and has been on oral Prednisone.     C/o swelling legs abd wall and lower extremities.  Uneventful night.  No cp or sob.  Cr 3.3, CO2 19 today.        Principal Problem:    ARF (acute renal failure) (HCC)  Active Problems:    Malignant neoplasm of urinary bladder (HCC)    Anemia    Prostate cancer (HCC)    Hypoalbuminemia    Anasarca    Hyperkalemia  Resolved Problems:    * No resolved hospital problems. *    Current Facility-Administered Medications   Medication Dose Route Frequency    levothyroxine (SYNTHROID) tablet 137.5 mcg  137.5 mcg Oral Daily    cetirizine (ZYRTEC) tablet 10 mg  10 mg Oral Daily    dextrose 50 % IV solution  25 g IntraVENous Once    insulin regular (HUMULIN R;NOVOLIN R) injection 8 Units  8 Units IntraVENous Once    sodium chloride flush 0.9 % injection 5-40 mL  5-40 mL IntraVENous 2 times per day    sodium chloride flush 0.9 % injection 5-40 mL  5-40 mL IntraVENous PRN    0.9 % sodium chloride infusion   IntraVENous PRN    ondansetron (ZOFRAN-ODT) disintegrating tablet 4 mg  4 mg Oral Q8H PRN    Or    ondansetron (ZOFRAN) injection 4 mg  4 mg IntraVENous Q6H PRN    polyethylene glycol (GLYCOLAX) packet 17 g  17 g Oral Daily PRN    acetaminophen (TYLENOL) tablet 650 mg  650 mg Oral Q6H PRN    Or    acetaminophen (TYLENOL) suppository 650 mg  650 mg Rectal Q6H PRN    [Held by provider] heparin (porcine) injection 5,000 Units  5,000 Units SubCUTAneous 3 times per day    famotidine  (PEPCID) tablet 20 mg  20 mg Oral Daily    dilTIAZem (CARDIZEM CD) extended release capsule 120 mg  120 mg Oral Daily    atorvastatin (LIPITOR) tablet 40 mg  40 mg Oral Daily    predniSONE (DELTASONE) tablet 50 mg  50 mg Oral Daily         Allergy:   No Known Allergies     Objective:     BP (!) 144/77   Pulse 56   Temp 97.4 °F (36.3 °C) (Oral)   Resp 17   Ht 1.702 m (5' 7\")   Wt 87.7 kg (193 lb 5.5 oz)   SpO2 99%   BMI 30.28 kg/m²       Intake/Output Summary (Last 24 hours) at 3/12/2024 0924  Last data filed at 3/12/2024 0541  Gross per 24 hour   Intake 860 ml   Output 800 ml   Net 60 ml         Physical Exam:       General: No acute resp distress   HENT: Atraumatic and normocephalic   Eyes: Sclera clear, normal conjunctiva   Neck: Supple, no mass   Cardiovascular: Normal S1 & S2, RRR, no m/r/g   Pulmonary/Chest Wall: Clear to auscultation bilaterally   Abdominal: Soft and non-tender, Bowel sounds normal. + ileostomy    Extremity +++ Lower ext edema   Neurological: AA and O X 3. No focal deficits     Data Review:  Lab Results   Component Value Date/Time     03/12/2024 05:05 AM    K 4.9 03/12/2024 05:05 AM     03/12/2024 05:05 AM    CO2 19 03/12/2024 05:05 AM     03/12/2024 05:05 AM    CREATININE 3.34 03/12/2024 05:05 AM    GFRAA >60 02/02/2022 09:14 AM     Lab Results   Component Value Date/Time    WBC 8.6 03/08/2024 10:10 AM    HGB 10.3 03/08/2024 10:10 AM    HCT 31.4 03/08/2024 10:10 AM     03/08/2024 10:10 AM    MCV 94.3 03/08/2024 10:10 AM     Lab Results   Component Value Date/Time    PHOS 5.7 03/12/2024 05:05 AM     No results found for: \"IRON\", \"TIBC\", \"IBCT\", \"FERR\"  No results found for: \"FERR\"      Joshi-Alonso N Newton, MD  [facility]  [phone]

## 2024-03-12 NOTE — PROGRESS NOTES
Hospitalist Progress Note    Patient: Efrain Mayorga MRN: [id number]  CSN: [id number]    YOB: 1955  Age: 69 y.o.  Sex: male    DOA: 3/7/2024 LOS:  LOS: 5 days          Chief Complaint:    acute renal failure     Assessment/Plan     69 years old referred from the clinic due to acute renal failure, likely related to chemotherapy     -Acute renal failure :  Likely related to immunotherapy   BUN and Scr inc  Lasix rec'd yest but not in orders -defer start to nephrology   Fluid restriction to <1.5/day rec, but not in orders -added today     -Hyperkalemia -improved, s/p lokelma   Low K and Na diet     -Nephrotic syndrome:  Likely associated with immunotherapy, recent OP 24hr urine showed protein of 11g, repeat done in hospital on 3/8/24 showed an inc to 16.34g  off immunotherapy now  Management per nephrology   -renal bx THURSDAY (in 2 days) -CT guided via IR  -holding heparin for bx  NPO Wednesday night   Patient was planning to leave AMA but after conversation with nephrologist he has agreed to stay for his kidney biopsy    -History of bladder and prostate cancer, with history of cystectomy/prostatectomy with ileostomy:   oncology following while inpt    -Hypertension  Well controlled     -CAD  No acute issues    -Hyperlipidemia  No acute issues     -Anemia   Stable  Transfuse if <7    -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 s/p cystectomy/prostatectomy and ilial conduit:   oncology following while inpt     Continue recently started prednisone 50 mg daily, continue     Hold home losartan  Avoid NSAIDs, nephrotoxic drugs  Continue other home meds    Intermittent bradycardia -  Asymptomatic   Telemetry monitoring    Active Hospital Problems    Diagnosis Date Noted    Nephrotic syndrome [N04.9] 03/12/2024    Anasarca [R60.1] 03/09/2024    Hyperkalemia [E87.5] 03/09/2024    ARF (acute renal failure) (HCC) [N17.9] 03/07/2024    Hypoalbuminemia [E88.09] 08/30/2023    Prostate cancer (HCC)  [C61] 04/07/2023    Anemia [D64.9] 03/22/2023    Malignant neoplasm of urinary bladder (HCC) [C67.9] 11/03/2022       Disposition : TBD    Subjective:    He is calm now.  Reported that he does plan to stay for his kidney biopsy as he knows how importance it is       Review of systems:    Constitutional: denies fevers, chills, myalgias  Respiratory: denies SOB, cough  Cardiovascular: denies chest pain, palpitations  Gastrointestinal: denies nausea, vomiting, diarrhea      Vital signs/Intake and Output:  Visit Vitals  /78   Pulse 66   Temp 97.9 °F (36.6 °C) (Axillary)   Resp 17   Ht 1.702 m (5' 7\")   Wt 87.7 kg (193 lb 5.5 oz)   SpO2 100%   BMI 30.28 kg/m²     Current Shift:  No intake/output data recorded.  Last three shifts:  03/10 1901 - 03/12 0700  In: 980 [P.O.:960; I.V.:20]  Out: 800 [Urine:800]    Exam:    General: Well developed, alert, NAD, OX3, older ill-appearing WM   Head/Neck: NCAT, supple, No masses, No lymphadenopathy  CVS:Regular rate and rhythm, no M/R/G, S1/S2 heard, no thrill  Lungs:Clear to auscultation bilaterally, no wheezes, rhonchi, or rales  Abdomen: Soft, Nontender, No distention, Normal Bowel sounds, No hepatomegaly  Extremities: No C/C/E, pulses palpable 2+  Skin:normal texture and turgor, no rashes, no lesions  Neuro:grossly normal , follows commands  Psych:appropriate    Labs: Results:       Chemistry Recent Labs     03/10/24  1924 03/11/24  0430 03/12/24  0505   GLUCOSE  --  115* 130*   NA  --  146* 146*   K 5.3 5.0 4.9   CL  --  116* 118*   CO2  --  22 19*   BUN  --  134* 128*   CREATININE  --  3.29* 3.34*   CALCIUM  --  8.3* 8.1*   LABGLOM  --  20* 19*      CBC w/Diff No results for input(s): \"WBC\", \"RBC\", \"HGB\", \"HCT\", \"PLT\", \"GRAN\", \"LYMPHOPCT\" in the last 72 hours.    Invalid input(s): \"EOS\"     Cardiac Enzymes No results for input(s): \"CKTOTAL\", \"CKMB\", \"CKMBINDEX\", \"TROPONINI\", \"CHRISTIANO\" in the last 72 hours.   Coagulation Recent Labs     03/11/24  1418   PROTIME 13.9   INR 1.1    APTT 30.7       Lipid Panel Lab Results   Component Value Date/Time    CHOL 239 03/11/2024 02:18 PM    TRIG 240 03/11/2024 02:18 PM    HDL 40 03/11/2024 02:18 PM    LDLCALC 151 03/11/2024 02:18 PM    CHOLHDLRATIO 6.0 03/11/2024 02:18 PM      BNP No results for input(s): \"BNP\" in the last 72 hours.   Liver Enzymes No results for input(s): \"ALT\", \"AST\", \"GGT\", \"ALKPHOS\", \"BILITOT\", \"BILIDIR\" in the last 72 hours.     Thyroid Studies No results found for: \"J2LVILU\", \"E0TPTNH\", \"FT3\", \"T4FREE\", \"TSH\"       Procedures/imaging: see electronic medical records for all procedures/Xrays and details which were not copied into this note but were reviewed prior to creation of Plan      Quin Bermudez MD    TIME: E/M Time spent with patient and patient care issues: [] 15-20 min  [] 21-30 min  [x] 31-44 min  [] 45 min or more.     This time also includes physician non-face-to-face service time visit on the date of service such as  Preparing to see the patient (eg, review of tests)  Obtaining and/or reviewing separately obtained history  Performing a medically necessary appropriate examination and/or evaluation  Counseling and educating the patient/family/caregiver  Ordering medications, tests, or procedures  Referring and communicating with other health care professionals as needed  Documenting clinical information in the electronic or other health record  Independently interpreting results (not reported separately) and communicating results to the patient/family/caregiver  Care coordination and discharge planning with Case Management.

## 2024-03-12 NOTE — PLAN OF CARE
Problem: Safety - Adult  Goal: Free from fall injury  Outcome: Progressing     Problem: ABCDS Injury Assessment  Goal: Absence of physical injury  Outcome: Progressing     Problem: Metabolic/Fluid and Electrolytes - Adult  Goal: Hemodynamic stability and optimal renal function maintained  Outcome: Progressing

## 2024-03-12 NOTE — PROGRESS NOTES
2000  Patient in bed awake, A/Ox4, speech clear, hearing intact, ambulatory, continent of urine (with urostomy) and stool. On room air, lung sounds clear, respirations unlabored. Skin is warm, dry, and intact. Radial and pedal pulses present bilaterally, capillary refill <3 seconds to fingers and toes. Abdomen soft, bowel sounds active. Strong hand  noted to bilateral upper extremities. Side rails x3 up, bed locked and in lowest position, bed alarm on, call bell and bedside table within reach, needs attended, denies any pain, SOB, or concerns at present.    Informed pt not to eat breakfast until we can confirm if we are not pushing through with biopsy today. Pt wants to do CHG wipes only when procedure is confirmed. Otherwise, want to be discharged today.

## 2024-03-12 NOTE — PROGRESS NOTES
Phone: [phone]  Paging : [phone]      Hematology / Oncology Progress Note    Admit Date: 3/7/2024    Assessment:     Hem/Onc Issues:  High grade urothelial carcinoma of bladder, s/p neoadjvuant chemo cis/gem, cystoprostatectomy 3/2023 [id number], adjvuant nivolumab 6/2023-10/17/2023 stopped due to gemerzlied swelling chronic kidney disease. Startedenfortumab in 1/2024.   Incidental prostate cancer, Jessica 3+3  Reasons for Admission: Acute renal failure, possible related to immunotherapy  Other Active Issues:    Nephrotic proteinuria 11 gram  Anasarca   Hyperkalemia    Principal Problem:    ARF (acute renal failure) (HCC)  Active Problems:    Malignant neoplasm of urinary bladder (HCC)    Anemia    Prostate cancer (HCC)    Hypoalbuminemia    Anasarca    Hyperkalemia    Nephrotic syndrome  Resolved Problems:    * No resolved hospital problems. 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 100 mg represents 1.25 mg/kg.   Holding off immunotherapy  Agree with DVT prophylaxis with nephrotic syndrome  Other management per primary and nephrology team      Subjectives:     Edema throughout.     Objectives:      VITAL SIGNS: Patient Vitals for the past 24 hrs:   BP Temp Temp src Pulse Resp SpO2   03/12/24 1600 126/78 97.9 °F (36.6 °C) Axillary 66 17 100 %   03/12/24 1153 121/76 -- Oral 60 18 100 %   03/12/24 0745 (!) 144/77 97.4 °F (36.3 °C) Oral 56 17 99 %   03/12/24 0315 136/83 97.5 °F (36.4 °C) Oral 55 18 98 %   03/11/24 2315 (!) 142/76 97.8 °F (36.6 °C) Oral 54 18 99 %   03/11/24 2015 (!) 144/94 98 °F (36.7 °C) Oral 58 18 99 %     GENERAL: normal appearance, no acute distress.   HEENT: conjunctivae normal, eyelids normal, PERRLA, anicteric, external ears and nose normal,      No acute cardiopulmonary disease.             Cody Herrera MD, PhD, FACP  Phone: [phone]  Pager: [phone]

## 2024-03-12 NOTE — PROGRESS NOTES
Pending radiologist approval, renal biopsy able to be done Thursday at the earliest due to MD not being at [facility] until then. Please keep pt NPO after midnight on Thursday.

## 2024-03-12 NOTE — TELEPHONE ENCOUNTER
received call from Jacquie Baez nurse. she stated that Self MLD was fine for pt. she reported that kidney issues are from pt not taking steriods that he is prescribed. PT also asked for basic pump contraindicated note.

## 2024-03-12 NOTE — PROGRESS NOTES
This writer received Handoff in regards to:  Efrain Mayorga (69 y.o., male)    : 1955    Admitted: 3/7/2024     Report on Efrain Mayorga ,(69 y.o., male), was received from Offgoing nursenara    All questions and concerns of this writer, Patient, and/or Family were addressed at this time. Report on patient's status and plan of care was given.      ___________________________________________________________________________________________________________________________________________________________________________________________________                                                                              Shift Event Summary:     1122-3926: At time of handoff, Patient is currently in bed therapy dog and family at bedside. Provided with ice water    9368-8130: Patient AxO4. No complaint of pain at this time. Patient states he has urostomy equipment from home that he is familiar with.      2147-2043: No concerns or complaints    7510-0619: Patient had uneventful evening. No concerns voiced by patient heart rate continues to be sawyer    [id number]    1524-0559: At time of handoff, Patient is    Throughout the shift, the patient has been   __________________________________________________________________________________________________________________________________________________________________________________________________    0730 This writer gave Handoff in regards to:  Efrain Mayorga (69 y.o., male)    : 1955    Admitted: 3/7/2024     This Patient will be received by the oncoming Nurse;     All questions and concerns of the Nurse, Patient, and/or Family were addressed at this time. Report on patient's status and plan of care was given.

## 2024-03-12 NOTE — PROGRESS NOTES
Bedside and Verbal shift change report given to JUSTIN Sinha (oncoming nurse) by JUSTIN Landaverde (offgoing nurse). Report included the following information Nurse Handoff Report, Adult Overview, Intake/Output, MAR, and Recent Results.

## 2024-03-13 ENCOUNTER — APPOINTMENT (OUTPATIENT)
Facility: HOSPITAL | Age: 69
End: 2024-03-13
Payer: MEDICARE

## 2024-03-13 LAB
ALBUMIN SERPL-MCNC: 2 G/DL (ref 3.4–5)
ANION GAP SERPL CALC-SCNC: 10 MMOL/L (ref 3–18)
BUN SERPL-MCNC: 132 MG/DL (ref 7–18)
BUN/CREAT SERPL: 42 (ref 12–20)
CALCIUM SERPL-MCNC: 8 MG/DL (ref 8.5–10.1)
CHLORIDE SERPL-SCNC: 116 MMOL/L (ref 100–111)
CO2 SERPL-SCNC: 19 MMOL/L (ref 21–32)
CREAT SERPL-MCNC: 3.12 MG/DL (ref 0.6–1.3)
GLUCOSE SERPL-MCNC: 136 MG/DL (ref 74–99)
PHOSPHATE SERPL-MCNC: 5.1 MG/DL (ref 2.5–4.9)
POTASSIUM SERPL-SCNC: 4.7 MMOL/L (ref 3.5–5.5)
SODIUM SERPL-SCNC: 145 MMOL/L (ref 136–145)

## 2024-03-13 PROCEDURE — 80069 RENAL FUNCTION PANEL: CPT

## 2024-03-13 PROCEDURE — 6370000000 HC RX 637 (ALT 250 FOR IP): Performed by: INTERNAL MEDICINE

## 2024-03-13 PROCEDURE — 1100000000 HC RM PRIVATE

## 2024-03-13 PROCEDURE — 36415 COLL VENOUS BLD VENIPUNCTURE: CPT

## 2024-03-13 PROCEDURE — 2580000003 HC RX 258: Performed by: INTERNAL MEDICINE

## 2024-03-13 RX ADMIN — PREDNISONE 50 MG: 20 TABLET ORAL at 08:27

## 2024-03-13 RX ADMIN — DILTIAZEM HYDROCHLORIDE 120 MG: 120 CAPSULE, COATED, EXTENDED RELEASE ORAL at 08:26

## 2024-03-13 RX ADMIN — SODIUM CHLORIDE, PRESERVATIVE FREE 10 ML: 5 INJECTION INTRAVENOUS at 08:28

## 2024-03-13 RX ADMIN — ATORVASTATIN CALCIUM 40 MG: 20 TABLET, FILM COATED ORAL at 08:27

## 2024-03-13 RX ADMIN — FAMOTIDINE 20 MG: 20 TABLET, FILM COATED ORAL at 08:27

## 2024-03-13 RX ADMIN — SODIUM BICARBONATE 650 MG TABLET 650 MG: at 08:26

## 2024-03-13 RX ADMIN — CETIRIZINE HYDROCHLORIDE 10 MG: 10 TABLET, FILM COATED ORAL at 08:27

## 2024-03-13 RX ADMIN — SODIUM BICARBONATE 650 MG TABLET 650 MG: at 21:48

## 2024-03-13 RX ADMIN — SODIUM CHLORIDE, PRESERVATIVE FREE 10 ML: 5 INJECTION INTRAVENOUS at 22:05

## 2024-03-13 RX ADMIN — LEVOTHYROXINE SODIUM 137.5 MCG: 0.07 TABLET ORAL at 05:43

## 2024-03-13 NOTE — CONSULTS
INTERVENTIONAL RADIOLOGY  Consult Note    Patient:  Efrain Mayorga  :  1955  Age:  69 y.o.  MRN:  [id number]    Today's Date:  3/13/2024  Admission Date:  3/7/2024  Hospital Day:  6  Consult requested by:  Cristofer Estevez MD     Efrain Mayorga is a 69 y.o. male with a history of bladder cancer, prostate cancer on immunotherapy. This admission with acute renal failure, nephrotic syndrome.    IR consulted for CT Guided Native Renal Biopsy.    CURRENT MEDICATIONS  Current Facility-Administered Medications   Medication Dose Route Frequency Provider Last Rate Last Admin    sodium bicarbonate tablet 650 mg  650 mg Oral BID Sofya Newton MD   650 mg at 24 0826    levothyroxine (SYNTHROID) tablet 137.5 mcg  137.5 mcg Oral Daily Da Carvalho MD   137.5 mcg at 24 0543    cetirizine (ZYRTEC) tablet 10 mg  10 mg Oral Daily Da Carvalho MD   10 mg at 24 0827    sodium chloride flush 0.9 % injection 5-40 mL  5-40 mL IntraVENous 2 times per day Da Carvalho MD   10 mL at 24 0828    sodium chloride flush 0.9 % injection 5-40 mL  5-40 mL IntraVENous PRN Da Carvalho MD        0.9 % sodium chloride infusion   IntraVENous PRN Da Carvalho MD        ondansetron (ZOFRAN-ODT) disintegrating tablet 4 mg  4 mg Oral Q8H PRN Da Carvalho MD        Or    ondansetron (ZOFRAN) injection 4 mg  4 mg IntraVENous Q6H PRN Da Carvalho MD        polyethylene glycol (GLYCOLAX) packet 17 g  17 g Oral Daily PRN Da Carvalho MD   17 g at 24 0631    acetaminophen (TYLENOL) tablet 650 mg  650 mg Oral Q6H PRN Da Carvalho MD        Or    acetaminophen (TYLENOL) suppository 650 mg  650 mg Rectal Q6H PRN Da Carvalho MD        [Held by provider] heparin (porcine) injection 5,000 Units  5,000 Units SubCUTAneous 3 times per day Da Carvalho MD   5,000 Units at 03/10/24 0424    famotidine (PEPCID) tablet 20 mg  20 mg Oral Daily Da Carvalho,

## 2024-03-13 NOTE — PRE-PROCEDURE INSTRUCTIONS
Spoke with Pt's primary RN Chen about planned procedure tomorrow (CT kidney biopsy). NPO orders and blood thinners held confirmed, labwork reviewed.    Spoke with Jerica ANDERSON in [facility], Pt will recover at least 1 hour there prior to returning to floor.

## 2024-03-13 NOTE — PROGRESS NOTES
Nephrology Progress note    Efrain Mayorga  MRN: [id number]  : 1955    Admit Date: 3/7/2024      Impression:   -Acute Kidney Injury: Prob pre-renal +/- related to pathology of nephrotic syndrome, ??immunotherapy. U Na 22   -Hyperkalemia, improved s/p Lokelma  -Nephrotic syndrome: Likely related to immunotherapy, recent out pt 24hr urine Pr 11g. Repeat 24hr Urine Protein of 3/8/24  16.34gms.  Now off Immunotherapy. ? Minimal change disease, ? 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     -Start NaBicarb po bid.  -Avoid Nephrotoxins  -No urgent indication for RRT  -No ACEI/ARB due to Hyperkalemia    -Would likely need oral anticoagulation in view of increased risk of DVT in setting of Nephrotic Proteinuria >10gms and Hypoalbuminemia  -Discussed with patient again regarding the importance of doing a kidney bx during this hospital admission.  High risk of worsening kidney failure, leading to dialysis.  Pt initially wanted to leave AMA, now agreed to stay for his Kidney Biopsy.   -Notified hospitalist, to resume the request for his C- guided Renal Bx by IR, ASAP.  -Pending lipid panel  -Hold heparin SC ahead of Biopsy  -AM renal panel    Subjective:     Efrain Mayorga is a 69 y.o. 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 Pt was initially on cisplatin and gemcitabine, later on adjuvant immunotherapy: Nivolumab (6-10/23) which was stopped due to side effects. In  he was started on weekly Enfortumab, and stopped after 3 doses due to side effect  (CARDIZEM CD) extended release capsule 120 mg  120 mg Oral Daily    atorvastatin (LIPITOR) tablet 40 mg  40 mg Oral Daily    predniSONE (DELTASONE) tablet 50 mg  50 mg Oral Daily         Allergy:   No Known Allergies     Objective:     /79   Pulse 61   Temp 98.3 °F (36.8 °C) (Oral)   Resp 16   Ht 1.702 m (5' 7\")   Wt 87.7 kg (193 lb 5.5 oz)   SpO2 100%   BMI 30.28 kg/m²       Intake/Output Summary (Last 24 hours) at 3/13/2024 1108  Last data filed at 3/13/2024 0834  Gross per 24 hour   Intake 1150 ml   Output 1150 ml   Net 0 ml         Physical Exam:       General: No acute resp distress   HENT: Atraumatic and normocephalic   Eyes: Sclera clear, normal conjunctiva   Neck: Supple, no mass   Cardiovascular: Normal S1 & S2, RRR, no m/r/g   Pulmonary/Chest Wall: Clear to auscultation bilaterally   Abdominal: Soft and non-tender, Bowel sounds normal. + ileostomy    Extremity +++ Lower ext edema   Neurological: AA and O X 3. No focal deficits     Data Review:  Lab Results   Component Value Date/Time     03/13/2024 03:48 AM    K 4.7 03/13/2024 03:48 AM     03/13/2024 03:48 AM    CO2 19 03/13/2024 03:48 AM     03/13/2024 03:48 AM    CREATININE 3.12 03/13/2024 03:48 AM    GFRAA >60 02/02/2022 09:14 AM     Lab Results   Component Value Date/Time    WBC 8.6 03/08/2024 10:10 AM    HGB 10.3 03/08/2024 10:10 AM    HCT 31.4 03/08/2024 10:10 AM     03/08/2024 10:10 AM    MCV 94.3 03/08/2024 10:10 AM     Lab Results   Component Value Date/Time    PHOS 5.1 03/13/2024 03:48 AM     No results found for: \"IRON\", \"TIBC\", \"IBCT\", \"FERR\"  No results found for: \"FERR\"      Lorelei Garcia MD  [facility]  [phone]

## 2024-03-13 NOTE — PROGRESS NOTES
Comprehensive Nutrition Assessment      Type and Reason for Visit:  Initial, RD Nutrition Re-Screen/LOS    Nutrition Recommendations/Plan:   Diet as ordered,   \Check Ca2+ & Phos  Monitor wt/fluid status for trend,  Continue to monitor tolerance of PO, weight, labs, and plan of care during admission.           Nutrition History and Allergies:   NKFA. Hx of prednisone use per caner treatment (x3 weeks prior to current admission). Wt hx: 3/22/23 156lb, 7/5/23 155lb, 3/7/24 190lb    Nutrition Assessment:    Efrain Mayorga is a 69 y.o. male  admitted on 3/13 for ARF.  PMHX of Arthritis, Bladder cancer (HCC), Fibromyalgia, GERD, Heart attack (HCC), Hematuria, gross, and Hypertension.   Per visit pt is having lunch , alert and oriented . spouse at bedside  Significant wt gain noticed over the past 3 weeks: +24.7% (38lb). Pt shares noticed the wt gain after taking prednisone for his CA.   Current on Cardiac and Renal diet -  po intake %.   Last BM (including prior to admit): 03/11/24  Edema: Generalized, Right lower extremity, Left lower extremity, +3, +2    Wt Readings from Last 10 Encounters:   03/09/24 87.7 kg (193 lb 5.5 oz)   07/05/23 70.3 kg (155 lb)   03/22/23 70.8 kg (156 lb)   12/28/22 73.5 kg (162 lb)   11/30/22 71.7 kg (158 lb)   10/31/22 71.7 kg (158 lb)        Nutrition Related Findings:    Pertinent meds: prednisone, levothyroxine, lipitor, Na Bicarb, KCl. Pertinent labs: eGFR 21, BUN/crea 42, Ca2+ 8, Phos 5.1, Glu 129   Wound Type: Partial Thickness ((R) foot old healing wound)       Current Nutrition Intake & Therapies:    Average Meal Intake: %  Average Supplements Intake: None Ordered  ADULT DIET; Regular; Low Fat/Low Chol/High Fiber/TYSHAWN; Low Sodium (2 gm); Low Potassium (Less than 3000 mg/day); 1500 ml  Diet NPO     Anthropometric Measures:  Height: 170.2 cm (5' 7\")  Ideal Body Weight (IBW): 148 lbs (67 kg)    Admission Body Weight: 86.2 kg (190 lb)  Current Body Weight: 87.7 kg (193 lb 5.5

## 2024-03-13 NOTE — PROGRESS NOTES
Hospitalist Progress Note    Patient: Efrain Mayorga MRN: [id number]  CSN: [id number]    YOB: 1955  Age: 69 y.o.  Sex: male    DOA: 3/7/2024 LOS:  LOS: 6 days                Assessment/Plan     Active Hospital Problems    Diagnosis     Nephrotic syndrome [N04.9]     Anasarca [R60.1]     Hyperkalemia [E87.5]     ARF (acute renal failure) (HCC) [N17.9]     Hypoalbuminemia [E88.09]     Prostate cancer (HCC) [C61]     Anemia [D64.9]     Malignant neoplasm of urinary bladder (HCC) [C67.9]         Chief complaint :  WERO  69 years old referred from the clinic due to acute renal failure, likely related to chemotherapy.     Acute renal failure :  Likely related to immunotherapy   BUN and Scr inc  Lasix rec'd yest but not in orders -defer start to nephrology   Fluid restriction to <1.5/day rec, but not in orders -added today      Hyperkalemia -  improved, s/p lokelma   Low K and Na diet      Nephrotic syndrome:  Likely associated with immunotherapy, recent OP 24hr urine showed protein of 11g, repeat done in hospital on 3/8/24 showed an inc to 16.34g  off immunotherapy now  Management per nephrology   -renal bx THURSDAY (in 2 days) -CT guided via IR  -holding heparin for bx  NPO Wednesday night   Biopsy tomorrow     History of bladder and prostate cancer, with history of cystectomy/prostatectomy with ileostomy:   oncology following while inpt     Hypertension  Well controlled      CAD  No acute issues     Hyperlipidemia  No acute issues      Anemia   Stable  Transfuse if <7     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 s/p cystectomy/prostatectomy and ilial conduit:   oncology following while inpt     Continue recently started prednisone 50 mg daily, continue      Hold home losartan  Avoid NSAIDs, nephrotoxic drugs  Continue other home meds     Intermittent bradycardia -  Asymptomatic   Telemetry monitoring    Disposition : TBD    Review of systems  General: No fevers or  procedures/Xrays and details which were not copied into this note but were reviewed prior to creation of Plan    TIME: E/M Time spent with patient and patient care issues: [] 31-40 mins  [x] 41-49 mins  [] 50 mins or more.     This time also includes physician non-face-to-face service time visit on the date of service such as  Preparing to see the patient (eg, review of tests)  Obtaining and/or reviewing separately obtained history  Performing a medically necessary appropriate examination and/or evaluation  Counseling and educating the patient/family/caregiver  Ordering medications, tests, or procedures  Referring and communicating with other health care professionals as needed  Documenting clinical information in the electronic or other health record  Independently interpreting results (not reported separately) and communicating results to the patient/family/caregiver  Care coordination and discharge planning with Case Management.

## 2024-03-13 NOTE — PROGRESS NOTES
Bedside and Verbal shift change report given to JUSTIN Landaverde (oncoming nurse) by IGNACIO Key RN (offgoing nurse). Report included the following information Nurse Handoff Report, Intake/Output, MAR, and Recent Results.

## 2024-03-14 ENCOUNTER — APPOINTMENT (OUTPATIENT)
Facility: HOSPITAL | Age: 69
DRG: 683 | End: 2024-03-14
Attending: INTERNAL MEDICINE
Payer: MEDICARE

## 2024-03-14 ENCOUNTER — APPOINTMENT (OUTPATIENT)
Facility: HOSPITAL | Age: 69
DRG: 683 | End: 2024-03-14
Payer: MEDICARE

## 2024-03-14 LAB
ALBUMIN SERPL-MCNC: 2 G/DL (ref 3.4–5)
ANION GAP SERPL CALC-SCNC: 9 MMOL/L (ref 3–18)
BUN SERPL-MCNC: 130 MG/DL (ref 7–18)
BUN/CREAT SERPL: 40 (ref 12–20)
CALCIUM SERPL-MCNC: 7.5 MG/DL (ref 8.5–10.1)
CHLORIDE SERPL-SCNC: 116 MMOL/L (ref 100–111)
CO2 SERPL-SCNC: 18 MMOL/L (ref 21–32)
CREAT SERPL-MCNC: 3.24 MG/DL (ref 0.6–1.3)
GLUCOSE SERPL-MCNC: 147 MG/DL (ref 74–99)
PHOSPHATE SERPL-MCNC: 5 MG/DL (ref 2.5–4.9)
POTASSIUM SERPL-SCNC: 4.5 MMOL/L (ref 3.5–5.5)
SODIUM SERPL-SCNC: 143 MMOL/L (ref 136–145)

## 2024-03-14 PROCEDURE — 1100000000 HC RM PRIVATE

## 2024-03-14 PROCEDURE — 6360000002 HC RX W HCPCS

## 2024-03-14 PROCEDURE — 88348 ELECTRON MICROSCOPY DX: CPT

## 2024-03-14 PROCEDURE — 36415 COLL VENOUS BLD VENIPUNCTURE: CPT

## 2024-03-14 PROCEDURE — 6370000000 HC RX 637 (ALT 250 FOR IP): Performed by: INTERNAL MEDICINE

## 2024-03-14 PROCEDURE — 80069 RENAL FUNCTION PANEL: CPT

## 2024-03-14 PROCEDURE — 0TB13ZX EXCISION OF LEFT KIDNEY, PERCUTANEOUS APPROACH, DIAGNOSTIC: ICD-10-PCS | Performed by: STUDENT IN AN ORGANIZED HEALTH CARE EDUCATION/TRAINING PROGRAM

## 2024-03-14 PROCEDURE — 2580000003 HC RX 258: Performed by: INTERNAL MEDICINE

## 2024-03-14 PROCEDURE — 88346 IMFLUOR 1ST 1ANTB STAIN PX: CPT

## 2024-03-14 PROCEDURE — 88341 IMHCHEM/IMCYTCHM EA ADD ANTB: CPT

## 2024-03-14 PROCEDURE — 88313 SPECIAL STAINS GROUP 2: CPT

## 2024-03-14 PROCEDURE — 99151 MOD SED SAME PHYS/QHP <5 YRS: CPT

## 2024-03-14 PROCEDURE — 99153 MOD SED SAME PHYS/QHP EA: CPT

## 2024-03-14 PROCEDURE — 77012 CT SCAN FOR NEEDLE BIOPSY: CPT

## 2024-03-14 PROCEDURE — 88350 IMFLUOR EA ADDL 1ANTB STN PX: CPT

## 2024-03-14 PROCEDURE — 88305 TISSUE EXAM BY PATHOLOGIST: CPT

## 2024-03-14 PROCEDURE — 88342 IMHCHEM/IMCYTCHM 1ST ANTB: CPT

## 2024-03-14 RX ORDER — FENTANYL CITRATE 50 UG/ML
INJECTION, SOLUTION INTRAMUSCULAR; INTRAVENOUS PRN
Status: COMPLETED | OUTPATIENT
Start: 2024-03-14 | End: 2024-03-14

## 2024-03-14 RX ORDER — MIDAZOLAM HYDROCHLORIDE 2 MG/2ML
INJECTION, SOLUTION INTRAMUSCULAR; INTRAVENOUS PRN
Status: COMPLETED | OUTPATIENT
Start: 2024-03-14 | End: 2024-03-14

## 2024-03-14 RX ADMIN — SODIUM BICARBONATE 650 MG TABLET 650 MG: at 09:57

## 2024-03-14 RX ADMIN — FAMOTIDINE 20 MG: 20 TABLET, FILM COATED ORAL at 09:57

## 2024-03-14 RX ADMIN — FENTANYL CITRATE 25 MCG: 50 INJECTION, SOLUTION INTRAMUSCULAR; INTRAVENOUS at 10:38

## 2024-03-14 RX ADMIN — MIDAZOLAM HYDROCHLORIDE 0.5 MG: 1 INJECTION, SOLUTION INTRAMUSCULAR; INTRAVENOUS at 10:38

## 2024-03-14 RX ADMIN — FENTANYL CITRATE 25 MCG: 50 INJECTION, SOLUTION INTRAMUSCULAR; INTRAVENOUS at 10:47

## 2024-03-14 RX ADMIN — CETIRIZINE HYDROCHLORIDE 10 MG: 10 TABLET, FILM COATED ORAL at 09:57

## 2024-03-14 RX ADMIN — ATORVASTATIN CALCIUM 40 MG: 20 TABLET, FILM COATED ORAL at 09:58

## 2024-03-14 RX ADMIN — MIDAZOLAM HYDROCHLORIDE 0.5 MG: 1 INJECTION, SOLUTION INTRAMUSCULAR; INTRAVENOUS at 10:47

## 2024-03-14 RX ADMIN — SODIUM CHLORIDE, PRESERVATIVE FREE 10 ML: 5 INJECTION INTRAVENOUS at 21:25

## 2024-03-14 RX ADMIN — PREDNISONE 50 MG: 20 TABLET ORAL at 09:58

## 2024-03-14 RX ADMIN — SODIUM BICARBONATE 650 MG TABLET 650 MG: at 21:25

## 2024-03-14 NOTE — PROGRESS NOTES
Phone: [phone]  Paging : [phone]      Hematology / Oncology Progress Note    Admit Date: 3/7/2024    Assessment:     Hem/Onc Issues:  High grade urothelial carcinoma of bladder, s/p neoadjvuant chemo cis/gem, cystoprostatectomy 3/2023 [id number], adjvuant nivolumab 6/2023-10/17/2023 stopped due to gemerzlied swelling chronic kidney disease. Startedenfortumab in 1/2024.   Incidental prostate cancer, Jessica 3+3  Reasons for Admission: Acute renal failure, possible related to immunotherapy  Other Active Issues:    Nephrotic proteinuria 11 gram  Anasarca   Hyperkalemia    Principal Problem:    ARF (acute renal failure) (HCC)  Active Problems:    Malignant neoplasm of urinary bladder (HCC)    Anemia    Prostate cancer (HCC)    Hypoalbuminemia    Anasarca    Hyperkalemia    Nephrotic syndrome  Resolved Problems:    * No resolved hospital problems. 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 100 mg represents 1.25 mg/kg.   Follow up renal function  Getting albumin  Continue current management per primary team and nephrology  Agree with DVT prophylaxis with nephrotic syndrome  Other management per primary and nephrology team      Subjectives:     Edema throughout. Very anxious about edema.     Objectives:      VITAL SIGNS: Patient Vitals for the past 24 hrs:   BP Temp Temp src Pulse Resp SpO2   03/14/24 1500 (!) 111/59 97.3 °F (36.3 °C) Oral 60 16 100 %   03/14/24 1333 112/63 97.3 °F (36.3 °C) Oral 73 16 100 %   03/14/24 1300 108/65 97.4 °F (36.3 °C) Oral 59 16 99 %   03/14/24 1230 125/85 -- -- 60 16 99 %   03/14/24 1215 123/74 -- -- 60 16 99 %   03/14/24 1200 120/82 -- -- 71 16 94 %   03/14/24 1145 131/69 -- -- 68 18 96 %   03/14/24 1130 (!) 120/90 -- -- 68 -- 99 %   03/14/24 1117 -- -- -- -- 18 --   03/14/24 1115 129/77 -- -- 71 14 99 %    03/14/24 1108 -- -- -- -- 18 --   03/14/24 1106 (!) 137/94 -- -- -- -- --   03/14/24 1102 119/84 -- -- 65 10 100 %   03/14/24 1057 135/86 -- -- 63 (!) 8 100 %   03/14/24 1052 130/86 -- -- 60 (!) 9 100 %   03/14/24 1047 121/81 -- -- 61 12 100 %   03/14/24 1042 129/89 -- -- 60 (!) 9 100 %   03/14/24 1038 (!) 119/92 -- -- 61 11 100 %   03/14/24 1025 (!) 147/85 -- -- 60 10 100 %   03/14/24 0950 -- -- -- 56 -- --   03/14/24 0945 114/72 -- -- 56 -- --   03/14/24 0815 113/80 97.6 °F (36.4 °C) Oral 54 18 97 %   03/14/24 0345 119/65 98.1 °F (36.7 °C) Oral 58 18 97 %   03/13/24 2315 137/87 97.7 °F (36.5 °C) Oral 68 18 95 %   03/13/24 1930 125/82 97.7 °F (36.5 °C) Oral 60 18 97 %       GENERAL: normal appearance, no acute distress.   HEENT: conjunctivae normal, eyelids normal, PERRLA, anicteric, external ears and nose normal, hearing grossly normal.   NECK: supple, no masses.   LUNG: breathing comfortably, lungs clear to auscultation and percussion.   CARDIOVASCULAR: normal heart sounds, heart rhythm regular, no peripheral edema.   ABDOMEN: soft. bowel sounds normal, non-tender non distension, no hepatosplenomegaly   PELVIS: pelvic exam deferred.   RECTUM: rectal exam deferred.   LYMPH NODES: no cervical adenopathy, no axillary adenopathy, no supraclavicular adenopathy, no infraclavicular adenopathy.   SKIN/EXT: Anasarca, edema  MUSCULOSKELETAL: normal muscle strength.   NEUROLOGIC: no focal motor deficit, normal gait, no abnormal mental status.   PSYCHIATRIC: oriented to person, time and place, mood and affect appropriate.      Medications:      Current Medications:    Current Facility-Administered Medications   Medication Dose Route Frequency    sodium bicarbonate tablet 650 mg  650 mg Oral BID    levothyroxine (SYNTHROID) tablet 137.5 mcg  137.5 mcg Oral Daily    cetirizine (ZYRTEC) tablet 10 mg  10 mg Oral Daily    sodium chloride flush 0.9 % injection 5-40 mL  5-40 mL IntraVENous 2 times per day    sodium chloride flush

## 2024-03-14 NOTE — PROGRESS NOTES
Off unit for CT guided Biopsy, left in no acute distress.    @ 1315 return to unit in no acute distress.  Awake, alert and orientated.  All orders resumed per MD  plan of care.

## 2024-03-14 NOTE — OR NURSING
TRANSFER - OUT REPORT:    Verbal report given to Sindhu ANDERSON on Cono Mukesh  being transferred to Care Unit for routine progression of patient care       Report consisted of patient's Situation, Background, Assessment and   Recommendations(SBAR).     Information from the following report(s) Nurse Handoff Report, MAR, and Event Log was reviewed with the receiving nurse.           Lines:       Opportunity for questions and clarification was provided.      Patient transported with:  Registered Nurse

## 2024-03-14 NOTE — PROGRESS NOTES
Received s/p left kidney biopsy. Pt flat on back with rolled towel under left flank, band-aid intact & dry. No swelling or bleeding. Denies pain. Diet given.   1215 Sleeping.   1304 Transferred to room 313 in stable condition. Denies pain.

## 2024-03-14 NOTE — OR NURSING
Pt educated on procedure and sedation. Verbalized understanding. Pt confirmed NPO status, nothing to eat or drink since 0000. Pt not currently on anticoagulation medication. Pt confirmed no issues with sedation in the past.

## 2024-03-14 NOTE — PROGRESS NOTES
Nephrology Progress note    Efrain Mayorga  MRN: [id number]  : 1955    Admit Date: 3/7/2024      Impression:   -Acute Kidney Injury: Prob pre-renal +/- related to pathology of nephrotic syndrome, ??immunotherapy. U Na 22   -Hyperkalemia, improved s/p Lokelma  -Nephrotic syndrome: Likely related to immunotherapy, recent out pt 24hr urine Pr 11g. Repeat 24hr Urine Protein of 3/8/24  16.34gms.  Now off Immunotherapy. ? Minimal change disease, ? Membranous GN.  S/p CT guided Renal Bx today.  -HTN: BP controlled   -Anemia  -Urothelial carcinoma of the bladder and prostate ca s/p cystectomy/prostatectomy and ilial conduit    -CAD  -Lower ext edema, U/S of 3/7/24, no DVT  -Acidosis, metabolic     Plan:   - F/u on Renal Bx report.  -Low K and Na diet  -Limit fluid intake to <1.5L/day  -Strict I/Os  -Start NaBicarb po bid.  -Avoid Nephrotoxins  -No urgent indication for RRT  -No ACEI/ARB due to Hyperkalemia  -Would likely need oral anticoagulation in view of increased risk of DVT in setting of Nephrotic Proteinuria >10gms and Hypoalbuminemia  -Patient would follow up with Dr Slade, [facility] office after discharge.    Subjective:     Efrain Mayorga is a 69 y.o. 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 Pt was initially on cisplatin and gemcitabine, later on adjuvant immunotherapy: Nivolumab (6-10/23) which was stopped due to side effects. In  he was started on weekly Enfortumab, and stopped after 3 doses due to side effect including WERO and Proteinuria. Pt was was seen in Oct by Dr Newton/[facility] for proteinuria and 24 hrs urine showed nephrotic range proteinuria (11g) w/ hypoalbuminemia. There was a question on the accuracy of this urine sample due to collection technicality.  Primary GN serology was unremarkable. His Cr then was 1.1-1.2.  Biopsy was

## 2024-03-14 NOTE — PROCEDURES
Interventional Radiology Brief Postoperative Note    Procedure Date:  3/14/2024    Procedure:  CT Guided Native Renal Biopsy    :  Yisel Sebastian NP    Attending:  Min Blanc MD    Preoperative Diagnosis:  Acute Renal Failure, Nephrotic Syndrome    Postoperative Diagnosis:  Acute Renal Failure, Nephrotic Syndrome    Complications:  None immediate.    Estimated Blood Loss:  < 5 mL    Procedure Findings:  Technically successful CT guided left core renal biopsy with moderate sedation.  Pathology results are pending.    Specimens:  3 18-gauge core specimens sent for pathology.    Condition:  The patient tolerated the procedure without difficulty and remained in stable condition throughout.    Patient to be monitored in Care Unit x 2 hours supine with towel under affected side, then okay to sit up and transfer back to the floor.    Recommendation to reinitiate SQ heparin 6 hours post procedure.    Yisel Sebastian, APRN - CNP  [facility]

## 2024-03-14 NOTE — PROGRESS NOTES
Hospitalist Progress Note    Patient: Efrain Mayorga MRN: [id number]  CSN: [id number]    YOB: 1955  Age: 69 y.o.  Sex: male    DOA: 3/7/2024 LOS:  LOS: 7 days                Assessment/Plan     Active Hospital Problems    Diagnosis     Nephrotic syndrome [N04.9]     Anasarca [R60.1]     Hyperkalemia [E87.5]     ARF (acute renal failure) (HCC) [N17.9]     Hypoalbuminemia [E88.09]     Prostate cancer (HCC) [C61]     Anemia [D64.9]     Malignant neoplasm of urinary bladder (HCC) [C67.9]         Chief complaint :  WERO  69 years old referred from the clinic due to acute renal failure, likely related to chemotherapy.    Acute renal failure :  Likely related to immunotherapy   BUN and Scr inc  Lasix rec'd yest but not in orders -defer start to nephrology   Fluid restriction to <1.5/day rec, but not in orders -added today      Hyperkalemia -  improved, s/p lokelma   Low K and Na diet      Nephrotic syndrome:  Likely associated with immunotherapy, recent OP 24hr urine showed protein of 11g, repeat done in hospital on 3/8/24 showed an inc to 16.34g  off immunotherapy now  Management per nephrology   -renal bx THURSDAY (in 2 days) -CT guided via IR  -holding heparin for bx  NPO Wednesday night   Biopsy tomorrow     History of bladder and prostate cancer, with history of cystectomy/prostatectomy with ileostomy:   oncology following while inpt     Hypertension  Well controlled      CAD  No acute issues     Hyperlipidemia  No acute issues      Anemia   Stable  Transfuse if <7     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   s/p   1. Radical cystoprostatectomy  2. Extended pelvic lymph node dissection  3. Ileal conduit urinary diversion with single-J stents bilaterally   On 03/28/2023    oncology following while inpt     Continue recently started prednisone 50 mg daily, continue      Hold home losartan  Avoid NSAIDs, nephrotoxic drugs  Continue other home meds     Intermittent bradycardia  -  Asymptomatic   Telemetry monitoring    Disposition : TBD    Review of systems  General: No fevers or chills.  Cardiovascular: No chest pain or pressure. No palpitations.   Pulmonary: No shortness of breath.   Gastrointestinal: No nausea, vomiting.     Physical Exam:  General: Awake, cooperative, no acute distress    HEENT: NC, Atraumatic.  PERRLA, anicteric sclerae.  Lungs: CTA Bilaterally. No Wheezing/Rhonchi/Rales.  Heart:  S1 S2,  No murmur, No Rubs, No Gallops  Abdomen: Soft, Non distended, Non tender.  +Bowel sounds, ileal conduit  Extremities: Edema LE bilaterally  Psych:   Not anxious or agitated.  Neurologic:  No acute neurological deficit.         Vital signs/Intake and Output:  Visit Vitals  /63   Pulse 73   Temp 97.3 °F (36.3 °C) (Oral)   Resp 16   Ht 1.702 m (5' 7\")   Wt 87.7 kg (193 lb 5.5 oz)   SpO2 100%   BMI 30.28 kg/m²     Current Shift:  No intake/output data recorded.  Last three shifts:  03/12 1901 - 03/14 0700  In: 1330 [P.O.:1310; I.V.:20]  Out: 1550 [Urine:1550]            Labs: Results:       Chemistry Recent Labs     03/12/24  0505 03/13/24  0348 03/14/24  0418   * 145 143   K 4.9 4.7 4.5   * 116* 116*   CO2 19* 19* 18*   * 132* 130*   ,No results found for: \"LACTA\"   CBC w/Diff No results for input(s): \"WBC\", \"RBC\", \"HGB\", \"HCT\", \"PLT\" in the last 72 hours.    Invalid input(s): \"GRANS\", \"LYMPH\", \"EOS\"   Cardiac Enzymes No results found for: \"CKTOTAL\", \"CKMB\", \"CKMBINDEX\", \"TROPONINI\", \"TROPHS\",No results found for: \"BNP\"   Coagulation Recent Labs     03/11/24  1418   INR 1.1   APTT 30.7       Lipid Panel Lab Results   Component Value Date/Time    CHOL 239 03/11/2024 02:18 PM    HDL 40 03/11/2024 02:18 PM      Pancreas No results for input(s): \"LIPASE\" in the last 72 hours.,No results for input(s): \"AMYLASE\" in the last 72 hours.   Liver Enzymes No results for input(s): \"TP\", \"ALB\" in the last 72 hours.    Invalid input(s): \"TBIL\", \"AP\", \"SGOT\", \"GPT\", \"DBIL\"

## 2024-03-14 NOTE — PLAN OF CARE
Problem: Safety - Adult  Goal: Free from fall injury  Outcome: Progressing     Problem: ABCDS Injury Assessment  Goal: Absence of physical injury  Outcome: Progressing     Problem: Metabolic/Fluid and Electrolytes - Adult  Goal: Hemodynamic stability and optimal renal function maintained  Outcome: Progressing     Problem: Nutrition Deficit:  Goal: Optimize nutritional status  Outcome: Progressing

## 2024-03-14 NOTE — PLAN OF CARE
Problem: Safety - Adult  Goal: Free from fall injury  Outcome: Progressing     Problem: ABCDS Injury Assessment  Goal: Absence of physical injury  Outcome: Progressing     Problem: Metabolic/Fluid and Electrolytes - Adult  Goal: Hemodynamic stability and optimal renal function maintained  Outcome: Progressing     Problem: Nutrition Deficit:  Goal: Optimize nutritional status  3/13/2024 1342 by Yolande Summers RD  Flowsheets (Taken 3/13/2024 1342)  Nutrient intake appropriate for improving, restoring, or maintaining nutritional needs: Assess nutritional status and recommend course of action

## 2024-03-14 NOTE — PRE SEDATION
INTERVENTIONAL RADIOLOGY  Preoperative History and Physical      Patient:  Efrain Mayorga  :  1955  Age:  69 y.o.  MRN:  [id number]  Today's Date:  3/14/2024      CC / HPI   Efrain Mayorga is a 69 y.o. male with a history of acute renal failure, nephrotic syndrome who presents for CT Guided Native Renal Biopsy.    Consent: I have discussed with the patient and/or the patient representative the indication, alternatives, and the possible risks and/or complications of the planned procedure and the anesthesia methods. The patient and/or patient representative appear to understand and agree to proceed.    PAST MEDICAL HISTORY  Past Medical History:   Diagnosis Date    Arthritis     Bladder cancer (HCC)     Fibromyalgia     GERD (gastroesophageal reflux disease)     Heart attack (HCC) 2017    Hematuria, gross     Hypertension        PAST SURGICAL HISTORY  Past Surgical History:   Procedure Laterality Date    HERNIA REPAIR Right     inguinal    LEFT HEART PERCUTANEOUS  2017    ERNESTO CORONARY ARTERIOGRAPH  2017    TRANSURETHRAL RESEC BLADDER NECK         CURRENT MEDICATIONS  Current Facility-Administered Medications   Medication Dose Route Frequency Provider Last Rate Last Admin    sodium bicarbonate tablet 650 mg  650 mg Oral BID Sofya Newton MD   650 mg at 24 0957    levothyroxine (SYNTHROID) tablet 137.5 mcg  137.5 mcg Oral Daily Da Carvalho MD   137.5 mcg at 24 0543    cetirizine (ZYRTEC) tablet 10 mg  10 mg Oral Daily Da Carvalho MD   10 mg at 24 0957    sodium chloride flush 0.9 % injection 5-40 mL  5-40 mL IntraVENous 2 times per day Da Carvalho MD   10 mL at 24 2205    sodium chloride flush 0.9 % injection 5-40 mL  5-40 mL IntraVENous PRN Da Carvalho MD        0.9 % sodium chloride infusion   IntraVENous PRN Da Carvalho MD        ondansetron (ZOFRAN-ODT) disintegrating tablet 4 mg  4 mg Oral Q8H PRN Da Carvalho MD         Or    ondansetron (ZOFRAN) injection 4 mg  4 mg IntraVENous Q6H PRN Da Carvalho MD        polyethylene glycol (GLYCOLAX) packet 17 g  17 g Oral Daily PRN Da Carvalho MD   17 g at 03/09/24 0631    acetaminophen (TYLENOL) tablet 650 mg  650 mg Oral Q6H PRN Da Carvalho MD        Or    acetaminophen (TYLENOL) suppository 650 mg  650 mg Rectal Q6H PRN Da Carvalho MD        [Held by provider] heparin (porcine) injection 5,000 Units  5,000 Units SubCUTAneous 3 times per day Da Carvalho MD   5,000 Units at 03/10/24 0424    famotidine (PEPCID) tablet 20 mg  20 mg Oral Daily Da Carvalho MD   20 mg at 03/14/24 0957    dilTIAZem (CARDIZEM CD) extended release capsule 120 mg  120 mg Oral Daily Da Carvalho MD   120 mg at 03/13/24 0826    atorvastatin (LIPITOR) tablet 40 mg  40 mg Oral Daily Da Carvalho MD   40 mg at 03/14/24 0958    predniSONE (DELTASONE) tablet 50 mg  50 mg Oral Daily Da Carvalho MD   50 mg at 03/14/24 0958       Anticoagulant use last 7 days: yes - SQ Heparin 3/10     ALLERGIES  No Known Allergies    DIAGNOSTIC STUDIES   IMAGING STUDIES  Relevant Imaging studies reviewed    LABS  Lab Results   Component Value Date/Time    WBC 8.6 03/08/2024 10:10 AM    HGB 10.3 03/08/2024 10:10 AM    HCT 31.4 03/08/2024 10:10 AM     03/08/2024 10:10 AM    MCV 94.3 03/08/2024 10:10 AM     Lab Results   Component Value Date/Time     03/14/2024 04:18 AM    K 4.5 03/14/2024 04:18 AM     03/14/2024 04:18 AM    CO2 18 03/14/2024 04:18 AM     03/14/2024 04:18 AM    GFRAA >60 02/02/2022 09:14 AM   GFR: 20    Lab Results   Component Value Date/Time    INR 1.1 03/11/2024 02:18 PM       PHYSICAL EXAM   /72   Pulse 60   Temp 97.6 °F (36.4 °C) (Oral)   Resp 18   Ht 1.702 m (5' 7\")   Wt 87.7 kg (193 lb 5.5 oz)   SpO2 97%   BMI 30.28 kg/m²    General:  NAD  Heart:  RRR  Lungs:  NWOB  Neurological:  AAOX3    Pre-Sedation Documentation and  Exam:   Vital signs have been reviewed (see flow sheet for vitals).  I have reviewed the patient's history and review of systems.    ASSESSMENT / PLAN   Sedation/ Anesthesia Plan:   Local  intravenous sedation    Medications Planned:   1% lidocaine locally  midazolam (Versed) intravenously, fentanyl intravenously    Mallampati Airway Assessment:  Mallampati Class II - (soft palate, fauces & uvula are visible)    Prior History of Anesthesia Complications:   none    ASA Classification:  Class 3 - A patient with severe systemic disease that limits activity but is not incapacitating    Patient is an appropriate candidate for plan of sedation: yes    Code status:  Full      Yisel Sebastian, APRN - CNP  [facility]

## 2024-03-14 NOTE — PROGRESS NOTES
2000  Patient in bed awake, A/Ox4, speech clear, hearing intact, ambulatory, continent of urine (with urostomy) and stool. On room air, lung sounds clear, respirations unlabored. Skin is warm, dry, and intact with ecchymosis to right upper extremity. With generalized swelling. Radial and pedal pulses present bilaterally, capillary refill <3 seconds to fingers and toes. Abdomen soft, bowel sounds active. Strong hand  noted to bilateral upper extremities. Side rails x3 up, bed locked and in lowest position, bed alarm on, call bell and bedside table within reach, needs attended, denies any pain, SOB, or concerns at present.     2300  Patient aware that he is NPO at midnight for his biopsy tomorrow. CHG wipes done

## 2024-03-14 NOTE — PROGRESS NOTES
Bedside and Verbal shift change report given to JUSTIN Box (oncoming nurse) by JUSTIN Landaverde (offgoing nurse). Report included the following information Nurse Handoff Report, Adult Overview, Intake/Output, MAR, and Recent Results.

## 2024-03-14 NOTE — PROGRESS NOTES
TRANSFER - OUT REPORT:    Verbal report given to Charu Sal RN on Cono Mkuesh  being transferred to [address] [place] [address] for ordered procedure       Report consisted of patient's Situation, Background, Assessment and   Recommendations(SBAR).     Information from the following report(s) Nurse Handoff Report was reviewed with the receiving nurse.           Lines:       Opportunity for questions and clarification was provided.      Patient transported with:  Tech

## 2024-03-15 VITALS
RESPIRATION RATE: 16 BRPM | SYSTOLIC BLOOD PRESSURE: 139 MMHG | DIASTOLIC BLOOD PRESSURE: 84 MMHG | HEIGHT: 67 IN | TEMPERATURE: 97.6 F | OXYGEN SATURATION: 100 % | WEIGHT: 200.62 LBS | HEART RATE: 61 BPM | BODY MASS INDEX: 31.49 KG/M2

## 2024-03-15 LAB
ALBUMIN SERPL-MCNC: 2 G/DL (ref 3.4–5)
ANION GAP SERPL CALC-SCNC: 8 MMOL/L (ref 3–18)
BUN SERPL-MCNC: 127 MG/DL (ref 7–18)
BUN/CREAT SERPL: 43 (ref 12–20)
CALCIUM SERPL-MCNC: 8.1 MG/DL (ref 8.5–10.1)
CHLORIDE SERPL-SCNC: 113 MMOL/L (ref 100–111)
CO2 SERPL-SCNC: 21 MMOL/L (ref 21–32)
CREAT SERPL-MCNC: 2.95 MG/DL (ref 0.6–1.3)
GLUCOSE SERPL-MCNC: 113 MG/DL (ref 74–99)
PHOSPHATE SERPL-MCNC: 5.5 MG/DL (ref 2.5–4.9)
POTASSIUM SERPL-SCNC: 4.8 MMOL/L (ref 3.5–5.5)
SODIUM SERPL-SCNC: 142 MMOL/L (ref 136–145)

## 2024-03-15 PROCEDURE — 2580000003 HC RX 258: Performed by: INTERNAL MEDICINE

## 2024-03-15 PROCEDURE — 6360000002 HC RX W HCPCS: Performed by: HOSPITALIST

## 2024-03-15 PROCEDURE — 6370000000 HC RX 637 (ALT 250 FOR IP): Performed by: INTERNAL MEDICINE

## 2024-03-15 PROCEDURE — 80069 RENAL FUNCTION PANEL: CPT

## 2024-03-15 RX ORDER — SODIUM BICARBONATE 650 MG/1
650 TABLET ORAL 2 TIMES DAILY
Qty: 60 TABLET | Refills: 0 | Status: ON HOLD | OUTPATIENT
Start: 2024-03-15 | End: 2024-04-01 | Stop reason: HOSPADM

## 2024-03-15 RX ORDER — PREDNISONE 20 MG/1
60 TABLET ORAL DAILY
Status: DISCONTINUED | OUTPATIENT
Start: 2024-03-15 | End: 2024-03-15 | Stop reason: HOSPADM

## 2024-03-15 RX ORDER — ASPIRIN 81 MG/1
81 TABLET ORAL DAILY
Qty: 30 TABLET | Refills: 3 | Status: SHIPPED | OUTPATIENT
Start: 2024-03-15 | End: 2024-03-15

## 2024-03-15 RX ORDER — ASPIRIN 81 MG/1
81 TABLET ORAL DAILY
Qty: 30 TABLET | Refills: 3 | Status: SHIPPED | OUTPATIENT
Start: 2024-03-15

## 2024-03-15 RX ORDER — FUROSEMIDE 40 MG/1
40 TABLET ORAL DAILY
Status: DISCONTINUED | OUTPATIENT
Start: 2024-03-15 | End: 2024-03-15 | Stop reason: HOSPADM

## 2024-03-15 RX ORDER — FUROSEMIDE 10 MG/ML
40 INJECTION INTRAMUSCULAR; INTRAVENOUS ONCE
Status: COMPLETED | OUTPATIENT
Start: 2024-03-15 | End: 2024-03-15

## 2024-03-15 RX ORDER — FUROSEMIDE 40 MG/1
40 TABLET ORAL DAILY PRN
Qty: 60 TABLET | Refills: 3 | Status: ON HOLD | OUTPATIENT
Start: 2024-03-15 | End: 2024-04-01 | Stop reason: HOSPADM

## 2024-03-15 RX ORDER — PREDNISONE 20 MG/1
60 TABLET ORAL DAILY
Qty: 90 TABLET | Refills: 0 | Status: SHIPPED | OUTPATIENT
Start: 2024-03-16 | End: 2024-04-15

## 2024-03-15 RX ADMIN — CETIRIZINE HYDROCHLORIDE 10 MG: 10 TABLET, FILM COATED ORAL at 09:28

## 2024-03-15 RX ADMIN — SODIUM BICARBONATE 650 MG TABLET 650 MG: at 09:28

## 2024-03-15 RX ADMIN — FAMOTIDINE 20 MG: 20 TABLET, FILM COATED ORAL at 09:29

## 2024-03-15 RX ADMIN — PREDNISONE 60 MG: 20 TABLET ORAL at 09:29

## 2024-03-15 RX ADMIN — ATORVASTATIN CALCIUM 40 MG: 20 TABLET, FILM COATED ORAL at 09:28

## 2024-03-15 RX ADMIN — SODIUM CHLORIDE, PRESERVATIVE FREE 10 ML: 5 INJECTION INTRAVENOUS at 09:35

## 2024-03-15 RX ADMIN — DILTIAZEM HYDROCHLORIDE 120 MG: 120 CAPSULE, COATED, EXTENDED RELEASE ORAL at 09:30

## 2024-03-15 RX ADMIN — FUROSEMIDE 40 MG: 10 INJECTION, SOLUTION INTRAMUSCULAR; INTRAVENOUS at 15:21

## 2024-03-15 RX ADMIN — LEVOTHYROXINE SODIUM 137.5 MCG: 0.07 TABLET ORAL at 06:45

## 2024-03-15 RX ADMIN — FUROSEMIDE 40 MG: 40 TABLET ORAL at 09:29

## 2024-03-15 NOTE — PROGRESS NOTES
Bedside and Verbal shift change report given to JUSTIN Martinez (oncoming nurse) by JUSTIN Saucedo (offgoing nurse). Report included the following information Nurse Handoff Report, Intake/Output, and MAR.

## 2024-03-15 NOTE — PROGRESS NOTES
D/C Plan: Home with physician follow up and family to drive within the next 24 hours    CM rounded with provider, patient is anticipated to discharge today. CM met with patient to discuss discharge plan. Patient reports he is aware of follow up with nephrology and his spouse will drive him home. Patient reports he will be able to  his medications and is aware they were sent to his preferred [facility] pharmacy.

## 2024-03-15 NOTE — PROGRESS NOTES
Nephrology Progress note    Efrain Mayorga  MRN: [id number]  : 1955    Admit Date: 3/7/2024      Impression:   -Acute Kidney Injury: Prob pre-renal +/- related to pathology of nephrotic syndrome, ??immunotherapy. U Na 22.  Cr improving  -Hyperkalemia, s/p Lokelma, resolved  -Nephrotic syndrome: Likely related to immunotherapy, recent out pt 24hr urine Pr 11g. Repeat 24hr Urine Protein of 3/8/24  16.34gms.  Now off Immunotherapy. ? Minimal change disease, ? Membranous GN.  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  -Low K and Na diet  -Limit fluid intake to <1.5L/day  -OK to start lasix 40mg daily prn edema  -Strict I/Os  -Continue NaBicarb po bid.  -Avoid Nephrotoxins  -No urgent indication for RRT  -No ACEI/ARB due to Hyperkalemia  -Cont daily statin    -Change daily po prednisone to 60mg daily.  OK to dc home with this dose    -Would likely need oral anticoagulation in view of increased risk of DVT in setting of Nephrotic Proteinuria >10gms and Hypoalbuminemia    -If d/c, will have patient to follow up with me at [facility] office in 7-10 days.      Subjective:     Efrain Mayorga is a 69 y.o. 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 Pt was initially on cisplatin and gemcitabine, later on adjuvant immunotherapy: Nivolumab (6-10/23) which was stopped due to side effects. In  he was started on weekly Enfortumab, and stopped after 3 doses due to side effect including WERO and Proteinuria. Pt was was seen in Oct by Dr Newton/[facility] for proteinuria and 24 hrs urine showed nephrotic range proteinuria (11g) w/ hypoalbuminemia. There was a

## 2024-03-15 NOTE — DISCHARGE SUMMARY
Discharge Summary    Patient: Efrain Mayorga MRN: [id number]  CSN: [id number]    YOB: 1955  Age: 69 y.o.  Sex: male    DOA: 3/7/2024 LOS:  LOS: 8 days   Discharge Date: 3/15/2024     Primary Care Provider:  Arnoldo St DO    Admission Diagnoses: Hyperkalemia [E87.5]  Lymphedema [I89.0]  ARF (acute renal failure) (HCC) [N17.9]  Acute renal failure superimposed on chronic kidney disease, unspecified acute renal failure type, unspecified CKD stage (HCC) [N17.9, N18.9]    Discharge Diagnoses:    Active Hospital Problems    Diagnosis     Nephrotic syndrome [N04.9]     Anasarca [R60.1]     Hyperkalemia [E87.5]     ARF (acute renal failure) (HCC) [N17.9]     Hypoalbuminemia [E88.09]     Prostate cancer (HCC) [C61]     Anemia [D64.9]     Malignant neoplasm of urinary bladder (HCC) [C67.9]        Discharge Medications:        Medication List        START taking these medications      furosemide 40 MG tablet  Commonly known as: LASIX  Take 1 tablet by mouth daily as needed (edema)     sodium bicarbonate 650 MG tablet  Take 1 tablet by mouth 2 times daily            CHANGE how you take these medications      predniSONE 20 MG tablet  Commonly known as: DELTASONE  Take 3 tablets by mouth daily  Start taking on: March 16, 2024  What changed:   medication strength  how much to take  additional instructions            CONTINUE taking these medications      aspirin 81 MG EC tablet  Take 1 tablet by mouth daily     atorvastatin 40 MG tablet  Commonly known as: LIPITOR     Claritin 10 MG tablet  Generic drug: loratadine     dilTIAZem 120 MG extended release capsule  Commonly known as: TIAZAC     famotidine 20 MG tablet  Commonly known as: PEPCID     levothyroxine 137 MCG tablet  Commonly known as: SYNTHROID            STOP taking these medications      diclofenac 75 MG EC tablet  Commonly known as: VOLTAREN     enfortumab vedotin-ejfv 20 MG Solr  Commonly known as: PADCEV     lidocaine-prilocaine 2.5-2.5 %  patient, procedure, site and allergies. The patient was placed prone on the CT table.  CT dose reduction was achieved through use of a standardized protocol tailored for this examination and automatic exposure control for dose modulation.  Initial  images were obtained.  An appropriate site for core renal biopsy was marked.  The skin was prepped and draped in sterile fashion.  1% lidocaine was utilized for local anesthesia.  A small dermatotomy was made.  An 18 gauge coaxial biopsy system was utilized.  Under CT guidance, the introducer needle was advanced into the lower pole of the left kidney and through this 3 core specimens were obtained. These were submitted directly to the on-site pathologist and tissue sample adequacy confirmed.  Gelfoam slurry was injected as the needle was removed. Pressure was applied locally at the biopsy site.  A dry sterile dressing was applied.  There were no immediate complications.  The patient tolerated the procedure without difficulty. SEDATION: Moderate intravenous conscious sedation was supervised by Dr. Min Blanc.  The patient was independently monitored by a registered nurse assigned to the Department of Radiology using automated blood pressure, ECG and pulse oximetry.  The detailed conscious sedation record is stored in the hospital information system. Medication: Versed:   1  mg Fentanyl:   50  mcg Intraprocedure time:   20  minutes ESTIMATED BLOOD LOSS:  < 5 ml SPECIMENS:  Cores sent for pathology DLP:  1097.17  mGy*cm     Technically successful CT guided core renal biopsy.  Pathology results are pending.    Vascular duplex lower extremity venous bilateral    Result Date: 3/7/2024    No evidence of deep vein thrombosis in the right lower extremity.   No evidence of deep vein thrombosis in the left lower extremity.     US RETROPERITONEAL COMPLETE    Result Date: 3/7/2024  EXAM: US RETROPERITONEAL COMPLETE INDICATION: WERO COMPARISON: CT chest abdomen and pelvis

## 2024-03-15 NOTE — PLAN OF CARE
Problem: Safety - Adult  Goal: Free from fall injury  3/15/2024 1256 by Charu Sal RN  Outcome: Adequate for Discharge  3/15/2024 1145 by Charu Sal RN  Outcome: Progressing     Problem: ABCDS Injury Assessment  Goal: Absence of physical injury  3/15/2024 1256 by Charu Sal RN  Outcome: Adequate for Discharge  3/15/2024 1145 by Charu Sal RN  Outcome: Progressing     Problem: Metabolic/Fluid and Electrolytes - Adult  Goal: Hemodynamic stability and optimal renal function maintained  3/15/2024 1256 by Charu Sal RN  Outcome: Adequate for Discharge  3/15/2024 1145 by Charu Sal RN  Outcome: Progressing     Problem: Nutrition Deficit:  Goal: Optimize nutritional status  3/15/2024 1256 by Charu Sal RN  Outcome: Adequate for Discharge  3/15/2024 1145 by Charu Sal RN  Outcome: Progressing

## 2024-03-15 NOTE — WOUND CARE
Late entry for 3/15/24 1030    Wound care nurse in to assess patient.  Patient has +4 pitting edema to bilateral lower extremities.  Patient also has pitting edema to bilateral forearms.  Light compression applied with single layer size G Tubigrip.  Patient assisted to bed and feet elevated on bed linens contained in pillow cases for firm support.  Foot of bed elevated as well.  Patient tolerated well.  Bed left in lowest position and wife at bedside.

## 2024-03-19 ENCOUNTER — TELEPHONE (OUTPATIENT)
Facility: HOSPITAL | Age: 69
End: 2024-03-19

## 2024-03-19 ENCOUNTER — APPOINTMENT (OUTPATIENT)
Facility: HOSPITAL | Age: 69
DRG: 683 | End: 2024-03-19
Payer: MEDICARE

## 2024-03-19 ENCOUNTER — HOSPITAL ENCOUNTER (INPATIENT)
Facility: HOSPITAL | Age: 69
LOS: 13 days | Discharge: HOME | DRG: 683 | End: 2024-04-01
Attending: HOSPITALIST | Admitting: HOSPITALIST
Payer: MEDICARE

## 2024-03-19 DIAGNOSIS — N05.1 FOCAL SEGMENTAL GLOMERULOSCLEROSIS: ICD-10-CM

## 2024-03-19 DIAGNOSIS — R60.1 ANASARCA: ICD-10-CM

## 2024-03-19 DIAGNOSIS — N04.9 NEPHROTIC SYNDROME: Primary | ICD-10-CM

## 2024-03-19 PROBLEM — L03.90 CELLULITIS: Status: ACTIVE | Noted: 2024-03-19

## 2024-03-19 PROBLEM — Z98.890 HISTORY OF UROSTOMY: Status: ACTIVE | Noted: 2024-03-19

## 2024-03-19 LAB
ALBUMIN SERPL-MCNC: 1.7 G/DL (ref 3.4–5)
ALBUMIN/GLOB SERPL: 0.7 (ref 0.8–1.7)
ALP SERPL-CCNC: 100 U/L (ref 45–117)
ALT SERPL-CCNC: 26 U/L (ref 16–61)
ANION GAP SERPL CALC-SCNC: 10 MMOL/L (ref 3–18)
ANION GAP SERPL CALC-SCNC: 8 MMOL/L (ref 3–18)
APPEARANCE UR: ABNORMAL
APTT PPP: 29.6 SEC (ref 23–36.4)
AST SERPL-CCNC: 23 U/L (ref 10–38)
BACTERIA URNS QL MICRO: ABNORMAL /HPF
BASOPHILS # BLD: 0 K/UL (ref 0–0.1)
BASOPHILS NFR BLD: 0 % (ref 0–2)
BILIRUB SERPL-MCNC: 0.5 MG/DL (ref 0.2–1)
BILIRUB UR QL: NEGATIVE
BUN SERPL-MCNC: 139 MG/DL (ref 7–18)
BUN SERPL-MCNC: 141 MG/DL (ref 7–18)
BUN/CREAT SERPL: 46 (ref 12–20)
BUN/CREAT SERPL: 48 (ref 12–20)
CALCIUM SERPL-MCNC: 7.9 MG/DL (ref 8.5–10.1)
CALCIUM SERPL-MCNC: 7.9 MG/DL (ref 8.5–10.1)
CHLORIDE SERPL-SCNC: 113 MMOL/L (ref 100–111)
CHLORIDE SERPL-SCNC: 113 MMOL/L (ref 100–111)
CO2 SERPL-SCNC: 19 MMOL/L (ref 21–32)
CO2 SERPL-SCNC: 19 MMOL/L (ref 21–32)
COLOR UR: YELLOW
CREAT SERPL-MCNC: 2.94 MG/DL (ref 0.6–1.3)
CREAT SERPL-MCNC: 3.02 MG/DL (ref 0.6–1.3)
DIFFERENTIAL METHOD BLD: ABNORMAL
EOSINOPHIL # BLD: 0 K/UL (ref 0–0.4)
EOSINOPHIL NFR BLD: 0 % (ref 0–5)
EPITH CASTS URNS QL MICRO: ABNORMAL /LPF (ref 0–5)
ERYTHROCYTE [DISTWIDTH] IN BLOOD BY AUTOMATED COUNT: 15.7 % (ref 11.6–14.5)
GLOBULIN SER CALC-MCNC: 2.4 G/DL (ref 2–4)
GLUCOSE SERPL-MCNC: 141 MG/DL (ref 74–99)
GLUCOSE SERPL-MCNC: 151 MG/DL (ref 74–99)
GLUCOSE UR STRIP.AUTO-MCNC: NEGATIVE MG/DL
GRAN CASTS URNS QL MICRO: ABNORMAL /LPF
HCT VFR BLD AUTO: 35.6 % (ref 36–48)
HGB BLD-MCNC: 11.8 G/DL (ref 13–16)
HGB UR QL STRIP: ABNORMAL
HYALINE CASTS URNS QL MICRO: ABNORMAL /LPF (ref 0–2)
IMM GRANULOCYTES # BLD AUTO: 0 K/UL
IMM GRANULOCYTES NFR BLD AUTO: 0 %
INR PPP: 1.1 (ref 0.9–1.1)
KETONES UR QL STRIP.AUTO: NEGATIVE MG/DL
LACTATE BLD-SCNC: 1.11 MMOL/L (ref 0.4–2)
LEUKOCYTE ESTERASE UR QL STRIP.AUTO: ABNORMAL
LYMPHOCYTES # BLD: 0.3 K/UL (ref 0.9–3.6)
LYMPHOCYTES NFR BLD: 1 % (ref 21–52)
MAGNESIUM SERPL-MCNC: 1.8 MG/DL (ref 1.6–2.6)
MCH RBC QN AUTO: 30.5 PG (ref 24–34)
MCHC RBC AUTO-ENTMCNC: 33.1 G/DL (ref 31–37)
MCV RBC AUTO: 92 FL (ref 78–100)
MONOCYTES # BLD: 2.3 K/UL (ref 0.05–1.2)
MONOCYTES NFR BLD: 8 % (ref 3–10)
NEUTS SEG # BLD: 26.7 K/UL (ref 1.8–8)
NEUTS SEG NFR BLD: 91 % (ref 40–73)
NITRITE UR QL STRIP.AUTO: NEGATIVE
NRBC # BLD: 0 K/UL (ref 0–0.01)
NRBC BLD-RTO: 0 PER 100 WBC
NT PRO BNP: 2042 PG/ML (ref 0–900)
PH UR STRIP: >8.5 [PH] (ref 5–8)
PLATELET # BLD AUTO: 199 K/UL (ref 135–420)
PLATELET COMMENT: ABNORMAL
PMV BLD AUTO: 9.9 FL (ref 9.2–11.8)
POTASSIUM SERPL-SCNC: 5 MMOL/L (ref 3.5–5.5)
POTASSIUM SERPL-SCNC: 5.1 MMOL/L (ref 3.5–5.5)
PROCALCITONIN SERPL-MCNC: 1.6 NG/ML
PROT SERPL-MCNC: 4.1 G/DL (ref 6.4–8.2)
PROT UR STRIP-MCNC: >1000 MG/DL
PROTHROMBIN TIME: 14.6 SEC (ref 11.9–14.7)
RBC # BLD AUTO: 3.87 M/UL (ref 4.35–5.65)
RBC #/AREA URNS HPF: ABNORMAL /HPF (ref 0–5)
RBC MORPH BLD: ABNORMAL
SODIUM SERPL-SCNC: 140 MMOL/L (ref 136–145)
SODIUM SERPL-SCNC: 142 MMOL/L (ref 136–145)
SP GR UR REFRACTOMETRY: 1.02 (ref 1–1.03)
TRI-PHOS CRY URNS QL MICRO: ABNORMAL
TROPONIN I SERPL HS-MCNC: 26 NG/L (ref 0–78)
TSH SERPL DL<=0.05 MIU/L-ACNC: 1.15 UIU/ML (ref 0.36–3.74)
UROBILINOGEN UR QL STRIP.AUTO: 0.2 EU/DL (ref 0.2–1)
VANCOMYCIN SERPL-MCNC: <0.8 UG/ML (ref 5–40)
WBC # BLD AUTO: 29.3 K/UL (ref 4.6–13.2)
WBC URNS QL MICRO: ABNORMAL /HPF (ref 0–5)

## 2024-03-19 PROCEDURE — 71045 X-RAY EXAM CHEST 1 VIEW: CPT

## 2024-03-19 PROCEDURE — 85610 PROTHROMBIN TIME: CPT

## 2024-03-19 PROCEDURE — 85025 COMPLETE CBC W/AUTO DIFF WBC: CPT

## 2024-03-19 PROCEDURE — 81001 URINALYSIS AUTO W/SCOPE: CPT

## 2024-03-19 PROCEDURE — 84484 ASSAY OF TROPONIN QUANT: CPT

## 2024-03-19 PROCEDURE — 36415 COLL VENOUS BLD VENIPUNCTURE: CPT

## 2024-03-19 PROCEDURE — 80053 COMPREHEN METABOLIC PANEL: CPT

## 2024-03-19 PROCEDURE — 85730 THROMBOPLASTIN TIME PARTIAL: CPT

## 2024-03-19 PROCEDURE — 2580000003 HC RX 258: Performed by: HOSPITALIST

## 2024-03-19 PROCEDURE — 83880 ASSAY OF NATRIURETIC PEPTIDE: CPT

## 2024-03-19 PROCEDURE — P9047 ALBUMIN (HUMAN), 25%, 50ML: HCPCS | Performed by: PHYSICIAN ASSISTANT

## 2024-03-19 PROCEDURE — 99285 EMERGENCY DEPT VISIT HI MDM: CPT

## 2024-03-19 PROCEDURE — 80202 ASSAY OF VANCOMYCIN: CPT

## 2024-03-19 PROCEDURE — 1100000000 HC RM PRIVATE

## 2024-03-19 PROCEDURE — 6360000002 HC RX W HCPCS: Performed by: PHYSICIAN ASSISTANT

## 2024-03-19 PROCEDURE — 84145 PROCALCITONIN (PCT): CPT

## 2024-03-19 PROCEDURE — 6370000000 HC RX 637 (ALT 250 FOR IP): Performed by: HOSPITALIST

## 2024-03-19 PROCEDURE — 87086 URINE CULTURE/COLONY COUNT: CPT

## 2024-03-19 PROCEDURE — 83735 ASSAY OF MAGNESIUM: CPT

## 2024-03-19 PROCEDURE — 83605 ASSAY OF LACTIC ACID: CPT

## 2024-03-19 PROCEDURE — 93005 ELECTROCARDIOGRAM TRACING: CPT | Performed by: PHYSICIAN ASSISTANT

## 2024-03-19 PROCEDURE — 96365 THER/PROPH/DIAG IV INF INIT: CPT

## 2024-03-19 PROCEDURE — 84443 ASSAY THYROID STIM HORMONE: CPT

## 2024-03-19 PROCEDURE — 93970 EXTREMITY STUDY: CPT

## 2024-03-19 PROCEDURE — 87040 BLOOD CULTURE FOR BACTERIA: CPT

## 2024-03-19 PROCEDURE — 6360000002 HC RX W HCPCS: Performed by: HOSPITALIST

## 2024-03-19 PROCEDURE — 2580000003 HC RX 258: Performed by: PHYSICIAN ASSISTANT

## 2024-03-19 PROCEDURE — 96375 TX/PRO/DX INJ NEW DRUG ADDON: CPT

## 2024-03-19 RX ORDER — POLYETHYLENE GLYCOL 3350 17 G/17G
17 POWDER, FOR SOLUTION ORAL DAILY PRN
Status: DISCONTINUED | OUTPATIENT
Start: 2024-03-19 | End: 2024-04-01 | Stop reason: HOSPADM

## 2024-03-19 RX ORDER — ONDANSETRON 4 MG/1
4 TABLET, ORALLY DISINTEGRATING ORAL EVERY 8 HOURS PRN
Status: DISCONTINUED | OUTPATIENT
Start: 2024-03-19 | End: 2024-04-01 | Stop reason: HOSPADM

## 2024-03-19 RX ORDER — DEXAMETHASONE SODIUM PHOSPHATE 4 MG/ML
4 INJECTION, SOLUTION INTRA-ARTICULAR; INTRALESIONAL; INTRAMUSCULAR; INTRAVENOUS; SOFT TISSUE
Status: COMPLETED | OUTPATIENT
Start: 2024-03-19 | End: 2024-03-19

## 2024-03-19 RX ORDER — ALBUMIN (HUMAN) 12.5 G/50ML
25 SOLUTION INTRAVENOUS EVERY 8 HOURS
Status: DISCONTINUED | OUTPATIENT
Start: 2024-03-19 | End: 2024-03-23

## 2024-03-19 RX ORDER — PANTOPRAZOLE SODIUM 40 MG/1
40 TABLET, DELAYED RELEASE ORAL
Status: DISCONTINUED | OUTPATIENT
Start: 2024-03-20 | End: 2024-04-01 | Stop reason: HOSPADM

## 2024-03-19 RX ORDER — ALBUMIN (HUMAN) 12.5 G/50ML
25 SOLUTION INTRAVENOUS ONCE
Status: COMPLETED | OUTPATIENT
Start: 2024-03-19 | End: 2024-03-19

## 2024-03-19 RX ORDER — ATORVASTATIN CALCIUM 20 MG/1
40 TABLET, FILM COATED ORAL DAILY
Status: DISCONTINUED | OUTPATIENT
Start: 2024-03-19 | End: 2024-04-01 | Stop reason: HOSPADM

## 2024-03-19 RX ORDER — SODIUM CHLORIDE 9 MG/ML
INJECTION, SOLUTION INTRAVENOUS PRN
Status: DISCONTINUED | OUTPATIENT
Start: 2024-03-19 | End: 2024-04-01 | Stop reason: HOSPADM

## 2024-03-19 RX ORDER — PREDNISONE 20 MG/1
60 TABLET ORAL DAILY
Status: DISCONTINUED | OUTPATIENT
Start: 2024-03-19 | End: 2024-03-19

## 2024-03-19 RX ORDER — ONDANSETRON 2 MG/ML
4 INJECTION INTRAMUSCULAR; INTRAVENOUS
Status: COMPLETED | OUTPATIENT
Start: 2024-03-19 | End: 2024-03-19

## 2024-03-19 RX ORDER — FUROSEMIDE 10 MG/ML
80 INJECTION INTRAMUSCULAR; INTRAVENOUS
Status: COMPLETED | OUTPATIENT
Start: 2024-03-19 | End: 2024-03-19

## 2024-03-19 RX ORDER — SODIUM BICARBONATE 650 MG/1
650 TABLET ORAL 2 TIMES DAILY
Status: DISCONTINUED | OUTPATIENT
Start: 2024-03-19 | End: 2024-03-27

## 2024-03-19 RX ORDER — ACETAMINOPHEN 650 MG/1
650 SUPPOSITORY RECTAL EVERY 6 HOURS PRN
Status: DISCONTINUED | OUTPATIENT
Start: 2024-03-19 | End: 2024-04-01 | Stop reason: HOSPADM

## 2024-03-19 RX ORDER — ACETAMINOPHEN 325 MG/1
650 TABLET ORAL EVERY 6 HOURS PRN
Status: DISCONTINUED | OUTPATIENT
Start: 2024-03-19 | End: 2024-04-01 | Stop reason: HOSPADM

## 2024-03-19 RX ORDER — DILTIAZEM HYDROCHLORIDE 120 MG/1
120 CAPSULE, EXTENDED RELEASE ORAL DAILY
Status: DISCONTINUED | OUTPATIENT
Start: 2024-03-19 | End: 2024-03-19

## 2024-03-19 RX ORDER — ASPIRIN 81 MG/1
81 TABLET ORAL DAILY
Status: DISCONTINUED | OUTPATIENT
Start: 2024-03-19 | End: 2024-04-01 | Stop reason: HOSPADM

## 2024-03-19 RX ORDER — DILTIAZEM HYDROCHLORIDE 120 MG/1
120 CAPSULE, COATED, EXTENDED RELEASE ORAL DAILY
Status: DISCONTINUED | OUTPATIENT
Start: 2024-03-20 | End: 2024-04-01 | Stop reason: HOSPADM

## 2024-03-19 RX ORDER — SODIUM CHLORIDE 0.9 % (FLUSH) 0.9 %
5-40 SYRINGE (ML) INJECTION EVERY 12 HOURS SCHEDULED
Status: DISCONTINUED | OUTPATIENT
Start: 2024-03-19 | End: 2024-04-01 | Stop reason: HOSPADM

## 2024-03-19 RX ORDER — HEPARIN SODIUM 5000 [USP'U]/ML
5000 INJECTION, SOLUTION INTRAVENOUS; SUBCUTANEOUS EVERY 8 HOURS SCHEDULED
Status: DISCONTINUED | OUTPATIENT
Start: 2024-03-19 | End: 2024-04-01 | Stop reason: HOSPADM

## 2024-03-19 RX ORDER — SODIUM CHLORIDE 0.9 % (FLUSH) 0.9 %
5-40 SYRINGE (ML) INJECTION PRN
Status: DISCONTINUED | OUTPATIENT
Start: 2024-03-19 | End: 2024-04-01 | Stop reason: HOSPADM

## 2024-03-19 RX ORDER — PREDNISONE 20 MG/1
60 TABLET ORAL DAILY
Status: DISCONTINUED | OUTPATIENT
Start: 2024-03-20 | End: 2024-04-01 | Stop reason: HOSPADM

## 2024-03-19 RX ORDER — MORPHINE SULFATE 4 MG/ML
4 INJECTION, SOLUTION INTRAMUSCULAR; INTRAVENOUS
Status: COMPLETED | OUTPATIENT
Start: 2024-03-19 | End: 2024-03-19

## 2024-03-19 RX ORDER — OXYCODONE HYDROCHLORIDE AND ACETAMINOPHEN 5; 325 MG/1; MG/1
1 TABLET ORAL EVERY 4 HOURS PRN
Status: DISCONTINUED | OUTPATIENT
Start: 2024-03-19 | End: 2024-04-01 | Stop reason: HOSPADM

## 2024-03-19 RX ORDER — ONDANSETRON 2 MG/ML
4 INJECTION INTRAMUSCULAR; INTRAVENOUS EVERY 6 HOURS PRN
Status: DISCONTINUED | OUTPATIENT
Start: 2024-03-19 | End: 2024-04-01 | Stop reason: HOSPADM

## 2024-03-19 RX ADMIN — HEPARIN SODIUM 5000 UNITS: 5000 INJECTION INTRAVENOUS; SUBCUTANEOUS at 23:12

## 2024-03-19 RX ADMIN — MORPHINE SULFATE 4 MG: 4 INJECTION, SOLUTION INTRAMUSCULAR; INTRAVENOUS at 18:01

## 2024-03-19 RX ADMIN — DEXAMETHASONE SODIUM PHOSPHATE 4 MG: 4 INJECTION INTRA-ARTICULAR; INTRALESIONAL; INTRAMUSCULAR; INTRAVENOUS; SOFT TISSUE at 14:49

## 2024-03-19 RX ADMIN — SODIUM CHLORIDE, PRESERVATIVE FREE 10 ML: 5 INJECTION INTRAVENOUS at 23:13

## 2024-03-19 RX ADMIN — CEFTRIAXONE 1000 MG: 1 INJECTION, POWDER, FOR SOLUTION INTRAMUSCULAR; INTRAVENOUS at 21:45

## 2024-03-19 RX ADMIN — ALBUMIN (HUMAN) 25 G: 0.25 INJECTION, SOLUTION INTRAVENOUS at 18:09

## 2024-03-19 RX ADMIN — SODIUM BICARBONATE 650 MG TABLET 650 MG: at 23:13

## 2024-03-19 RX ADMIN — ATORVASTATIN CALCIUM 40 MG: 20 TABLET, FILM COATED ORAL at 21:44

## 2024-03-19 RX ADMIN — ASPIRIN 81 MG: 81 TABLET ORAL at 21:44

## 2024-03-19 RX ADMIN — FUROSEMIDE 80 MG: 10 INJECTION, SOLUTION INTRAMUSCULAR; INTRAVENOUS at 17:28

## 2024-03-19 RX ADMIN — VANCOMYCIN HYDROCHLORIDE 1000 MG: 1 INJECTION, POWDER, LYOPHILIZED, FOR SOLUTION INTRAVENOUS at 23:12

## 2024-03-19 RX ADMIN — OXYCODONE HYDROCHLORIDE AND ACETAMINOPHEN 1 TABLET: 5; 325 TABLET ORAL at 21:44

## 2024-03-19 RX ADMIN — PIPERACILLIN AND TAZOBACTAM 2250 MG: 2; .25 INJECTION, POWDER, LYOPHILIZED, FOR SOLUTION INTRAVENOUS at 17:38

## 2024-03-19 RX ADMIN — ONDANSETRON 4 MG: 2 INJECTION INTRAMUSCULAR; INTRAVENOUS at 18:01

## 2024-03-19 ASSESSMENT — PAIN DESCRIPTION - LOCATION
LOCATION: ARM
LOCATION: ARM

## 2024-03-19 ASSESSMENT — PAIN SCALES - GENERAL
PAINLEVEL_OUTOF10: 5
PAINLEVEL_OUTOF10: 8

## 2024-03-19 ASSESSMENT — PAIN DESCRIPTION - DESCRIPTORS
DESCRIPTORS: THROBBING
DESCRIPTORS: ACHING;THROBBING

## 2024-03-19 ASSESSMENT — PAIN DESCRIPTION - ORIENTATION: ORIENTATION: LEFT

## 2024-03-19 NOTE — PROGRESS NOTES
1955    TRANSFER - IN REPORT:    Verbal report received from JUSTIN Palacios (name) on Cono Mukesh  being received from ED (unit) for routine progression of patient care      Report consisted of patient’s Situation, Background, Assessment and   Recommendations(SBAR).     Information from the following report(s) Nurse Handoff Report, ED Encounter Summary, ED SBAR, and Recent Results was reviewed with the receiving nurse.    Opportunity for questions and clarification was provided.      2020    Patient arrived on unit, transport c/o tech. Transferred safely to bed with x 1 assist. Assessment done upon patient's arrival to unit and care assumed.     0725    Bedside and Verbal shift change report given to JUSTIN Quevedo (oncoming nurse) by JUSTIN Myers (offgoing nurse). Report included the following information Nurse Handoff Report, Adult Overview, Intake/Output, MAR, and Recent Results.

## 2024-03-19 NOTE — ED PROVIDER NOTES
[facility] EMERGENCY DEPT  EMERGENCY DEPARTMENT ENCOUNTER       Pt Name: Efrain Mayorga  MRN: [id number]  Birthdate 1955  Date of evaluation: 3/19/2024  PCP: Arnoldo St DO  Note Started: 6:30 PM 3/19/24     CHIEF COMPLAINT       Chief Complaint   Patient presents with    Arm Pain        HISTORY OF PRESENT ILLNESS: 1 or more elements      History From: Patient and patient's wife  HPI Limitations: None  Chronic Conditions: HTN, CAD, GERD, fibromyalgia, bladder urothelial CA, also adenocarcinoma of prostate, tobacco use, anxiety, CKD, nephrotic syndrome  Social Determinants affecting Dx or Tx: none      Efrain Mayorga is a 69 y.o. male who presents to ED c/o BUE swelling. Associated sxs are redness. Pt was discharged from this facility 4 days ago after 8 day stay for suspected nephrotic syndrome. Pt has chronic lymphedema in BLE now affecting BUE over last 1-2 days. Pt has gained 10 pounds in last 12 days per EMR review. Pt was Rx 40 mg lasix prn with last dose last night. He also is Rx prednisone 50 mg with last dose last night. Pt is followed by Dr. Francisco Garza ([facility]). Pt had neoadjuvant therapy with cisplatin/gemcitabine until 03/2023. Pt had cystectomy/prostatectomy with ileostomy. Pt was then placed on Opdivo (immunotherapy) with last dose 09/2023. Pt was halted due to LE lymphedema, abnormal thyroid, and rising creatinine. Pt is followed by [facility]. No fever, CP, SOB, vomiting. Pt is putting out about 1100cc daily.      Nursing Notes were all reviewed and agreed with or any disagreements were addressed in the HPI.    PAST HISTORY     Past Medical History:  Past Medical History:   Diagnosis Date    Arthritis     Bladder cancer (HCC)     Fibromyalgia     GERD (gastroesophageal reflux disease)     Heart attack (HCC) 03/2017    Hematuria, gross     Hypertension 2017       Past Surgical History:  Past Surgical History:   Procedure Laterality Date    CT BIOPSY RENAL  3/14/2024    CT BIOPSY RENAL 3/14/2024 [facility] RAD CT

## 2024-03-19 NOTE — PROGRESS NOTES
Physician Progress Note      PATIENT:               NIRMAL HOLLIDAY  [facility] #:                  [id number]  :                       1955  ADMIT DATE:       3/7/2024 5:44 PM  DISCH DATE:        3/15/2024 4:21 PM  RESPONDING  PROVIDER #:        Faustino Mendes MD          QUERY TEXT:    Pt admitted with WERO and has mild malnutrition documented in RD PN 3/13.   Please further specify type of malnutrition with documentation in the medical   record.    Risk Factors:  PMHX of Arthritis, Bladder cancer (HCC), Fibromyalgia, GERD,   Heart attack (HCC), Hematuria, gross, and Hypertension.    Clinical Indicators:  RD 3/13  Malnutrition Status:  Mild malnutrition (r/t ARF) (24 1335)  Context:  Acute Illness  Findings of the 6 clinical characteristics of malnutrition:  Energy Intake:  No significant decrease in energy intake  Weight Loss:  No significant weight loss  Body Fat Loss:  Unable to assess  Muscle Mass Loss:  Unable to assess  Fluid Accumulation:  Moderate to Severe Extremities, Generalized   Strength:  Normal  strength  Ideal Body Weight (IBW): 148 lbs (67 kg)  Admission Body Weight: 86.2 kg (190 lb)  Current Body Weight: 87.7 kg (193 lb 5.5 oz), 130.6 % IBW. Weight Source: Not   Specified  Current BMI (kg/m2): 30.3  Usual Body Weight: 70.3 kg (155 lb)  % Weight Change (Calculated): 24.7    Treatment: receiving RD Continue Current Diet    ASPEN Criteria:    https://aspenjournals.onlinelibrary.serrano.com/doi/full/10.1177/402815514724652  5    Summer Wood RN, BSN, CRCR  [address] [place] [address]  [email or web address]  Options provided:  -- Mild Malnutrition  -- Other - I will add my own diagnosis  -- Disagree - Not applicable / Not valid  -- Disagree - Clinically unable to determine / Unknown  -- Refer to Clinical Documentation Reviewer    PROVIDER RESPONSE TEXT:    This patient has mild malnutrition.    Query created by: Summer Wood on 3/14/2024 10:25 AM      Electronically  signed by:  Faustino Mendes MD 3/19/2024 11:18 AM

## 2024-03-19 NOTE — H&P
History & Physical    Patient: Efrain Mayorga MRN: [id number]  CSN: [id number]    YOB: 1955  Age: 69 y.o.  Sex: male      DOA: 3/19/2024  Primary Care Provider:  Arnoldo St DO      Assessment/Plan     Active Hospital Problems    Diagnosis Date Noted    Cellulitis [L03.90] 03/19/2024    History of urostomy [Z98.890] 03/19/2024    Nephrotic syndrome [N04.9] 03/12/2024    Anasarca [R60.1] 03/09/2024    Hypoalbuminemia [E88.09] 08/30/2023    Prostate cancer (HCC) [C61] 04/07/2023    Anemia [D64.9] 03/22/2023    Malignant neoplasm of urinary bladder (HCC) [C67.9] 11/03/2022         Admit to medical     Anasarca, due to nephrotic syndrome  Continue albumin, albumin 1.7  Lasix  Check tsh     Nephrotic syndrome  On prednisone 60 mg  Will continue  Nephrology has been consulted  Renal biopsy done last admission  Continue aspirin daily    Cellulitis history of left arm  PVL no DVT, superficial thrombosis  Put on Rocephin and vancomycin  Symptoms control    Prostate cancer, bladder cancer  Seeing Dr. Debbie MCCLAIN oncologist  Received immunotherapy before, no active treatment right now  s/p   1. Radical cystoprostatectomy  2. Extended pelvic lymph node dissection  3. Ileal conduit urinary diversion with single-J stents bilaterally   On 03/28/2023  Urostomy bag noted urine clear    Anemia  Due to chronic illness     Hypertension  Well controlled      CAD  No acute issues     Hyperlipidemia  No acute issues      Anemia   Stable  Monitored H/H       Full code     Please note that this dictation was completed with [employer], the [employer] voice recognition software.  Quite often unanticipated grammatical, syntax, homophones, and other interpretive errors are inadvertently transcribed by the computer software.  Please disregard these errors.  Please excuse any errors that have escaped final proofreading    Estimate  length of stay : 2-3 day    DVT : heparin ppi proph  CC: Leg swelling, arm pain       HPI:     Cono

## 2024-03-19 NOTE — ED TRIAGE NOTES
Pt to ed via ems with complaints of left arm pain and swelling. Pt was recently admitted at this facility for WERO and kidney biopsy. Swelling, warmth and redness noted to bilateral hands, up the left arm. PMS intact, pt denies numbness or tingling.

## 2024-03-19 NOTE — ED NOTES
TRANSFER - OUT REPORT:    Verbal report given to Lucia ANDERSON on Efrain Mayorga  being transferred to  for routine progression of patient care       Report consisted of patient's Situation, Background, Assessment and   Recommendations(SBAR).     Information from the following report(s) Nurse Handoff Report, Index, ED Encounter Summary, ED SBAR, Adult Overview, Surgery Report, Intake/Output, MAR, and Recent Results was reviewed with the receiving nurse.    Ethel Fall Assessment:    Presents to emergency department  because of falls (Syncope, seizure, or loss of consciousness): No  Age > 70: No  Altered Mental Status, Intoxication with alcohol or substance confusion (Disorientation, impaired judgment, poor safety awaremess, or inability to follow instructions): No  Impaired Mobility: Ambulates or transfers with assistive devices or assistance; Unable to ambulate or transer.: Yes  Nursing Judgement: Yes          Lines:   Peripheral IV 03/19/24 Right Antecubital (Active)       Extended Dwell Peripherial IV 03/19/24 Right Cephalic (Active)        Opportunity for questions and clarification was provided.      Patient transported with:  Tech

## 2024-03-20 ENCOUNTER — APPOINTMENT (OUTPATIENT)
Facility: HOSPITAL | Age: 69
End: 2024-03-20
Payer: MEDICARE

## 2024-03-20 LAB
ANION GAP SERPL CALC-SCNC: 9 MMOL/L (ref 3–18)
BACTERIA SPEC CULT: NORMAL
BASOPHILS # BLD: 0 K/UL (ref 0–0.1)
BASOPHILS NFR BLD: 0 % (ref 0–2)
BUN SERPL-MCNC: 139 MG/DL (ref 7–18)
BUN/CREAT SERPL: 47 (ref 12–20)
CALCIUM SERPL-MCNC: 8.1 MG/DL (ref 8.5–10.1)
CC UR VC: NORMAL
CHLORIDE SERPL-SCNC: 117 MMOL/L (ref 100–111)
CO2 SERPL-SCNC: 17 MMOL/L (ref 21–32)
CREAT SERPL-MCNC: 2.98 MG/DL (ref 0.6–1.3)
DIFFERENTIAL METHOD BLD: ABNORMAL
ECHO BSA: 2.07 M2
EOSINOPHIL # BLD: 0 K/UL (ref 0–0.4)
EOSINOPHIL NFR BLD: 0 % (ref 0–5)
ERYTHROCYTE [DISTWIDTH] IN BLOOD BY AUTOMATED COUNT: 15.7 % (ref 11.6–14.5)
GLUCOSE SERPL-MCNC: 136 MG/DL (ref 74–99)
HCT VFR BLD AUTO: 28.8 % (ref 36–48)
HGB BLD-MCNC: 9.7 G/DL (ref 13–16)
IMM GRANULOCYTES # BLD AUTO: 0.1 K/UL (ref 0–0.04)
IMM GRANULOCYTES NFR BLD AUTO: 1 % (ref 0–0.5)
LYMPHOCYTES # BLD: 0.4 K/UL (ref 0.9–3.6)
LYMPHOCYTES NFR BLD: 2 % (ref 21–52)
MAGNESIUM SERPL-MCNC: 1.8 MG/DL (ref 1.6–2.6)
MCH RBC QN AUTO: 30.8 PG (ref 24–34)
MCHC RBC AUTO-ENTMCNC: 33.7 G/DL (ref 31–37)
MCV RBC AUTO: 91.4 FL (ref 78–100)
MONOCYTES # BLD: 0.7 K/UL (ref 0.05–1.2)
MONOCYTES NFR BLD: 4 % (ref 3–10)
NEUTS SEG # BLD: 17.4 K/UL (ref 1.8–8)
NEUTS SEG NFR BLD: 93 % (ref 40–73)
NRBC # BLD: 0 K/UL (ref 0–0.01)
NRBC BLD-RTO: 0 PER 100 WBC
PLATELET # BLD AUTO: 110 K/UL (ref 135–420)
PMV BLD AUTO: 11 FL (ref 9.2–11.8)
POTASSIUM SERPL-SCNC: 5.3 MMOL/L (ref 3.5–5.5)
RBC # BLD AUTO: 3.15 M/UL (ref 4.35–5.65)
SERVICE CMNT-IMP: NORMAL
SODIUM SERPL-SCNC: 143 MMOL/L (ref 136–145)
VANCOMYCIN SERPL-MCNC: 8.1 UG/ML (ref 5–40)
WBC # BLD AUTO: 18.6 K/UL (ref 4.6–13.2)

## 2024-03-20 PROCEDURE — 80202 ASSAY OF VANCOMYCIN: CPT

## 2024-03-20 PROCEDURE — 1100000000 HC RM PRIVATE

## 2024-03-20 PROCEDURE — 6360000002 HC RX W HCPCS: Performed by: HOSPITALIST

## 2024-03-20 PROCEDURE — P9047 ALBUMIN (HUMAN), 25%, 50ML: HCPCS | Performed by: HOSPITALIST

## 2024-03-20 PROCEDURE — 6370000000 HC RX 637 (ALT 250 FOR IP): Performed by: HOSPITALIST

## 2024-03-20 PROCEDURE — 80048 BASIC METABOLIC PNL TOTAL CA: CPT

## 2024-03-20 PROCEDURE — 6360000002 HC RX W HCPCS: Performed by: INTERNAL MEDICINE

## 2024-03-20 PROCEDURE — 83735 ASSAY OF MAGNESIUM: CPT

## 2024-03-20 PROCEDURE — 2580000003 HC RX 258: Performed by: HOSPITALIST

## 2024-03-20 PROCEDURE — 85025 COMPLETE CBC W/AUTO DIFF WBC: CPT

## 2024-03-20 PROCEDURE — 36415 COLL VENOUS BLD VENIPUNCTURE: CPT

## 2024-03-20 RX ORDER — FUROSEMIDE 10 MG/ML
40 INJECTION INTRAMUSCULAR; INTRAVENOUS 2 TIMES DAILY
Status: DISCONTINUED | OUTPATIENT
Start: 2024-03-20 | End: 2024-03-22

## 2024-03-20 RX ADMIN — LEVOTHYROXINE SODIUM 137.5 MCG: 0.07 TABLET ORAL at 06:22

## 2024-03-20 RX ADMIN — SODIUM CHLORIDE, PRESERVATIVE FREE 10 ML: 5 INJECTION INTRAVENOUS at 21:12

## 2024-03-20 RX ADMIN — HEPARIN SODIUM 5000 UNITS: 5000 INJECTION INTRAVENOUS; SUBCUTANEOUS at 21:33

## 2024-03-20 RX ADMIN — SODIUM BICARBONATE 650 MG TABLET 650 MG: at 20:22

## 2024-03-20 RX ADMIN — ASPIRIN 81 MG: 81 TABLET ORAL at 09:11

## 2024-03-20 RX ADMIN — ALBUMIN (HUMAN) 25 G: 0.25 INJECTION, SOLUTION INTRAVENOUS at 09:10

## 2024-03-20 RX ADMIN — CEFTRIAXONE 1000 MG: 1 INJECTION, POWDER, FOR SOLUTION INTRAMUSCULAR; INTRAVENOUS at 19:11

## 2024-03-20 RX ADMIN — ATORVASTATIN CALCIUM 40 MG: 20 TABLET, FILM COATED ORAL at 09:11

## 2024-03-20 RX ADMIN — PANTOPRAZOLE SODIUM 40 MG: 40 TABLET, DELAYED RELEASE ORAL at 06:22

## 2024-03-20 RX ADMIN — SODIUM CHLORIDE, PRESERVATIVE FREE 10 ML: 5 INJECTION INTRAVENOUS at 09:12

## 2024-03-20 RX ADMIN — FUROSEMIDE 40 MG: 10 INJECTION, SOLUTION INTRAMUSCULAR; INTRAVENOUS at 18:00

## 2024-03-20 RX ADMIN — HEPARIN SODIUM 5000 UNITS: 5000 INJECTION INTRAVENOUS; SUBCUTANEOUS at 06:21

## 2024-03-20 RX ADMIN — OXYCODONE HYDROCHLORIDE AND ACETAMINOPHEN 1 TABLET: 5; 325 TABLET ORAL at 03:13

## 2024-03-20 RX ADMIN — ALBUMIN (HUMAN) 25 G: 0.25 INJECTION, SOLUTION INTRAVENOUS at 18:00

## 2024-03-20 RX ADMIN — OXYCODONE HYDROCHLORIDE AND ACETAMINOPHEN 1 TABLET: 5; 325 TABLET ORAL at 09:11

## 2024-03-20 RX ADMIN — ALBUMIN (HUMAN) 25 G: 0.25 INJECTION, SOLUTION INTRAVENOUS at 01:57

## 2024-03-20 RX ADMIN — HEPARIN SODIUM 5000 UNITS: 5000 INJECTION INTRAVENOUS; SUBCUTANEOUS at 14:03

## 2024-03-20 RX ADMIN — PREDNISONE 60 MG: 20 TABLET ORAL at 09:12

## 2024-03-20 RX ADMIN — SODIUM BICARBONATE 650 MG TABLET 650 MG: at 09:11

## 2024-03-20 RX ADMIN — DILTIAZEM HYDROCHLORIDE 120 MG: 120 CAPSULE, EXTENDED RELEASE ORAL at 09:12

## 2024-03-20 RX ADMIN — VANCOMYCIN HYDROCHLORIDE 1250 MG: 1.25 INJECTION, POWDER, LYOPHILIZED, FOR SOLUTION INTRAVENOUS at 14:03

## 2024-03-20 ASSESSMENT — PAIN DESCRIPTION - ORIENTATION
ORIENTATION: LEFT
ORIENTATION: LEFT

## 2024-03-20 ASSESSMENT — PAIN DESCRIPTION - LOCATION
LOCATION: ARM
LOCATION: ARM

## 2024-03-20 ASSESSMENT — PAIN SCALES - GENERAL
PAINLEVEL_OUTOF10: 3
PAINLEVEL_OUTOF10: 6
PAINLEVEL_OUTOF10: 8

## 2024-03-20 ASSESSMENT — PAIN DESCRIPTION - DESCRIPTORS
DESCRIPTORS: THROBBING
DESCRIPTORS: ACHING;SORE

## 2024-03-20 NOTE — PLAN OF CARE
Problem: Pain  Goal: Verbalizes/displays adequate comfort level or baseline comfort level  Outcome: Progressing     Problem: Safety - Adult  Goal: Free from fall injury  Outcome: Progressing     Problem: ABCDS Injury Assessment  Goal: Absence of physical injury  Outcome: Progressing  Flowsheets (Taken 3/19/2024 2155)  Absence of Physical Injury: Implement safety measures based on patient assessment     Problem: Skin/Tissue Integrity  Goal: Absence of new skin breakdown  Description: 1.  Monitor for areas of redness and/or skin breakdown  2.  Assess vascular access sites hourly  3.  Every 4-6 hours minimum:  Change oxygen saturation probe site  4.  Every 4-6 hours:  If on nasal continuous positive airway pressure, respiratory therapy assess nares and determine need for appliance change or resting period.  Outcome: Progressing

## 2024-03-20 NOTE — PROGRESS NOTES
[facility]   Pharmacy Pharmacokinetic Monitoring Service - Vancomycin     Efrain Mayorga is a 69 y.o. male starting on vancomycin therapy for Skin and Soft Tissue x7 days. Pharmacy consulted by Dr. Harding for monitoring and adjustment.    Target Concentration: Dosing based on anticipated concentration <15 mg/L due to renal impairment/insufficiency    Additional Antimicrobials: Zosyn    Pertinent Laboratory Values:   Wt Readings from Last 1 Encounters:   03/19/24 91 kg (200 lb 9.9 oz)     Temp Readings from Last 1 Encounters:   03/19/24 98 °F (36.7 °C)     Estimated Creatinine Clearance: 25 mL/min (A) (based on SCr of 3.02 mg/dL (H)).  Recent Labs     03/19/24  1446   CREATININE 3.02*   *   WBC 29.3*     Plan:  Concentration-guided dosing due to renal impairment/insufficiency  Start Vancomycin 1000 mg IV once on 3/19/2024@2100.  Renal labs as indicated   Pharmacy will continue to monitor patient and adjust therapy as indicated    Thank you for the consult,  Luisa Briseno [facility]  3/19/2024 8:22 PM

## 2024-03-20 NOTE — PROGRESS NOTES
[facility]   Pharmacy Pharmacokinetic Monitoring Service - Vancomycin    Consulting Provider: Dr. Harding   Indication: SSTI x 7 days  Target Concentration: Dosing based on anticipated concentration <15 mg/L due to renal impairment/insufficiency  Day of Therapy: 2  Additional Antimicrobials: Rocephin    Pertinent Laboratory Values:   Wt Readings from Last 1 Encounters:   03/19/24 91 kg (200 lb 9.9 oz)     Temp Readings from Last 1 Encounters:   03/20/24 98.4 °F (36.9 °C) (Oral)     Estimated Creatinine Clearance: 25 mL/min (A) (based on SCr of 2.98 mg/dL (H)).  Recent Labs     03/19/24  1446 03/19/24  2048 03/20/24  0502   CREATININE 3.02* 2.94* 2.98*   * 141* 139*   WBC 29.3*  --  18.6*     Recent vancomycin administrations                     vancomycin (VANCOCIN) 1,000 mg in sodium chloride 0.9 % 250 mL (vial-mate) IVPB (mg) 1,000 mg New Bag 03/19/24 2312             Assessment:  Date/Time Current Dose Concentration Timing of Concentration (h)   3/20/24 @ 1239 Vancomycin 1000 mg IV x1 8.1 mg/L 3/20/24 @ 1026   Note: Serum concentrations collected for AUC dosing may appear elevated if collected in close proximity to the dose administered, this is not necessarily an indication of toxicity    Plan:  Current dosing regimen is sub-therapeutic  Increase dose to Vancomycin 1250 mg IV x1  Random level ordered for 3/21 @ 1300  Pharmacy will continue to monitor patient and adjust therapy as indicated    Thank you for the consult,  Júnior Hay RPH  3/20/2024 12:38 PM

## 2024-03-20 NOTE — PROGRESS NOTES
Occupational Therapy Evaluation/Treatment Attempt    Chart reviewed. Attempted Occupational Therapy Evaluation/Treatment, however, patient unable to be seen due to:  []  Nausea/vomiting  [x]  Eating      Will follow up later as patient's schedule allows.   Thank you for this referral.  Jo LEDESMA, OTR/L

## 2024-03-20 NOTE — CONSULTS
Nephrology Consult Note    Consult requesting Physican:   Dr SIDNEY Harding    Reason for Consult:   Nephrotic syndrome   WERO/CKD     HPI:   Efrain Mayorga is a 69 y.o. male  with a PMHx of recently diagnosed nephrotic syndrome sec to FSGS, CKD, HTN, CAD, Urothelial carcinoma of the bladder and prostate ca s/p cystectomy/prostatectomy and ilial conduit who was recently discharged from the hospital now came to the ER complaining of LUE swelling and pain. He also contiue to have LE edema. Recent kidney Biopsy done while he was admitted showed Tip variant FSGS and started on Prednisone by his nephrologist, Dr Newton/[facility].  On initial eval labs showed Cr 3.0 and leucocytosis. During recent hospital stay his Cr was 2.9-3.2, from from baseline of  1.2 in Dec 2023. PVL showed LUE acute superficial vein thrombosis, but no DVT. Pt currently w/o SOB, CP or fever.     Impression:   -Primary FSGS: Bx proven, recent dx, on high dose Pred  -Nephrotic syndrome sec to FSGS  -Acute Kidney injury; sec to FSGS, Cr up at 2.9-3.0. 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 s/p cystectomy, prostatectomy and ilial conduit    -CAD  -Met acidosis: on po NaBicarb   -Leucocytosis likely sec to high dose steroid     Plan:   -Cont current dose of Pred  -Lasix 40mg iv bid  -Cont IV Alb Q8hrs  -Low K and Na diet  -Limit fluid intake to <1.5L/day  -Strict I/Os  -Avoid Nephrotoxins  -No urgent indication for RRT  -No ACEI/ARB due to recent Hyperkalemia  -Will check iron store  -Would likely need oral anticoagulation in view of increased risk of DVT in setting of Nephrotic Proteinuria >10gms and Hypoalbuminemia       Medications:     Current Facility-Administered Medications:     sodium chloride flush 0.9 % injection 5-40 mL, 5-40 mL, IntraVENous, 2 times per day, Char Harding MD, 10 mL at 03/20/24 0912    sodium chloride flush 0.9 %

## 2024-03-20 NOTE — CARE COORDINATION
03/20/24 0981   Service Assessment   Patient Orientation Alert and Oriented   Cognition Alert   History Provided By Patient   Primary Caregiver Self   Support Systems Spouse/Significant Other   Patient's Healthcare Decision Maker is: Named in Scanned ACP Document   PCP Verified by CM Yes   Last Visit to PCP Within last 3 months   Prior Functional Level Independent in ADLs/IADLs   Current Functional Level Independent in ADLs/IADLs   Can patient return to prior living arrangement Yes   Ability to make needs known: Good   Family able to assist with home care needs: Yes   Would you like for me to discuss the discharge plan with any other family members/significant others, and if so, who? Yes   Financial Resources Medicare   Community Resources Other (Comment)  (Chemotherapy through oncologist Dr. Garza)   CM/SW Referral Other (see comment)  (Discharge planning)   Social/Functional History   Lives With Spouse   Type of Home House   Home Layout One level   Home Access Stairs to enter with rails   Entrance Stairs - Number of Steps 4   Bathroom Equipment Grab bars in shower;Shower chair   Home Equipment None   Receives Help From Family   ADL Assistance Independent   Homemaking Assistance Independent   Ambulation Assistance Independent   Transfer Assistance Independent   Mode of Transportation Car   Occupation Retired   Discharge Planning   Type of Residence House   Living Arrangements Spouse/Significant Other   Current Services Prior To Admission Other (Comment)  (Immunotherapy injections through oncology)   Potential Assistance Needed Home Care;Skilled Nursing Facility   Potential Assistance Purchasing Medications No   Type of Home Care Services None   Patient expects to be discharged to: House   One/Two Story Residence One story   History of falls? 0   Services At/After Discharge   Transition of Care Consult (CM Consult) Discharge Planning   Mode of Transport at Discharge Other (see comment)  (family)   Confirm

## 2024-03-20 NOTE — PROGRESS NOTES
Hospitalist Progress Note-critical care note     Patient: Efrain Mayorga MRN: [id number]  [facility]: [id number]    YOB: 1955  Age: 69 y.o.  Sex: male    DOA: 3/19/2024 LOS:  LOS: 1 day            Chief complaint: cellulitis and anasarca, anemia nephrotic syndrome     Assessment/Plan         Active Hospital Problems    Diagnosis Date Noted    Cellulitis [L03.90] 03/19/2024    History of urostomy [Z98.890] 03/19/2024    Nephrotic syndrome [N04.9] 03/12/2024    Anasarca [R60.1] 03/09/2024    Hypoalbuminemia [E88.09] 08/30/2023    Prostate cancer (HCC) [C61] 04/07/2023    Anemia [D64.9] 03/22/2023    Malignant neoplasm of urinary bladder (HCC) [C67.9] 11/03/2022        Anasarca, due to nephrotic syndrome- mild improving   Continue albumin,   Lasix  tsh  wnl      Nephrotic syndrome-biopsy FSGS   On prednisone 60 mg  Will continue  Nephrology has been consulted  Continue aspirin daily     Cellulitis history of left arm-improving   Wbc 18 K now , bcx so far negative   PVL no DVT, superficial thrombosis  Put on Rocephin and vancomycin  Symptoms control     Prostate cancer, bladder cancer  Seeing Dr. Debbie MCCLAIN oncologist  Received immunotherapy before, no active treatment right now  s/p   1. Radical cystoprostatectomy  2. Extended pelvic lymph node dissection  3. Ileal conduit urinary diversion with single-J stents bilaterally   On 03/28/2023  Urostomy bag noted urine clear     Anemia  Due to chronic illness     Hypertension  Well controlled      CAD  No acute issues     Hyperlipidemia  No acute issues        Subjective you tell me if I am better or not     All questions have been answered. 55 total min's spent on patient care including >50% on counseling/coordinating care. Discussed the above assessments. also discussed labs, medications and hospital course       Prognosis is guarded , will have palliative care on board     TIME: E/M Time spent with patient and patient care issues: [] 31-40 mins  [] 41-49 mins

## 2024-03-20 NOTE — CARE COORDINATION
03/20/24 0920   Readmission Assessment   Number of Days since last admission? 1-7 days   Previous Disposition Home with Family  (home with family and physician follow up)   Who is being Interviewed Patient   What was the patient's/caregiver's perception as to why they think they needed to return back to the hospital? Other (Comment)  (\"I couldn't move my arms.\")   Did you visit your Primary Care Physician after you left the hospital, before you returned this time? No   Why weren't you able to visit your PCP? Other (Comment)  (Patient's PCP appointment was for March 22, 2024)   Did you see a specialist, such as Cardiac, Pulmonary, Orthopedic Physician, etc. after you left the hospital? Yes  (\"I think I saw a nephrologist\" \"I did not see Dr. Garza (oncology)\")   Who advised the patient to return to the hospital? Self-referral  (\"I could not move my arms so I came to the hospital\")   Does the patient report anything that got in the way of taking their medications? No   In our efforts to provide the best possible care to you and others like you, can you think of anything that we could have done to help you after you left the hospital the first time, so that you might not have needed to return so soon? Other (Comment);Arrange for more help when leaving the hospital  (\"I don't think I'll need home health, but if I do need help, I can't put that on my wife. I'd rather go to rehab than have home health, only if its recommended though\" \"I think they are doing all they can for me. I like this hospital because it's close.\")

## 2024-03-20 NOTE — PROGRESS NOTES
Bedside and Verbal shift change report given to Saima RN (oncoming nurse) by Sae RN (offgoing nurse). Report included the following information Nurse Handoff Report.

## 2024-03-20 NOTE — PROGRESS NOTES
PT order received and chart reviewed. Attempted to see pt for PT evaluation. Patient and spouse strongly against PT stating patient has no needs. Patient states he has been ambulating to/from bathroom without assistance. Despite encouragement and education pt continues to decline and states he has no PT needs. PT will sign off at this time.  Krysta Pelletier, PT, DPT

## 2024-03-20 NOTE — PROGRESS NOTES
Occupational Therapy   Orders received, chart reviewed. Patient approached for evaluation. Patient wife present. Patient and wife reporting that patient does not need OT because patient walks to bathroom without any help. Would like orders to be discharged. Please re-consult if needed.  Orders will be discontinued d/t patient refusal.  Jo Mathis OTR/L

## 2024-03-21 LAB
ANION GAP SERPL CALC-SCNC: 9 MMOL/L (ref 3–18)
BASOPHILS # BLD: 0 K/UL (ref 0–0.1)
BASOPHILS NFR BLD: 0 % (ref 0–2)
BUN SERPL-MCNC: 147 MG/DL (ref 7–18)
BUN/CREAT SERPL: 48 (ref 12–20)
CALCIUM SERPL-MCNC: 8.1 MG/DL (ref 8.5–10.1)
CHLORIDE SERPL-SCNC: 116 MMOL/L (ref 100–111)
CO2 SERPL-SCNC: 19 MMOL/L (ref 21–32)
CREAT SERPL-MCNC: 3.06 MG/DL (ref 0.6–1.3)
DIFFERENTIAL METHOD BLD: ABNORMAL
EOSINOPHIL # BLD: 0 K/UL (ref 0–0.4)
EOSINOPHIL NFR BLD: 0 % (ref 0–5)
ERYTHROCYTE [DISTWIDTH] IN BLOOD BY AUTOMATED COUNT: 16.1 % (ref 11.6–14.5)
FERRITIN SERPL-MCNC: 570 NG/ML (ref 8–388)
FOLATE SERPL-MCNC: 3.8 NG/ML (ref 3.1–17.5)
GLUCOSE SERPL-MCNC: 152 MG/DL (ref 74–99)
HCT VFR BLD AUTO: 19.9 % (ref 36–48)
HCT VFR BLD AUTO: 20.9 % (ref 36–48)
HCT VFR BLD AUTO: 21.1 % (ref 36–48)
HCT VFR BLD AUTO: 24.2 % (ref 36–48)
HGB BLD-MCNC: 6.9 G/DL (ref 13–16)
HGB BLD-MCNC: 7.6 G/DL (ref 13–16)
HISTORY CHECK: NORMAL
IMM GRANULOCYTES # BLD AUTO: 0.1 K/UL (ref 0–0.04)
IMM GRANULOCYTES NFR BLD AUTO: 1 % (ref 0–0.5)
IRON SATN MFR SERPL: 29 % (ref 20–50)
IRON SERPL-MCNC: 14 UG/DL (ref 50–175)
LYMPHOCYTES # BLD: 0.3 K/UL (ref 0.9–3.6)
LYMPHOCYTES NFR BLD: 2 % (ref 21–52)
MAGNESIUM SERPL-MCNC: 1.9 MG/DL (ref 1.6–2.6)
MCH RBC QN AUTO: 30.1 PG (ref 24–34)
MCHC RBC AUTO-ENTMCNC: 32.7 G/DL (ref 31–37)
MCV RBC AUTO: 92.1 FL (ref 78–100)
MONOCYTES # BLD: 0.9 K/UL (ref 0.05–1.2)
MONOCYTES NFR BLD: 5 % (ref 3–10)
NEUTS SEG # BLD: 16.8 K/UL (ref 1.8–8)
NEUTS SEG NFR BLD: 93 % (ref 40–73)
NRBC # BLD: 0 K/UL (ref 0–0.01)
NRBC BLD-RTO: 0 PER 100 WBC
PLATELET # BLD AUTO: 128 K/UL (ref 135–420)
PMV BLD AUTO: 10.2 FL (ref 9.2–11.8)
POTASSIUM SERPL-SCNC: 4.6 MMOL/L (ref 3.5–5.5)
RBC # BLD AUTO: 2.29 M/UL (ref 4.35–5.65)
SODIUM SERPL-SCNC: 144 MMOL/L (ref 136–145)
TIBC SERPL-MCNC: 48 UG/DL (ref 250–450)
VANCOMYCIN SERPL-MCNC: 14.9 UG/ML (ref 5–40)
VIT B12 SERPL-MCNC: 271 PG/ML (ref 211–911)
WBC # BLD AUTO: 18.1 K/UL (ref 4.6–13.2)

## 2024-03-21 PROCEDURE — P9016 RBC LEUKOCYTES REDUCED: HCPCS

## 2024-03-21 PROCEDURE — 86920 COMPATIBILITY TEST SPIN: CPT

## 2024-03-21 PROCEDURE — 82607 VITAMIN B-12: CPT

## 2024-03-21 PROCEDURE — 30233N1 TRANSFUSION OF NONAUTOLOGOUS RED BLOOD CELLS INTO PERIPHERAL VEIN, PERCUTANEOUS APPROACH: ICD-10-PCS | Performed by: HOSPITALIST

## 2024-03-21 PROCEDURE — 86850 RBC ANTIBODY SCREEN: CPT

## 2024-03-21 PROCEDURE — 80202 ASSAY OF VANCOMYCIN: CPT

## 2024-03-21 PROCEDURE — 1100000000 HC RM PRIVATE

## 2024-03-21 PROCEDURE — 6370000000 HC RX 637 (ALT 250 FOR IP): Performed by: HOSPITALIST

## 2024-03-21 PROCEDURE — 2580000003 HC RX 258: Performed by: HOSPITALIST

## 2024-03-21 PROCEDURE — 82728 ASSAY OF FERRITIN: CPT

## 2024-03-21 PROCEDURE — 6360000002 HC RX W HCPCS: Performed by: HOSPITALIST

## 2024-03-21 PROCEDURE — 83735 ASSAY OF MAGNESIUM: CPT

## 2024-03-21 PROCEDURE — 85014 HEMATOCRIT: CPT

## 2024-03-21 PROCEDURE — 36415 COLL VENOUS BLD VENIPUNCTURE: CPT

## 2024-03-21 PROCEDURE — 83550 IRON BINDING TEST: CPT

## 2024-03-21 PROCEDURE — 6360000002 HC RX W HCPCS: Performed by: INTERNAL MEDICINE

## 2024-03-21 PROCEDURE — 86900 BLOOD TYPING SEROLOGIC ABO: CPT

## 2024-03-21 PROCEDURE — 85025 COMPLETE CBC W/AUTO DIFF WBC: CPT

## 2024-03-21 PROCEDURE — 85018 HEMOGLOBIN: CPT

## 2024-03-21 PROCEDURE — 86921 COMPATIBILITY TEST INCUBATE: CPT

## 2024-03-21 PROCEDURE — 86922 COMPATIBILITY TEST ANTIGLOB: CPT

## 2024-03-21 PROCEDURE — 83540 ASSAY OF IRON: CPT

## 2024-03-21 PROCEDURE — 82746 ASSAY OF FOLIC ACID SERUM: CPT

## 2024-03-21 PROCEDURE — 36430 TRANSFUSION BLD/BLD COMPNT: CPT

## 2024-03-21 PROCEDURE — 86901 BLOOD TYPING SEROLOGIC RH(D): CPT

## 2024-03-21 PROCEDURE — 80048 BASIC METABOLIC PNL TOTAL CA: CPT

## 2024-03-21 PROCEDURE — P9047 ALBUMIN (HUMAN), 25%, 50ML: HCPCS | Performed by: HOSPITALIST

## 2024-03-21 PROCEDURE — 86870 RBC ANTIBODY IDENTIFICATION: CPT

## 2024-03-21 RX ORDER — SODIUM CHLORIDE 9 MG/ML
INJECTION, SOLUTION INTRAVENOUS PRN
Status: DISCONTINUED | OUTPATIENT
Start: 2024-03-21 | End: 2024-04-01 | Stop reason: HOSPADM

## 2024-03-21 RX ADMIN — ALBUMIN (HUMAN) 25 G: 0.25 INJECTION, SOLUTION INTRAVENOUS at 18:39

## 2024-03-21 RX ADMIN — DILTIAZEM HYDROCHLORIDE 120 MG: 120 CAPSULE, EXTENDED RELEASE ORAL at 10:16

## 2024-03-21 RX ADMIN — PANTOPRAZOLE SODIUM 40 MG: 40 TABLET, DELAYED RELEASE ORAL at 06:38

## 2024-03-21 RX ADMIN — ATORVASTATIN CALCIUM 40 MG: 20 TABLET, FILM COATED ORAL at 10:16

## 2024-03-21 RX ADMIN — FUROSEMIDE 40 MG: 10 INJECTION, SOLUTION INTRAMUSCULAR; INTRAVENOUS at 10:16

## 2024-03-21 RX ADMIN — VANCOMYCIN HYDROCHLORIDE 1000 MG: 1 INJECTION, POWDER, LYOPHILIZED, FOR SOLUTION INTRAVENOUS at 20:31

## 2024-03-21 RX ADMIN — ALBUMIN (HUMAN) 25 G: 0.25 INJECTION, SOLUTION INTRAVENOUS at 02:27

## 2024-03-21 RX ADMIN — OXYCODONE HYDROCHLORIDE AND ACETAMINOPHEN 1 TABLET: 5; 325 TABLET ORAL at 00:25

## 2024-03-21 RX ADMIN — LEVOTHYROXINE SODIUM 137.5 MCG: 0.07 TABLET ORAL at 06:38

## 2024-03-21 RX ADMIN — SODIUM CHLORIDE, PRESERVATIVE FREE 10 ML: 5 INJECTION INTRAVENOUS at 10:17

## 2024-03-21 RX ADMIN — ASPIRIN 81 MG: 81 TABLET ORAL at 10:16

## 2024-03-21 RX ADMIN — SODIUM BICARBONATE 650 MG TABLET 650 MG: at 20:30

## 2024-03-21 RX ADMIN — ONDANSETRON 4 MG: 2 INJECTION INTRAMUSCULAR; INTRAVENOUS at 03:44

## 2024-03-21 RX ADMIN — CEFTRIAXONE 1000 MG: 1 INJECTION, POWDER, FOR SOLUTION INTRAMUSCULAR; INTRAVENOUS at 20:29

## 2024-03-21 RX ADMIN — ALBUMIN (HUMAN) 25 G: 0.25 INJECTION, SOLUTION INTRAVENOUS at 10:16

## 2024-03-21 RX ADMIN — HEPARIN SODIUM 5000 UNITS: 5000 INJECTION INTRAVENOUS; SUBCUTANEOUS at 23:19

## 2024-03-21 RX ADMIN — SODIUM CHLORIDE, PRESERVATIVE FREE 10 ML: 5 INJECTION INTRAVENOUS at 21:00

## 2024-03-21 RX ADMIN — PREDNISONE 60 MG: 20 TABLET ORAL at 10:16

## 2024-03-21 RX ADMIN — SODIUM BICARBONATE 650 MG TABLET 650 MG: at 10:16

## 2024-03-21 RX ADMIN — FUROSEMIDE 40 MG: 10 INJECTION, SOLUTION INTRAMUSCULAR; INTRAVENOUS at 18:39

## 2024-03-21 ASSESSMENT — PAIN SCALES - GENERAL
PAINLEVEL_OUTOF10: 8
PAINLEVEL_OUTOF10: 6
PAINLEVEL_OUTOF10: 10

## 2024-03-21 ASSESSMENT — PAIN DESCRIPTION - ORIENTATION: ORIENTATION: LEFT;RIGHT

## 2024-03-21 ASSESSMENT — PAIN DESCRIPTION - LOCATION: LOCATION: ARM

## 2024-03-21 ASSESSMENT — PAIN - FUNCTIONAL ASSESSMENT: PAIN_FUNCTIONAL_ASSESSMENT: ACTIVITIES ARE NOT PREVENTED

## 2024-03-21 ASSESSMENT — PAIN DESCRIPTION - PAIN TYPE: TYPE: ACUTE PAIN

## 2024-03-21 ASSESSMENT — PAIN DESCRIPTION - DESCRIPTORS: DESCRIPTORS: ACHING

## 2024-03-21 NOTE — PROGRESS NOTES
kidney and through this 3 core specimens were obtained. These were submitted directly to the on-site pathologist and tissue sample adequacy confirmed.  Gelfoam slurry was injected as the needle was removed. Pressure was applied locally at the biopsy site.  A dry sterile dressing was applied.  There were no immediate complications.  The patient tolerated the procedure without difficulty. SEDATION: Moderate intravenous conscious sedation was supervised by Dr. Min Blanc.  The patient was independently monitored by a registered nurse assigned to the Department of Radiology using automated blood pressure, ECG and pulse oximetry.  The detailed conscious sedation record is stored in the hospital information system. Medication: Versed:   1  mg Fentanyl:   50  mcg Intraprocedure time:   20  minutes ESTIMATED BLOOD LOSS:  < 5 ml SPECIMENS:  Cores sent for pathology DLP:  1097.17  mGy*cm     Technically successful CT guided core renal biopsy.  Pathology results are pending.    Vascular duplex lower extremity venous bilateral    Result Date: 3/7/2024    No evidence of deep vein thrombosis in the right lower extremity.   No evidence of deep vein thrombosis in the left lower extremity.     US RETROPERITONEAL COMPLETE    Result Date: 3/7/2024  EXAM: US RETROPERITONEAL COMPLETE INDICATION: WERO COMPARISON: CT chest abdomen and pelvis 12/11/2023 TECHNIQUE: Ultrasound of the kidneys and urinary bladder. FINDINGS: The right and left kidneys measure 10.6 and 11.7 cm, respectively. No hydronephrosis. There is no nephrolithiasis or renal mass. Kidneys are echogenic. The bladder is not well delineated,. Visualized aorta and IVC are within normal limits. And trace ascites.     Evidence of medical renal disease, without hydronephrosis.      XR CHEST PORTABLE    Result Date: 3/7/2024  INDICATION: Leg swelling. Portable AP view of the chest. Direct comparison made to prior chest x-ray dated 3/2017. Cardiomediastinal silhouette is stable.  Central venous port catheter extends to the proximal SVC. Lungs are clear bilaterally. Pleural spaces are normal and there is no pneumothorax. Osseous structures are intact.     No acute cardiopulmonary disease.       ROCAEL ARRINGTON MD

## 2024-03-21 NOTE — PROGRESS NOTES
[facility]   Pharmacy Pharmacokinetic Monitoring Service - Vancomycin    Consulting Provider: Dr. Harding   Indication: Skin and Soft Tissue Infection ( 7 days)  Target Concentration: Dosing based on anticipated concentration <15 mg/L due to renal impairment/insufficiency  Day of Therapy: 3 of 7  Additional Antimicrobials: Ceftriaxone    Pertinent Laboratory Values:   Wt Readings from Last 1 Encounters:   03/19/24 91 kg (200 lb 9.9 oz)     Temp Readings from Last 1 Encounters:   03/21/24 97.3 °F (36.3 °C) (Oral)     Estimated Creatinine Clearance: 25 mL/min (A) (based on SCr of 3.06 mg/dL (H)).  Recent Labs     03/20/24  0502 03/21/24  0444   CREATININE 2.98* 3.06*   * 147*   WBC 18.6* 18.1*     Procalcitonin: 1.6  (3/19/24)     Recent vancomycin administrations                     vancomycin (VANCOCIN) 1,250 mg in sodium chloride 0.9 % 250 mL (vial-mate) IVPB (mg) 1,250 mg New Bag 03/20/24 1403    vancomycin (VANCOCIN) 1,000 mg in sodium chloride 0.9 % 250 mL (vial-mate) IVPB (mg) 1,000 mg New Bag 03/19/24 2312                    Plan:   Vancomycin Random level = 14.9  (3/21/24 at 04:44)   Continue therapy with dose:  Vancomycin 1000 mg IV once, scheduled for 3/21/24 at ~ 14:15  Pharmacy will continue to monitor patient and adjust therapy as indicated    Thank you for the consult,  Ernestine Edward, PharmD  3/21/2024 2:07 PM

## 2024-03-21 NOTE — PROGRESS NOTES
Physician Progress Note      PATIENT:               NIRMAL HOLLIDAY  CSN #:                  [id number]  :                       1955  ADMIT DATE:       3/19/2024 12:28 PM  DISCH DATE:  RESPONDING  PROVIDER #:        Char Harding MD        QUERY TEXT:    Stage of Chronic Kidney Disease: Please provide further specificity, if known.    Clinical indicators include: ckd, creatinine, bun, brain natriuretic peptide,   pro-bnp, dialysis, kd, cr  Options provided:  -- Chronic kidney disease stage 1  -- Chronic kidney disease stage 2  -- Chronic kidney disease stage 3  -- Chronic kidney disease stage 3a  -- Chronic kidney disease stage 3b  -- Chronic kidney disease stage 4  -- Chronic kidney disease stage 5  -- Chronic kidney disease stage 5, requiring dialysis  -- End stage renal disease  -- Other - I will add my own diagnosis  -- Disagree - Not applicable / Not valid  -- Disagree - Clinically Unable to determine / Unknown        PROVIDER RESPONSE TEXT:    The patient has chronic kidney disease stage 3.      Electronically signed by:  Char Harding MD 3/21/2024 1:44 PM

## 2024-03-21 NOTE — PROGRESS NOTES
tablet 60 mg, 60 mg, Oral, Daily, Char Harding MD, 60 mg at 24 1016    oxyCODONE-acetaminophen (PERCOCET) 5-325 MG per tablet 1 tablet, 1 tablet, Oral, Q4H PRN, Char Harding MD, 1 tablet at 24 0025    Vancomycin-Pharmacy to Dose, 1 each, Other, RX Placeholder, Char Harding MD    PM/SHx:     Active Ambulatory Problems     Diagnosis Date Noted    NSTEMI (non-ST elevated myocardial infarction) ([facility]) 2017    Hyperlipidemia 2017    Chest pain 2017    Arteriosclerosis of coronary artery 2017    Malignant neoplasm of urinary bladder ([facility]) 2022    Anemia 2023    Fatigue 2023    Prostate cancer ([facility]) 2023    Primary malignant neoplasm of prostate ([facility]) 2023    Abnormal finding on thyroid function test 2023    Edema of lower extremity 2023    High serum creatinine 2023    Hypoalbuminemia 2023    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    ARF (acute renal failure) ([facility]) 2024    Anasarca 2024    Hyperkalemia 2024    Nephrotic syndrome 2024     Resolved Ambulatory Problems     Diagnosis Date Noted    No Resolved Ambulatory Problems     Past Medical History:   Diagnosis Date    Arthritis     Bladder cancer ([facility])     Fibromyalgia     GERD (gastroesophageal reflux disease)     Heart attack ([facility]) 2017    Hematuria, gross     Hypertension        SHx:     Social History     Socioeconomic History    Marital status:      Spouse name: Not on file    Number of children: Not on file    Years of education: Not on file    Highest education level: Not on file   Occupational History    Not on file   Tobacco Use    Smoking status: Former     Current packs/day: 0.00     Types: Cigarettes     Quit date: 10/18/2022     Years since quittin.4    Smokeless tobacco: Never   Substance and Sexual Activity    Alcohol use: No    Drug use: No    Sexual activity: Not on file   Other Topics Concern    Not on file   Social History Narrative

## 2024-03-21 NOTE — PLAN OF CARE
Problem: Pain  Goal: Verbalizes/displays adequate comfort level or baseline comfort level  Outcome: Progressing     Problem: Risk for Elopement  Goal: Patient will not exit the unit/facility without proper excort  Outcome: Progressing     Problem: Safety - Adult  Goal: Free from fall injury  Outcome: Progressing     Problem: ABCDS Injury Assessment  Goal: Absence of physical injury  Outcome: Progressing     Problem: Skin/Tissue Integrity  Goal: Absence of new skin breakdown  Description: 1.  Monitor for areas of redness and/or skin breakdown  2.  Assess vascular access sites hourly  3.  Every 4-6 hours minimum:  Change oxygen saturation probe site  4.  Every 4-6 hours:  If on nasal continuous positive airway pressure, respiratory therapy assess nares and determine need for appliance change or resting period.  Outcome: Progressing

## 2024-03-21 NOTE — PROGRESS NOTES
0900: Changed pt's sheets as pt sat in chair. Edema was on gown and sheets. Placed new abiola pad under pt's arms and put a new gown on pt.    1545: Started blood transfusion on pt. Pt tolerated well. Pt denies any transfusion reaction symptoms.    1835: Transfusion was completed and vitals taken. Hemoglobin and hematocrit ordered per protocol.    1900: Bedside and Verbal shift change report given to Saba ANDERSON (oncoming nurse) by Sae ANDERSON (offgoing nurse). Report included the following information Nurse Handoff Report, Intake/Output, and Recent Results.  Saba was made aware of obtaining occult blood stool sample if pt has a bowel movement.

## 2024-03-21 NOTE — CONSENT
Informed Consent for Blood Component Transfusion Note    I have discussed with the patient the rationale for blood component transfusion; its benefits in treating or preventing fatigue, organ damage, or death; and its risk which includes mild transfusion reactions, rare risk of blood borne infection, or more serious but rare reactions. I have discussed the alternatives to transfusion, including the risk and consequences of not receiving transfusion. The patient had an opportunity to ask questions and had agreed to proceed with transfusion of blood components.    Electronically signed by Char Harding MD on 3/21/24 at 12:14 PM EDT

## 2024-03-21 NOTE — PROGRESS NOTES
Bedside and Verbal shift change report given to JUSTIN Quevedo (oncoming nurse) by JUSTIN Landaverde (offgoing nurse). Report included the following information Nurse Handoff Report, Adult Overview, Intake/Output, MAR, and Recent Results.

## 2024-03-21 NOTE — PROGRESS NOTES
2000  Patient in bed awake, A/Ox4, speech clear, hearing intact, ambulatory, continent of urine (with urostomy) and stool. On room air, lung sounds clear, respirations unlabored. Skin is warm, dry, and intact with generalized swelling. +3 pitting edema to BLE. Radial and pedal pulses present bilaterally, capillary refill <3 seconds to fingers and toes. Abdomen soft, bowel sounds active. Strong hand  noted to bilateral upper extremities. Side rails x3 up, bed locked and in lowest position, bed alarm on, call bell and bedside table within reach, needs attended, denies any pain, SOB, or concerns at present.      0540  Noted hemoglobin this AM is 6.9. Informed MD through [facility]. Orders received to repeat H&H.

## 2024-03-22 LAB
ANION GAP SERPL CALC-SCNC: 8 MMOL/L (ref 3–18)
BASOPHILS # BLD: 0 K/UL (ref 0–0.1)
BASOPHILS NFR BLD: 0 % (ref 0–2)
BUN SERPL-MCNC: 157 MG/DL (ref 7–18)
BUN/CREAT SERPL: 49 (ref 12–20)
CALCIUM SERPL-MCNC: 8.3 MG/DL (ref 8.5–10.1)
CHLORIDE SERPL-SCNC: 115 MMOL/L (ref 100–111)
CO2 SERPL-SCNC: 18 MMOL/L (ref 21–32)
CREAT SERPL-MCNC: 3.18 MG/DL (ref 0.6–1.3)
DIFFERENTIAL METHOD BLD: ABNORMAL
EKG ATRIAL RATE: 80 BPM
EKG DIAGNOSIS: NORMAL
EKG P AXIS: -20 DEGREES
EKG P-R INTERVAL: 136 MS
EKG Q-T INTERVAL: 346 MS
EKG QRS DURATION: 90 MS
EKG QTC CALCULATION (BAZETT): 399 MS
EKG R AXIS: 114 DEGREES
EKG T AXIS: -9 DEGREES
EKG VENTRICULAR RATE: 80 BPM
EOSINOPHIL # BLD: 0 K/UL (ref 0–0.4)
EOSINOPHIL NFR BLD: 0 % (ref 0–5)
ERYTHROCYTE [DISTWIDTH] IN BLOOD BY AUTOMATED COUNT: 16.2 % (ref 11.6–14.5)
GLUCOSE SERPL-MCNC: 141 MG/DL (ref 74–99)
HCT VFR BLD AUTO: 22.6 % (ref 36–48)
HGB BLD-MCNC: 7.4 G/DL (ref 13–16)
IMM GRANULOCYTES # BLD AUTO: 0.1 K/UL (ref 0–0.04)
IMM GRANULOCYTES NFR BLD AUTO: 1 % (ref 0–0.5)
LYMPHOCYTES # BLD: 0.3 K/UL (ref 0.9–3.6)
LYMPHOCYTES NFR BLD: 2 % (ref 21–52)
MAGNESIUM SERPL-MCNC: 1.9 MG/DL (ref 1.6–2.6)
MCH RBC QN AUTO: 30.8 PG (ref 24–34)
MCHC RBC AUTO-ENTMCNC: 32.7 G/DL (ref 31–37)
MCV RBC AUTO: 94.2 FL (ref 78–100)
MONOCYTES # BLD: 0.8 K/UL (ref 0.05–1.2)
MONOCYTES NFR BLD: 6 % (ref 3–10)
NEUTS SEG # BLD: 12.5 K/UL (ref 1.8–8)
NEUTS SEG NFR BLD: 91 % (ref 40–73)
NRBC # BLD: 0 K/UL (ref 0–0.01)
NRBC BLD-RTO: 0 PER 100 WBC
PLATELET # BLD AUTO: 121 K/UL (ref 135–420)
PMV BLD AUTO: 10.6 FL (ref 9.2–11.8)
POTASSIUM SERPL-SCNC: 4.6 MMOL/L (ref 3.5–5.5)
RBC # BLD AUTO: 2.4 M/UL (ref 4.35–5.65)
SODIUM SERPL-SCNC: 141 MMOL/L (ref 136–145)
VANCOMYCIN SERPL-MCNC: 15.4 UG/ML (ref 5–40)
WBC # BLD AUTO: 13.6 K/UL (ref 4.6–13.2)

## 2024-03-22 PROCEDURE — 85025 COMPLETE CBC W/AUTO DIFF WBC: CPT

## 2024-03-22 PROCEDURE — 83735 ASSAY OF MAGNESIUM: CPT

## 2024-03-22 PROCEDURE — 6370000000 HC RX 637 (ALT 250 FOR IP): Performed by: HOSPITALIST

## 2024-03-22 PROCEDURE — 2580000003 HC RX 258: Performed by: HOSPITALIST

## 2024-03-22 PROCEDURE — 80048 BASIC METABOLIC PNL TOTAL CA: CPT

## 2024-03-22 PROCEDURE — 6360000002 HC RX W HCPCS: Performed by: HOSPITALIST

## 2024-03-22 PROCEDURE — 6370000000 HC RX 637 (ALT 250 FOR IP): Performed by: INTERNAL MEDICINE

## 2024-03-22 PROCEDURE — 1100000000 HC RM PRIVATE

## 2024-03-22 PROCEDURE — P9047 ALBUMIN (HUMAN), 25%, 50ML: HCPCS | Performed by: HOSPITALIST

## 2024-03-22 PROCEDURE — 36415 COLL VENOUS BLD VENIPUNCTURE: CPT

## 2024-03-22 PROCEDURE — 80202 ASSAY OF VANCOMYCIN: CPT

## 2024-03-22 PROCEDURE — 6360000002 HC RX W HCPCS: Performed by: INTERNAL MEDICINE

## 2024-03-22 RX ORDER — POLYETHYLENE GLYCOL 3350 17 G/17G
17 POWDER, FOR SOLUTION ORAL DAILY
Status: DISCONTINUED | OUTPATIENT
Start: 2024-03-22 | End: 2024-04-01 | Stop reason: HOSPADM

## 2024-03-22 RX ORDER — FUROSEMIDE 10 MG/ML
80 INJECTION INTRAMUSCULAR; INTRAVENOUS 2 TIMES DAILY
Status: DISCONTINUED | OUTPATIENT
Start: 2024-03-22 | End: 2024-03-24

## 2024-03-22 RX ORDER — FERROUS SULFATE 325(65) MG
325 TABLET ORAL 2 TIMES DAILY WITH MEALS
Status: DISCONTINUED | OUTPATIENT
Start: 2024-03-22 | End: 2024-04-01 | Stop reason: HOSPADM

## 2024-03-22 RX ADMIN — PREDNISONE 60 MG: 20 TABLET ORAL at 08:56

## 2024-03-22 RX ADMIN — POLYETHYLENE GLYCOL 3350 17 G: 17 POWDER, FOR SOLUTION ORAL at 10:30

## 2024-03-22 RX ADMIN — PANTOPRAZOLE SODIUM 40 MG: 40 TABLET, DELAYED RELEASE ORAL at 06:32

## 2024-03-22 RX ADMIN — ALBUMIN (HUMAN) 25 G: 0.25 INJECTION, SOLUTION INTRAVENOUS at 10:30

## 2024-03-22 RX ADMIN — SODIUM BICARBONATE 650 MG TABLET 650 MG: at 19:44

## 2024-03-22 RX ADMIN — ASPIRIN 81 MG: 81 TABLET ORAL at 08:56

## 2024-03-22 RX ADMIN — HEPARIN SODIUM 5000 UNITS: 5000 INJECTION INTRAVENOUS; SUBCUTANEOUS at 06:33

## 2024-03-22 RX ADMIN — ONDANSETRON 4 MG: 2 INJECTION INTRAMUSCULAR; INTRAVENOUS at 19:44

## 2024-03-22 RX ADMIN — DILTIAZEM HYDROCHLORIDE 120 MG: 120 CAPSULE, EXTENDED RELEASE ORAL at 08:56

## 2024-03-22 RX ADMIN — FUROSEMIDE 80 MG: 10 INJECTION, SOLUTION INTRAMUSCULAR; INTRAVENOUS at 18:27

## 2024-03-22 RX ADMIN — FERROUS SULFATE TAB 325 MG (65 MG ELEMENTAL FE) 325 MG: 325 (65 FE) TAB at 18:28

## 2024-03-22 RX ADMIN — CEFTRIAXONE 1000 MG: 1 INJECTION, POWDER, FOR SOLUTION INTRAMUSCULAR; INTRAVENOUS at 18:38

## 2024-03-22 RX ADMIN — SODIUM BICARBONATE 650 MG TABLET 650 MG: at 08:56

## 2024-03-22 RX ADMIN — ATORVASTATIN CALCIUM 40 MG: 20 TABLET, FILM COATED ORAL at 08:56

## 2024-03-22 RX ADMIN — FUROSEMIDE 40 MG: 10 INJECTION, SOLUTION INTRAMUSCULAR; INTRAVENOUS at 08:56

## 2024-03-22 RX ADMIN — SODIUM CHLORIDE, PRESERVATIVE FREE 10 ML: 5 INJECTION INTRAVENOUS at 19:49

## 2024-03-22 RX ADMIN — HEPARIN SODIUM 5000 UNITS: 5000 INJECTION INTRAVENOUS; SUBCUTANEOUS at 21:31

## 2024-03-22 RX ADMIN — ALBUMIN (HUMAN) 25 G: 0.25 INJECTION, SOLUTION INTRAVENOUS at 03:37

## 2024-03-22 RX ADMIN — ALBUMIN (HUMAN) 25 G: 0.25 INJECTION, SOLUTION INTRAVENOUS at 18:28

## 2024-03-22 RX ADMIN — LEVOTHYROXINE SODIUM 137.5 MCG: 0.07 TABLET ORAL at 06:33

## 2024-03-22 RX ADMIN — SODIUM CHLORIDE, PRESERVATIVE FREE 10 ML: 5 INJECTION INTRAVENOUS at 08:57

## 2024-03-22 NOTE — PROGRESS NOTES
Patient slept through the night.  Vital signs stable, afebrile.  Sitting up in chair.    Patient stated phone had been missing all day and day shift nurse searched for phone, but not found.  Changed and checked patient's bedding this morning, but no phone found.  Checked dirty linen bags and food tray cart in dirty utility room, but no phone found.  Patient's son attempted calling phone, not found.  Reported to supervisor, security, and unit manager of missing phone.    Bedside and Verbal shift change report given to JUSTIN Martinez (oncoming nurse) by JUSTIN Saucedo (offgoing nurse). Report included the following information Nurse Handoff Report, Intake/Output, and MAR.

## 2024-03-22 NOTE — PROGRESS NOTES
Antimicrobial Stewardship Chart review:    With focus on prescribed Antimicrobials, reviewed clinical presentation that includes fever and VS curve or trend, labs, Microbiology, imaging reports and notes as appropriate.    Based on this brief analysis, here below are the suggestion.    Diagnostic indication in chart for the Antimicrobial/s:NS, cellulitis    CURRENT ANTIMICROBIALS: Ctx/Vanco 3/19 to date    DISCONTINUE THE FOLLOWING ANTIBIOTIC(S):       Rationale:    (  )  No evidence of bacterial infection                          (  )  Microbiology report does not support use                          (  )  Narrowing broad-spectrum empiric coverage                          (  )  Therapeutic duplication: overlapping antimicrobial spectrum                          (  )  Clinical situation dictates change                          (  )  Prolonged duration of therapy not needed                          (  )  Allergy/toxicity/drug interaction present                          (  ) More clinically/cost effective therapy available  ADD ANTIBIOTICS  Rationale:        (  ) Microbiology report supports use                          (  ) Expanded coverage needed: gm positive /negative / anaerobic / atypical infection possible                          (  ) More clinicaly/cost effective therapy than current regimen                          (  ) Needed for synergy                          (  ) Double coverage needed                          (  ) Less toxic therapy                          (  ) Antifungal therapy needed    No  Change in Antibiotics: (X )                                COMMENTS:       This communication is not a consultation. If further/additional analysis of the case is desired, please consider ID consultation.    Janna Meyer MD  [facility])  Office #:     [phone]-Option #[phone]   Office Fax: [phone]

## 2024-03-22 NOTE — PROGRESS NOTES
Nephrology Progress Note      HPI:   Efrain Mayorga is a 69 y.o. male  with a PMHx of recently diagnosed nephrotic syndrome sec to FSGS, CKD, HTN, CAD, Urothelial carcinoma of the bladder and prostate ca s/p cystectomy/prostatectomy and ilial conduit who was recently discharged from the hospital now came to the ER complaining of LUE swelling and pain. He also contiue to have LE edema. Recent kidney Biopsy done while he was admitted showed Tip variant FSGS and started on Prednisone by his nephrologist, Dr Newton/[facility].  On initial eval labs showed Cr 3.0 and leucocytosis. During recent hospital stay his Cr was 2.9-3.2, from from baseline of  1.2 in Dec 2023. PVL showed LUE acute superficial vein thrombosis, but no DVT. Pt currently w/o SOB, CP or fever.     -Pt w/o new complaints. Continues to have U&L extremity swelling. UOP 2.0L     Impression:   -Primary FSGS: recently bx proven, on high dose Pred  -Nephrotic syndrome sec to FSGS  -Acute Kidney injury; sec to FSGS, Cr up at 2.9-3.1. Recent baseline Cr 1.2 (Dec 23)  -HTN  -Anasarca sec to nephrotic syndrome  -Anemia: s/p prbc tx   -Lt UExt superficial vein thrombosis   -Urothelial carcinoma of the bladder and prostate ca s/p cystectomy, prostatectomy and ilial conduit    -CAD  -Met acidosis: on po NaBicarb   -Leucocytosis likely sec to high dose steroid, vs ?infection   -Hypoalbuminemia    Plan:   -Cont current dose of Pred  -Cont Lasix, increase to 80mg iv bid  -Cont IV Alb Q8hrs  -Low K and Na diet  -Limit fluid intake to <1.5L/day  -Strict I/Os  -Avoid Nephrotoxins  -No urgent indication for RRT  -No ACEI/ARB due to recent Hyperkalemia  -Ferrous sulphate po  -Would likely need oral anticoagulation in view of increased risk of DVT in setting of Nephrotic Proteinuria >10gms and Hypoalbuminemia   -Recheck Serum albumin in a.m.    Medications:     Current Facility-Administered Medications:     polyethylene glycol (GLYCOLAX) packet 17 g, 17 g, Oral, Daily, Char Harding,  MD, 17 g at 03/22/24 1030    Vancomycin random level due 3/22/24 @ 2000, , Other, Once, Char Harding MD    0.9 % sodium chloride infusion, , IntraVENous, PRN, Char Harding MD    furosemide (LASIX) injection 40 mg, 40 mg, IntraVENous, BID, Semret, MD Padmini, 40 mg at 03/22/24 0856    sodium chloride flush 0.9 % injection 5-40 mL, 5-40 mL, IntraVENous, 2 times per day, Char Harding MD, 10 mL at 03/22/24 0857    sodium chloride flush 0.9 % injection 5-40 mL, 5-40 mL, IntraVENous, PRN, Char Harding MD    0.9 % sodium chloride infusion, , IntraVENous, PRN, Char Harding MD    heparin (porcine) injection 5,000 Units, 5,000 Units, SubCUTAneous, 3 times per day, Char Harding MD, 5,000 Units at 03/22/24 0633    ondansetron (ZOFRAN-ODT) disintegrating tablet 4 mg, 4 mg, Oral, Q8H PRN **OR** ondansetron (ZOFRAN) injection 4 mg, 4 mg, IntraVENous, Q6H PRN, Char Harding MD, 4 mg at 03/21/24 0344    polyethylene glycol (GLYCOLAX) packet 17 g, 17 g, Oral, Daily PRN, Char Harding MD    acetaminophen (TYLENOL) tablet 650 mg, 650 mg, Oral, Q6H PRN **OR** acetaminophen (TYLENOL) suppository 650 mg, 650 mg, Rectal, Q6H PRN, Char Harding MD    cefTRIAXone (ROCEPHIN) 1,000 mg in sodium chloride 0.9 % 50 mL IVPB (mini-bag), 1,000 mg, IntraVENous, Q24H, Char Harding MD, Stopped at 03/21/24 2059    albumin human 25% IV solution 25 g, 25 g, IntraVENous, Q8H, Char Harding MD, Stopped at 03/22/24 1220    atorvastatin (LIPITOR) tablet 40 mg, 40 mg, Oral, Daily, Char Harding MD, 40 mg at 03/22/24 0856    levothyroxine (SYNTHROID) tablet 137.5 mcg, 137.5 mcg, Oral, Daily, Char Harding MD, 137.5 mcg at 03/22/24 0633    aspirin EC tablet 81 mg, 81 mg, Oral, Daily, Char Harding MD, 81 mg at 03/22/24 0856    sodium bicarbonate tablet 650 mg, 650 mg, Oral, BID, Char Harding MD, 650 mg at 03/22/24 0856    pantoprazole (PROTONIX) tablet 40 mg, 40 mg, Oral, QAM AC, Char Harding MD, 40 mg at 03/22/24 0632    dilTIAZem (CARDIZEM CD) extended release capsule 120 mg, 120 mg, Oral, Daily, Char Harding MD, 120 mg at 03/22/24

## 2024-03-22 NOTE — PROGRESS NOTES
[facility]   Pharmacy Pharmacokinetic Monitoring Service - Vancomycin    Consulting Provider: Dr. Harding   Indication: Skin and Soft tissue infection  Target Concentration: Dosing based on anticipated concentration 15-20 mg/L due to renal impairment/insufficiency  Day of Therapy: 4  Additional Antimicrobials: Ceftriaxone    Pertinent Laboratory Values:   Wt Readings from Last 1 Encounters:   03/19/24 91 kg (200 lb 9.9 oz)     Temp Readings from Last 1 Encounters:   03/22/24 97.6 °F (36.4 °C) (Oral)     Estimated Creatinine Clearance: 24 mL/min (A) (based on SCr of 3.18 mg/dL (H)).  Recent Labs     03/21/24  0444 03/22/24  0321   CREATININE 3.06* 3.18*   * 157*   WBC 18.1* 13.6*     Plan:  Scr = 3.18 today  CrCl ~ 24 ml/min   Vancomycin random level ordered for 2000 tonight ( ~ 24 hours after last dose administered ) to determine further dosing   Pharmacy will continue to monitor patient and adjust therapy as indicated    EDGAR MELÉNDEZ RPH, BCPS   3/22/2024 9:31 AM

## 2024-03-22 NOTE — PROGRESS NOTES
conducted an initial consultation and spiritual assessment for Efrain Mayorga, who is a 69 y.o.,male.     Initiated a relationship of care and support.   Explored issues of kobe, belief, spirituality and Hoahaoism/ritual needs.  Listened empathically.  Provided information about Spiritual Care Services.   confirmed Patient's Jehovah's witness Affiliation.  Patient processed feelings about current hospitalization.  Offered prayer and assurance of continued prayers on patients behalf.   Chart reviewed.  Patient expressed gratitude for Spiritual Care visit.    Chaplains will continue to follow and will provide pastoral care as needed or requested.    recommends bedside caregivers page  on duty if patient shows signs of acute spiritual or emotional distress.    Chaplain Irma Gray M.Div.    [phone] - Office

## 2024-03-22 NOTE — PROGRESS NOTES
input(s): \"CKRMB\", \"CKND1\", \"TROIP\"   Coagulation Recent Labs     03/19/24  1446   INR 1.1   APTT 29.6         Lipid Panel Lab Results   Component Value Date/Time    CHOL 239 03/11/2024 02:18 PM    HDL 40 03/11/2024 02:18 PM      BNP Invalid input(s): \"BNPP\"   Liver Enzymes No results for input(s): \"TP\", \"ALB\" in the last 72 hours.    Invalid input(s): \"TBIL\", \"AP\", \"SGOT\", \"GPT\", \"DBIL\"   Thyroid Studies No results found for: \"T4\", \"T3RU\", \"TSH\"     Procedures/imaging: see electronic medical records for all procedures/Xrays and details which were not copied into this note but were reviewed prior to creation of Plan    Vascular duplex upper extremity venous bilateral    Result Date: 3/20/2024    Acute superficial vein thrombosis in the cephalic vein of the left forearm.   No evidence of acute and chronic deep vein thrombosis in the right upper extremity.     XR CHEST PORTABLE    Result Date: 3/19/2024  INDICATION: nephrotic syndrome  EXAM:  AP CHEST RADIOGRAPH COMPARISON: March 7, 2024 FINDINGS: AP portable view of the chest demonstrates normal heart size. Right IJ Port-A-Cath unchanged. There is no edema, effusion, consolidation, or pneumothorax. The osseous structures are unremarkable.     No acute process.    CT BIOPSY RENAL    Result Date: 3/14/2024  PROCEDURE:  CT GUIDED RENAL BIOPSY HISTORY: NIRMAL HOLLIDAY is a 69 year old Male with Nephrotic Syndrome and Renal failure. :  Yisel Sebastian NP ATTENDING:  Min Blanc MD CONSENT:  After full discussion of the procedure, including risks, benefits and alternatives, both verbal and written consent were obtained. TECHNIQUE: A timeout was called to verify the correct patient, procedure, site and allergies. The patient was placed prone on the CT table.  CT dose reduction was achieved through use of a standardized protocol tailored for this examination and automatic exposure control for dose modulation.  Initial  images were obtained.  An appropriate site  normal limits. And trace ascites.     Evidence of medical renal disease, without hydronephrosis.      XR CHEST PORTABLE    Result Date: 3/7/2024  INDICATION: Leg swelling. Portable AP view of the chest. Direct comparison made to prior chest x-ray dated 3/2017. Cardiomediastinal silhouette is stable. Central venous port catheter extends to the proximal SVC. Lungs are clear bilaterally. Pleural spaces are normal and there is no pneumothorax. Osseous structures are intact.     No acute cardiopulmonary disease.       ROCAEL ARRINGTON MD

## 2024-03-23 LAB
ALBUMIN SERPL-MCNC: 3.2 G/DL (ref 3.4–5)
ANION GAP SERPL CALC-SCNC: 11 MMOL/L (ref 3–18)
BUN SERPL-MCNC: 158 MG/DL (ref 7–18)
BUN/CREAT SERPL: 49 (ref 12–20)
CALCIUM SERPL-MCNC: 8.3 MG/DL (ref 8.5–10.1)
CHLORIDE SERPL-SCNC: 117 MMOL/L (ref 100–111)
CO2 SERPL-SCNC: 18 MMOL/L (ref 21–32)
CREAT SERPL-MCNC: 3.22 MG/DL (ref 0.6–1.3)
GLUCOSE SERPL-MCNC: 131 MG/DL (ref 74–99)
HEMOCCULT STL QL: POSITIVE
PHOSPHATE SERPL-MCNC: 6.9 MG/DL (ref 2.5–4.9)
POTASSIUM SERPL-SCNC: 4 MMOL/L (ref 3.5–5.5)
SODIUM SERPL-SCNC: 146 MMOL/L (ref 136–145)

## 2024-03-23 PROCEDURE — 6360000002 HC RX W HCPCS: Performed by: INTERNAL MEDICINE

## 2024-03-23 PROCEDURE — 6370000000 HC RX 637 (ALT 250 FOR IP): Performed by: INTERNAL MEDICINE

## 2024-03-23 PROCEDURE — P9047 ALBUMIN (HUMAN), 25%, 50ML: HCPCS | Performed by: INTERNAL MEDICINE

## 2024-03-23 PROCEDURE — 1100000000 HC RM PRIVATE

## 2024-03-23 PROCEDURE — 36415 COLL VENOUS BLD VENIPUNCTURE: CPT

## 2024-03-23 PROCEDURE — 80069 RENAL FUNCTION PANEL: CPT

## 2024-03-23 PROCEDURE — 82272 OCCULT BLD FECES 1-3 TESTS: CPT

## 2024-03-23 PROCEDURE — 6360000002 HC RX W HCPCS: Performed by: HOSPITALIST

## 2024-03-23 PROCEDURE — P9047 ALBUMIN (HUMAN), 25%, 50ML: HCPCS | Performed by: HOSPITALIST

## 2024-03-23 PROCEDURE — 2580000003 HC RX 258: Performed by: HOSPITALIST

## 2024-03-23 PROCEDURE — 6370000000 HC RX 637 (ALT 250 FOR IP): Performed by: HOSPITALIST

## 2024-03-23 RX ORDER — METOLAZONE 5 MG/1
5 TABLET ORAL DAILY
Status: DISCONTINUED | OUTPATIENT
Start: 2024-03-23 | End: 2024-04-01 | Stop reason: HOSPADM

## 2024-03-23 RX ORDER — ALBUMIN (HUMAN) 12.5 G/50ML
25 SOLUTION INTRAVENOUS EVERY 6 HOURS
Status: DISCONTINUED | OUTPATIENT
Start: 2024-03-23 | End: 2024-03-27

## 2024-03-23 RX ADMIN — HEPARIN SODIUM 5000 UNITS: 5000 INJECTION INTRAVENOUS; SUBCUTANEOUS at 06:04

## 2024-03-23 RX ADMIN — PANTOPRAZOLE SODIUM 40 MG: 40 TABLET, DELAYED RELEASE ORAL at 06:05

## 2024-03-23 RX ADMIN — VANCOMYCIN HYDROCHLORIDE 1000 MG: 1 INJECTION, POWDER, LYOPHILIZED, FOR SOLUTION INTRAVENOUS at 00:30

## 2024-03-23 RX ADMIN — ALBUMIN (HUMAN) 25 G: 0.25 INJECTION, SOLUTION INTRAVENOUS at 22:16

## 2024-03-23 RX ADMIN — FERROUS SULFATE TAB 325 MG (65 MG ELEMENTAL FE) 325 MG: 325 (65 FE) TAB at 16:20

## 2024-03-23 RX ADMIN — FUROSEMIDE 80 MG: 10 INJECTION, SOLUTION INTRAMUSCULAR; INTRAVENOUS at 17:34

## 2024-03-23 RX ADMIN — FUROSEMIDE 80 MG: 10 INJECTION, SOLUTION INTRAMUSCULAR; INTRAVENOUS at 10:35

## 2024-03-23 RX ADMIN — SODIUM BICARBONATE 650 MG TABLET 650 MG: at 09:07

## 2024-03-23 RX ADMIN — CEFTRIAXONE 1000 MG: 1 INJECTION, POWDER, FOR SOLUTION INTRAMUSCULAR; INTRAVENOUS at 19:32

## 2024-03-23 RX ADMIN — ALBUMIN (HUMAN) 25 G: 0.25 INJECTION, SOLUTION INTRAVENOUS at 09:03

## 2024-03-23 RX ADMIN — SODIUM BICARBONATE 650 MG TABLET 650 MG: at 19:59

## 2024-03-23 RX ADMIN — ATORVASTATIN CALCIUM 40 MG: 20 TABLET, FILM COATED ORAL at 09:03

## 2024-03-23 RX ADMIN — ALBUMIN (HUMAN) 25 G: 0.25 INJECTION, SOLUTION INTRAVENOUS at 14:14

## 2024-03-23 RX ADMIN — PREDNISONE 60 MG: 20 TABLET ORAL at 09:03

## 2024-03-23 RX ADMIN — SODIUM CHLORIDE, PRESERVATIVE FREE 10 ML: 5 INJECTION INTRAVENOUS at 09:09

## 2024-03-23 RX ADMIN — DILTIAZEM HYDROCHLORIDE 120 MG: 120 CAPSULE, EXTENDED RELEASE ORAL at 09:01

## 2024-03-23 RX ADMIN — ALBUMIN (HUMAN) 25 G: 0.25 INJECTION, SOLUTION INTRAVENOUS at 01:29

## 2024-03-23 RX ADMIN — HEPARIN SODIUM 5000 UNITS: 5000 INJECTION INTRAVENOUS; SUBCUTANEOUS at 22:17

## 2024-03-23 RX ADMIN — SODIUM CHLORIDE, PRESERVATIVE FREE 10 ML: 5 INJECTION INTRAVENOUS at 20:00

## 2024-03-23 RX ADMIN — FERROUS SULFATE TAB 325 MG (65 MG ELEMENTAL FE) 325 MG: 325 (65 FE) TAB at 09:02

## 2024-03-23 RX ADMIN — METOLAZONE 5 MG: 5 TABLET ORAL at 12:09

## 2024-03-23 RX ADMIN — ASPIRIN 81 MG: 81 TABLET ORAL at 09:03

## 2024-03-23 RX ADMIN — HEPARIN SODIUM 5000 UNITS: 5000 INJECTION INTRAVENOUS; SUBCUTANEOUS at 14:14

## 2024-03-23 RX ADMIN — LEVOTHYROXINE SODIUM 137.5 MCG: 0.07 TABLET ORAL at 06:04

## 2024-03-23 NOTE — PROGRESS NOTES
Hospitalist Progress Note    Patient: Efrain Mayorga MRN: [id number]  [facility]: [id number]    YOB: 1955  Age: 69 y.o.  Sex: male    DOA: 3/19/2024 LOS:  LOS: 4 days            Chief complaint: cellulitis and anasarca, anemia nephrotic syndrome     Assessment/Plan         Active Hospital Problems    Diagnosis Date Noted    Cellulitis [L03.90] 03/19/2024    History of urostomy [Z98.890] 03/19/2024    Nephrotic syndrome [N04.9] 03/12/2024    Anasarca [R60.1] 03/09/2024    Hypoalbuminemia [E88.09] 08/30/2023    Prostate cancer (HCC) [C61] 04/07/2023    Anemia [D64.9] 03/22/2023    Malignant neoplasm of urinary bladder (HCC) [C67.9] 11/03/2022        Anasarca, due to nephrotic syndrome- continue improving slowly   Continue albumin,   Lasix  tsh  wnl      Nephrotic syndrome-biopsy FSGS   On prednisone 60 mg  Nephrology seeing pt recommend to continue lasix and prednisone   Continue aspirin daily  3/23: Continue IV Lasix 80 mg twice daily.  Nephrology to follow.     Cellulitis history of left arm-improving   Wbc 13  K stable now due to steroid and infection   PVL no DVT, superficial thrombosis  Continue  Rocephin and vancomycin  Symptoms control  3/23: ID will be consulted to narrow down choice and duration for antibiotics     Prostate cancer, bladder cancer  Seeing Dr. Debbie MCCLAIN oncologist  Received immunotherapy before, no active treatment right now  s/p   1. Radical cystoprostatectomy  2. Extended pelvic lymph node dissection  3. Ileal conduit urinary diversion with single-J stents bilaterally   On 03/28/2023  Urostomy bag noted urine clear     Anemia  Due to chronic illness  No bleeding reported   Will transfuse   Due to high risk for thrombosis -will continue heparin for dvt prophy except acute bleeding noted   Occult stool -no bm so far -usually 2-3 days having bm    Consent obtained   3/23: Recent hemoglobin level was 7.4.  Has leukocytosis due to steroid.     Hypertension  Well controlled     Acute  disease, without hydronephrosis.      XR CHEST PORTABLE    Result Date: 3/7/2024  INDICATION: Leg swelling. Portable AP view of the chest. Direct comparison made to prior chest x-ray dated 3/2017. Cardiomediastinal silhouette is stable. Central venous port catheter extends to the proximal SVC. Lungs are clear bilaterally. Pleural spaces are normal and there is no pneumothorax. Osseous structures are intact.     No acute cardiopulmonary disease.       Abdulaziz Blanc MD

## 2024-03-23 NOTE — PROGRESS NOTES
Nephrology Progress Note      HPI:   Efrain Mayorga is a 69 y.o. male  with a PMHx of recently diagnosed nephrotic syndrome sec to FSGS, CKD, HTN, CAD, Urothelial carcinoma of the bladder and prostate ca s/p cystectomy/prostatectomy and ilial conduit who was recently discharged from the hospital now came to the ER complaining of LUE swelling and pain. He also contiue to have LE edema. Recent kidney Biopsy done while he was admitted showed Tip variant FSGS and started on Prednisone by his nephrologist, Dr Newton/[facility].  On initial eval labs showed Cr 3.0 and leucocytosis. During recent hospital stay his Cr was 2.9-3.2, from from baseline of  1.2 in Dec 2023. PVL showed LUE acute superficial vein thrombosis, but no DVT. Pt currently w/o SOB, CP or fever.     -Pt w/o new complaints. Continues to have U&L extremity swelling. UOP 3.3L yesterday. With loose stools while on miralax. Reports good po intake, limiting himself on fluid intake to 1.5L/day.     Impression:   -Primary FSGS: recently bx proven, on high dose Pred  -Nephrotic syndrome sec to FSGS  -Acute Kidney injury; sec to FSGS, Cr up at 2.9-3.1. Recent baseline Cr 1.2 (Dec 23)  -HTN  -Anasarca sec to nephrotic syndrome  -Anemia: s/p prbc tx   -Lt UExt superficial vein thrombosis   -Urothelial carcinoma of the bladder and prostate ca s/p cystectomy, prostatectomy and ilial conduit    -CAD  -Met acidosis: on po NaBicarb   -Leucocytosis likely sec to high dose steroid, vs ?infection   -Hypoalbuminemia  -Hypernatremia, mild    Plan:   -Cont current dose of Pred  -Cont Lasix 80mg iv bid  -increase IV Alb to Q6hrs  -Low K and Na diet  -loosen up fluid restriction up to 2L/day  -hold off miralax given loose stools  -Strict I/Os  -Avoid Nephrotoxins  -No urgent indication for RRT  -No ACEI/ARB due to recent Hyperkalemia  -Ferrous sulphate po  -Would likely need oral anticoagulation in view of increased risk of DVT in setting of Nephrotic Proteinuria >10gms and  3/23/2024 0859  Gross per 24 hour   Intake --   Output 2800 ml   Net -2800 ml       General Appearance: NAD  HEENT: sclera clear  Chest: LS clear to auscultation bilaterally  Heart: S1/S2, no murmur, RRR  Abdomen: soft, nontender, BS active   : no flank tenderness, no anne catheter  Skin: intact, no rash  Extremity: ++ b/l LE and ++ Lt UE edema  Neuro: Alert, oriented x 3, no focal deficit      Alex Chand MD  [facility]

## 2024-03-23 NOTE — PROGRESS NOTES
Patient stated that spouse called and patient's cell phone found by linen company contracted by [employer].

## 2024-03-23 NOTE — PROGRESS NOTES
[facility]   Pharmacy Pharmacokinetic Monitoring Service - Vancomycin    Consulting Provider: Dr. Harding    Indication: SSTI  Target Concentration: Dosing based on anticipated concentration 15-20 mg/L due to renal impairment/insufficiency  Day of Therapy: 5  Additional Antimicrobials: Ceftriaxone     Pertinent Laboratory Values:   Wt Readings from Last 1 Encounters:   03/19/24 91 kg (200 lb 9.9 oz)     Temp Readings from Last 1 Encounters:   03/22/24 97.3 °F (36.3 °C) (Oral)     Recent Labs     03/21/24  0444 03/22/24  0321   CREATININE 3.06* 3.18*   * 157*   WBC 18.1* 13.6*     Recent vancomycin administrations                     vancomycin (VANCOCIN) 1,000 mg in sodium chloride 0.9 % 250 mL (vial-mate) IVPB (mg) 1,000 mg New Bag 03/23/24 0030    Vancomycin random level due 3/22/24 @ 2000 ()  Given 03/22/24 2150    vancomycin (VANCOCIN) 1,000 mg in sodium chloride 0.9 % 250 mL (vial-mate) IVPB (mg) 1,000 mg New Bag 03/21/24 2031    vancomycin (VANCOCIN) 1,250 mg in sodium chloride 0.9 % 250 mL (vial-mate) IVPB (mg) 1,250 mg New Bag 03/20/24 1403                  Plan:  Concentration-guided dosing due to renal impairment   Vancomycin 1000 mg IV ordered x 1 dose   Pharmacy will continue to monitor patient and adjust therapy as indicated    Thank you for the consult,JODI Combs [facility]  3/23/2024 12:56 AM

## 2024-03-23 NOTE — PROGRESS NOTES
Remote chart review. Patient added to lD list.  Had prior AMS review  Patient with NS on steroid, arm cellulitis per notes    Consult for ID on antibiotic advise noted  VS/labs reviewed- stable  Currently on Vanco/Ceftriaxone    MRSA screen ordered  Cont rx and monitor change on cellulitis. Full consult will be on Monday. Call if urgent issues to discuss    Thanks  Janna Meyer MD  [facility]  Office: [phone] -Option #8  Office fax:  [phone]

## 2024-03-23 NOTE — PROGRESS NOTES
This writer received Handoff in regards to:  Efrain Mayorga (69 y.o., male)    : 1955    Admitted: 3/19/2024     Report on Efrain Mayorga ,(69 y.o., male), was received from Offgoing nurse, Charu and JUSTIN Siddiqui    All questions and concerns of this writer, Patient, and/or Family were addressed at this time. Report on patient's status and plan of care was given.      ___________________________________________________________________________________________________________________________________________________________________________________________________                                                                              Shift Event Summary:     9111-0166: At time of handoff, Patient is currently sitting on EOB. Requested a warm blanket, Voiced question about black stool.  L arm ace wrapped.  P    6871-2515: Patient is AxO 4 no complaint of pain at this time. Edema +4 in BLE. Patient urostomy dry and intact. No concerns voiced from patient. Educated that black stools are a side effect of iron- patient appreciated.   --: 900 ml output as of 2300. Stool sample and MRSA Swab obtained    4672-8591:Patient had 1 episode of incontinence. Walked independently from bed to bathroom and vacCleaned bed and provided new sheets. Patient emptied an additional 900 ml output at this time. Patient sitting up in chair. Encouraged patient to return to bed, but declined at this time.     7504-6131     5060-6072 Hematoma/blister on L arm ruptuped. Bleeding. Dressing changed and mepilex applied. 350 emptied    2579-1770 Patient attempted to get up from chair     7928-8128 3 [place] Closed patient transferred to [facility] from [facility]. Patient states his blue usb cord is missing- patient states it was on his bed, Patient'ss sheets were removed by patient prior in shift and changed by this RN. No usb cord was found with patient belonging. Patient does have personal medical equipment for ostomy and razor in room.

## 2024-03-23 NOTE — PROGRESS NOTES
Bedside and Verbal shift change report given to Chen (oncoming nurse) by Jeanne (offgoing nurse). Report included the following information Nurse Handoff Report, Index, and MAR.

## 2024-03-23 NOTE — PLAN OF CARE
Problem: Pain  Goal: Verbalizes/displays adequate comfort level or baseline comfort level  3/22/2024 1100 by Angely Luque RN  Outcome: Progressing     Problem: Safety - Adult  Goal: Free from fall injury  3/22/2024 2237 by Jeanne Ball RN  Outcome: Progressing  3/22/2024 1100 by Angely Luque RN  Outcome: Progressing     Problem: ABCDS Injury Assessment  Goal: Absence of physical injury  3/22/2024 2237 by Jeanne Ball RN  Outcome: Progressing  3/22/2024 1100 by Angely Luque RN  Outcome: Progressing     Problem: Skin/Tissue Integrity  Goal: Absence of new skin breakdown  Description: 1.  Monitor for areas of redness and/or skin breakdown  2.  Assess vascular access sites hourly  3.  Every 4-6 hours minimum:  Change oxygen saturation probe site  4.  Every 4-6 hours:  If on nasal continuous positive airway pressure, respiratory therapy assess nares and determine need for appliance change or resting period.  3/22/2024 2237 by Jeanne Ball RN  Outcome: Progressing  3/22/2024 1100 by Angely Luque RN  Outcome: Progressing

## 2024-03-24 LAB
ALBUMIN SERPL-MCNC: 3.6 G/DL (ref 3.4–5)
ANION GAP SERPL CALC-SCNC: 12 MMOL/L (ref 3–18)
APPEARANCE UR: CLEAR
BACTERIA URNS QL MICRO: ABNORMAL /HPF
BASOPHILS # BLD: 0 K/UL (ref 0–0.1)
BASOPHILS NFR BLD: 0 % (ref 0–2)
BILIRUB UR QL: NEGATIVE
BUN SERPL-MCNC: 154 MG/DL (ref 7–18)
BUN/CREAT SERPL: 43 (ref 12–20)
CALCIUM SERPL-MCNC: 8.5 MG/DL (ref 8.5–10.1)
CHLORIDE SERPL-SCNC: 113 MMOL/L (ref 100–111)
CO2 SERPL-SCNC: 20 MMOL/L (ref 21–32)
COLOR UR: YELLOW
CREAT SERPL-MCNC: 3.6 MG/DL (ref 0.6–1.3)
DIFFERENTIAL METHOD BLD: ABNORMAL
EOSINOPHIL # BLD: 0 K/UL (ref 0–0.4)
EOSINOPHIL NFR BLD: 0 % (ref 0–5)
EPITH CASTS URNS QL MICRO: NEGATIVE /LPF (ref 0–5)
ERYTHROCYTE [DISTWIDTH] IN BLOOD BY AUTOMATED COUNT: 15.9 % (ref 11.6–14.5)
GLUCOSE SERPL-MCNC: 156 MG/DL (ref 74–99)
GLUCOSE UR STRIP.AUTO-MCNC: NEGATIVE MG/DL
GRAN CASTS URNS QL MICRO: ABNORMAL /LPF
HCT VFR BLD AUTO: 19.6 % (ref 36–48)
HCT VFR BLD AUTO: 22 % (ref 36–48)
HGB BLD-MCNC: 6.3 G/DL (ref 13–16)
HGB BLD-MCNC: 7.4 G/DL (ref 13–16)
HGB UR QL STRIP: ABNORMAL
HISTORY CHECK: NORMAL
HYALINE CASTS URNS QL MICRO: ABNORMAL /LPF (ref 0–2)
IMM GRANULOCYTES # BLD AUTO: 0.1 K/UL (ref 0–0.04)
IMM GRANULOCYTES NFR BLD AUTO: 1 % (ref 0–0.5)
KETONES UR QL STRIP.AUTO: NEGATIVE MG/DL
LEUKOCYTE ESTERASE UR QL STRIP.AUTO: NEGATIVE
LYMPHOCYTES # BLD: 0.2 K/UL (ref 0.9–3.6)
LYMPHOCYTES NFR BLD: 2 % (ref 21–52)
MCH RBC QN AUTO: 29.7 PG (ref 24–34)
MCHC RBC AUTO-ENTMCNC: 32.1 G/DL (ref 31–37)
MCV RBC AUTO: 92.5 FL (ref 78–100)
MONOCYTES # BLD: 0.7 K/UL (ref 0.05–1.2)
MONOCYTES NFR BLD: 7 % (ref 3–10)
NEUTS SEG # BLD: 8.9 K/UL (ref 1.8–8)
NEUTS SEG NFR BLD: 91 % (ref 40–73)
NITRITE UR QL STRIP.AUTO: NEGATIVE
NRBC # BLD: 0 K/UL (ref 0–0.01)
NRBC BLD-RTO: 0 PER 100 WBC
PH UR STRIP: 6 (ref 5–8)
PHOSPHATE SERPL-MCNC: 6.8 MG/DL (ref 2.5–4.9)
PLATELET # BLD AUTO: 126 K/UL (ref 135–420)
PMV BLD AUTO: 10.5 FL (ref 9.2–11.8)
POTASSIUM SERPL-SCNC: 3.6 MMOL/L (ref 3.5–5.5)
PROT UR STRIP-MCNC: >1000 MG/DL
RBC # BLD AUTO: 2.12 M/UL (ref 4.35–5.65)
RBC #/AREA URNS HPF: ABNORMAL /HPF (ref 0–5)
SODIUM SERPL-SCNC: 145 MMOL/L (ref 136–145)
SP GR UR REFRACTOMETRY: 1.02 (ref 1–1.03)
UROBILINOGEN UR QL STRIP.AUTO: 0.2 EU/DL (ref 0.2–1)
VANCOMYCIN SERPL-MCNC: 19.2 UG/ML (ref 5–40)
WBC # BLD AUTO: 9.8 K/UL (ref 4.6–13.2)
WBC URNS QL MICRO: ABNORMAL /HPF (ref 0–5)

## 2024-03-24 PROCEDURE — P9047 ALBUMIN (HUMAN), 25%, 50ML: HCPCS | Performed by: INTERNAL MEDICINE

## 2024-03-24 PROCEDURE — 6360000002 HC RX W HCPCS: Performed by: HOSPITALIST

## 2024-03-24 PROCEDURE — 6370000000 HC RX 637 (ALT 250 FOR IP): Performed by: HOSPITALIST

## 2024-03-24 PROCEDURE — 2580000003 HC RX 258: Performed by: INTERNAL MEDICINE

## 2024-03-24 PROCEDURE — 85018 HEMOGLOBIN: CPT

## 2024-03-24 PROCEDURE — 85014 HEMATOCRIT: CPT

## 2024-03-24 PROCEDURE — 1100000003 HC PRIVATE W/ TELEMETRY

## 2024-03-24 PROCEDURE — 36415 COLL VENOUS BLD VENIPUNCTURE: CPT

## 2024-03-24 PROCEDURE — 6370000000 HC RX 637 (ALT 250 FOR IP): Performed by: INTERNAL MEDICINE

## 2024-03-24 PROCEDURE — 6360000002 HC RX W HCPCS: Performed by: INTERNAL MEDICINE

## 2024-03-24 PROCEDURE — P9016 RBC LEUKOCYTES REDUCED: HCPCS

## 2024-03-24 PROCEDURE — 36430 TRANSFUSION BLD/BLD COMPNT: CPT

## 2024-03-24 PROCEDURE — 2580000003 HC RX 258: Performed by: HOSPITALIST

## 2024-03-24 PROCEDURE — 80202 ASSAY OF VANCOMYCIN: CPT

## 2024-03-24 PROCEDURE — 85025 COMPLETE CBC W/AUTO DIFF WBC: CPT

## 2024-03-24 PROCEDURE — 80069 RENAL FUNCTION PANEL: CPT

## 2024-03-24 PROCEDURE — 81001 URINALYSIS AUTO W/SCOPE: CPT

## 2024-03-24 RX ORDER — SODIUM CHLORIDE 9 MG/ML
INJECTION, SOLUTION INTRAVENOUS PRN
Status: DISCONTINUED | OUTPATIENT
Start: 2024-03-24 | End: 2024-04-01 | Stop reason: HOSPADM

## 2024-03-24 RX ORDER — SEVELAMER CARBONATE 800 MG/1
800 TABLET, FILM COATED ORAL
Status: DISCONTINUED | OUTPATIENT
Start: 2024-03-24 | End: 2024-04-01 | Stop reason: HOSPADM

## 2024-03-24 RX ADMIN — ALBUMIN (HUMAN) 25 G: 0.25 INJECTION, SOLUTION INTRAVENOUS at 15:19

## 2024-03-24 RX ADMIN — ASPIRIN 81 MG: 81 TABLET ORAL at 08:44

## 2024-03-24 RX ADMIN — HEPARIN SODIUM 5000 UNITS: 5000 INJECTION INTRAVENOUS; SUBCUTANEOUS at 06:27

## 2024-03-24 RX ADMIN — CEFTRIAXONE 1000 MG: 1 INJECTION, POWDER, FOR SOLUTION INTRAMUSCULAR; INTRAVENOUS at 21:13

## 2024-03-24 RX ADMIN — FERROUS SULFATE TAB 325 MG (65 MG ELEMENTAL FE) 325 MG: 325 (65 FE) TAB at 17:34

## 2024-03-24 RX ADMIN — SODIUM BICARBONATE 650 MG TABLET 650 MG: at 08:45

## 2024-03-24 RX ADMIN — SODIUM CHLORIDE, PRESERVATIVE FREE 10 ML: 5 INJECTION INTRAVENOUS at 08:51

## 2024-03-24 RX ADMIN — FUROSEMIDE 10 MG/HR: 10 INJECTION, SOLUTION INTRAMUSCULAR; INTRAVENOUS at 11:51

## 2024-03-24 RX ADMIN — ATORVASTATIN CALCIUM 40 MG: 20 TABLET, FILM COATED ORAL at 08:44

## 2024-03-24 RX ADMIN — METOLAZONE 5 MG: 5 TABLET ORAL at 08:45

## 2024-03-24 RX ADMIN — FERROUS SULFATE TAB 325 MG (65 MG ELEMENTAL FE) 325 MG: 325 (65 FE) TAB at 08:46

## 2024-03-24 RX ADMIN — PREDNISONE 60 MG: 20 TABLET ORAL at 08:45

## 2024-03-24 RX ADMIN — DILTIAZEM HYDROCHLORIDE 120 MG: 120 CAPSULE, EXTENDED RELEASE ORAL at 08:45

## 2024-03-24 RX ADMIN — SEVELAMER CARBONATE 800 MG: 800 TABLET, FILM COATED ORAL at 17:34

## 2024-03-24 RX ADMIN — PANTOPRAZOLE SODIUM 40 MG: 40 TABLET, DELAYED RELEASE ORAL at 06:27

## 2024-03-24 RX ADMIN — EPOETIN ALFA-EPBX 20000 UNITS: 20000 INJECTION, SOLUTION INTRAVENOUS; SUBCUTANEOUS at 17:33

## 2024-03-24 RX ADMIN — ALBUMIN (HUMAN) 25 G: 0.25 INJECTION, SOLUTION INTRAVENOUS at 03:04

## 2024-03-24 RX ADMIN — LEVOTHYROXINE SODIUM 137.5 MCG: 0.07 TABLET ORAL at 06:27

## 2024-03-24 RX ADMIN — ALBUMIN (HUMAN) 25 G: 0.25 INJECTION, SOLUTION INTRAVENOUS at 08:48

## 2024-03-24 RX ADMIN — ALBUMIN (HUMAN) 25 G: 0.25 INJECTION, SOLUTION INTRAVENOUS at 21:40

## 2024-03-24 RX ADMIN — SEVELAMER CARBONATE 800 MG: 800 TABLET, FILM COATED ORAL at 11:19

## 2024-03-24 ASSESSMENT — PAIN SCALES - GENERAL
PAINLEVEL_OUTOF10: 0
PAINLEVEL_OUTOF10: 0

## 2024-03-24 NOTE — PROGRESS NOTES
Nephrology Progress Note      HPI:   Efrain Mayorga is a 69 y.o. male  with a PMHx of recently diagnosed nephrotic syndrome sec to FSGS, CKD, HTN, CAD, Urothelial carcinoma of the bladder and prostate ca s/p cystectomy/prostatectomy and ilial conduit who was recently discharged from the hospital now came to the ER complaining of LUE swelling and pain. He also contiue to have LE edema. Recent kidney Biopsy done while he was admitted showed Tip variant FSGS and started on Prednisone by his nephrologist, Dr Newton/[facility].  On initial eval labs showed Cr 3.0 and leucocytosis. During recent hospital stay his Cr was 2.9-3.2, from from baseline of  1.2 in Dec 2023. PVL showed LUE acute superficial vein thrombosis, but no DVT. Pt currently w/o SOB, CP or fever.     -Pt w/o new complaints. Continues to have U&L extremity swelling. UOP >3.5L yesterday. Reports good po intake, no diarrhea.     Impression:   -Primary FSGS: recently bx proven, on high dose Pred  -Nephrotic syndrome sec to FSGS  -Acute Kidney injury; sec to FSGS, Cr up with diuresis, 3.6 today. Recent baseline Cr 1.2 (Dec 23)  -HTN  -Anasarca sec to nephrotic syndrome  -Anemia: s/p prbc tx   -Lt UExt superficial vein thrombosis   -Urothelial carcinoma of the bladder and prostate ca s/p cystectomy, prostatectomy and ilial conduit    -CAD  -Met acidosis: on po NaBicarb   -Leucocytosis likely sec to high dose steroid, vs ?infection   -Hypoalbuminemia  -Hypernatremia, improved  -Hyperphosphatemia    Plan:   -repeat UA  -Cont current dose of Pred  -Cont iv Lasix, change to gtt 10mg/h  -cont metolazone   -hold bicarb tabs  -start renvela 800mg tid wm  -dose epogen  -cont IV Alb Q6hrs  -blood transfusion, monitor H&H  -Low K and Na diet  -lfluid restriction 1.5L/day  -Strict I/Os  -Avoid Nephrotoxins  -No urgent indication for RRT  -No ACEI/ARB due to recent Hyperkalemia  -Ferrous sulphate po  -Would likely need oral anticoagulation in view of increased risk of DVT in     Hypertension 2017       Allergies:   No Known Allergies      Physical Assessment:     Vitals:    03/24/24 0727   BP: 129/63   Pulse: 65   Resp: 16   Temp: 97.5 °F (36.4 °C)   SpO2: 100%       Intake/Output Summary (Last 24 hours) at 3/24/2024 1021  Last data filed at 3/24/2024 0655  Gross per 24 hour   Intake 1395 ml   Output 3100 ml   Net -1705 ml       General Appearance: NAD  HEENT: sclera clear  Chest: LS clear to auscultation bilaterally  Heart: S1/S2, no murmur, RRR  Abdomen: soft, nontender, BS active   : no flank tenderness, no anne catheter  Skin: intact, no rash  Extremity: ++ b/l LE and ++ Lt UE edema  Neuro: Alert, oriented x 3, no focal deficit      Alex Chand MD  [facility]

## 2024-03-24 NOTE — PLAN OF CARE
Problem: Pain  Goal: Verbalizes/displays adequate comfort level or baseline comfort level  Outcome: Progressing     Problem: Risk for Elopement  Goal: Patient will not exit the unit/facility without proper excort  Outcome: Progressing     Problem: Safety - Adult  Goal: Free from fall injury  Outcome: Progressing  Flowsheets (Taken 3/24/2024 0417 by Marcelina Parker RN)  Free From Fall Injury: Based on caregiver fall risk screen, instruct family/caregiver to ask for assistance with transferring infant if caregiver noted to have fall risk factors     Problem: ABCDS Injury Assessment  Goal: Absence of physical injury  Outcome: Progressing     Problem: Skin/Tissue Integrity  Goal: Absence of new skin breakdown  Description: 1.  Monitor for areas of redness and/or skin breakdown  2.  Assess vascular access sites hourly  3.  Every 4-6 hours minimum:  Change oxygen saturation probe site  4.  Every 4-6 hours:  If on nasal continuous positive airway pressure, respiratory therapy assess nares and determine need for appliance change or resting period.  Outcome: Progressing     Problem: Discharge Planning  Goal: Discharge to home or other facility with appropriate resources  Outcome: Progressing

## 2024-03-24 NOTE — CONSENT
Informed Consent for Blood Component Transfusion Note    I have discussed with the patient the rationale for blood component transfusion; its benefits in treating or preventing fatigue, organ damage, or death; and its risk which includes mild transfusion reactions, rare risk of blood borne infection, or more serious but rare reactions. I have discussed the alternatives to transfusion, including the risk and consequences of not receiving transfusion. The patient had an opportunity to ask questions and had agreed to proceed with transfusion of blood components.    Electronically signed by Abdulaziz Blanc MD on 3/24/24 at 11:12 AM EDT

## 2024-03-24 NOTE — PROGRESS NOTES
[facility]   Pharmacy Pharmacokinetic Monitoring Service - Vancomycin    Consulting Provider: Dr. Harding    Indication: SSTI  Target Concentration: Dosing based on anticipated concentration 15-20 mg/L due to renal impairment/insufficiency  Day of Therapy: 6  Additional Antimicrobials: Ceftriaxone     Pertinent Laboratory Values:   Wt Readings from Last 1 Encounters:   03/19/24 91 kg (200 lb 9.9 oz)     Temp Readings from Last 1 Encounters:   03/24/24 97.5 °F (36.4 °C) (Oral)     Estimated Creatinine Clearance: 21 mL/min (A) (based on SCr of 3.6 mg/dL (H)).  Recent Labs     03/22/24  0321 03/23/24  0549 03/24/24  0206   CREATININE 3.18* 3.22* 3.60*   * 158* 154*   WBC 13.6*  --  9.8     Recent vancomycin administrations                     vancomycin (VANCOCIN) 1,000 mg in sodium chloride 0.9 % 250 mL (vial-mate) IVPB (mg) 1,000 mg New Bag 03/23/24 0030    Vancomycin random level due 3/22/24 @ 2000 ()  Given 03/22/24 2150    vancomycin (VANCOCIN) 1,000 mg in sodium chloride 0.9 % 250 mL (vial-mate) IVPB (mg) 1,000 mg New Bag 03/21/24 2031             Assessment:  Date/Time Current Dose Concentration Timing of Concentration (h)   3/24/24 @ 0919 Vancomycin 1000 mg IV x1 on 3/23/24 19.6 mg/L 3/24/24 @ 0200   Note: Serum concentrations collected for AUC dosing may appear elevated if collected in close proximity to the dose administered, this is not necessarily an indication of toxicity    Plan:  No dose of vancomycin today  Repeat vancomycin concentration ordered for 3/25/24 @ 0200   Pharmacy will continue to monitor patient and adjust therapy as indicated    Thank you for the consult,  Júnior Hay RPH  3/24/2024 9:18 AM

## 2024-03-24 NOTE — PROGRESS NOTES
Hospitalist Progress Note    Patient: Efrain Mayorga MRN: [id number]  [facility]: [id number]    YOB: 1955  Age: 69 y.o.  Sex: male    DOA: 3/19/2024 LOS:  LOS: 5 days            Chief complaint: cellulitis and anasarca, anemia nephrotic syndrome     Assessment/Plan         Active Hospital Problems    Diagnosis Date Noted    Cellulitis [L03.90] 03/19/2024    History of urostomy [Z98.890] 03/19/2024    Nephrotic syndrome [N04.9] 03/12/2024    Anasarca [R60.1] 03/09/2024    Hypoalbuminemia [E88.09] 08/30/2023    Prostate cancer (HCC) [C61] 04/07/2023    Anemia [D64.9] 03/22/2023    Malignant neoplasm of urinary bladder (HCC) [C67.9] 11/03/2022        Anasarca, due to nephrotic syndrome- continue improving slowly   Continue albumin,   Lasix  tsh  wnl      Nephrotic syndrome-biopsy FSGS   On prednisone 60 mg  Nephrology seeing pt recommend to continue lasix and prednisone   Continue aspirin daily  3/23: Continue IV Lasix 80 mg twice daily.  Nephrology to follow.  3/24: Discussed with nephrologist.  Patient will be started on Lasix drip 10 mg/h.  Will discontinue Lasix push.     Cellulitis history of left arm-improving   Wbc 13  K stable now due to steroid and infection   PVL no DVT, superficial thrombosis  Continue  Rocephin and vancomycin  Symptoms control  3/23: ID will be consulted to narrow down choice and duration for antibiotics     Prostate cancer, bladder cancer  Seeing Dr. Debbie MCCLAIN oncologist  Received immunotherapy before, no active treatment right now  s/p   1. Radical cystoprostatectomy  2. Extended pelvic lymph node dissection  3. Ileal conduit urinary diversion with single-J stents bilaterally   On 03/28/2023  Urostomy bag noted urine clear     Anemia  Due to chronic illness  No bleeding reported   Will transfuse   Due to high risk for thrombosis -will continue heparin for dvt prophy except acute bleeding noted   Occult stool -no bm so far -usually 2-3 days having bm    Consent obtained   3/23:  Ultrasound of the kidneys and urinary bladder. FINDINGS: The right and left kidneys measure 10.6 and 11.7 cm, respectively. No hydronephrosis. There is no nephrolithiasis or renal mass. Kidneys are echogenic. The bladder is not well delineated,. Visualized aorta and IVC are within normal limits. And trace ascites.     Evidence of medical renal disease, without hydronephrosis.      XR CHEST PORTABLE    Result Date: 3/7/2024  INDICATION: Leg swelling. Portable AP view of the chest. Direct comparison made to prior chest x-ray dated 3/2017. Cardiomediastinal silhouette is stable. Central venous port catheter extends to the proximal SVC. Lungs are clear bilaterally. Pleural spaces are normal and there is no pneumothorax. Osseous structures are intact.     No acute cardiopulmonary disease.       Abdulaziz Blanc MD

## 2024-03-25 PROBLEM — E87.6 HYPOKALEMIA: Status: ACTIVE | Noted: 2024-03-25

## 2024-03-25 LAB
ABO + RH BLD: NORMAL
ABO + RH BLD: NORMAL
ALBUMIN SERPL-MCNC: 4.1 G/DL (ref 3.4–5)
ANION GAP SERPL CALC-SCNC: 13 MMOL/L (ref 3–18)
BACTERIA SPEC CULT: NORMAL
BACTERIA SPEC CULT: NORMAL
BASOPHILS # BLD: 0 K/UL (ref 0–0.1)
BASOPHILS NFR BLD: 0 % (ref 0–2)
BLD PROD TYP BPU: NORMAL
BLD PROD TYP BPU: NORMAL
BLOOD BANK BLOOD PRODUCT EXPIRATION DATE: NORMAL
BLOOD BANK BLOOD PRODUCT EXPIRATION DATE: NORMAL
BLOOD BANK DISPENSE STATUS: NORMAL
BLOOD BANK DISPENSE STATUS: NORMAL
BLOOD BANK ISBT PRODUCT BLOOD TYPE: 6200
BLOOD BANK ISBT PRODUCT BLOOD TYPE: 6200
BLOOD BANK PRODUCT CODE: NORMAL
BLOOD BANK PRODUCT CODE: NORMAL
BLOOD BANK UNIT TYPE AND RH: NORMAL
BLOOD BANK UNIT TYPE AND RH: NORMAL
BLOOD GROUP ANTIBODIES SERPL: NORMAL
BPU ID: NORMAL
BPU ID: NORMAL
BUN SERPL-MCNC: 153 MG/DL (ref 7–18)
BUN/CREAT SERPL: 42 (ref 12–20)
CALCIUM SERPL-MCNC: 8.6 MG/DL (ref 8.5–10.1)
CALLED TO: NORMAL
CALLED TO:: NORMAL
CHLORIDE SERPL-SCNC: 116 MMOL/L (ref 100–111)
CO2 SERPL-SCNC: 20 MMOL/L (ref 21–32)
CREAT SERPL-MCNC: 3.67 MG/DL (ref 0.6–1.3)
CROSSMATCH RESULT: NORMAL
CROSSMATCH RESULT: NORMAL
DIFFERENTIAL METHOD BLD: ABNORMAL
EOSINOPHIL # BLD: 0 K/UL (ref 0–0.4)
EOSINOPHIL NFR BLD: 0 % (ref 0–5)
ERYTHROCYTE [DISTWIDTH] IN BLOOD BY AUTOMATED COUNT: 15.7 % (ref 11.6–14.5)
GLUCOSE SERPL-MCNC: 139 MG/DL (ref 74–99)
HCT VFR BLD AUTO: 21.7 % (ref 36–48)
HGB BLD-MCNC: 7.2 G/DL (ref 13–16)
IMM GRANULOCYTES # BLD AUTO: 0.1 K/UL (ref 0–0.04)
IMM GRANULOCYTES NFR BLD AUTO: 1 % (ref 0–0.5)
LYMPHOCYTES # BLD: 0.3 K/UL (ref 0.9–3.6)
LYMPHOCYTES NFR BLD: 3 % (ref 21–52)
MCH RBC QN AUTO: 30.4 PG (ref 24–34)
MCHC RBC AUTO-ENTMCNC: 33.2 G/DL (ref 31–37)
MCV RBC AUTO: 91.6 FL (ref 78–100)
MONOCYTES # BLD: 0.8 K/UL (ref 0.05–1.2)
MONOCYTES NFR BLD: 8 % (ref 3–10)
NEUTS SEG # BLD: 9.1 K/UL (ref 1.8–8)
NEUTS SEG NFR BLD: 88 % (ref 40–73)
NRBC # BLD: 0 K/UL (ref 0–0.01)
NRBC BLD-RTO: 0 PER 100 WBC
PHOSPHATE SERPL-MCNC: 7.2 MG/DL (ref 2.5–4.9)
PLATELET # BLD AUTO: 127 K/UL (ref 135–420)
PMV BLD AUTO: 10.7 FL (ref 9.2–11.8)
POTASSIUM SERPL-SCNC: 3 MMOL/L (ref 3.5–5.5)
RBC # BLD AUTO: 2.37 M/UL (ref 4.35–5.65)
SERVICE CMNT-IMP: NORMAL
SODIUM SERPL-SCNC: 149 MMOL/L (ref 136–145)
SPECIMEN EXP DATE BLD: NORMAL
SPECIMEN EXP DATE BLD: NORMAL
UNIT DIVISION: 0
UNIT DIVISION: 0
UNIT ISSUE DATE/TIME: NORMAL
UNIT ISSUE DATE/TIME: NORMAL
WBC # BLD AUTO: 10.3 K/UL (ref 4.6–13.2)

## 2024-03-25 PROCEDURE — 6370000000 HC RX 637 (ALT 250 FOR IP): Performed by: HOSPITALIST

## 2024-03-25 PROCEDURE — 6370000000 HC RX 637 (ALT 250 FOR IP): Performed by: INTERNAL MEDICINE

## 2024-03-25 PROCEDURE — 36415 COLL VENOUS BLD VENIPUNCTURE: CPT

## 2024-03-25 PROCEDURE — 2580000003 HC RX 258: Performed by: HOSPITALIST

## 2024-03-25 PROCEDURE — 1100000003 HC PRIVATE W/ TELEMETRY

## 2024-03-25 PROCEDURE — 6360000002 HC RX W HCPCS: Performed by: INTERNAL MEDICINE

## 2024-03-25 PROCEDURE — 6360000002 HC RX W HCPCS: Performed by: HOSPITALIST

## 2024-03-25 PROCEDURE — 86900 BLOOD TYPING SEROLOGIC ABO: CPT

## 2024-03-25 PROCEDURE — 80069 RENAL FUNCTION PANEL: CPT

## 2024-03-25 PROCEDURE — 86850 RBC ANTIBODY SCREEN: CPT

## 2024-03-25 PROCEDURE — 85025 COMPLETE CBC W/AUTO DIFF WBC: CPT

## 2024-03-25 PROCEDURE — P9047 ALBUMIN (HUMAN), 25%, 50ML: HCPCS | Performed by: INTERNAL MEDICINE

## 2024-03-25 PROCEDURE — 2580000003 HC RX 258: Performed by: INTERNAL MEDICINE

## 2024-03-25 PROCEDURE — 86901 BLOOD TYPING SEROLOGIC RH(D): CPT

## 2024-03-25 RX ORDER — AMOXICILLIN 250 MG/1
250 CAPSULE ORAL EVERY 12 HOURS SCHEDULED
Status: COMPLETED | OUTPATIENT
Start: 2024-03-25 | End: 2024-03-29

## 2024-03-25 RX ORDER — POTASSIUM CHLORIDE 20 MEQ/1
20 TABLET, EXTENDED RELEASE ORAL 2 TIMES DAILY
Status: DISCONTINUED | OUTPATIENT
Start: 2024-03-25 | End: 2024-03-26

## 2024-03-25 RX ADMIN — ATORVASTATIN CALCIUM 40 MG: 20 TABLET, FILM COATED ORAL at 09:21

## 2024-03-25 RX ADMIN — ALBUMIN (HUMAN) 25 G: 0.25 INJECTION, SOLUTION INTRAVENOUS at 02:45

## 2024-03-25 RX ADMIN — METOLAZONE 5 MG: 5 TABLET ORAL at 09:21

## 2024-03-25 RX ADMIN — CEFTRIAXONE 1000 MG: 1 INJECTION, POWDER, FOR SOLUTION INTRAMUSCULAR; INTRAVENOUS at 21:21

## 2024-03-25 RX ADMIN — SEVELAMER CARBONATE 800 MG: 800 TABLET, FILM COATED ORAL at 09:21

## 2024-03-25 RX ADMIN — FUROSEMIDE 10 MG/HR: 10 INJECTION, SOLUTION INTRAMUSCULAR; INTRAVENOUS at 15:24

## 2024-03-25 RX ADMIN — POTASSIUM CHLORIDE 20 MEQ: 1500 TABLET, EXTENDED RELEASE ORAL at 21:20

## 2024-03-25 RX ADMIN — AMOXICILLIN 250 MG: 250 CAPSULE ORAL at 21:20

## 2024-03-25 RX ADMIN — SEVELAMER CARBONATE 800 MG: 800 TABLET, FILM COATED ORAL at 12:08

## 2024-03-25 RX ADMIN — SODIUM CHLORIDE, PRESERVATIVE FREE 10 ML: 5 INJECTION INTRAVENOUS at 09:22

## 2024-03-25 RX ADMIN — AMOXICILLIN 250 MG: 250 CAPSULE ORAL at 12:07

## 2024-03-25 RX ADMIN — POTASSIUM BICARBONATE 40 MEQ: 782 TABLET, EFFERVESCENT ORAL at 09:21

## 2024-03-25 RX ADMIN — DILTIAZEM HYDROCHLORIDE 120 MG: 120 CAPSULE, EXTENDED RELEASE ORAL at 09:21

## 2024-03-25 RX ADMIN — SODIUM CHLORIDE, PRESERVATIVE FREE 10 ML: 5 INJECTION INTRAVENOUS at 21:19

## 2024-03-25 RX ADMIN — ALBUMIN (HUMAN) 25 G: 0.25 INJECTION, SOLUTION INTRAVENOUS at 14:06

## 2024-03-25 RX ADMIN — SEVELAMER CARBONATE 800 MG: 800 TABLET, FILM COATED ORAL at 18:00

## 2024-03-25 RX ADMIN — LEVOTHYROXINE SODIUM 137.5 MCG: 0.07 TABLET ORAL at 06:38

## 2024-03-25 RX ADMIN — PREDNISONE 60 MG: 20 TABLET ORAL at 09:20

## 2024-03-25 RX ADMIN — PANTOPRAZOLE SODIUM 40 MG: 40 TABLET, DELAYED RELEASE ORAL at 06:38

## 2024-03-25 RX ADMIN — POTASSIUM CHLORIDE 20 MEQ: 1500 TABLET, EXTENDED RELEASE ORAL at 14:06

## 2024-03-25 RX ADMIN — FERROUS SULFATE TAB 325 MG (65 MG ELEMENTAL FE) 325 MG: 325 (65 FE) TAB at 18:00

## 2024-03-25 RX ADMIN — ALBUMIN (HUMAN) 25 G: 0.25 INJECTION, SOLUTION INTRAVENOUS at 21:19

## 2024-03-25 RX ADMIN — FERROUS SULFATE TAB 325 MG (65 MG ELEMENTAL FE) 325 MG: 325 (65 FE) TAB at 09:21

## 2024-03-25 RX ADMIN — ALBUMIN (HUMAN) 25 G: 0.25 INJECTION, SOLUTION INTRAVENOUS at 09:15

## 2024-03-25 NOTE — CONSULTS
[facility]  (A Division of [facility])      Consultation Note      Date of Admission: 3/19/2024      Date of Note: 3/25/2024      Reason for Referral: leukocytosis        Current Antimicrobials:    Prior Antimicrobials:  CAX IV (3/19-) #6 Vancomycin IV (3/19-24) #5       Assessment: Rec / Plan:   Erysipelas  (Probably) on top of his nephrotic/LE swelling last few weeks - goes along with his presenting chills/leukocytosis that has resolved this admission on CAX/steroids.  Give him one more dose CAX today (can move it up if ready for DC this afternoon) - start short-course 5d renal-dosed PO amox to finish ->CAX #6    I stopped the vancomycin last night (skulking from home).  Can safely switch to PO amox 250mg PO bid for 5d to finish; if he goes home today, give one more dose of IV CAX early afternoon/now.    Should do fine.  Thanks for consult   WERO/Nephrotic Syndrome    FSGS    Bladder CA/CaP    Superficial clot L arm          Microbiology:  3/23 - MRSA screen sent     3/19 - UA (-)      BCx x2 (-)      Lines / Catheters: peripheral      HPI:  CC:  \"I'm much better\"  Mr Mayorga is a 69y WM with underlying Bladder CA/CaP who was admitted 3/19 for generalized malaise and bilat leg swelling found to be WERO/nephrotic syndrome due to FSGS.  With that admission, he noted chills PTA and admitted with marked leukocytosis that has responded nicely over the last week (despite continued big-boy dosing steroids).  Feels good today.  Still has a little pain on dorsum R foot (where all this started).    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 *    Past Medical History:   Diagnosis Date    Arthritis          ROS:  Constitutional: negative for chills, fevers, and sweats  Respiratory: negative for cough and dyspnea on exertion  Cardiovascular: negative for chest pain and dyspnea  Gastrointestinal: negative for abdominal pain and diarrhea     Physical Exam:    Temp (24hrs), Av.7 °F (36.5 °C), Min:97.2 °F (36.2 °C), Max:98.3 °F (36.8 °C)    /70   Pulse 70   Temp 98.3 °F (36.8 °C) (Oral)   Resp 19   Ht 1.702 m (5' 7.01\")   Wt 91 kg (200 lb 9.9 oz)   SpO2 100%   BMI 31.41 kg/m²     General: Well developed, well nourished 69 y.o. White (non-) male in no acute distress.  ENT: ENT exam normal, no neck nodes or sinus tenderness  Head: NC/AT  Mouth:  mucous membranes moist, pharynx normal without lesions  Neck: Supple   Cardio:  RRR  Chest: inspection normal - no chest wall deformities or tenderness, respiratory effort normal  Lungs: clear to auscultation, no wheezes or rales, and unlabored breathing  Abdomen: soft, non-tender. Bowel sounds normal. No masses, no organomegaly.  Extremities:  no redness or tenderness in the calves or thighs, edema 3+, R foot dorsum well-demarcated erythema  Neuro: intact       Lab results:    Chemistry  Recent Labs     24  0549 24  0206 24  0511   * 145 149*   K 4.0 3.6 3.0*   * 113* 116*   CO2 18* 20* 20*   * 154* 153*       CBC w/ Diff  Recent Labs     24  0206 24  2258 24  0511   WBC 9.8  --  10.3   RBC 2.12*  --  2.37*   HGB 6.3* 7.4* 7.2*   HCT 19.6* 22.0* 21.7*   *  --  127*       Microbiology  Results       Procedure Component Value Units Date/Time    Culture, MRSA, Screening [[id number]] Collected: 24    Order Status: Sent Specimen: Nares Updated: 24    Culture, Urine [[id number]] Collected: 24 1734    Order Status: Completed Specimen: Urine, clean catch Updated: 24 8194     Special Requests NO SPECIAL REQUESTS        [place] count --        >100,000  COLONIES/mL

## 2024-03-25 NOTE — PLAN OF CARE
Problem: Pain  Goal: Verbalizes/displays adequate comfort level or baseline comfort level  3/25/2024 0223 by Trudy Lopez RN  Outcome: Progressing  3/24/2024 1541 by Charu Sal RN  Outcome: Progressing     Problem: Safety - Adult  Goal: Free from fall injury  3/25/2024 0223 by Trudy Lopez RN  Outcome: Progressing  3/24/2024 1541 by Charu Sal RN  Outcome: Progressing  Flowsheets (Taken 3/24/2024 0417 by Marcelina Parker RN)  Free From Fall Injury: Based on caregiver fall risk screen, instruct family/caregiver to ask for assistance with transferring infant if caregiver noted to have fall risk factors     Problem: Skin/Tissue Integrity  Goal: Absence of new skin breakdown  Description: 1.  Monitor for areas of redness and/or skin breakdown  2.  Assess vascular access sites hourly  3.  Every 4-6 hours minimum:  Change oxygen saturation probe site  4.  Every 4-6 hours:  If on nasal continuous positive airway pressure, respiratory therapy assess nares and determine need for appliance change or resting period.  3/24/2024 1541 by Charu Sal, RN  Outcome: Progressing

## 2024-03-25 NOTE — PROGRESS NOTES
CM notes ID consult placed to determine appropriate ABX therapy. CM notes anasarca slowly improving.

## 2024-03-25 NOTE — PROGRESS NOTES
Hospitalist Progress Note-critical care note     Patient: Efrain Mayorga MRN: [id number]  [facility]: [id number]    YOB: 1955  Age: 69 y.o.  Sex: male    DOA: 3/19/2024 LOS:  LOS: 6 days            Chief complaint: cellulitis and anasarca, anemia nephrotic syndrome     Assessment/Plan         Active Hospital Problems    Diagnosis Date Noted    Cellulitis [L03.90] 03/19/2024    History of urostomy [Z98.890] 03/19/2024    Nephrotic syndrome [N04.9] 03/12/2024    Anasarca [R60.1] 03/09/2024    Hypoalbuminemia [E88.09] 08/30/2023    Prostate cancer (HCC) [C61] 04/07/2023    Anemia [D64.9] 03/22/2023    Malignant neoplasm of urinary bladder (HCC) [C67.9] 11/03/2022        Anasarca, due to nephrotic syndrome- continue improving slowly still swelling   Continue albumin and Lasix gtt   tsh  wnl      Nephrotic syndrome-biopsy FSGS   On prednisone 60 mg  Nephrology seeing pt recommend to continue lasix and prednisone   Continue aspirin daily  Cr mild elevated     Hypokalemia  K replacement , will give 60 meq today , continue monitoring      Cellulitis history of left arm-improving   Wbc 13  K stable now due to steroid and infection   PVL no DVT, superficial thrombosis  ID seeing pt and recommend po amoxicillin     Prostate cancer, bladder cancer  Seeing Dr. Debbie MCCLAIN oncologist  Received immunotherapy before, no active treatment right now  s/p   1. Radical cystoprostatectomy  2. Extended pelvic lymph node dissection  3. Ileal conduit urinary diversion with single-J stents bilaterally   On 03/28/2023  Urostomy bag noted urine clear     Anemia  Due to chronic illness  No bleeding reported   Will transfuse   Due to high risk for thrombosis -will continue heparin for dvt prophy except acute bleeding noted   Occult stool -no bm so far -usually 2-3 days having bm    Consent obtained      Hypertension  Well controlled      CAD  No acute issues     Hyperlipidemia  Continue statin      Subjective  fine , still swelling in  Portable AP view of the chest. Direct comparison made to prior chest x-ray dated 3/2017. Cardiomediastinal silhouette is stable. Central venous port catheter extends to the proximal SVC. Lungs are clear bilaterally. Pleural spaces are normal and there is no pneumothorax. Osseous structures are intact.     No acute cardiopulmonary disease.       ROCAEL ARRINGTON MD

## 2024-03-25 NOTE — PROGRESS NOTES
Redressed left arm.  Some weeping noted.  Blood blister to inner arm covered with mediplex.  Dressed with Abd and kerlex wrap.  Pt tolerated well.

## 2024-03-25 NOTE — PROGRESS NOTES
Blood drawn for H&H post blood transfusion    2309 H&H of 7.4 & 22.8 per Julius from Lab    2330 Verbal shift change report given to Trudy Lopez RN (oncoming nurse) by MADDIE MAURICE RN   (offgoing nurse). Report included the following information Nurse Handoff Report, Adult Overview, Intake/Output, MAR, Recent Results, and Quality Measures.

## 2024-03-25 NOTE — PROGRESS NOTES
2327  Bedside and verbal shift change report given to JHON Lopez RN (on coming nurse) by JUSTIN Santiago (off going nurse). Report included the following information SBAR, Kardex, Intake/Output and MAR.  Per previous RN, the blood transfusion got completed on 2100.    0029  Pt is resting quietly.    0725  Bedside and verbal shift change report given to JUSTIN Gonzalez (on coming nurse) by JHON Lopez RN (off going nurse). Report included the following information SBAR, Kardex, Intake/Output and MAR.  Occult blood sample sent to lab.

## 2024-03-25 NOTE — PROGRESS NOTES
Nephrology Progress Note      HPI:   Efrain Mayorga is a 69 y.o. male  with a PMHx of recently diagnosed nephrotic syndrome sec to FSGS, CKD, HTN, CAD, Urothelial carcinoma of the bladder and prostate ca s/p cystectomy/prostatectomy and ilial conduit who was recently discharged from the hospital now came to the ER complaining of LUE swelling and pain. He also contiue to have LE edema. Recent kidney Biopsy done while he was admitted showed Tip variant FSGS and started on Prednisone by his nephrologist, Dr Newton/[facility].  On initial eval labs showed Cr 3.0 and leucocytosis. During recent hospital stay his Cr was 2.9-3.2, from from baseline of  1.2 in Dec 2023. PVL showed LUE acute superficial vein thrombosis, but no DVT. Pt currently w/o SOB, CP or fever.     -Pt continues to have U&L extremity swelling. UOP 1.6L, on Lasix drip. 3.3-3.5L/day prior to that. Serum BUN, Na up and K down.     Impression:   -Primary FSGS: recently bx proven, on high dose Pred  -Nephrotic syndrome sec to FSGS  -Acute Kidney injury; sec to FSGS, Cr up with diuresis, 3.6 today. Recent baseline Cr 1.2 (Dec 23). Elevated BUN likely sec to IV dehydration and prednisone use  -HTN  -Anasarca/Hypoalbuminemiasec to nephrotic syndrome  -Hypernatremia, recurrent  -Hypokalemia   -Hyperphosphatemia: started on renvela 800mg tid wm  -Anemia: s/p prbc tx, on Ferrous sulphate po  -Lt UExt superficial vein thrombosis   -Urothelial carcinoma of the bladder and prostate ca s/p cystectomy, prostatectomy and ilial conduit    -CAD  -Met acidosis: improving on po NaBicarb   -Leucocytosis likely sec to high dose steroid, vs ?infection         Plan:   -D/C IV Lasix gtt and Metolazone for now due to electrolyte disturbance   -Cont current dose of Pred  -Replace K  -Cont to hold po bicarb   -Cont epogen  -cont IV Alb Q6hrs  -blood transfusion, monitor H&H  -Low Na diet  -Strict I/Os  -Avoid Nephrotoxins  -No urgent indication for RRT  -No ACEI/ARB due to recent

## 2024-03-26 LAB
ANION GAP SERPL CALC-SCNC: 12 MMOL/L (ref 3–18)
BACTERIA SPEC CULT: NORMAL
BACTERIA SPEC CULT: NORMAL
BASOPHILS # BLD: 0 K/UL (ref 0–0.1)
BASOPHILS NFR BLD: 0 % (ref 0–2)
BUN SERPL-MCNC: 164 MG/DL (ref 7–18)
BUN/CREAT SERPL: 43 (ref 12–20)
CALCIUM SERPL-MCNC: 8.5 MG/DL (ref 8.5–10.1)
CHLORIDE SERPL-SCNC: 117 MMOL/L (ref 100–111)
CO2 SERPL-SCNC: 20 MMOL/L (ref 21–32)
CREAT SERPL-MCNC: 3.84 MG/DL (ref 0.6–1.3)
DIFFERENTIAL METHOD BLD: ABNORMAL
EOSINOPHIL # BLD: 0 K/UL (ref 0–0.4)
EOSINOPHIL NFR BLD: 0 % (ref 0–5)
ERYTHROCYTE [DISTWIDTH] IN BLOOD BY AUTOMATED COUNT: 15.6 % (ref 11.6–14.5)
GLUCOSE SERPL-MCNC: 138 MG/DL (ref 74–99)
HCT VFR BLD AUTO: 22 % (ref 36–48)
HGB BLD-MCNC: 7.3 G/DL (ref 13–16)
IMM GRANULOCYTES # BLD AUTO: 0.1 K/UL (ref 0–0.04)
IMM GRANULOCYTES NFR BLD AUTO: 1 % (ref 0–0.5)
LYMPHOCYTES # BLD: 0.3 K/UL (ref 0.9–3.6)
LYMPHOCYTES NFR BLD: 3 % (ref 21–52)
MAGNESIUM SERPL-MCNC: 2 MG/DL (ref 1.6–2.6)
MCH RBC QN AUTO: 30.3 PG (ref 24–34)
MCHC RBC AUTO-ENTMCNC: 33.2 G/DL (ref 31–37)
MCV RBC AUTO: 91.3 FL (ref 78–100)
MONOCYTES # BLD: 0.9 K/UL (ref 0.05–1.2)
MONOCYTES NFR BLD: 9 % (ref 3–10)
NEUTS SEG # BLD: 8.6 K/UL (ref 1.8–8)
NEUTS SEG NFR BLD: 87 % (ref 40–73)
NRBC # BLD: 0 K/UL (ref 0–0.01)
NRBC BLD-RTO: 0 PER 100 WBC
PLATELET # BLD AUTO: 135 K/UL (ref 135–420)
PMV BLD AUTO: 10.7 FL (ref 9.2–11.8)
POTASSIUM SERPL-SCNC: 3 MMOL/L (ref 3.5–5.5)
RBC # BLD AUTO: 2.41 M/UL (ref 4.35–5.65)
SERVICE CMNT-IMP: NORMAL
SERVICE CMNT-IMP: NORMAL
SODIUM SERPL-SCNC: 149 MMOL/L (ref 136–145)
WBC # BLD AUTO: 9.9 K/UL (ref 4.6–13.2)

## 2024-03-26 PROCEDURE — 85025 COMPLETE CBC W/AUTO DIFF WBC: CPT

## 2024-03-26 PROCEDURE — 6360000002 HC RX W HCPCS: Performed by: HOSPITALIST

## 2024-03-26 PROCEDURE — 6370000000 HC RX 637 (ALT 250 FOR IP): Performed by: INTERNAL MEDICINE

## 2024-03-26 PROCEDURE — 1100000003 HC PRIVATE W/ TELEMETRY

## 2024-03-26 PROCEDURE — 83735 ASSAY OF MAGNESIUM: CPT

## 2024-03-26 PROCEDURE — 80048 BASIC METABOLIC PNL TOTAL CA: CPT

## 2024-03-26 PROCEDURE — 36415 COLL VENOUS BLD VENIPUNCTURE: CPT

## 2024-03-26 PROCEDURE — 6370000000 HC RX 637 (ALT 250 FOR IP): Performed by: HOSPITALIST

## 2024-03-26 PROCEDURE — P9047 ALBUMIN (HUMAN), 25%, 50ML: HCPCS | Performed by: INTERNAL MEDICINE

## 2024-03-26 PROCEDURE — 6360000002 HC RX W HCPCS: Performed by: INTERNAL MEDICINE

## 2024-03-26 PROCEDURE — 2580000003 HC RX 258: Performed by: HOSPITALIST

## 2024-03-26 RX ORDER — POTASSIUM CHLORIDE 20 MEQ/1
40 TABLET, EXTENDED RELEASE ORAL 2 TIMES DAILY
Status: DISCONTINUED | OUTPATIENT
Start: 2024-03-26 | End: 2024-03-26

## 2024-03-26 RX ORDER — POTASSIUM CHLORIDE 20 MEQ/1
40 TABLET, EXTENDED RELEASE ORAL 2 TIMES DAILY
Status: COMPLETED | OUTPATIENT
Start: 2024-03-26 | End: 2024-03-27

## 2024-03-26 RX ADMIN — HEPARIN SODIUM 5000 UNITS: 5000 INJECTION INTRAVENOUS; SUBCUTANEOUS at 14:45

## 2024-03-26 RX ADMIN — FERROUS SULFATE TAB 325 MG (65 MG ELEMENTAL FE) 325 MG: 325 (65 FE) TAB at 17:29

## 2024-03-26 RX ADMIN — DILTIAZEM HYDROCHLORIDE 120 MG: 120 CAPSULE, EXTENDED RELEASE ORAL at 08:44

## 2024-03-26 RX ADMIN — SEVELAMER CARBONATE 800 MG: 800 TABLET, FILM COATED ORAL at 11:33

## 2024-03-26 RX ADMIN — ALBUMIN (HUMAN) 25 G: 0.25 INJECTION, SOLUTION INTRAVENOUS at 08:46

## 2024-03-26 RX ADMIN — HEPARIN SODIUM 5000 UNITS: 5000 INJECTION INTRAVENOUS; SUBCUTANEOUS at 21:30

## 2024-03-26 RX ADMIN — ALBUMIN (HUMAN) 25 G: 0.25 INJECTION, SOLUTION INTRAVENOUS at 04:00

## 2024-03-26 RX ADMIN — PREDNISONE 60 MG: 20 TABLET ORAL at 08:45

## 2024-03-26 RX ADMIN — ALBUMIN (HUMAN) 25 G: 0.25 INJECTION, SOLUTION INTRAVENOUS at 14:45

## 2024-03-26 RX ADMIN — METOLAZONE 5 MG: 5 TABLET ORAL at 08:45

## 2024-03-26 RX ADMIN — PANTOPRAZOLE SODIUM 40 MG: 40 TABLET, DELAYED RELEASE ORAL at 06:04

## 2024-03-26 RX ADMIN — SODIUM CHLORIDE, PRESERVATIVE FREE 10 ML: 5 INJECTION INTRAVENOUS at 08:45

## 2024-03-26 RX ADMIN — SEVELAMER CARBONATE 800 MG: 800 TABLET, FILM COATED ORAL at 17:29

## 2024-03-26 RX ADMIN — SODIUM CHLORIDE, PRESERVATIVE FREE 10 ML: 5 INJECTION INTRAVENOUS at 20:57

## 2024-03-26 RX ADMIN — POTASSIUM CHLORIDE 40 MEQ: 1500 TABLET, EXTENDED RELEASE ORAL at 20:56

## 2024-03-26 RX ADMIN — FERROUS SULFATE TAB 325 MG (65 MG ELEMENTAL FE) 325 MG: 325 (65 FE) TAB at 08:44

## 2024-03-26 RX ADMIN — SEVELAMER CARBONATE 800 MG: 800 TABLET, FILM COATED ORAL at 08:45

## 2024-03-26 RX ADMIN — AMOXICILLIN 250 MG: 250 CAPSULE ORAL at 20:56

## 2024-03-26 RX ADMIN — ALBUMIN (HUMAN) 25 G: 0.25 INJECTION, SOLUTION INTRAVENOUS at 20:57

## 2024-03-26 RX ADMIN — POTASSIUM CHLORIDE 20 MEQ: 1500 TABLET, EXTENDED RELEASE ORAL at 08:45

## 2024-03-26 RX ADMIN — AMOXICILLIN 250 MG: 250 CAPSULE ORAL at 08:45

## 2024-03-26 RX ADMIN — ATORVASTATIN CALCIUM 40 MG: 20 TABLET, FILM COATED ORAL at 08:45

## 2024-03-26 NOTE — PROGRESS NOTES
Comprehensive Nutrition Assessment      Type and Reason for Visit:  Initial, RD Nutrition Re-Screen/LOS (LOS)    Nutrition Recommendations/Plan:   Diet as ordered -- recommend fluid restricted diet  Monitor wt/fluid status for trend,  Continue to monitor tolerance of PO, compliance of oral supplements, weight, labs (K, Na, Phos), and plan of care during admission.           Nutrition History and Allergies:   NKFA. Hx of CAD, hypertension, prostate bladder cancer, with urostomy bag, nephrotic syndrome presented to ER due to worsening sweating. Recently discharged home with Lasix, and prednisone. Pt noticed that his swelling came back for 1 week, also noted left arm becomes swelling and painful. Wt hx: 3/22/23 156lb, 7/5/23 155lb, 1/3/24 164lb, 3/7/24 190lb, 3/13/24 193.3lb.    Nutrition Assessment:    Efrain Mayorga is a 69 y.o. gentleman admitted on 3/26 for anasarca r/t worsen renal function. Pt been followed by Nephrology.  Per visit pt is sitting in chair, alert and oriented, but seems weak.   Siginificant wt fluid gain noticed over the past 2-3 months: +22.3% (~36lb).  Current on Renal diet -  po intake %. Pt shares have no issue with current diet and that he is trying to only take sips of water.    Noted elevated sodium and phos -- continues on low Na+ diet and on sevelamer     Nutrition Related Findings:    Pertinent meds: prednisone, levothyroxine, lipitor, PPI, NaCl, sevelamer.   Pertinent labs: eGFR 16, BUN/crea 42, Na+ 149 (trending up), K+ 3 (trending down), Phos 7.2 (trending up)  Wound Type: None     Last BM (including prior to admit): 03/25/24  Edema: Generalized, Right upper extremity, Left lower extremity, perineal, sacral    Current Nutrition Intake & Therapies:    Average Meal Intake: %     ADULT DIET; Regular; Low Sodium (2 gm); Low Potassium (Less than 3000 mg/day); Low Phosphorus (Less than 1000 mg)     Anthropometric Measures:  Height: 170.2 cm (5' 7.01\")  Ideal Body Weight (IBW):

## 2024-03-26 NOTE — PROGRESS NOTES
[facility]  (A Division of [facility])    Follow-up Note  Dr Meyer will be back Wednesday morning.        Date of Admission: 3/19/2024       Date of Note:  3/26/2024    Summary:  Mr Mayorga is a 69y WM with underlying Bladder CA/CaP who was admitted 3/19 for generalized malaise and bilat leg swelling found to be WERO/nephrotic syndrome due to FSGS.  With that admission, he noted chills PTA and admitted with marked leukocytosis that has responded nicely over the last week (despite continued big-boy dosing steroids).  Feels good today.  Still has a little pain on dorsum R foot (where all this started).    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  Has a lot of questions about his kidneys  Foot feels less tender.    Tm <100  WBC 9.9k without L shift    Current Antimicrobials:    Prior Antimicrobials:  Amox PO (3/25-) #1 Vancomycin IV (3/19-24) #5   CAX IV (3/19-25) #7       Assessment: Rec / Plan:   Erysipelas  Better/better - less demarcation/redness to dorsum of R foot (I think was epicenter).  Ok to DC home anytime per ID ->abx #7    Would send home with refills on his amox as this likely to recur in future given his chronic lymphoedema/stasis  Dr Meyer here tomorrow if needed.   Superficial clot L arm     WERO/Nephrotic Syndrome     FSGS     Bladder CA/CaP         Microbiology:             3/23 - MRSA (-)                                      3/19 - UA (-)                                                  BCx x2 (-)        Lines / Catheters:      peripheral        Patient Active Problem List   Diagnosis    NSTEMI (non-ST elevated myocardial infarction) (HCC)    Hyperlipidemia    Chest pain    Arteriosclerosis of coronary artery    Malignant neoplasm of urinary bladder (HCC)    Anemia    Fatigue    Prostate cancer (HCC)    Primary malignant neoplasm of prostate

## 2024-03-26 NOTE — PROGRESS NOTES
superficial vein thrombosis, but no DVT. Pt currently w/o SOB, CP or fever.      -Pt continues to have U&L extremity swelling  -Lasix drip held due to increase in serum Sodium and BUN.  -Denies SOB    Principal Problem:    Anasarca  Active Problems:    Malignant neoplasm of urinary bladder (HCC)    Anemia    Prostate cancer (HCC)    Hypoalbuminemia    Nephrotic syndrome    Cellulitis    History of urostomy    Hypokalemia  Resolved Problems:    * No resolved hospital problems. *    Current Facility-Administered Medications   Medication Dose Route Frequency    potassium chloride (KLOR-CON M) extended release tablet 40 mEq  40 mEq Oral BID    amoxicillin (AMOXIL) capsule 250 mg  250 mg Oral 2 times per day    Epoetin Marco-epbx (RETACRIT) injection 20,000 Units  20,000 Units SubCUTAneous Weekly    sevelamer (RENVELA) tablet 800 mg  800 mg Oral TID WC    0.9 % sodium chloride infusion   IntraVENous PRN    albumin human 25% IV solution 25 g  25 g IntraVENous Q6H    metOLazone (ZAROXOLYN) tablet 5 mg  5 mg Oral Daily    [Held by provider] polyethylene glycol (GLYCOLAX) packet 17 g  17 g Oral Daily    ferrous sulfate (IRON 325) tablet 325 mg  325 mg Oral BID WC    0.9 % sodium chloride infusion   IntraVENous PRN    sodium chloride flush 0.9 % injection 5-40 mL  5-40 mL IntraVENous 2 times per day    sodium chloride flush 0.9 % injection 5-40 mL  5-40 mL IntraVENous PRN    0.9 % sodium chloride infusion   IntraVENous PRN    heparin (porcine) injection 5,000 Units  5,000 Units SubCUTAneous 3 times per day    ondansetron (ZOFRAN-ODT) disintegrating tablet 4 mg  4 mg Oral Q8H PRN    Or    ondansetron (ZOFRAN) injection 4 mg  4 mg IntraVENous Q6H PRN    polyethylene glycol (GLYCOLAX) packet 17 g  17 g Oral Daily PRN    acetaminophen (TYLENOL) tablet 650 mg  650 mg Oral Q6H PRN    Or    acetaminophen (TYLENOL) suppository 650 mg  650 mg Rectal Q6H PRN    atorvastatin (LIPITOR) tablet 40 mg  40 mg Oral Daily    levothyroxine  MD, MPH FACP  [facility]  [phone]

## 2024-03-26 NOTE — PROGRESS NOTES
Hospitalist Progress Note-critical care note     Patient: Efrain Mayorga MRN: [id number]  [facility]: [id number]    YOB: 1955  Age: 69 y.o.  Sex: male    DOA: 3/19/2024 LOS:  LOS: 7 days            Chief complaint: cellulitis and anasarca, anemia nephrotic syndrome     Assessment/Plan         Active Hospital Problems    Diagnosis Date Noted    Hypokalemia [E87.6] 03/25/2024    Cellulitis [L03.90] 03/19/2024    History of urostomy [Z98.890] 03/19/2024    Nephrotic syndrome [N04.9] 03/12/2024    Anasarca [R60.1] 03/09/2024    Hypoalbuminemia [E88.09] 08/30/2023    Prostate cancer (HCC) [C61] 04/07/2023    Anemia [D64.9] 03/22/2023    Malignant neoplasm of urinary bladder (HCC) [C67.9] 11/03/2022        Anasarca, due to nephrotic syndrome- continue improving slowly still swelling   Continue albumin and Lasix gtt hold   tsh  wnl      Nephrotic syndrome-biopsy FSGS   On prednisone 60 mg  Nephrology seeing pt recommend to continue  prednisone   Continue aspirin daily  Cr mild elevated     Hypokalemia  K replacement ,  continue monitoring      Cellulitis history of left arm-improving   Wbc 13  K stable now due to steroid and infection   PVL no DVT, superficial thrombosis  ID seeing pt and recommend po amoxicillin     Prostate cancer, bladder cancer  Seeing Dr. Debbie MCCLAIN oncologist  Received immunotherapy before, no active treatment right now  s/p   1. Radical cystoprostatectomy  2. Extended pelvic lymph node dissection  3. Ileal conduit urinary diversion with single-J stents bilaterally   On 03/28/2023  Urostomy bag noted urine clear     Anemia  Due to chronic illness  No bleeding reported   Due to high risk for thrombosis -will continue heparin for dvt prophy except acute bleeding noted     Iron and epogen      Hypertension  Well controlled      CAD  No acute issues     Hyperlipidemia  Continue statin      Subjective  fine ,      continue  iv albumin. Heparin for dvt   TIME: E/M Time spent with patient and  guidance, the introducer needle was advanced into the lower pole of the left kidney and through this 3 core specimens were obtained. These were submitted directly to the on-site pathologist and tissue sample adequacy confirmed.  Gelfoam slurry was injected as the needle was removed. Pressure was applied locally at the biopsy site.  A dry sterile dressing was applied.  There were no immediate complications.  The patient tolerated the procedure without difficulty. SEDATION: Moderate intravenous conscious sedation was supervised by Dr. Min Blanc.  The patient was independently monitored by a registered nurse assigned to the Department of Radiology using automated blood pressure, ECG and pulse oximetry.  The detailed conscious sedation record is stored in the hospital information system. Medication: Versed:   1  mg Fentanyl:   50  mcg Intraprocedure time:   20  minutes ESTIMATED BLOOD LOSS:  < 5 ml SPECIMENS:  Cores sent for pathology DLP:  1097.17  mGy*cm     Technically successful CT guided core renal biopsy.  Pathology results are pending.    Vascular duplex lower extremity venous bilateral    Result Date: 3/7/2024    No evidence of deep vein thrombosis in the right lower extremity.   No evidence of deep vein thrombosis in the left lower extremity.     US RETROPERITONEAL COMPLETE    Result Date: 3/7/2024  EXAM: US RETROPERITONEAL COMPLETE INDICATION: WERO COMPARISON: CT chest abdomen and pelvis 12/11/2023 TECHNIQUE: Ultrasound of the kidneys and urinary bladder. FINDINGS: The right and left kidneys measure 10.6 and 11.7 cm, respectively. No hydronephrosis. There is no nephrolithiasis or renal mass. Kidneys are echogenic. The bladder is not well delineated,. Visualized aorta and IVC are within normal limits. And trace ascites.     Evidence of medical renal disease, without hydronephrosis.      XR CHEST PORTABLE    Result Date: 3/7/2024  INDICATION: Leg swelling. Portable AP view of the chest. Direct comparison made

## 2024-03-26 NOTE — PLAN OF CARE
Problem: Pain  Goal: Verbalizes/displays adequate comfort level or baseline comfort level  Outcome: Progressing  Flowsheets (Taken 3/25/2024 2100 by Birgit Caruso RN)  Verbalizes/displays adequate comfort level or baseline comfort level:   Encourage patient to monitor pain and request assistance   Assess pain using appropriate pain scale   Administer analgesics based on type and severity of pain and evaluate response   Implement non-pharmacological measures as appropriate and evaluate response     Problem: Risk for Elopement  Goal: Patient will not exit the unit/facility without proper excort  Outcome: Progressing  Flowsheets  Taken 3/26/2024 0926 by Kirsty Salvador, RN  Nursing Interventions for Elopement Risk:   Collaborate with family members/caregivers to mitigate the elopement risk   Assist with personal care needs such as toileting, eating, dressing, as needed to reduce the risk of wandering  Taken 3/25/2024 2100 by Birgit Caruso, RN  Nursing Interventions for Elopement Risk:   Collaborate with family members/caregivers to mitigate the elopement risk   Assist with personal care needs such as toileting, eating, dressing, as needed to reduce the risk of wandering     Problem: Safety - Adult  Goal: Free from fall injury  Outcome: Progressing     Problem: ABCDS Injury Assessment  Goal: Absence of physical injury  Outcome: Progressing     Problem: Skin/Tissue Integrity  Goal: Absence of new skin breakdown  Description: 1.  Monitor for areas of redness and/or skin breakdown  2.  Assess vascular access sites hourly  3.  Every 4-6 hours minimum:  Change oxygen saturation probe site  4.  Every 4-6 hours:  If on nasal continuous positive airway pressure, respiratory therapy assess nares and determine need for appliance change or resting period.  Outcome: Progressing     Problem: Discharge Planning  Goal: Discharge to home or other facility with appropriate resources  Outcome: Progressing  Flowsheets (Taken  3/25/2024 2100 by Birgit Caruso RN)  Discharge to home or other facility with appropriate resources:   Identify barriers to discharge with patient and caregiver   Arrange for needed discharge resources and transportation as appropriate   Identify discharge learning needs (meds, wound care, etc)

## 2024-03-27 PROBLEM — N05.1 FOCAL SEGMENTAL GLOMERULOSCLEROSIS: Status: ACTIVE | Noted: 2024-03-27

## 2024-03-27 LAB
ALBUMIN SERPL-MCNC: 4.9 G/DL (ref 3.4–5)
ANION GAP SERPL CALC-SCNC: 15 MMOL/L (ref 3–18)
BASOPHILS # BLD: 0 K/UL (ref 0–0.1)
BASOPHILS NFR BLD: 0 % (ref 0–2)
BUN SERPL-MCNC: 148 MG/DL (ref 7–18)
BUN/CREAT SERPL: 41 (ref 12–20)
CALCIUM SERPL-MCNC: 8.9 MG/DL (ref 8.5–10.1)
CHLORIDE SERPL-SCNC: 115 MMOL/L (ref 100–111)
CO2 SERPL-SCNC: 20 MMOL/L (ref 21–32)
CREAT SERPL-MCNC: 3.57 MG/DL (ref 0.6–1.3)
DIFFERENTIAL METHOD BLD: ABNORMAL
EOSINOPHIL # BLD: 0 K/UL (ref 0–0.4)
EOSINOPHIL NFR BLD: 0 % (ref 0–5)
ERYTHROCYTE [DISTWIDTH] IN BLOOD BY AUTOMATED COUNT: 15.9 % (ref 11.6–14.5)
GLUCOSE SERPL-MCNC: 139 MG/DL (ref 74–99)
HCT VFR BLD AUTO: 22.1 % (ref 36–48)
HGB BLD-MCNC: 7.4 G/DL (ref 13–16)
IMM GRANULOCYTES # BLD AUTO: 0.1 K/UL (ref 0–0.04)
IMM GRANULOCYTES NFR BLD AUTO: 1 % (ref 0–0.5)
LYMPHOCYTES # BLD: 0.3 K/UL (ref 0.9–3.6)
LYMPHOCYTES NFR BLD: 3 % (ref 21–52)
MAGNESIUM SERPL-MCNC: 2 MG/DL (ref 1.6–2.6)
MCH RBC QN AUTO: 30.3 PG (ref 24–34)
MCHC RBC AUTO-ENTMCNC: 33.5 G/DL (ref 31–37)
MCV RBC AUTO: 90.6 FL (ref 78–100)
MONOCYTES # BLD: 0.8 K/UL (ref 0.05–1.2)
MONOCYTES NFR BLD: 7 % (ref 3–10)
NEUTS SEG # BLD: 9.7 K/UL (ref 1.8–8)
NEUTS SEG NFR BLD: 89 % (ref 40–73)
NRBC # BLD: 0.03 K/UL (ref 0–0.01)
NRBC BLD-RTO: 0.3 PER 100 WBC
PHOSPHATE SERPL-MCNC: 5.1 MG/DL (ref 2.5–4.9)
PLATELET # BLD AUTO: 135 K/UL (ref 135–420)
PMV BLD AUTO: 10.4 FL (ref 9.2–11.8)
POTASSIUM SERPL-SCNC: 3.4 MMOL/L (ref 3.5–5.5)
RBC # BLD AUTO: 2.44 M/UL (ref 4.35–5.65)
SODIUM SERPL-SCNC: 150 MMOL/L (ref 136–145)
WBC # BLD AUTO: 11 K/UL (ref 4.6–13.2)

## 2024-03-27 PROCEDURE — 36415 COLL VENOUS BLD VENIPUNCTURE: CPT

## 2024-03-27 PROCEDURE — 80069 RENAL FUNCTION PANEL: CPT

## 2024-03-27 PROCEDURE — P9047 ALBUMIN (HUMAN), 25%, 50ML: HCPCS | Performed by: INTERNAL MEDICINE

## 2024-03-27 PROCEDURE — 85025 COMPLETE CBC W/AUTO DIFF WBC: CPT

## 2024-03-27 PROCEDURE — 2580000003 HC RX 258: Performed by: HOSPITALIST

## 2024-03-27 PROCEDURE — 6370000000 HC RX 637 (ALT 250 FOR IP): Performed by: INTERNAL MEDICINE

## 2024-03-27 PROCEDURE — 83735 ASSAY OF MAGNESIUM: CPT

## 2024-03-27 PROCEDURE — 6360000002 HC RX W HCPCS: Performed by: INTERNAL MEDICINE

## 2024-03-27 PROCEDURE — 6370000000 HC RX 637 (ALT 250 FOR IP): Performed by: HOSPITALIST

## 2024-03-27 PROCEDURE — 1100000003 HC PRIVATE W/ TELEMETRY

## 2024-03-27 PROCEDURE — 6360000002 HC RX W HCPCS: Performed by: HOSPITALIST

## 2024-03-27 RX ADMIN — HEPARIN SODIUM 5000 UNITS: 5000 INJECTION INTRAVENOUS; SUBCUTANEOUS at 21:34

## 2024-03-27 RX ADMIN — PANTOPRAZOLE SODIUM 40 MG: 40 TABLET, DELAYED RELEASE ORAL at 08:28

## 2024-03-27 RX ADMIN — SODIUM CHLORIDE, PRESERVATIVE FREE 10 ML: 5 INJECTION INTRAVENOUS at 21:37

## 2024-03-27 RX ADMIN — SEVELAMER CARBONATE 800 MG: 800 TABLET, FILM COATED ORAL at 17:24

## 2024-03-27 RX ADMIN — ALBUMIN (HUMAN) 25 G: 0.25 INJECTION, SOLUTION INTRAVENOUS at 08:26

## 2024-03-27 RX ADMIN — ALBUMIN (HUMAN) 25 G: 0.25 INJECTION, SOLUTION INTRAVENOUS at 04:27

## 2024-03-27 RX ADMIN — POTASSIUM CHLORIDE 40 MEQ: 1500 TABLET, EXTENDED RELEASE ORAL at 08:27

## 2024-03-27 RX ADMIN — FERROUS SULFATE TAB 325 MG (65 MG ELEMENTAL FE) 325 MG: 325 (65 FE) TAB at 17:24

## 2024-03-27 RX ADMIN — SEVELAMER CARBONATE 800 MG: 800 TABLET, FILM COATED ORAL at 08:27

## 2024-03-27 RX ADMIN — SODIUM CHLORIDE, PRESERVATIVE FREE 10 ML: 5 INJECTION INTRAVENOUS at 08:28

## 2024-03-27 RX ADMIN — AMOXICILLIN 250 MG: 250 CAPSULE ORAL at 08:27

## 2024-03-27 RX ADMIN — PREDNISONE 60 MG: 20 TABLET ORAL at 08:26

## 2024-03-27 RX ADMIN — SEVELAMER CARBONATE 800 MG: 800 TABLET, FILM COATED ORAL at 14:26

## 2024-03-27 RX ADMIN — DILTIAZEM HYDROCHLORIDE 120 MG: 120 CAPSULE, EXTENDED RELEASE ORAL at 08:26

## 2024-03-27 RX ADMIN — AMOXICILLIN 250 MG: 250 CAPSULE ORAL at 21:34

## 2024-03-27 RX ADMIN — ALBUMIN (HUMAN) 25 G: 0.25 INJECTION, SOLUTION INTRAVENOUS at 14:26

## 2024-03-27 RX ADMIN — FERROUS SULFATE TAB 325 MG (65 MG ELEMENTAL FE) 325 MG: 325 (65 FE) TAB at 08:27

## 2024-03-27 RX ADMIN — HEPARIN SODIUM 5000 UNITS: 5000 INJECTION INTRAVENOUS; SUBCUTANEOUS at 14:26

## 2024-03-27 RX ADMIN — ATORVASTATIN CALCIUM 40 MG: 20 TABLET, FILM COATED ORAL at 08:26

## 2024-03-27 RX ADMIN — METOLAZONE 5 MG: 5 TABLET ORAL at 08:26

## 2024-03-27 ASSESSMENT — PAIN SCALES - GENERAL: PAINLEVEL_OUTOF10: 0

## 2024-03-27 NOTE — PROGRESS NOTES
CM visited patient at bedside and discussed restarting therapy to prevent deconditioning. Patient reported he is independent and does not need therapy. Patient then walked out into the hallway in an effort to \"prove\" to CM that he is strong and does not need therapy, despite CM insisting that was not necessary. Patient returned to room at CM request. Patient continues to decline therapy.

## 2024-03-27 NOTE — PLAN OF CARE
Problem: Pain  Goal: Verbalizes/displays adequate comfort level or baseline comfort level  Outcome: Progressing     Problem: Risk for Elopement  Goal: Patient will not exit the unit/facility without proper excort  Outcome: Progressing  Flowsheets (Taken 3/27/2024 0815)  Nursing Interventions for Elopement Risk:   Collaborate with family members/caregivers to mitigate the elopement risk   Assist with personal care needs such as toileting, eating, dressing, as needed to reduce the risk of wandering   Collaborate with treatment team for drug withdrawal symptoms treatment   Collaborate with treatment team for nicotine replacement   Communicate/escalate to charge nurse the risk of elopement   Make sure patient has all necessary personal care items   Communicate/escalate to /other team member the risk of elopement   Communicate/escalate to nursing supervisor the risk of elopement     Problem: Safety - Adult  Goal: Free from fall injury  Outcome: Progressing     Problem: ABCDS Injury Assessment  Goal: Absence of physical injury  Outcome: Progressing     Problem: Skin/Tissue Integrity  Goal: Absence of new skin breakdown  Description: 1.  Monitor for areas of redness and/or skin breakdown  2.  Assess vascular access sites hourly  3.  Every 4-6 hours minimum:  Change oxygen saturation probe site  4.  Every 4-6 hours:  If on nasal continuous positive airway pressure, respiratory therapy assess nares and determine need for appliance change or resting period.  Outcome: Progressing     Problem: Discharge Planning  Goal: Discharge to home or other facility with appropriate resources  Outcome: Progressing     Problem: Nutrition Deficit:  Goal: Optimize nutritional status  Outcome: Progressing

## 2024-03-27 NOTE — PLAN OF CARE
Problem: Pain  Goal: Verbalizes/displays adequate comfort level or baseline comfort level  3/26/2024 2043 by Birgit Caruso RN  Outcome: Progressing  3/26/2024 2042 by Birgit Caruso RN  Outcome: Progressing  Flowsheets (Taken 3/26/2024 2030)  Verbalizes/displays adequate comfort level or baseline comfort level:   Administer analgesics based on type and severity of pain and evaluate response   Assess pain using appropriate pain scale   Encourage patient to monitor pain and request assistance   Implement non-pharmacological measures as appropriate and evaluate response  3/26/2024 0937 by Kirsty Salvador RN  Outcome: Progressing  Flowsheets (Taken 3/25/2024 2100 by Birgit Caruso RN)  Verbalizes/displays adequate comfort level or baseline comfort level:   Encourage patient to monitor pain and request assistance   Assess pain using appropriate pain scale   Administer analgesics based on type and severity of pain and evaluate response   Implement non-pharmacological measures as appropriate and evaluate response     Problem: Risk for Elopement  Goal: Patient will not exit the unit/facility without proper excort  3/26/2024 2043 by Birgit Caruso RN  Outcome: Progressing  3/26/2024 2042 by Birgit Caruso RN  Outcome: Progressing  Flowsheets (Taken 3/26/2024 2030)  Nursing Interventions for Elopement Risk:   Collaborate with family members/caregivers to mitigate the elopement risk   Assist with personal care needs such as toileting, eating, dressing, as needed to reduce the risk of wandering  3/26/2024 0937 by Kirsty Salvador RN  Outcome: Progressing  Flowsheets  Taken 3/26/2024 0926 by Kirsty Salvador RN  Nursing Interventions for Elopement Risk:   Collaborate with family members/caregivers to mitigate the elopement risk   Assist with personal care needs such as toileting, eating, dressing, as needed to reduce the risk of wandering  Taken 3/25/2024 2100 by Birgit Caruso RN  Nursing Interventions for

## 2024-03-27 NOTE — PLAN OF CARE
Problem: Pain  Goal: Verbalizes/displays adequate comfort level or baseline comfort level  3/26/2024 2042 by Birgit Caruso RN  Outcome: Progressing  Flowsheets (Taken 3/26/2024 2030)  Verbalizes/displays adequate comfort level or baseline comfort level:   Administer analgesics based on type and severity of pain and evaluate response   Assess pain using appropriate pain scale   Encourage patient to monitor pain and request assistance   Implement non-pharmacological measures as appropriate and evaluate response  3/26/2024 0937 by Kirsty Salvador RN  Outcome: Progressing  Flowsheets (Taken 3/25/2024 2100 by Birgit Caruso RN)  Verbalizes/displays adequate comfort level or baseline comfort level:   Encourage patient to monitor pain and request assistance   Assess pain using appropriate pain scale   Administer analgesics based on type and severity of pain and evaluate response   Implement non-pharmacological measures as appropriate and evaluate response     Problem: Risk for Elopement  Goal: Patient will not exit the unit/facility without proper excort  3/26/2024 2042 by Birgit Caruso RN  Outcome: Progressing  Flowsheets (Taken 3/26/2024 2030)  Nursing Interventions for Elopement Risk:   Collaborate with family members/caregivers to mitigate the elopement risk   Assist with personal care needs such as toileting, eating, dressing, as needed to reduce the risk of wandering  3/26/2024 0937 by Kirsty Salvador RN  Outcome: Progressing  Flowsheets  Taken 3/26/2024 0926 by Kirsty Salvador RN  Nursing Interventions for Elopement Risk:   Collaborate with family members/caregivers to mitigate the elopement risk   Assist with personal care needs such as toileting, eating, dressing, as needed to reduce the risk of wandering  Taken 3/25/2024 2100 by Birgit Caruso RN  Nursing Interventions for Elopement Risk:   Collaborate with family members/caregivers to mitigate the elopement risk   Assist with personal care needs  Progressing  3/26/2024 1536 by Yolande Summers RD  Flowsheets (Taken 3/26/2024 1536)  Nutrient intake appropriate for improving, restoring, or maintaining nutritional needs: Assess nutritional status and recommend course of action  Note: Adequate po intake and Electrolytes balance focus

## 2024-03-27 NOTE — PROGRESS NOTES
Nephrology Progress note    Efrain Mayorga  MRN: [id number]  : 1955    Admit Date: 3/19/2024        Impression:     -Primary FSGS: recently bx proven, on high dose Pred  -Nephrotic syndrome sec to FSGS  -Acute Kidney injury; sec to FSGS, Cr up with diuresis, 3.84 today. Recent baseline Cr 1.2 (Dec 23). 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 Would also hold Metolazone  -Cont current dose of Pred  -Replace K  -Cont to hold po bicarb   -Cont Epogen  -D/C  IV Albumin, Serum Alb. has improved 4.9 today  -Blood transfusion prn, monitor H&H  -Low Na diet  -Strict I/Os  -Avoid Nephrotoxins  -No urgent indication for RRT  -No ACEI/ARB due to recent Hyperkalemia  -Would likely need oral anticoagulation in view of increased risk of DVT in setting of Nephrotic Proteinuria >10gms and Hypoalbuminemia           History of Present Illness:  Efrain Mayorga is a 69 y.o. male   with a PMHx of recently diagnosed nephrotic syndrome sec to FSGS, CKD, HTN, CAD, Urothelial carcinoma of the bladder and prostate ca s/p cystectomy/prostatectomy and ilial conduit who was recently discharged from the hospital now came to the ER complaining of LUE swelling and pain. He also contiue to have LE edema. Recent kidney Biopsy done while he was admitted showed Tip variant FSGS and started on Prednisone by his nephrologist, Dr Newton/ZHOU.  On initial eval labs showed Cr 3.0 and leucocytosis. During recent hospital   40 mg Oral Daily    levothyroxine (SYNTHROID) tablet 137.5 mcg  137.5 mcg Oral Daily    [Held by provider] aspirin EC tablet 81 mg  81 mg Oral Daily    [Held by provider] sodium bicarbonate tablet 650 mg  650 mg Oral BID    pantoprazole (PROTONIX) tablet 40 mg  40 mg Oral QAM AC    dilTIAZem (CARDIZEM CD) extended release capsule 120 mg  120 mg Oral Daily    predniSONE (DELTASONE) tablet 60 mg  60 mg Oral Daily    oxyCODONE-acetaminophen (PERCOCET) 5-325 MG per tablet 1 tablet  1 tablet Oral Q4H PRN         Allergy:   No Known Allergies     Objective:     BP (!) 140/66   Pulse 66   Temp 97.5 °F (36.4 °C) (Oral)   Resp 18   Ht 1.702 m (5' 7.01\")   Wt 87.9 kg (193 lb 12.6 oz)   SpO2 100%   BMI 30.34 kg/m²       Intake/Output Summary (Last 24 hours) at 3/27/2024 1433  Last data filed at 3/27/2024 1200  Gross per 24 hour   Intake 240 ml   Output 1350 ml   Net -1110 ml         Physical Exam:       General: No acute distress   HENT: Atraumatic and normocephalic   Eyes: Sclera clear   Neck: Supple, No JVD   Cardiovascular: Normal S1 & S2, RRR, no m/r/g   Pulmonary/Chest Wall: Clear to auscultation bilaterally   Abdominal: Soft and non-tender, Bowel sounds normal   Musculoskeletal: +++ Pedal  edema bilaterally   Neurological: AA and O X 3. No focal deficits     Data Review:  Lab Results   Component Value Date/Time     03/27/2024 06:22 AM    K 3.4 03/27/2024 06:22 AM     03/27/2024 06:22 AM    CO2 20 03/27/2024 06:22 AM     03/27/2024 06:22 AM    CREATININE 3.57 03/27/2024 06:22 AM    GFRAA >60 02/02/2022 09:14 AM     Lab Results   Component Value Date/Time    WBC 11.0 03/27/2024 06:22 AM    HGB 7.4 03/27/2024 06:22 AM    HCT 22.1 03/27/2024 06:22 AM     03/27/2024 06:22 AM    MCV 90.6 03/27/2024 06:22 AM     Lab Results   Component Value Date/Time    PHOS 5.1 03/27/2024 06:22 AM     Lab Results   Component Value Date/Time    IRON 14 03/21/2024 12:30 PM    TIBC 48 03/21/2024 12:30 PM     No

## 2024-03-27 NOTE — PROGRESS NOTES
Wheezing/Rhonchi/Rales.  Heart:  S1 S2,  No murmur, No Rubs, No Gallops  Abdomen: Soft, Non distended, Non tender.  +Bowel sounds,   Extremities: Edema, left upper ext swelling.  Psych:   Not anxious or agitated.  Neurologic:  No acute neurological deficit.         Vital signs/Intake and Output:  Visit Vitals  /81   Pulse 70   Temp 98.2 °F (36.8 °C) (Oral)   Resp 18   Ht 1.702 m (5' 7.01\")   Wt 87.9 kg (193 lb 12.6 oz)   SpO2 99%   BMI 30.34 kg/m²     Current Shift:  03/27 0701 - 03/27 1900  In: -   Out: 1000 [Urine:1000]  Last three shifts:  03/25 1901 - 03/27 0700  In: -   Out: 1300 [Urine:1300]            Labs: Results:       Chemistry Recent Labs     03/25/24  0511 03/26/24  0526 03/27/24  0622   * 149* 150*   K 3.0* 3.0* 3.4*   * 117* 115*   CO2 20* 20* 20*   * 164* 148*   ,No results found for: \"LACTA\"   CBC w/Diff Recent Labs     03/25/24  0511 03/26/24  0526 03/27/24  0622   WBC 10.3 9.9 11.0   RBC 2.37* 2.41* 2.44*   HGB 7.2* 7.3* 7.4*   HCT 21.7* 22.0* 22.1*   * 135 135      Cardiac Enzymes Lab Results   Component Value Date    TROPHS 26 03/19/2024   ,No results found for: \"BNP\"   Coagulation No results for input(s): \"INR\", \"APTT\" in the last 72 hours.    Invalid input(s): \"PTP\"    Lipid Panel Lab Results   Component Value Date/Time    CHOL 239 03/11/2024 02:18 PM    HDL 40 03/11/2024 02:18 PM      Pancreas No results for input(s): \"LIPASE\" in the last 72 hours.,No results for input(s): \"AMYLASE\" in the last 72 hours.   Liver Enzymes No results for input(s): \"TP\", \"ALB\" in the last 72 hours.    Invalid input(s): \"TBIL\", \"AP\", \"SGOT\", \"GPT\", \"DBIL\"   Thyroid Studies No results found for: \"T4\", \"T3RU\", \"TSH\"     Procedures/imaging: see electronic medical records for all procedures/Xrays and details which were not copied into this note but were reviewed prior to creation of Plan    TIME: E/M Time spent with patient and patient care issues: [] 31-40 mins  [x] 41-49 mins  [] 50

## 2024-03-28 ENCOUNTER — TELEPHONE (OUTPATIENT)
Facility: HOSPITAL | Age: 69
End: 2024-03-28

## 2024-03-28 ENCOUNTER — HOSPITAL ENCOUNTER (OUTPATIENT)
Facility: HOSPITAL | Age: 69
Setting detail: RECURRING SERIES
End: 2024-03-28
Payer: MEDICARE

## 2024-03-28 LAB
ALBUMIN SERPL-MCNC: 4.5 G/DL (ref 3.4–5)
ANION GAP SERPL CALC-SCNC: 11 MMOL/L (ref 3–18)
BASOPHILS # BLD: 0 K/UL (ref 0–0.1)
BASOPHILS NFR BLD: 0 % (ref 0–2)
BUN SERPL-MCNC: 146 MG/DL (ref 7–18)
BUN/CREAT SERPL: 42 (ref 12–20)
CALCIUM SERPL-MCNC: 8.9 MG/DL (ref 8.5–10.1)
CHLORIDE SERPL-SCNC: 115 MMOL/L (ref 100–111)
CO2 SERPL-SCNC: 21 MMOL/L (ref 21–32)
CREAT SERPL-MCNC: 3.51 MG/DL (ref 0.6–1.3)
DIFFERENTIAL METHOD BLD: ABNORMAL
EOSINOPHIL # BLD: 0 K/UL (ref 0–0.4)
EOSINOPHIL NFR BLD: 0 % (ref 0–5)
ERYTHROCYTE [DISTWIDTH] IN BLOOD BY AUTOMATED COUNT: 15.9 % (ref 11.6–14.5)
GLUCOSE SERPL-MCNC: 135 MG/DL (ref 74–99)
HCT VFR BLD AUTO: 24.1 % (ref 36–48)
HGB BLD-MCNC: 7.7 G/DL (ref 13–16)
IMM GRANULOCYTES # BLD AUTO: 0.1 K/UL (ref 0–0.04)
IMM GRANULOCYTES NFR BLD AUTO: 1 % (ref 0–0.5)
LYMPHOCYTES # BLD: 0.5 K/UL (ref 0.9–3.6)
LYMPHOCYTES NFR BLD: 4 % (ref 21–52)
MAGNESIUM SERPL-MCNC: 2 MG/DL (ref 1.6–2.6)
MCH RBC QN AUTO: 29.8 PG (ref 24–34)
MCHC RBC AUTO-ENTMCNC: 32 G/DL (ref 31–37)
MCV RBC AUTO: 93.4 FL (ref 78–100)
MONOCYTES # BLD: 0.7 K/UL (ref 0.05–1.2)
MONOCYTES NFR BLD: 6 % (ref 3–10)
NEUTS SEG # BLD: 10.9 K/UL (ref 1.8–8)
NEUTS SEG NFR BLD: 90 % (ref 40–73)
NRBC # BLD: 0.06 K/UL (ref 0–0.01)
NRBC BLD-RTO: 0.5 PER 100 WBC
PHOSPHATE SERPL-MCNC: 4.6 MG/DL (ref 2.5–4.9)
PLATELET # BLD AUTO: 151 K/UL (ref 135–420)
PMV BLD AUTO: 10.9 FL (ref 9.2–11.8)
POTASSIUM SERPL-SCNC: 3.1 MMOL/L (ref 3.5–5.5)
RBC # BLD AUTO: 2.58 M/UL (ref 4.35–5.65)
SODIUM SERPL-SCNC: 147 MMOL/L (ref 136–145)
WBC # BLD AUTO: 12.1 K/UL (ref 4.6–13.2)

## 2024-03-28 PROCEDURE — 1100000003 HC PRIVATE W/ TELEMETRY

## 2024-03-28 PROCEDURE — 83735 ASSAY OF MAGNESIUM: CPT

## 2024-03-28 PROCEDURE — 80069 RENAL FUNCTION PANEL: CPT

## 2024-03-28 PROCEDURE — 6370000000 HC RX 637 (ALT 250 FOR IP): Performed by: INTERNAL MEDICINE

## 2024-03-28 PROCEDURE — 85025 COMPLETE CBC W/AUTO DIFF WBC: CPT

## 2024-03-28 PROCEDURE — 36415 COLL VENOUS BLD VENIPUNCTURE: CPT

## 2024-03-28 PROCEDURE — 6370000000 HC RX 637 (ALT 250 FOR IP): Performed by: HOSPITALIST

## 2024-03-28 PROCEDURE — 6360000002 HC RX W HCPCS: Performed by: HOSPITALIST

## 2024-03-28 PROCEDURE — 2580000003 HC RX 258: Performed by: HOSPITALIST

## 2024-03-28 RX ORDER — POTASSIUM CHLORIDE 20 MEQ/1
40 TABLET, EXTENDED RELEASE ORAL ONCE
Status: COMPLETED | OUTPATIENT
Start: 2024-03-28 | End: 2024-03-28

## 2024-03-28 RX ADMIN — DILTIAZEM HYDROCHLORIDE 120 MG: 120 CAPSULE, EXTENDED RELEASE ORAL at 09:21

## 2024-03-28 RX ADMIN — SEVELAMER CARBONATE 800 MG: 800 TABLET, FILM COATED ORAL at 09:21

## 2024-03-28 RX ADMIN — PANTOPRAZOLE SODIUM 40 MG: 40 TABLET, DELAYED RELEASE ORAL at 05:54

## 2024-03-28 RX ADMIN — HEPARIN SODIUM 5000 UNITS: 5000 INJECTION INTRAVENOUS; SUBCUTANEOUS at 21:31

## 2024-03-28 RX ADMIN — SEVELAMER CARBONATE 800 MG: 800 TABLET, FILM COATED ORAL at 17:13

## 2024-03-28 RX ADMIN — HEPARIN SODIUM 5000 UNITS: 5000 INJECTION INTRAVENOUS; SUBCUTANEOUS at 05:54

## 2024-03-28 RX ADMIN — POTASSIUM CHLORIDE 40 MEQ: 20 TABLET, EXTENDED RELEASE ORAL at 12:27

## 2024-03-28 RX ADMIN — FERROUS SULFATE TAB 325 MG (65 MG ELEMENTAL FE) 325 MG: 325 (65 FE) TAB at 17:13

## 2024-03-28 RX ADMIN — AMOXICILLIN 250 MG: 250 CAPSULE ORAL at 09:21

## 2024-03-28 RX ADMIN — SEVELAMER CARBONATE 800 MG: 800 TABLET, FILM COATED ORAL at 12:27

## 2024-03-28 RX ADMIN — FERROUS SULFATE TAB 325 MG (65 MG ELEMENTAL FE) 325 MG: 325 (65 FE) TAB at 09:23

## 2024-03-28 RX ADMIN — ATORVASTATIN CALCIUM 40 MG: 20 TABLET, FILM COATED ORAL at 09:21

## 2024-03-28 RX ADMIN — LEVOTHYROXINE SODIUM 137.5 MCG: 0.07 TABLET ORAL at 05:54

## 2024-03-28 RX ADMIN — HEPARIN SODIUM 5000 UNITS: 5000 INJECTION INTRAVENOUS; SUBCUTANEOUS at 17:13

## 2024-03-28 RX ADMIN — AMOXICILLIN 250 MG: 250 CAPSULE ORAL at 21:30

## 2024-03-28 RX ADMIN — PREDNISONE 60 MG: 20 TABLET ORAL at 09:21

## 2024-03-28 RX ADMIN — SODIUM CHLORIDE, PRESERVATIVE FREE 10 ML: 5 INJECTION INTRAVENOUS at 21:31

## 2024-03-28 RX ADMIN — SODIUM CHLORIDE, PRESERVATIVE FREE 10 ML: 5 INJECTION INTRAVENOUS at 09:24

## 2024-03-28 NOTE — TELEPHONE ENCOUNTER
Called pt due to pt not being able to attend therapy today due to being hospitalized.  PT discussed placing pt on a 30 day hold until kidney issue cleared up.  Pt agreeable.

## 2024-03-28 NOTE — PLAN OF CARE
Problem: Pain  Goal: Verbalizes/displays adequate comfort level or baseline comfort level  3/28/2024 1115 by Stacey Brown RN  Outcome: Progressing  3/28/2024 0057 by Fabrizio Garner RN  Outcome: Progressing  Flowsheets (Taken 3/27/2024 2000)  Verbalizes/displays adequate comfort level or baseline comfort level:   Encourage patient to monitor pain and request assistance   Assess pain using appropriate pain scale   Administer analgesics based on type and severity of pain and evaluate response   Implement non-pharmacological measures as appropriate and evaluate response     Problem: Risk for Elopement  Goal: Patient will not exit the unit/facility without proper excort  3/28/2024 1115 by Stacey Brown RN  Outcome: Progressing  Flowsheets (Taken 3/28/2024 0800)  Nursing Interventions for Elopement Risk:   Collaborate with family members/caregivers to mitigate the elopement risk   Assist with personal care needs such as toileting, eating, dressing, as needed to reduce the risk of wandering   Collaborate with treatment team for drug withdrawal symptoms treatment   Collaborate with treatment team for nicotine replacement   Communicate/escalate to charge nurse the risk of elopement   Make sure patient has all necessary personal care items   Communicate/escalate to /other team member the risk of elopement   Communicate/escalate to nursing supervisor the risk of elopement  3/28/2024 0057 by Fabrizio Garner, RN  Outcome: Progressing  Flowsheets (Taken 3/27/2024 2000)  Nursing Interventions for Elopement Risk:   Collaborate with family members/caregivers to mitigate the elopement risk   Assist with personal care needs such as toileting, eating, dressing, as needed to reduce the risk of wandering   Collaborate with treatment team for drug withdrawal symptoms treatment   Collaborate with treatment team for nicotine replacement   Communicate/escalate to charge nurse the risk of elopement   Make sure patient has all  necessary personal care items   Communicate/escalate to /other team member the risk of elopement   Communicate/escalate to nursing supervisor the risk of elopement     Problem: Safety - Adult  Goal: Free from fall injury  3/28/2024 1115 by Stacey Brown RN  Outcome: Progressing  3/28/2024 0057 by Fabrizio Garner RN  Outcome: Progressing     Problem: ABCDS Injury Assessment  Goal: Absence of physical injury  3/28/2024 1115 by Stacey Brown RN  Outcome: Progressing  3/28/2024 0057 by Fabrizio Garner RN  Outcome: Progressing     Problem: Skin/Tissue Integrity  Goal: Absence of new skin breakdown  Description: 1.  Monitor for areas of redness and/or skin breakdown  2.  Assess vascular access sites hourly  3.  Every 4-6 hours minimum:  Change oxygen saturation probe site  4.  Every 4-6 hours:  If on nasal continuous positive airway pressure, respiratory therapy assess nares and determine need for appliance change or resting period.  3/28/2024 1115 by Stacey Brown RN  Outcome: Progressing  3/28/2024 0057 by Fabrizio Garner RN  Outcome: Progressing     Problem: Discharge Planning  Goal: Discharge to home or other facility with appropriate resources  3/28/2024 1115 by Stacey Brown RN  Outcome: Progressing  Flowsheets (Taken 3/28/2024 0800)  Discharge to home or other facility with appropriate resources:   Identify barriers to discharge with patient and caregiver   Identify discharge learning needs (meds, wound care, etc)  3/28/2024 0057 by Fabrizio Garner RN  Outcome: Progressing  Flowsheets (Taken 3/27/2024 2000)  Discharge to home or other facility with appropriate resources:   Identify barriers to discharge with patient and caregiver   Identify discharge learning needs (meds, wound care, etc)     Problem: Nutrition Deficit:  Goal: Optimize nutritional status  3/28/2024 1115 by Stacey Brown RN  Outcome: Progressing  3/28/2024 0057 by Fabrizio Garner RN  Outcome: Progressing

## 2024-03-28 NOTE — PROGRESS NOTES
Hospitalist Progress Note    Patient: Efrain Mayorga MRN: [id number]  CSN: [id number]    YOB: 1955  Age: 69 y.o.  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    Discussed with Dr. Estevez, recommend compression stockings. Diuresis on hold due to increased azotemia, recommended discharging on torsemide 20 mg. Recommended discharge in 1-2 days. Patient eager to go home.      Hypokalemia -  K replacement       Cellulitis history of left arm-improving   Wbc 13  K stable now due to steroid and infection   PVL no DVT, superficial thrombosis  ID seeing pt and recommend po amoxicillin      Prostate cancer, bladder cancer  Seeing Dr. Debbie MCCLAIN oncologist  Received immunotherapy before, no active treatment right now  s/p   1. Radical cystoprostatectomy  2. Extended pelvic lymph node dissection  3. Ileal conduit urinary diversion with single-J stents bilaterally   On 03/28/2023  Urostomy bag noted urine clear     Anemia  Due to chronic illness  No bleeding reported   Due to high risk for thrombosis -will continue heparin for dvt prophy except acute bleeding noted     Iron and epogen      Hypertension  Well controlled      CAD  No acute issues     Hyperlipidemia  Continue statin      Disposition : 1-2 days    Review of systems  General: No fevers or chills.  Cardiovascular: No chest pain or pressure. No palpitations.

## 2024-03-28 NOTE — PROGRESS NOTES
Component Value Date/Time    PHOS 4.6 03/28/2024 05:00 AM     Lab Results   Component Value Date/Time    IRON 14 03/21/2024 12:30 PM    TIBC 48 03/21/2024 12:30 PM     No results found for: \"FERR\"          HIGINIO HERNANDEZ MD, MPH [facility]  [phone]

## 2024-03-28 NOTE — PLAN OF CARE
Problem: Pain  Goal: Verbalizes/displays adequate comfort level or baseline comfort level  3/28/2024 0057 by Fabrizio Garner RN  Outcome: Progressing  Flowsheets (Taken 3/27/2024 2000)  Verbalizes/displays adequate comfort level or baseline comfort level:   Encourage patient to monitor pain and request assistance   Assess pain using appropriate pain scale   Administer analgesics based on type and severity of pain and evaluate response   Implement non-pharmacological measures as appropriate and evaluate response  3/27/2024 1444 by Obdulia Camacho RN  Outcome: Progressing     Problem: Risk for Elopement  Goal: Patient will not exit the unit/facility without proper excort  3/28/2024 0057 by Fabrizio Garner RN  Outcome: Progressing  Flowsheets (Taken 3/27/2024 2000)  Nursing Interventions for Elopement Risk:   Collaborate with family members/caregivers to mitigate the elopement risk   Assist with personal care needs such as toileting, eating, dressing, as needed to reduce the risk of wandering   Collaborate with treatment team for drug withdrawal symptoms treatment   Collaborate with treatment team for nicotine replacement   Communicate/escalate to charge nurse the risk of elopement   Make sure patient has all necessary personal care items   Communicate/escalate to /other team member the risk of elopement   Communicate/escalate to nursing supervisor the risk of elopement  3/27/2024 1444 by Obdulia Camacho, RN  Outcome: Progressing  Flowsheets (Taken 3/27/2024 0815)  Nursing Interventions for Elopement Risk:   Collaborate with family members/caregivers to mitigate the elopement risk   Assist with personal care needs such as toileting, eating, dressing, as needed to reduce the risk of wandering   Collaborate with treatment team for drug withdrawal symptoms treatment   Collaborate with treatment team for nicotine replacement   Communicate/escalate to charge nurse the risk of elopement   Make sure patient has all  necessary personal care items   Communicate/escalate to /other team member the risk of elopement   Communicate/escalate to nursing supervisor the risk of elopement     Problem: Safety - Adult  Goal: Free from fall injury  3/28/2024 0057 by Fabrizio Garner RN  Outcome: Progressing  3/27/2024 1444 by Obdulia Camacho RN  Outcome: Progressing     Problem: ABCDS Injury Assessment  Goal: Absence of physical injury  3/28/2024 0057 by Fabrizio Garner RN  Outcome: Progressing  3/27/2024 1444 by Obdulia Camacho RN  Outcome: Progressing     Problem: Skin/Tissue Integrity  Goal: Absence of new skin breakdown  Description: 1.  Monitor for areas of redness and/or skin breakdown  2.  Assess vascular access sites hourly  3.  Every 4-6 hours minimum:  Change oxygen saturation probe site  4.  Every 4-6 hours:  If on nasal continuous positive airway pressure, respiratory therapy assess nares and determine need for appliance change or resting period.  3/28/2024 0057 by Fabrizio Garner RN  Outcome: Progressing  3/27/2024 1444 by Obdulia Camacho RN  Outcome: Progressing     Problem: Discharge Planning  Goal: Discharge to home or other facility with appropriate resources  3/28/2024 0057 by Fabrizio Garner RN  Outcome: Progressing  Flowsheets (Taken 3/27/2024 2000)  Discharge to home or other facility with appropriate resources:   Identify barriers to discharge with patient and caregiver   Identify discharge learning needs (meds, wound care, etc)  3/27/2024 1444 by Obdulia Camacho RN  Outcome: Progressing     Problem: Nutrition Deficit:  Goal: Optimize nutritional status  3/28/2024 0057 by Fabrizio Garner RN  Outcome: Progressing  3/27/2024 1444 by Obdulia Camacho RN  Outcome: Progressing

## 2024-03-29 ENCOUNTER — APPOINTMENT (OUTPATIENT)
Facility: HOSPITAL | Age: 69
DRG: 683 | End: 2024-03-29
Attending: INTERNAL MEDICINE
Payer: MEDICARE

## 2024-03-29 LAB
ALBUMIN SERPL-MCNC: 3.4 G/DL (ref 3.4–5)
ANION GAP SERPL CALC-SCNC: 12 MMOL/L (ref 3–18)
BASOPHILS # BLD: 0 K/UL (ref 0–0.1)
BASOPHILS NFR BLD: 0 % (ref 0–2)
BUN SERPL-MCNC: 136 MG/DL (ref 7–18)
BUN/CREAT SERPL: 43 (ref 12–20)
CALCIUM SERPL-MCNC: 8.5 MG/DL (ref 8.5–10.1)
CHLORIDE SERPL-SCNC: 118 MMOL/L (ref 100–111)
CO2 SERPL-SCNC: 20 MMOL/L (ref 21–32)
CREAT SERPL-MCNC: 3.15 MG/DL (ref 0.6–1.3)
DIFFERENTIAL METHOD BLD: ABNORMAL
ECHO BSA: 2.07 M2
EOSINOPHIL # BLD: 0 K/UL (ref 0–0.4)
EOSINOPHIL NFR BLD: 0 % (ref 0–5)
ERYTHROCYTE [DISTWIDTH] IN BLOOD BY AUTOMATED COUNT: 15.4 % (ref 11.6–14.5)
GLUCOSE SERPL-MCNC: 144 MG/DL (ref 74–99)
HCT VFR BLD AUTO: 22.9 % (ref 36–48)
HGB BLD-MCNC: 7.5 G/DL (ref 13–16)
IMM GRANULOCYTES # BLD AUTO: 0.1 K/UL (ref 0–0.04)
IMM GRANULOCYTES NFR BLD AUTO: 1 % (ref 0–0.5)
LYMPHOCYTES # BLD: 0.3 K/UL (ref 0.9–3.6)
LYMPHOCYTES NFR BLD: 3 % (ref 21–52)
MAGNESIUM SERPL-MCNC: 2 MG/DL (ref 1.6–2.6)
MCH RBC QN AUTO: 29.8 PG (ref 24–34)
MCHC RBC AUTO-ENTMCNC: 32.8 G/DL (ref 31–37)
MCV RBC AUTO: 90.9 FL (ref 78–100)
MONOCYTES # BLD: 0.6 K/UL (ref 0.05–1.2)
MONOCYTES NFR BLD: 5 % (ref 3–10)
NEUTS SEG # BLD: 10 K/UL (ref 1.8–8)
NEUTS SEG NFR BLD: 91 % (ref 40–73)
NRBC # BLD: 0.03 K/UL (ref 0–0.01)
NRBC BLD-RTO: 0.3 PER 100 WBC
PHOSPHATE SERPL-MCNC: 4.2 MG/DL (ref 2.5–4.9)
PLATELET # BLD AUTO: 129 K/UL (ref 135–420)
PMV BLD AUTO: 10.9 FL (ref 9.2–11.8)
POTASSIUM SERPL-SCNC: 3 MMOL/L (ref 3.5–5.5)
RBC # BLD AUTO: 2.52 M/UL (ref 4.35–5.65)
SODIUM SERPL-SCNC: 150 MMOL/L (ref 136–145)
WBC # BLD AUTO: 11 K/UL (ref 4.6–13.2)

## 2024-03-29 PROCEDURE — 6370000000 HC RX 637 (ALT 250 FOR IP): Performed by: INTERNAL MEDICINE

## 2024-03-29 PROCEDURE — 6370000000 HC RX 637 (ALT 250 FOR IP): Performed by: HOSPITALIST

## 2024-03-29 PROCEDURE — 1100000003 HC PRIVATE W/ TELEMETRY

## 2024-03-29 PROCEDURE — 2580000003 HC RX 258: Performed by: HOSPITALIST

## 2024-03-29 PROCEDURE — 36415 COLL VENOUS BLD VENIPUNCTURE: CPT

## 2024-03-29 PROCEDURE — 6360000002 HC RX W HCPCS: Performed by: HOSPITALIST

## 2024-03-29 PROCEDURE — 93970 EXTREMITY STUDY: CPT

## 2024-03-29 PROCEDURE — 85025 COMPLETE CBC W/AUTO DIFF WBC: CPT

## 2024-03-29 PROCEDURE — 83735 ASSAY OF MAGNESIUM: CPT

## 2024-03-29 PROCEDURE — 80069 RENAL FUNCTION PANEL: CPT

## 2024-03-29 PROCEDURE — 6360000002 HC RX W HCPCS: Performed by: INTERNAL MEDICINE

## 2024-03-29 RX ORDER — BUMETANIDE 0.25 MG/ML
1 INJECTION INTRAMUSCULAR; INTRAVENOUS ONCE
Status: COMPLETED | OUTPATIENT
Start: 2024-03-29 | End: 2024-03-29

## 2024-03-29 RX ORDER — POTASSIUM CHLORIDE 20 MEQ/1
40 TABLET, EXTENDED RELEASE ORAL
Status: COMPLETED | OUTPATIENT
Start: 2024-03-29 | End: 2024-03-29

## 2024-03-29 RX ADMIN — SEVELAMER CARBONATE 800 MG: 800 TABLET, FILM COATED ORAL at 11:19

## 2024-03-29 RX ADMIN — FERROUS SULFATE TAB 325 MG (65 MG ELEMENTAL FE) 325 MG: 325 (65 FE) TAB at 16:49

## 2024-03-29 RX ADMIN — FERROUS SULFATE TAB 325 MG (65 MG ELEMENTAL FE) 325 MG: 325 (65 FE) TAB at 08:17

## 2024-03-29 RX ADMIN — BUMETANIDE 1 MG: 0.25 INJECTION INTRAMUSCULAR; INTRAVENOUS at 11:19

## 2024-03-29 RX ADMIN — PREDNISONE 60 MG: 20 TABLET ORAL at 08:17

## 2024-03-29 RX ADMIN — POTASSIUM CHLORIDE 40 MEQ: 1500 TABLET, EXTENDED RELEASE ORAL at 08:17

## 2024-03-29 RX ADMIN — HEPARIN SODIUM 5000 UNITS: 5000 INJECTION INTRAVENOUS; SUBCUTANEOUS at 06:11

## 2024-03-29 RX ADMIN — DILTIAZEM HYDROCHLORIDE 120 MG: 120 CAPSULE, EXTENDED RELEASE ORAL at 08:17

## 2024-03-29 RX ADMIN — POTASSIUM CHLORIDE 40 MEQ: 1500 TABLET, EXTENDED RELEASE ORAL at 11:19

## 2024-03-29 RX ADMIN — LEVOTHYROXINE SODIUM 137.5 MCG: 0.07 TABLET ORAL at 06:11

## 2024-03-29 RX ADMIN — POTASSIUM CHLORIDE 40 MEQ: 1500 TABLET, EXTENDED RELEASE ORAL at 16:49

## 2024-03-29 RX ADMIN — PANTOPRAZOLE SODIUM 40 MG: 40 TABLET, DELAYED RELEASE ORAL at 06:11

## 2024-03-29 RX ADMIN — SEVELAMER CARBONATE 800 MG: 800 TABLET, FILM COATED ORAL at 16:49

## 2024-03-29 RX ADMIN — AMOXICILLIN 250 MG: 250 CAPSULE ORAL at 21:30

## 2024-03-29 RX ADMIN — HEPARIN SODIUM 5000 UNITS: 5000 INJECTION INTRAVENOUS; SUBCUTANEOUS at 21:30

## 2024-03-29 RX ADMIN — ATORVASTATIN CALCIUM 40 MG: 20 TABLET, FILM COATED ORAL at 08:18

## 2024-03-29 RX ADMIN — SODIUM CHLORIDE, PRESERVATIVE FREE 10 ML: 5 INJECTION INTRAVENOUS at 21:47

## 2024-03-29 RX ADMIN — SODIUM CHLORIDE, PRESERVATIVE FREE 10 ML: 5 INJECTION INTRAVENOUS at 08:18

## 2024-03-29 RX ADMIN — SEVELAMER CARBONATE 800 MG: 800 TABLET, FILM COATED ORAL at 08:17

## 2024-03-29 RX ADMIN — AMOXICILLIN 250 MG: 250 CAPSULE ORAL at 08:17

## 2024-03-29 NOTE — PLAN OF CARE
Problem: Pain  Goal: Verbalizes/displays adequate comfort level or baseline comfort level  3/29/2024 0005 by Fabrizio Garner RN  Outcome: Progressing  Flowsheets (Taken 3/28/2024 2000)  Verbalizes/displays adequate comfort level or baseline comfort level:   Encourage patient to monitor pain and request assistance   Administer analgesics based on type and severity of pain and evaluate response   Assess pain using appropriate pain scale   Implement non-pharmacological measures as appropriate and evaluate response  3/28/2024 1115 by Stacey Brown RN  Outcome: Progressing     Problem: Risk for Elopement  Goal: Patient will not exit the unit/facility without proper excort  3/29/2024 0005 by Fabrizio Garner RN  Outcome: Progressing  Flowsheets (Taken 3/28/2024 2000)  Nursing Interventions for Elopement Risk:   Collaborate with family members/caregivers to mitigate the elopement risk   Assist with personal care needs such as toileting, eating, dressing, as needed to reduce the risk of wandering   Collaborate with treatment team for drug withdrawal symptoms treatment   Collaborate with treatment team for nicotine replacement   Communicate/escalate to charge nurse the risk of elopement   Make sure patient has all necessary personal care items   Communicate/escalate to /other team member the risk of elopement   Communicate/escalate to nursing supervisor the risk of elopement  3/28/2024 1115 by Stacey Brown RN  Outcome: Progressing  Flowsheets (Taken 3/28/2024 0800)  Nursing Interventions for Elopement Risk:   Collaborate with family members/caregivers to mitigate the elopement risk   Assist with personal care needs such as toileting, eating, dressing, as needed to reduce the risk of wandering   Collaborate with treatment team for drug withdrawal symptoms treatment   Collaborate with treatment team for nicotine replacement   Communicate/escalate to charge nurse the risk of elopement   Make sure patient has all  necessary personal care items   Communicate/escalate to /other team member the risk of elopement   Communicate/escalate to nursing supervisor the risk of elopement     Problem: Safety - Adult  Goal: Free from fall injury  3/29/2024 0005 by Fabrizio Garner RN  Outcome: Progressing  3/28/2024 1115 by Stacey Brown RN  Outcome: Progressing     Problem: ABCDS Injury Assessment  Goal: Absence of physical injury  3/29/2024 0005 by Fabrizio Garner RN  Outcome: Progressing  3/28/2024 1115 by Stacey Brown RN  Outcome: Progressing     Problem: Skin/Tissue Integrity  Goal: Absence of new skin breakdown  Description: 1.  Monitor for areas of redness and/or skin breakdown  2.  Assess vascular access sites hourly  3.  Every 4-6 hours minimum:  Change oxygen saturation probe site  4.  Every 4-6 hours:  If on nasal continuous positive airway pressure, respiratory therapy assess nares and determine need for appliance change or resting period.  3/29/2024 0005 by Fabrizio Garner RN  Outcome: Progressing  3/28/2024 1115 by Stacey Brown RN  Outcome: Progressing     Problem: Discharge Planning  Goal: Discharge to home or other facility with appropriate resources  3/29/2024 0005 by Fabrizio Garner RN  Outcome: Progressing  Flowsheets (Taken 3/28/2024 2000)  Discharge to home or other facility with appropriate resources:   Identify barriers to discharge with patient and caregiver   Identify discharge learning needs (meds, wound care, etc)  3/28/2024 1115 by Stacey Brown RN  Outcome: Progressing  Flowsheets (Taken 3/28/2024 0800)  Discharge to home or other facility with appropriate resources:   Identify barriers to discharge with patient and caregiver   Identify discharge learning needs (meds, wound care, etc)     Problem: Nutrition Deficit:  Goal: Optimize nutritional status  3/29/2024 0005 by Fabrizio Garner RN  Outcome: Progressing  3/28/2024 1115 by Stacey Brown RN  Outcome: Progressing

## 2024-03-29 NOTE — PROGRESS NOTES
Hospitalist Progress Note    Patient: Efrain Mayorga MRN: [id number]  CSN: [id number]    YOB: 1955  Age: 69 y.o.  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    Hypernatremia  Sodium up  Diuretics on hold  Considering dialysis??  Also high risk for dvt  Check leg ultrasound neg one week ago  Will get hematology to weigh on anticoagulation     Discussed with Dr. Estevez, recommend compression stockings. Diuresis on hold due to increased azotemia, recommended discharging on torsemide 20 mg. Recommended discharge in 1-2 days. Patient eager to go home.      Hypokalemia -  K replacement       Cellulitis history of left arm-improving   Wbc 13  K stable now due to steroid and infection   PVL no DVT, superficial thrombosis  ID seeing pt and recommend po amoxicillin      Prostate cancer, bladder cancer  Seeing Dr. Debbie MCCLAIN oncologist  Received immunotherapy before, no active treatment right now  s/p   1. Radical cystoprostatectomy  2. Extended pelvic lymph node dissection  3. Ileal conduit urinary diversion with single-J stents bilaterally   On 03/28/2023  Urostomy bag noted urine clear     Anemia  Due to chronic illness  No bleeding reported   Due to high risk for thrombosis -will continue heparin for dvt prophy except acute bleeding noted     Iron and epogen   Obtaining  last 72 hours.,No results for input(s): \"AMYLASE\" in the last 72 hours.   Liver Enzymes No results for input(s): \"TP\", \"ALB\" in the last 72 hours.    Invalid input(s): \"TBIL\", \"AP\", \"SGOT\", \"GPT\", \"DBIL\"   Thyroid Studies No results found for: \"T4\", \"T3RU\", \"TSH\"     Procedures/imaging: see electronic medical records for all procedures/Xrays and details which were not copied into this note but were reviewed prior to creation of Plan    TIME: E/M Time spent with patient and patient care issues: [] 31-40 mins  [x] 41-49 mins  [] 50 mins or more.     This time also includes physician non-face-to-face service time visit on the date of service such as  Preparing to see the patient (eg, review of tests)  Obtaining and/or reviewing separately obtained history  Performing a medically necessary appropriate examination and/or evaluation  Counseling and educating the patient/family/caregiver  Ordering medications, tests, or procedures  Referring and communicating with other health care professionals as needed  Documenting clinical information in the electronic or other health record  Independently interpreting results (not reported separately) and communicating results to the patient/family/caregiver  Care coordination and discharge planning with Case Management.

## 2024-03-29 NOTE — PROGRESS NOTES
Nephrology Progress note    Efrain Mayorga  MRN: [id number]  : 1955    Admit Date: 3/19/2024        Impression:     -Primary FSGS: recently bx proven, on high dose Pred  -Nephrotic syndrome sec to FSGS  -Acute Kidney injury; sec to FSGS, Cr up with diuresis, 3.84 today. Recent baseline Cr 1.2 (Dec 23). Elevated BUN likely sec to dehydration and high dose prednisone  -HTN  -Anasarca/Hypoalbuminemia sec to nephrotic syndrome. S/p IV albumin. S Alb 4.5  -Hypernatremia, i  -Hypokalemia   -Hyperphosphatemia: started on Renvela 800mg tid w/meals  -Anemia: s/p prbc tx, on Ferrous sulphate po  -Lt UExt superficial vein thrombosis   -Urothelial carcinoma of the bladder and prostate ca s/p cystectomy, prostatectomy and ilial conduit    -CAD  -Met acidosis: off po NaBicarb presently to limit Na load  -Leucocytosis likely sec to high dose steroid, vs ?infection       Plan:     -Advised on more free water intake  -Diuretics, Lasix and Metolazone, on hold due to increased Azotemia and hypernatremia   -Recommend PT evaluation for Lymphedema treatment and keeping both legs elevated when sitting or Lying down.   -Recommend starting Torsemide 20mg po daily prior to discharge  -Cont current dose of Pred  -Replace K  -Cont Epogen  -S/p  IV Albumin, Serum Alb. has improved 4.5   -Blood transfusion prn, monitor H&H  -Low Na diet  -Strict I/Os  -Avoid Nephrotoxins  -No urgent indication for RRT  -No ACEI/ARB due to recent Hyperkalemia  -Would likely need oral anticoagulation in view of increased risk of DVT in setting of Nephrotic Proteinuria >10gms and Hypoalbuminemia   -If no improvement with diuretics, pt might need HD for anasarca  -Patient would follow up with dr Newton at [facility] after discharge          History of Present Illness:  Efrain Mayorga is a 69 y.o. male   with a PMHx of recently diagnosed nephrotic syndrome sec to FSGS, CKD, HTN, CAD, Urothelial carcinoma of the bladder and prostate ca s/p cystectomy/prostatectomy

## 2024-03-29 NOTE — PROGRESS NOTES
[facility]  (A Division of [facility])    Follow-up Note        Date of Admission: 3/19/2024       Date of Note:  3/29/2024    Summary:  Mr Mayorga is a 69y WM with underlying Bladder CA/CaP who was admitted 3/19 for generalized malaise and bilat leg swelling found to be WERO/nephrotic syndrome due to FSGS.  With that admission, he noted chills PTA and admitted with marked leukocytosis that has responded nicely over the last week (despite continued big-boy dosing steroids).  Feels good today.  Still has a little pain on dorsum R foot (where all this started).    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  Has a lot of questions about his kidneys  Foot feels less tender.    Tm <100  WBC 9.9k without L shift    Current Antimicrobials:    Prior Antimicrobials:  Amox PO (3/25-) to date Vancomycin IV (3/19-24) #5   CAX IV (3/19-25) #7       Assessment: Rec / Plan:   Erysipelas  resolved - less demarcation/redness to dorsum of R foot (I think was epicenter).  Ok to DC home anytime per ID ->abx #10  Stop amoxicillin     Would send home with refills on his amox as this likely to recur in future given his chronic lymphoedema/stasis    Will sign off. Please call back if questions or concerns. Thanks.       Superficial clot L arm     WERO/Nephrotic Syndrome     FSGS     Bladder CA/CaP         Microbiology:             3/23 - MRSA (-)                                      3/19 - UA (-)                                                  BCx x2 (-)        Lines / Catheters:      peripheral        Patient Active Problem List   Diagnosis    NSTEMI (non-ST elevated myocardial infarction) (HCC)    Hyperlipidemia    Chest pain    Arteriosclerosis of coronary artery    Malignant neoplasm of urinary bladder (HCC)    Anemia    Fatigue    Prostate cancer (HCC)    Primary malignant  Oral Daily PRN Char Harding MD        acetaminophen (TYLENOL) tablet 650 mg  650 mg Oral Q6H PRN Char Harding MD        Or    acetaminophen (TYLENOL) suppository 650 mg  650 mg Rectal Q6H PRN Char Harding MD        atorvastatin (LIPITOR) tablet 40 mg  40 mg Oral Daily Char Harding MD   40 mg at 24 0818    levothyroxine (SYNTHROID) tablet 137.5 mcg  137.5 mcg Oral Daily Char Harding MD   137.5 mcg at 24 0611    [Held by provider] aspirin EC tablet 81 mg  81 mg Oral Daily Char Harding MD   81 mg at 24 0844    pantoprazole (PROTONIX) tablet 40 mg  40 mg Oral QAM AC Char Harding MD   40 mg at 24 0611    dilTIAZem (CARDIZEM CD) extended release capsule 120 mg  120 mg Oral Daily Char Harding MD   120 mg at 24 0817    predniSONE (DELTASONE) tablet 60 mg  60 mg Oral Daily Char Harding MD   60 mg at 24 0817    oxyCODONE-acetaminophen (PERCOCET) 5-325 MG per tablet 1 tablet  1 tablet Oral Q4H PRN Char Harding MD   1 tablet at 24 0025          Review of Systems - General ROS: negative for - chills, fever, or night sweats  Respiratory ROS: no cough, shortness of breath, or wheezing  Cardiovascular ROS: no chest pain or dyspnea on exertion       Objective:    BP (!) 150/81   Pulse 73   Temp 98 °F (36.7 °C) (Oral)   Resp 22   Ht 1.702 m (5' 7.01\")   Wt 87.9 kg (193 lb 12.6 oz)   SpO2 100%   BMI 30.34 kg/m²     Temp (24hrs), Av.8 °F (36.6 °C), Min:97.3 °F (36.3 °C), Max:98.6 °F (37 °C)      GEN: Elderly WM in NAD  HEENT: anicteric  CHEST: CTA  CVS:RRR  ABD: NT  EXT: L arm edematous (clot); R dorsum foot with breaking up demarcation erythema.         Lab results:    Chemistry  Recent Labs     24  0622 24  0500 24  0335   * 147* 150*   K 3.4* 3.1* 3.0*   * 115* 118*   CO2 20* 21 20*   * 146* 136*         CBC w/ Diff  Recent Labs     24  0622 24  0500 24  0335   WBC 11.0 12.1 11.0   RBC 2.44* 2.58* 2.52*   HGB 7.4* 7.7* 7.5*   HCT 22.1* 24.1* 22.9*    151 129*

## 2024-03-29 NOTE — PLAN OF CARE
Problem: Pain  Goal: Verbalizes/displays adequate comfort level or baseline comfort level  3/29/2024 0938 by Kirsty Salvador RN  Outcome: Progressing  3/29/2024 0005 by Fabrizio Garner RN  Outcome: Progressing  Flowsheets (Taken 3/28/2024 2000)  Verbalizes/displays adequate comfort level or baseline comfort level:   Encourage patient to monitor pain and request assistance   Administer analgesics based on type and severity of pain and evaluate response   Assess pain using appropriate pain scale   Implement non-pharmacological measures as appropriate and evaluate response     Problem: Risk for Elopement  Goal: Patient will not exit the unit/facility without proper excort  3/29/2024 0938 by Kirsty Salvador RN  Outcome: Progressing  3/29/2024 0005 by Fabrizio Garner RN  Outcome: Progressing  Flowsheets (Taken 3/28/2024 2000)  Nursing Interventions for Elopement Risk:   Collaborate with family members/caregivers to mitigate the elopement risk   Assist with personal care needs such as toileting, eating, dressing, as needed to reduce the risk of wandering   Collaborate with treatment team for drug withdrawal symptoms treatment   Collaborate with treatment team for nicotine replacement   Communicate/escalate to charge nurse the risk of elopement   Make sure patient has all necessary personal care items   Communicate/escalate to /other team member the risk of elopement   Communicate/escalate to nursing supervisor the risk of elopement     Problem: Safety - Adult  Goal: Free from fall injury  3/29/2024 0938 by Kirsty Salvador RN  Outcome: Progressing  3/29/2024 0005 by Fabrizio Garner RN  Outcome: Progressing     Problem: ABCDS Injury Assessment  Goal: Absence of physical injury  3/29/2024 0938 by Kirsty Salvador RN  Outcome: Progressing  3/29/2024 0005 by Fabrizio Garner RN  Outcome: Progressing     Problem: Skin/Tissue Integrity  Goal: Absence of new skin breakdown  Description: 1.  Monitor for areas of redness and/or  skin breakdown  2.  Assess vascular access sites hourly  3.  Every 4-6 hours minimum:  Change oxygen saturation probe site  4.  Every 4-6 hours:  If on nasal continuous positive airway pressure, respiratory therapy assess nares and determine need for appliance change or resting period.  3/29/2024 0938 by Kirsty Salvador RN  Outcome: Progressing  3/29/2024 0005 by Fabrizio Garner RN  Outcome: Progressing     Problem: Discharge Planning  Goal: Discharge to home or other facility with appropriate resources  3/29/2024 0938 by Kirsty Salvador RN  Outcome: Progressing  3/29/2024 0005 by Fabrizio Garner RN  Outcome: Progressing  Flowsheets (Taken 3/28/2024 2000)  Discharge to home or other facility with appropriate resources:   Identify barriers to discharge with patient and caregiver   Identify discharge learning needs (meds, wound care, etc)     Problem: Nutrition Deficit:  Goal: Optimize nutritional status  3/29/2024 0938 by Kirsty Salvador RN  Outcome: Progressing  3/29/2024 0005 by Fabrizio Garner RN  Outcome: Progressing

## 2024-03-29 NOTE — PROGRESS NOTES
0800  Assumed care. Assessment completed. Patient sitting up on side of the bed. Patient has no complaints of pain or any needs. Patients son would like the provider to call him and give him an update. No other needs noted. Call light in reach.    1110 Patients wife approached the desk and stated that the patient wants to be discharged today. She stated, \"You aren't doing anything here that he can't do at home, so he may as well go home.\" Page out to provider. Provider called back and was connected to the patients room.    1125 Attempted to give patient his medications and he stated, \"Look Stacie they are just trying to push stuff at me. I'm done here.\" Patient refused to take Bumetanide, Sevelamer, and Potassium. Adjusted medications in EMAR.    1320  Patient has returned to floor from vascular. Patient asked to take the mediations that he had previously refused. Patient states, \"I apologize for how I acted. I just had a rough few minutes.\"     1638  Patient sleeping in bed.

## 2024-03-29 NOTE — PROGRESS NOTES
Nutrition Follow-up Assessment       Nutrition Recommendations/Plan:   Diet as ordered,   Monitor wt/fluid status for trend  Encourage adequate fluid intake  Continue to monitor tolerance of PO, weight, labs, and plan of care during admission.         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 male is on hospital D#10 for anasarca r/t worsen renal function.   Some wt loss noticed since last visit: -3.4% (~7#) x 3 days -- suspect fluid wt.   Per flowsheet records, pt intake remains good, %.  Meds and Labs reviewed - noted trending up Na and BUN.  Per visit, pt is upset and feeling \"fxxx up\". Per pt wife, pt is \"frustrated\" regarding his diet illness progression and treatment plan. They are confused on whether pt need too drink more or less fluid. RD comforts pt and family, and recommends pt to resume regular fluid intake to prevent dehydration at this time.        3/27/2024    12:21 AM 3/19/2024    12:38 PM 3/15/2024     1:04 AM 3/9/2024    12:19 AM 3/7/2024     9:30 PM 3/7/2024     6:58 PM 3/7/2024     5:41 PM   Weight Metrics   Weight 193 lb 12.6 oz 200 lb 9.9 oz 200 lb 9.9 oz 193 lb 5.5 oz 190 lb 4.1 oz 190 lb 190 lb   BMI (Calculated) 30.4 kg/m2 31.5 kg/m2 31.5 kg/m2 30.3 kg/m2 29.9 kg/m2 30.7 kg/m2 0 kg/m2       Nutrition Related Findings:   Pertinent meds: prednisone, levothyroxine,

## 2024-03-30 ENCOUNTER — APPOINTMENT (OUTPATIENT)
Facility: HOSPITAL | Age: 69
DRG: 683 | End: 2024-03-30
Payer: MEDICARE

## 2024-03-30 PROBLEM — E87.0 HYPERNATREMIA: Status: ACTIVE | Noted: 2024-03-30

## 2024-03-30 LAB
ALBUMIN SERPL-MCNC: 3.5 G/DL (ref 3.4–5)
ALBUMIN/GLOB SERPL: 2.9 (ref 0.8–1.7)
ALP SERPL-CCNC: 70 U/L (ref 45–117)
ALT SERPL-CCNC: 25 U/L (ref 16–61)
ANION GAP SERPL CALC-SCNC: 11 MMOL/L (ref 3–18)
AST SERPL-CCNC: 22 U/L (ref 10–38)
BILIRUB SERPL-MCNC: 0.6 MG/DL (ref 0.2–1)
BUN SERPL-MCNC: 129 MG/DL (ref 7–18)
BUN/CREAT SERPL: 43 (ref 12–20)
CALCIUM SERPL-MCNC: 8.4 MG/DL (ref 8.5–10.1)
CHLORIDE SERPL-SCNC: 117 MMOL/L (ref 100–111)
CO2 SERPL-SCNC: 20 MMOL/L (ref 21–32)
CREAT SERPL-MCNC: 3.03 MG/DL (ref 0.6–1.3)
GLOBULIN SER CALC-MCNC: 1.2 G/DL (ref 2–4)
GLUCOSE SERPL-MCNC: 134 MG/DL (ref 74–99)
POTASSIUM SERPL-SCNC: 3.4 MMOL/L (ref 3.5–5.5)
PROT SERPL-MCNC: 4.7 G/DL (ref 6.4–8.2)
SODIUM SERPL-SCNC: 148 MMOL/L (ref 136–145)

## 2024-03-30 PROCEDURE — 6370000000 HC RX 637 (ALT 250 FOR IP): Performed by: INTERNAL MEDICINE

## 2024-03-30 PROCEDURE — 80053 COMPREHEN METABOLIC PANEL: CPT

## 2024-03-30 PROCEDURE — 2580000003 HC RX 258: Performed by: HOSPITALIST

## 2024-03-30 PROCEDURE — 6370000000 HC RX 637 (ALT 250 FOR IP): Performed by: HOSPITALIST

## 2024-03-30 PROCEDURE — 71250 CT THORAX DX C-: CPT

## 2024-03-30 PROCEDURE — 1100000003 HC PRIVATE W/ TELEMETRY

## 2024-03-30 PROCEDURE — 36415 COLL VENOUS BLD VENIPUNCTURE: CPT

## 2024-03-30 PROCEDURE — 6360000002 HC RX W HCPCS: Performed by: HOSPITALIST

## 2024-03-30 PROCEDURE — 2500000003 HC RX 250 WO HCPCS: Performed by: HOSPITALIST

## 2024-03-30 RX ORDER — POTASSIUM CHLORIDE 20 MEQ/1
20 TABLET, EXTENDED RELEASE ORAL ONCE
Status: COMPLETED | OUTPATIENT
Start: 2024-03-30 | End: 2024-03-30

## 2024-03-30 RX ADMIN — HEPARIN SODIUM 5000 UNITS: 5000 INJECTION INTRAVENOUS; SUBCUTANEOUS at 06:11

## 2024-03-30 RX ADMIN — SEVELAMER CARBONATE 800 MG: 800 TABLET, FILM COATED ORAL at 12:14

## 2024-03-30 RX ADMIN — ATORVASTATIN CALCIUM 40 MG: 20 TABLET, FILM COATED ORAL at 09:05

## 2024-03-30 RX ADMIN — FERROUS SULFATE TAB 325 MG (65 MG ELEMENTAL FE) 325 MG: 325 (65 FE) TAB at 17:12

## 2024-03-30 RX ADMIN — HEPARIN SODIUM 5000 UNITS: 5000 INJECTION INTRAVENOUS; SUBCUTANEOUS at 15:06

## 2024-03-30 RX ADMIN — PREDNISONE 60 MG: 20 TABLET ORAL at 09:05

## 2024-03-30 RX ADMIN — LEVOTHYROXINE SODIUM 137.5 MCG: 0.07 TABLET ORAL at 06:11

## 2024-03-30 RX ADMIN — FERROUS SULFATE TAB 325 MG (65 MG ELEMENTAL FE) 325 MG: 325 (65 FE) TAB at 09:05

## 2024-03-30 RX ADMIN — MICONAZOLE NITRATE: 2 POWDER TOPICAL at 20:14

## 2024-03-30 RX ADMIN — DILTIAZEM HYDROCHLORIDE 120 MG: 120 CAPSULE, EXTENDED RELEASE ORAL at 09:05

## 2024-03-30 RX ADMIN — MICONAZOLE NITRATE: 2 POWDER TOPICAL at 12:14

## 2024-03-30 RX ADMIN — POTASSIUM CHLORIDE 20 MEQ: 1500 TABLET, EXTENDED RELEASE ORAL at 12:14

## 2024-03-30 RX ADMIN — HEPARIN SODIUM 5000 UNITS: 5000 INJECTION INTRAVENOUS; SUBCUTANEOUS at 20:10

## 2024-03-30 RX ADMIN — SODIUM CHLORIDE, PRESERVATIVE FREE 10 ML: 5 INJECTION INTRAVENOUS at 09:05

## 2024-03-30 RX ADMIN — SODIUM CHLORIDE, PRESERVATIVE FREE 10 ML: 5 INJECTION INTRAVENOUS at 20:14

## 2024-03-30 RX ADMIN — PANTOPRAZOLE SODIUM 40 MG: 40 TABLET, DELAYED RELEASE ORAL at 06:11

## 2024-03-30 RX ADMIN — SEVELAMER CARBONATE 800 MG: 800 TABLET, FILM COATED ORAL at 09:05

## 2024-03-30 RX ADMIN — SEVELAMER CARBONATE 800 MG: 800 TABLET, FILM COATED ORAL at 17:12

## 2024-03-30 ASSESSMENT — PAIN SCALES - GENERAL: PAINLEVEL_OUTOF10: 0

## 2024-03-30 NOTE — CONSULTS
Hematology / Oncology Initial Consult Note    Admit Date: 3/19/2024    Reason for Consult: Hypercoagulable state    Requesting Physician: Dr. Char Harding    Assessment:     Hem/Onc Issues:  High grade urothelial carcinoma of bladder, s/p neoadjvuant chemo cis/gem, cystoprostatectomy 3/2023 [id number], adjvuant nivolumab 6/2023-10/17/2023 stopped due to anasarca and chronic kidney disease. Started enfortumab in 1/2024, but placed on hold since Jan due to SE's with rash and diarrhea. Off treatment at this time. No staging scans since 10/2023  Incidental prostate cancer, Jessica 3+3  Reasons for Admission:   Cellulitis  Other Active Issues:               Focal Segmental Glomerulonephrosis  Hypokalemia  Anasarca  Hypercoagulable state- on heparin ppx       Plan:     - There are no high-quality studies to guide anticoagulation treatment in nephrotic syndrome, but general recommendations are if nephrotic syndrome is from FSGS and albumin>3, there is no role for ppx anticoagulation; if between 2-3 can consider on case-by-case basis based on bleeding risk, and if <2 should administer ppx with either low molecular weight heparin or coumadin.   - He may also be hypercoagulable if he has active cancer  - Will order CT CAP w/o contrast to help guide conversation  - Will continue to follow      Lis Wallis MD  [facility]        History of Present Illness: Efrain Mayorga is a 69 y.o. male with a history of prostate and bladder cancer, , s/p neoadjvuant chemo cis/gem, cystoprostatectomy 3/2023 [id number], s/p adjvuant nivolumab 6/2023-10/17/2023 stopped due to gemerzlied swelling chronic kidney disease. Startedenfortumab in 1/2024, but stopped due to rash and diarrhea, off therapy, followed at [facility] by Dr. Lewis, admitted to [facility] from 3/7/24-3/15/24 for WERO with nephrotic range proteinuria, who presented on 3/19/24 for LUE cellulitis and diagnosed with FSGS. Hematology consulted to address need for prophylactic  mg Oral Daily    levothyroxine (SYNTHROID) tablet 137.5 mcg  137.5 mcg Oral Daily    [Held by provider] aspirin EC tablet 81 mg  81 mg Oral Daily    pantoprazole (PROTONIX) tablet 40 mg  40 mg Oral QAM AC    dilTIAZem (CARDIZEM CD) extended release capsule 120 mg  120 mg Oral Daily    predniSONE (DELTASONE) tablet 60 mg  60 mg Oral Daily    oxyCODONE-acetaminophen (PERCOCET) 5-325 MG per tablet 1 tablet  1 tablet Oral Q4H PRN       Prior to Admission medications    Medication Sig Start Date End Date Taking? Authorizing Provider   furosemide (LASIX) 40 MG tablet Take 1 tablet by mouth daily as needed (edema) 3/15/24   AmFaustino lindquist MD   sodium bicarbonate 650 MG tablet Take 1 tablet by mouth 2 times daily 3/15/24   AmFaustino lindquist MD   predniSONE (DELTASONE) 20 MG tablet Take 3 tablets by mouth daily 3/16/24 4/15/24  AmFaustino lindquist MD   aspirin 81 MG EC tablet Take 1 tablet by mouth daily 3/15/24   AmFaustino lindquist MD   levothyroxine (SYNTHROID) 137 MCG tablet Take 1 tablet by mouth Daily    ProviderBrown MD   loratadine (CLARITIN) 10 MG tablet Take 1 tablet by mouth daily as needed    Brown To MD   dilTIAZem (TIAZAC) 120 MG extended release capsule Take 1 capsule by mouth daily 10/18/21   Brown To MD   atorvastatin (LIPITOR) 40 MG tablet Take 1 tablet by mouth daily 3/27/17   Automatic Reconciliation, Ar   famotidine (PEPCID) 20 MG tablet Take 1 tablet by mouth 2 times daily 3/27/17   Automatic Reconciliation, Ar       No Known Allergies        Physical Exam:    Temp (24hrs), Av °F (36.7 °C), Min:97.9 °F (36.6 °C), Max:98.2 °F (36.8 °C)  [email or web address]      General: Alert , Oriented, in no distress  HEENT: no pallor, anicteric sclera, oral pharynx without lesion   No cervical, supraclavicular, axillary and inguinal lymphadenopathy palpated  Heart: regular rate, and rhythm, without murmur, gallop or rubbing  Lungs:breathing

## 2024-03-30 NOTE — PLAN OF CARE
Problem: Risk for Elopement  Goal: Patient will not exit the unit/facility without proper excort  Recent Flowsheet Documentation  Taken 3/29/2024 2030 by Birgit Caruso RN  Nursing Interventions for Elopement Risk:   Collaborate with family members/caregivers to mitigate the elopement risk   Assist with personal care needs such as toileting, eating, dressing, as needed to reduce the risk of wandering   Collaborate with treatment team for drug withdrawal symptoms treatment   Collaborate with treatment team for nicotine replacement   Communicate/escalate to charge nurse the risk of elopement   Make sure patient has all necessary personal care items   Communicate/escalate to /other team member the risk of elopement   Communicate/escalate to nursing supervisor the risk of elopement     Problem: Discharge Planning  Goal: Discharge to home or other facility with appropriate resources  Recent Flowsheet Documentation  Taken 3/29/2024 2030 by Birgit Caruso RN  Discharge to home or other facility with appropriate resources:   Identify barriers to discharge with patient and caregiver   Arrange for needed discharge resources and transportation as appropriate   Identify discharge learning needs (meds, wound care, etc)     Problem: Nutrition Deficit:  Goal: Optimize nutritional status  Recent Flowsheet Documentation  Taken 3/29/2024 1233 by Yolande Summers RD  Nutrient intake appropriate for improving, restoring, or maintaining nutritional needs:   Assess nutritional status and recommend course of action   Monitor oral intake, labs, and treatment plans

## 2024-03-30 NOTE — PROGRESS NOTES
Nephrology Progress note    Efrain Mayorga  MRN: [id number]  : 1955    Admit Date: 3/19/2024        Impression:     -Primary FSGS: recently bx proven, on high dose Pred  -Nephrotic syndrome sec to FSGS  -Acute Kidney injury; sec to FSGS, Cr up with diuresis, 3.84=>3.0 today. Recent baseline Cr 1.2 (Dec 23). Elevated BUN likely sec to dehydration and high dose prednisone  -HTN  -Anasarca/Hypoalbuminemia sec to nephrotic syndrome. S/p IV albumin. S Alb 4.5  -Hypernatremia, i  -Hypokalemia   -Hyperphosphatemia: started on Renvela 800mg tid w/meals  -Anemia: s/p prbc tx, on Ferrous sulphate po  -Lt UExt superficial vein thrombosis   -Urothelial carcinoma of the bladder and prostate ca s/p cystectomy, prostatectomy and ilial conduit    -CAD  -Met acidosis: off po NaBicarb presently to limit Na load  -Leucocytosis likely sec to high dose steroid, vs ?infection       Plan:     -Advised on more free water intake  -Diuretics, Lasix and Metolazone, on hold due to increased Azotemia and hypernatremia   -Recommend PT evaluation for Lymphedema treatment and keeping both legs elevated when sitting or Lying down.   -Recommend starting Torsemide 20mg po daily prior to discharge  -Cont current dose of Pred  -Replace K  -Cont Epogen  -S/p  IV Albumin, Serum Alb. has improved 4.5   -Blood transfusion prn, monitor H&H  -Low Na diet  -Strict I/Os  -Avoid Nephrotoxins  -No urgent indication for RRT  -No ACEI/ARB due to recent Hyperkalemia  -Would likely need oral anticoagulation in view of increased risk of DVT in setting of Nephrotic Proteinuria >10gms and Hypoalbuminemia   -If no improvement with diuretics, pt might need HD for anasarca  -Patient would follow up with dr Newton at [facility] after discharge          History of Present Illness:  Efrain Mayorga is a 69 y.o. male   with a PMHx of recently diagnosed nephrotic syndrome sec to FSGS, CKD, HTN, CAD, Urothelial carcinoma of the bladder and prostate ca s/p  cystectomy/prostatectomy and ilial conduit who was recently discharged from the hospital now came to the ER complaining of LUE swelling and pain. He also contiue to have LE edema. Recent kidney Biopsy done while he was admitted showed Tip variant FSGS and started on Prednisone by his nephrologist, Dr Newton/[facility].  On initial eval labs showed Cr 3.0 and leucocytosis. During recent hospital stay his Cr was 2.9-3.2, from from baseline of  1.2 in Dec 2023. PVL showed LUE acute superficial vein thrombosis, but no DVT. Pt currently w/o SOB, CP or fever.      -Pt continues to have Lower extremity swelling. Lasix and Metolazone held due to increase in serum Sodium and BUN. Denies SOB    Principal Problem:    Anasarca  Active Problems:    Malignant neoplasm of urinary bladder (HCC)    Anemia    Prostate cancer (HCC)    Hypoalbuminemia    Nephrotic syndrome    Cellulitis    History of urostomy    Hypokalemia    Focal segmental glomerulosclerosis    Hypernatremia  Resolved Problems:    * No resolved hospital problems. *    Current Facility-Administered Medications   Medication Dose Route Frequency    miconazole (MICOTIN) 2 % powder   Topical BID    Epoetin Marco-epbx (RETACRIT) injection 20,000 Units  20,000 Units SubCUTAneous Weekly    sevelamer (RENVELA) tablet 800 mg  800 mg Oral TID WC    0.9 % sodium chloride infusion   IntraVENous PRN    [Held by provider] metOLazone (ZAROXOLYN) tablet 5 mg  5 mg Oral Daily    [Held by provider] polyethylene glycol (GLYCOLAX) packet 17 g  17 g Oral Daily    ferrous sulfate (IRON 325) tablet 325 mg  325 mg Oral BID WC    0.9 % sodium chloride infusion   IntraVENous PRN    sodium chloride flush 0.9 % injection 5-40 mL  5-40 mL IntraVENous 2 times per day    sodium chloride flush 0.9 % injection 5-40 mL  5-40 mL IntraVENous PRN    0.9 % sodium chloride infusion   IntraVENous PRN    heparin (porcine) injection 5,000 Units  5,000 Units SubCUTAneous 3 times per day    ondansetron (ZOFRAN-ODT)

## 2024-03-30 NOTE — PROGRESS NOTES
Hospitalist Progress Note-critical care note     Patient: Efrain Mayorga MRN: [id number]  [facility]: [id number]    YOB: 1955  Age: 69 y.o.  Sex: male    DOA: 3/19/2024 LOS:  LOS: 11 days            Chief complaint: fsgs, hypernatremia, anasarca ,  hypokalemia     Assessment/Plan         Active Hospital Problems    Diagnosis Date Noted    Focal segmental glomerulosclerosis [N05.1] 03/27/2024    Hypokalemia [E87.6] 03/25/2024    Cellulitis [L03.90] 03/19/2024    History of urostomy [Z98.890] 03/19/2024    Nephrotic syndrome [N04.9] 03/12/2024    Anasarca [R60.1] 03/09/2024    Hypoalbuminemia [E88.09] 08/30/2023    Prostate cancer (HCC) [C61] 04/07/2023    Anemia [D64.9] 03/22/2023    Malignant neoplasm of urinary bladder (HCC) [C67.9] 11/03/2022        Anasarca   Still swelling in LE ,   Received diuretics and albumin -diuretic hold now due to hypernatremia   Albumin 3.5      Focal segmental glomerulosclerosis - cr 3.03 on 3/30   On prednisone 60 mg  Nephrology following recommend to continue  prednisone   Continue aspirin daily  Monitor renal function.  Hematologist on board for ac recommendation      Hypernatremia-mild improving today   Sodium up  Diuretics on hold  Considering dialysis??  Also high risk for dvt  Repeated  leg ultrasound neg one week ago        Hypokalemia -  K replacement       Cellulitis history of left arm-  Resolved   PVL no DVT, superficial thrombosis  ID seeing pt and recommend po amoxicillin      Prostate cancer, bladder cancer  Seeing Dr. Debbie MCCLAIN oncologist  Received immunotherapy before, no active treatment right now  s/p   1. Radical cystoprostatectomy  2. Extended pelvic lymph node dissection  3. Ileal conduit urinary diversion with single-J stents bilaterally   On 03/28/2023  Urostomy bag noted urine clear  Will have ct scan per oncologist      Anemia  Due to chronic illness  No bleeding reported   Due to high risk for thrombosis -will continue heparin for dvt prophy except

## 2024-03-30 NOTE — PLAN OF CARE
Problem: Skin/Tissue Integrity  Goal: Absence of new skin breakdown  Description: 1.  Monitor for areas of redness and/or skin breakdown  2.  Assess vascular access sites hourly  3.  Every 4-6 hours minimum:  Change oxygen saturation probe site  4.  Every 4-6 hours:  If on nasal continuous positive airway pressure, respiratory therapy assess nares and determine need for appliance change or resting period.  Outcome: Progressing     Problem: Discharge Planning  Goal: Discharge to home or other facility with appropriate resources  Outcome: Progressing  Flowsheets (Taken 3/29/2024 2030)  Discharge to home or other facility with appropriate resources:   Identify barriers to discharge with patient and caregiver   Arrange for needed discharge resources and transportation as appropriate   Identify discharge learning needs (meds, wound care, etc)     Problem: Nutrition Deficit:  Goal: Optimize nutritional status  Outcome: Progressing  Flowsheets (Taken 3/29/2024 1233 by Yolande Summers, RD)  Nutrient intake appropriate for improving, restoring, or maintaining nutritional needs:   Assess nutritional status and recommend course of action   Monitor oral intake, labs, and treatment plans

## 2024-03-31 LAB
ALBUMIN SERPL-MCNC: 3.2 G/DL (ref 3.4–5)
ALBUMIN/GLOB SERPL: 2.7 (ref 0.8–1.7)
ALP SERPL-CCNC: 71 U/L (ref 45–117)
ALT SERPL-CCNC: 25 U/L (ref 16–61)
ANION GAP SERPL CALC-SCNC: 9 MMOL/L (ref 3–18)
AST SERPL-CCNC: 23 U/L (ref 10–38)
BILIRUB SERPL-MCNC: 0.8 MG/DL (ref 0.2–1)
BUN SERPL-MCNC: 135 MG/DL (ref 7–18)
BUN/CREAT SERPL: 47 (ref 12–20)
CALCIUM SERPL-MCNC: 8.3 MG/DL (ref 8.5–10.1)
CHLORIDE SERPL-SCNC: 117 MMOL/L (ref 100–111)
CO2 SERPL-SCNC: 22 MMOL/L (ref 21–32)
CREAT SERPL-MCNC: 2.9 MG/DL (ref 0.6–1.3)
GLOBULIN SER CALC-MCNC: 1.2 G/DL (ref 2–4)
GLUCOSE SERPL-MCNC: 147 MG/DL (ref 74–99)
HCT VFR BLD AUTO: 25.4 % (ref 36–48)
HGB BLD-MCNC: 8.4 G/DL (ref 13–16)
MAGNESIUM SERPL-MCNC: 2 MG/DL (ref 1.6–2.6)
POTASSIUM SERPL-SCNC: 3.4 MMOL/L (ref 3.5–5.5)
PROT SERPL-MCNC: 4.4 G/DL (ref 6.4–8.2)
SODIUM SERPL-SCNC: 148 MMOL/L (ref 136–145)

## 2024-03-31 PROCEDURE — 80053 COMPREHEN METABOLIC PANEL: CPT

## 2024-03-31 PROCEDURE — 2580000003 HC RX 258: Performed by: HOSPITALIST

## 2024-03-31 PROCEDURE — 83735 ASSAY OF MAGNESIUM: CPT

## 2024-03-31 PROCEDURE — 6370000000 HC RX 637 (ALT 250 FOR IP): Performed by: INTERNAL MEDICINE

## 2024-03-31 PROCEDURE — 6370000000 HC RX 637 (ALT 250 FOR IP): Performed by: HOSPITALIST

## 2024-03-31 PROCEDURE — 36415 COLL VENOUS BLD VENIPUNCTURE: CPT

## 2024-03-31 PROCEDURE — 6360000002 HC RX W HCPCS: Performed by: HOSPITALIST

## 2024-03-31 PROCEDURE — 85014 HEMATOCRIT: CPT

## 2024-03-31 PROCEDURE — 85018 HEMOGLOBIN: CPT

## 2024-03-31 PROCEDURE — 1100000003 HC PRIVATE W/ TELEMETRY

## 2024-03-31 RX ADMIN — LEVOTHYROXINE SODIUM 137.5 MCG: 0.07 TABLET ORAL at 06:36

## 2024-03-31 RX ADMIN — SODIUM CHLORIDE, PRESERVATIVE FREE 10 ML: 5 INJECTION INTRAVENOUS at 08:29

## 2024-03-31 RX ADMIN — MICONAZOLE NITRATE: 2 POWDER TOPICAL at 08:29

## 2024-03-31 RX ADMIN — DILTIAZEM HYDROCHLORIDE 120 MG: 120 CAPSULE, EXTENDED RELEASE ORAL at 08:29

## 2024-03-31 RX ADMIN — FERROUS SULFATE TAB 325 MG (65 MG ELEMENTAL FE) 325 MG: 325 (65 FE) TAB at 18:03

## 2024-03-31 RX ADMIN — SODIUM CHLORIDE, PRESERVATIVE FREE 10 ML: 5 INJECTION INTRAVENOUS at 20:42

## 2024-03-31 RX ADMIN — SEVELAMER CARBONATE 800 MG: 800 TABLET, FILM COATED ORAL at 18:03

## 2024-03-31 RX ADMIN — HEPARIN SODIUM 5000 UNITS: 5000 INJECTION INTRAVENOUS; SUBCUTANEOUS at 21:30

## 2024-03-31 RX ADMIN — FERROUS SULFATE TAB 325 MG (65 MG ELEMENTAL FE) 325 MG: 325 (65 FE) TAB at 08:29

## 2024-03-31 RX ADMIN — ATORVASTATIN CALCIUM 40 MG: 20 TABLET, FILM COATED ORAL at 08:29

## 2024-03-31 RX ADMIN — HEPARIN SODIUM 5000 UNITS: 5000 INJECTION INTRAVENOUS; SUBCUTANEOUS at 14:13

## 2024-03-31 RX ADMIN — SEVELAMER CARBONATE 800 MG: 800 TABLET, FILM COATED ORAL at 08:28

## 2024-03-31 RX ADMIN — HEPARIN SODIUM 5000 UNITS: 5000 INJECTION INTRAVENOUS; SUBCUTANEOUS at 06:36

## 2024-03-31 RX ADMIN — PREDNISONE 60 MG: 20 TABLET ORAL at 08:29

## 2024-03-31 RX ADMIN — SEVELAMER CARBONATE 800 MG: 800 TABLET, FILM COATED ORAL at 11:52

## 2024-03-31 RX ADMIN — PANTOPRAZOLE SODIUM 40 MG: 40 TABLET, DELAYED RELEASE ORAL at 06:36

## 2024-03-31 ASSESSMENT — PAIN SCALES - GENERAL
PAINLEVEL_OUTOF10: 0
PAINLEVEL_OUTOF10: 0

## 2024-03-31 NOTE — PLAN OF CARE
Problem: Pain  Goal: Verbalizes/displays adequate comfort level or baseline comfort level  Outcome: Progressing  Flowsheets (Taken 3/30/2024 2000)  Verbalizes/displays adequate comfort level or baseline comfort level:   Encourage patient to monitor pain and request assistance   Administer analgesics based on type and severity of pain and evaluate response   Assess pain using appropriate pain scale     Problem: Risk for Elopement  Goal: Patient will not exit the unit/facility without proper excort  Outcome: Progressing  Flowsheets (Taken 3/30/2024 0800 by Obdulia Camacho RN)  Nursing Interventions for Elopement Risk:   Collaborate with family members/caregivers to mitigate the elopement risk   Assist with personal care needs such as toileting, eating, dressing, as needed to reduce the risk of wandering   Collaborate with treatment team for drug withdrawal symptoms treatment   Collaborate with treatment team for nicotine replacement   Communicate/escalate to charge nurse the risk of elopement   Make sure patient has all necessary personal care items   Communicate/escalate to /other team member the risk of elopement   Communicate/escalate to nursing supervisor the risk of elopement     Problem: Safety - Adult  Goal: Free from fall injury  Outcome: Progressing     Problem: ABCDS Injury Assessment  Goal: Absence of physical injury  Outcome: Progressing     Problem: Skin/Tissue Integrity  Goal: Absence of new skin breakdown  Description: 1.  Monitor for areas of redness and/or skin breakdown  2.  Assess vascular access sites hourly  3.  Every 4-6 hours minimum:  Change oxygen saturation probe site  4.  Every 4-6 hours:  If on nasal continuous positive airway pressure, respiratory therapy assess nares and determine need for appliance change or resting period.  Outcome: Progressing     Problem: Discharge Planning  Goal: Discharge to home or other facility with appropriate resources  Outcome:

## 2024-03-31 NOTE — PROGRESS NOTES
Nephrology Progress note    Efrain Mayorga  MRN: [id number]  : 1955    Admit Date: 3/19/2024        Impression:     -Primary FSGS: recently bx proven, on high dose Pred  -Nephrotic syndrome sec to FSGS  -Acute Kidney injury; sec to FSGS, Cr up with diuresis, 3.84=>3.0=>2.9 today. Recent baseline Cr 1.2 (Dec 23). Elevated BUN likely sec to dehydration and high dose prednisone  -HTN  -Anasarca/Hypoalbuminemia sec to nephrotic syndrome. S/p IV albumin. S Alb 4.5  -Hypernatremia, i  -Hypokalemia   -Hyperphosphatemia: started on Renvela 800mg tid w/meals  -Anemia: s/p prbc tx, on Ferrous sulphate po  -Lt UExt superficial vein thrombosis   -Urothelial carcinoma of the bladder and prostate ca s/p cystectomy, prostatectomy and ilial conduit    -CAD  -Met acidosis: off po NaBicarb presently to limit Na load  -Leucocytosis likely sec to high dose steroid, vs ?infection       Plan:     -Advised on more free water intake  -Diuretics, Lasix and Metolazone, on hold due to increased Azotemia and hypernatremia   -Recommend PT evaluation for Lymphedema treatment and keeping both legs elevated when sitting or Lying down.   -Recommend starting Torsemide 20mg po daily prior to discharge  -Cont current dose of Pred  -Replace K  -Cont Epogen  -S/p  IV Albumin, Serum Alb. has improved 4.5   -Blood transfusion prn, monitor H&H  -Low Na diet  -Strict I/Os  -Avoid Nephrotoxins  -No urgent indication for RRT  -No ACEI/ARB due to recent Hyperkalemia  -Would likely need oral anticoagulation in view of increased risk of DVT in setting of Nephrotic Proteinuria >10gms and Hypoalbuminemia   -If no improvement with diuretics, pt might need HD for anasarca  -Patient would follow up with dr Newton at [facility] after discharge          History of Present Illness:  Efrain Mayorga is a 69 y.o. male   with a PMHx of recently diagnosed nephrotic syndrome sec to FSGS, CKD, HTN, CAD, Urothelial carcinoma of the bladder and prostate ca s/p

## 2024-03-31 NOTE — PROGRESS NOTES
Hematology / Oncology Initial Consult Note    Admit Date: 3/19/2024    Reason for Consult: Hypercoagulable state    Requesting Physician: Dr. Char Harding    Assessment:     Hem/Onc Issues:  High grade urothelial carcinoma of bladder, s/p neoadjvuant chemo cis/gem, cystoprostatectomy 3/2023 [id number], adjvuant nivolumab 6/2023-10/17/2023 stopped due to anasarca and chronic kidney disease. Started enfortumab in 1/2024, but placed on hold since Jan due to SE's with rash and diarrhea. Off treatment at this time. CT CAP 3/30/24 with interval retroperitoneal and pelvic adenopathy  Incidental prostate cancer, Jessica 3+3  Reasons for Admission:   Cellulitis  Other Active Issues:               Focal Segmental Glomerulonephrosis  Hypokalemia  Anasarca  Hypercoagulable state- on heparin ppx       Plan:     - There are no high-quality studies to guide anticoagulation treatment in nephrotic syndrome, but general recommendations are if nephrotic syndrome is from FSGS and albumin>3, there is no role for ppx anticoagulation; if between 2-3 can consider on case-by-case basis based on bleeding risk, and if <2 should administer ppx with either low molecular weight heparin or coumadin.   - CT CAP with interval retroperitoneal and side wall adenopathy  - Given proteinuria, immobility, and LAD, recommend starting ppx anticoagulation with Lovenox 30mg once daily (dosed for pts with Cr Cl<30) once discharged  - Will ensure f/u with Dr. Debbie Wallis MD  [facility]        History of Present Illness: Efrain Mayorga is a 69 y.o. male with a history of prostate and bladder cancer, , s/p neoadjvuant chemo cis/gem, cystoprostatectomy 3/2023 [id number], s/p adjvuant nivolumab 6/2023-10/17/2023 stopped due to gemerzlied swelling chronic kidney disease. Startedenfortumab in 1/2024, but stopped due to rash and diarrhea, off therapy, followed at [facility] by Dr. Lewis, admitted to [facility] from 3/7/24-3/15/24 for WERO with nephrotic

## 2024-03-31 NOTE — PLAN OF CARE
Problem: Pain  Goal: Verbalizes/displays adequate comfort level or baseline comfort level  3/31/2024 0954 by Obdulia Camacho RN  Outcome: Progressing  3/30/2024 2136 by Aliza Luque RN  Outcome: Progressing  Flowsheets (Taken 3/30/2024 2000)  Verbalizes/displays adequate comfort level or baseline comfort level:   Encourage patient to monitor pain and request assistance   Administer analgesics based on type and severity of pain and evaluate response   Assess pain using appropriate pain scale     Problem: Risk for Elopement  Goal: Patient will not exit the unit/facility without proper excort  3/31/2024 0954 by Obdulia Camacho, RN  Outcome: Progressing  Flowsheets (Taken 3/31/2024 0800)  Nursing Interventions for Elopement Risk:   Collaborate with family members/caregivers to mitigate the elopement risk   Assist with personal care needs such as toileting, eating, dressing, as needed to reduce the risk of wandering   Collaborate with treatment team for drug withdrawal symptoms treatment   Collaborate with treatment team for nicotine replacement   Communicate/escalate to charge nurse the risk of elopement   Make sure patient has all necessary personal care items   Communicate/escalate to /other team member the risk of elopement   Communicate/escalate to nursing supervisor the risk of elopement  3/30/2024 2136 by Aliza Luque RN  Outcome: Progressing  Flowsheets (Taken 3/30/2024 0800 by Obdulia Camacho, JUSTIN)  Nursing Interventions for Elopement Risk:   Collaborate with family members/caregivers to mitigate the elopement risk   Assist with personal care needs such as toileting, eating, dressing, as needed to reduce the risk of wandering   Collaborate with treatment team for drug withdrawal symptoms treatment   Collaborate with treatment team for nicotine replacement   Communicate/escalate to charge nurse the risk of elopement   Make sure patient has all necessary personal care items   Communicate/escalate to

## 2024-03-31 NOTE — PROGRESS NOTES
input(s): \"CKRMB\", \"CKND1\", \"TROIP\"   Coagulation No results for input(s): \"INR\", \"APTT\" in the last 72 hours.    Invalid input(s): \"PTP\"    Lipid Panel Lab Results   Component Value Date/Time    CHOL 239 03/11/2024 02:18 PM    HDL 40 03/11/2024 02:18 PM      BNP Invalid input(s): \"BNPP\"   Liver Enzymes No results for input(s): \"TP\", \"ALB\" in the last 72 hours.    Invalid input(s): \"TBIL\", \"AP\", \"SGOT\", \"GPT\", \"DBIL\"   Thyroid Studies No results found for: \"T4\", \"T3RU\", \"TSH\"     Procedures/imaging: see electronic medical records for all procedures/Xrays and details which were not copied into this note but were reviewed prior to creation of Plan    Vascular duplex lower extremity venous bilateral    Result Date: 3/29/2024  INDICATION: Edema right calf. Edema of right leg. Rule out DVT     No evidence of bilateral lower extremity deep venous thrombosis.    Vascular duplex upper extremity venous bilateral    Result Date: 3/20/2024    Acute superficial vein thrombosis in the cephalic vein of the left forearm.   No evidence of acute and chronic deep vein thrombosis in the right upper extremity.     XR CHEST PORTABLE    Result Date: 3/19/2024  INDICATION: nephrotic syndrome  EXAM:  AP CHEST RADIOGRAPH COMPARISON: March 7, 2024 FINDINGS: AP portable view of the chest demonstrates normal heart size. Right IJ Port-A-Cath unchanged. There is no edema, effusion, consolidation, or pneumothorax. The osseous structures are unremarkable.     No acute process.    CT BIOPSY RENAL    Result Date: 3/14/2024  PROCEDURE:  CT GUIDED RENAL BIOPSY HISTORY: NIRMAL HOLLIDAY is a 69 year old Male with Nephrotic Syndrome and Renal failure. :  Yisel Sebastian NP ATTENDING:  Min Blanc MD CONSENT:  After full discussion of the procedure, including risks, benefits and alternatives, both verbal and written consent were obtained. TECHNIQUE: A timeout was called to verify the correct patient, procedure, site and allergies. The patient was  placed prone on the CT table.  CT dose reduction was achieved through use of a standardized protocol tailored for this examination and automatic exposure control for dose modulation.  Initial  images were obtained.  An appropriate site for core renal biopsy was marked.  The skin was prepped and draped in sterile fashion.  1% lidocaine was utilized for local anesthesia.  A small dermatotomy was made.  An 18 gauge coaxial biopsy system was utilized.  Under CT guidance, the introducer needle was advanced into the lower pole of the left kidney and through this 3 core specimens were obtained. These were submitted directly to the on-site pathologist and tissue sample adequacy confirmed.  Gelfoam slurry was injected as the needle was removed. Pressure was applied locally at the biopsy site.  A dry sterile dressing was applied.  There were no immediate complications.  The patient tolerated the procedure without difficulty. SEDATION: Moderate intravenous conscious sedation was supervised by Dr. Min Blanc.  The patient was independently monitored by a registered nurse assigned to the Department of Radiology using automated blood pressure, ECG and pulse oximetry.  The detailed conscious sedation record is stored in the hospital information system. Medication: Versed:   1  mg Fentanyl:   50  mcg Intraprocedure time:   20  minutes ESTIMATED BLOOD LOSS:  < 5 ml SPECIMENS:  Cores sent for pathology DLP:  1097.17  mGy*cm     Technically successful CT guided core renal biopsy.  Pathology results are pending.    Vascular duplex lower extremity venous bilateral    Result Date: 3/7/2024    No evidence of deep vein thrombosis in the right lower extremity.   No evidence of deep vein thrombosis in the left lower extremity.     US RETROPERITONEAL COMPLETE    Result Date: 3/7/2024  EXAM: US RETROPERITONEAL COMPLETE INDICATION: WERO COMPARISON: CT chest abdomen and pelvis 12/11/2023 TECHNIQUE: Ultrasound of the kidneys and urinary

## 2024-04-01 VITALS
RESPIRATION RATE: 18 BRPM | BODY MASS INDEX: 30.42 KG/M2 | OXYGEN SATURATION: 100 % | HEART RATE: 66 BPM | WEIGHT: 193.78 LBS | SYSTOLIC BLOOD PRESSURE: 134 MMHG | DIASTOLIC BLOOD PRESSURE: 76 MMHG | HEIGHT: 67 IN | TEMPERATURE: 97.5 F

## 2024-04-01 LAB
ALBUMIN SERPL-MCNC: 3 G/DL (ref 3.4–5)
ALBUMIN/GLOB SERPL: 1.9 (ref 0.8–1.7)
ALP SERPL-CCNC: 87 U/L (ref 45–117)
ALT SERPL-CCNC: 27 U/L (ref 16–61)
ANION GAP SERPL CALC-SCNC: 9 MMOL/L (ref 3–18)
AST SERPL-CCNC: 22 U/L (ref 10–38)
BILIRUB SERPL-MCNC: 0.7 MG/DL (ref 0.2–1)
BUN SERPL-MCNC: 130 MG/DL (ref 7–18)
BUN/CREAT SERPL: 44 (ref 12–20)
CALCIUM SERPL-MCNC: 8 MG/DL (ref 8.5–10.1)
CHLORIDE SERPL-SCNC: 114 MMOL/L (ref 100–111)
CO2 SERPL-SCNC: 21 MMOL/L (ref 21–32)
CREAT SERPL-MCNC: 2.98 MG/DL (ref 0.6–1.3)
GLOBULIN SER CALC-MCNC: 1.6 G/DL (ref 2–4)
GLUCOSE SERPL-MCNC: 189 MG/DL (ref 74–99)
HCT VFR BLD AUTO: 28.5 % (ref 36–48)
HGB BLD-MCNC: 9.3 G/DL (ref 13–16)
MAGNESIUM SERPL-MCNC: 1.9 MG/DL (ref 1.6–2.6)
POTASSIUM SERPL-SCNC: 3.4 MMOL/L (ref 3.5–5.5)
PROT SERPL-MCNC: 4.6 G/DL (ref 6.4–8.2)
SODIUM SERPL-SCNC: 144 MMOL/L (ref 136–145)

## 2024-04-01 PROCEDURE — 36415 COLL VENOUS BLD VENIPUNCTURE: CPT

## 2024-04-01 PROCEDURE — 6370000000 HC RX 637 (ALT 250 FOR IP): Performed by: INTERNAL MEDICINE

## 2024-04-01 PROCEDURE — 85018 HEMOGLOBIN: CPT

## 2024-04-01 PROCEDURE — 83735 ASSAY OF MAGNESIUM: CPT

## 2024-04-01 PROCEDURE — 6370000000 HC RX 637 (ALT 250 FOR IP): Performed by: HOSPITALIST

## 2024-04-01 PROCEDURE — 6360000002 HC RX W HCPCS: Performed by: HOSPITALIST

## 2024-04-01 PROCEDURE — 80053 COMPREHEN METABOLIC PANEL: CPT

## 2024-04-01 PROCEDURE — 85014 HEMATOCRIT: CPT

## 2024-04-01 PROCEDURE — 2580000003 HC RX 258: Performed by: HOSPITALIST

## 2024-04-01 RX ORDER — TORSEMIDE 20 MG/1
20 TABLET ORAL DAILY
Qty: 30 TABLET | Refills: 0 | Status: ON HOLD | OUTPATIENT
Start: 2024-04-02

## 2024-04-01 RX ORDER — AMOXICILLIN 250 MG/1
250 CAPSULE ORAL EVERY 12 HOURS SCHEDULED
Qty: 14 CAPSULE | Refills: 0 | Status: ON HOLD | OUTPATIENT
Start: 2024-04-01 | End: 2024-04-08

## 2024-04-01 RX ORDER — ENOXAPARIN SODIUM 100 MG/ML
30 INJECTION SUBCUTANEOUS DAILY
Qty: 30 EACH | Refills: 0 | Status: ON HOLD | OUTPATIENT
Start: 2024-04-01

## 2024-04-01 RX ORDER — AMOXICILLIN 250 MG/1
250 CAPSULE ORAL EVERY 12 HOURS SCHEDULED
Status: DISCONTINUED | OUTPATIENT
Start: 2024-04-01 | End: 2024-04-01 | Stop reason: HOSPADM

## 2024-04-01 RX ORDER — POTASSIUM CHLORIDE 20 MEQ/1
40 TABLET, EXTENDED RELEASE ORAL ONCE
Status: COMPLETED | OUTPATIENT
Start: 2024-04-01 | End: 2024-04-01

## 2024-04-01 RX ORDER — TORSEMIDE 20 MG/1
20 TABLET ORAL DAILY
Status: DISCONTINUED | OUTPATIENT
Start: 2024-04-01 | End: 2024-04-01 | Stop reason: HOSPADM

## 2024-04-01 RX ORDER — SEVELAMER CARBONATE 800 MG/1
800 TABLET, FILM COATED ORAL
Qty: 90 TABLET | Refills: 0 | Status: ON HOLD | OUTPATIENT
Start: 2024-04-01

## 2024-04-01 RX ADMIN — DILTIAZEM HYDROCHLORIDE 120 MG: 120 CAPSULE, EXTENDED RELEASE ORAL at 09:10

## 2024-04-01 RX ADMIN — SODIUM CHLORIDE, PRESERVATIVE FREE 10 ML: 5 INJECTION INTRAVENOUS at 09:11

## 2024-04-01 RX ADMIN — HEPARIN SODIUM 5000 UNITS: 5000 INJECTION INTRAVENOUS; SUBCUTANEOUS at 05:53

## 2024-04-01 RX ADMIN — SEVELAMER CARBONATE 800 MG: 800 TABLET, FILM COATED ORAL at 18:16

## 2024-04-01 RX ADMIN — FERROUS SULFATE TAB 325 MG (65 MG ELEMENTAL FE) 325 MG: 325 (65 FE) TAB at 18:16

## 2024-04-01 RX ADMIN — LEVOTHYROXINE SODIUM 137.5 MCG: 0.07 TABLET ORAL at 05:53

## 2024-04-01 RX ADMIN — POTASSIUM CHLORIDE 40 MEQ: 1500 TABLET, EXTENDED RELEASE ORAL at 18:16

## 2024-04-01 RX ADMIN — SEVELAMER CARBONATE 800 MG: 800 TABLET, FILM COATED ORAL at 11:15

## 2024-04-01 RX ADMIN — SEVELAMER CARBONATE 800 MG: 800 TABLET, FILM COATED ORAL at 09:10

## 2024-04-01 RX ADMIN — TORSEMIDE 20 MG: 20 TABLET ORAL at 11:11

## 2024-04-01 RX ADMIN — AMOXICILLIN 250 MG: 250 CAPSULE ORAL at 18:16

## 2024-04-01 RX ADMIN — PANTOPRAZOLE SODIUM 40 MG: 40 TABLET, DELAYED RELEASE ORAL at 05:53

## 2024-04-01 RX ADMIN — HEPARIN SODIUM 5000 UNITS: 5000 INJECTION INTRAVENOUS; SUBCUTANEOUS at 15:04

## 2024-04-01 RX ADMIN — MICONAZOLE NITRATE: 2 POWDER TOPICAL at 09:11

## 2024-04-01 RX ADMIN — FERROUS SULFATE TAB 325 MG (65 MG ELEMENTAL FE) 325 MG: 325 (65 FE) TAB at 09:10

## 2024-04-01 RX ADMIN — ATORVASTATIN CALCIUM 40 MG: 20 TABLET, FILM COATED ORAL at 09:10

## 2024-04-01 RX ADMIN — PREDNISONE 60 MG: 20 TABLET ORAL at 09:10

## 2024-04-01 ASSESSMENT — PAIN SCALES - GENERAL: PAINLEVEL_OUTOF10: 0

## 2024-04-01 NOTE — PLAN OF CARE
Problem: Pain  Goal: Verbalizes/displays adequate comfort level or baseline comfort level  3/31/2024 2334 by Ingris Ward RN  Outcome: Progressing  3/31/2024 0954 by Obdulia Camacho RN  Outcome: Progressing     Problem: Risk for Elopement  Goal: Patient will not exit the unit/facility without proper excort  3/31/2024 2334 by Ingris Ward RN  Outcome: Progressing  3/31/2024 0954 by Obdulia Camacho RN  Outcome: Progressing  Flowsheets (Taken 3/31/2024 0800)  Nursing Interventions for Elopement Risk:   Collaborate with family members/caregivers to mitigate the elopement risk   Assist with personal care needs such as toileting, eating, dressing, as needed to reduce the risk of wandering   Collaborate with treatment team for drug withdrawal symptoms treatment   Collaborate with treatment team for nicotine replacement   Communicate/escalate to charge nurse the risk of elopement   Make sure patient has all necessary personal care items   Communicate/escalate to /other team member the risk of elopement   Communicate/escalate to nursing supervisor the risk of elopement     Problem: Safety - Adult  Goal: Free from fall injury  3/31/2024 2334 by Ingris Ward RN  Outcome: Progressing  3/31/2024 0954 by Obdulia Camacho RN  Outcome: Progressing     Problem: ABCDS Injury Assessment  Goal: Absence of physical injury  3/31/2024 2334 by Ingris Ward RN  Outcome: Progressing  3/31/2024 0954 by Obdulia Camacho RN  Outcome: Progressing     Problem: Skin/Tissue Integrity  Goal: Absence of new skin breakdown  Description: 1.  Monitor for areas of redness and/or skin breakdown  2.  Assess vascular access sites hourly  3.  Every 4-6 hours minimum:  Change oxygen saturation probe site  4.  Every 4-6 hours:  If on nasal continuous positive airway pressure, respiratory therapy assess nares and determine need for appliance change or resting period.  3/31/2024 2334 by Ingris Ward RN  Outcome:

## 2024-04-01 NOTE — PROGRESS NOTES
CM notes patient labs improving with sodium 148, and creatinine of 2.9, CM notes possible discharge tomorrow pending continues to improve.     CM rounded with provider in discharge huddle, and provider reported patient is not ready for discharge today as he may possibly need HD. Provider questioned who made decision to transfer patient to medical from telemetry.

## 2024-04-01 NOTE — PROGRESS NOTES
Assisted with care of transfer patient from room [address].  Arrived via wheelchair in no acute distress.  Orientated to unit/room, safety measures remain in place, call bell in reach.    @ 1100 patient and family requesting to speak with Primary MD regarding plan of care.  Communicated would like to get an update as unsure why he remains hospitalized at this time and potential discharge.  @ 1110 MD paged, return call received.  RN communicated concerns, MD stated will come to bedside to speak with patient and family.

## 2024-04-01 NOTE — PROGRESS NOTES
Nephrology Progress note    Efrain Mayorga  MRN: [id number]  : 1955    Admit Date: 3/19/2024        Impression:     -Primary FSGS: recently bx proven, on high dose Pred  -Nephrotic syndrome sec to FSGS  -Acute Kidney injury; sec to FSGS, Cr up with diuresis, 3.84=>3.0=>2.9 today. Recent baseline Cr 1.2 (Dec 23). Elevated BUN likely sec to dehydration and high dose prednisone  -HTN  -Anasarca/Hypoalbuminemia sec to nephrotic syndrome. S/p IV albumin. S Alb 4.5  -Hypernatremia, i  -Hypokalemia   -Hyperphosphatemia: started on Renvela 800mg tid w/meals  -Anemia: s/p prbc tx, on Ferrous sulphate po  -Lt UExt superficial vein thrombosis   -Urothelial carcinoma of the bladder and prostate ca s/p cystectomy, prostatectomy and ilial conduit    -CAD  -Met acidosis: off po NaBicarb presently to limit Na load  -Leucocytosis likely sec to high dose steroid, vs ?infection       Plan:     -Advised on more free water intake  -Diuretics, Lasix and Metolazone, on hold due to increased Azotemia and hypernatremia   -Recommend PT evaluation for Lymphedema treatment and keeping both legs elevated when sitting or Lying down.   -Resume Torsemide 20mg po daily  -Cont current dose of Pred  -Replace K  -Cont Epogen  -S/p  IV Albumin, Serum Alb. has improved 4.5   -Blood transfusion prn, monitor H&H  -Low Na diet  -Strict I/Os  -Avoid Nephrotoxins  -No urgent indication for RRT  -No ACEI/ARB due to recent Hyperkalemia  -Would likely need oral anticoagulation in view of increased risk of DVT in setting of Nephrotic Proteinuria >10gms and Hypoalbuminemia   -If no improvement with diuretics, pt might need HD for anasarca  -Patient would follow up with dr Newton at [facility] after discharge, ?DC tomorrow           History of Present Illness:  Efrain Mayorga is a 69 y.o. male   with a PMHx of recently diagnosed nephrotic syndrome sec to FSGS, CKD, HTN, CAD, Urothelial carcinoma of the bladder and prostate ca s/p cystectomy/prostatectomy and  mg Oral Q8H PRN    Or    ondansetron (ZOFRAN) injection 4 mg  4 mg IntraVENous Q6H PRN    polyethylene glycol (GLYCOLAX) packet 17 g  17 g Oral Daily PRN    acetaminophen (TYLENOL) tablet 650 mg  650 mg Oral Q6H PRN    Or    acetaminophen (TYLENOL) suppository 650 mg  650 mg Rectal Q6H PRN    atorvastatin (LIPITOR) tablet 40 mg  40 mg Oral Daily    levothyroxine (SYNTHROID) tablet 137.5 mcg  137.5 mcg Oral Daily    [Held by provider] aspirin EC tablet 81 mg  81 mg Oral Daily    pantoprazole (PROTONIX) tablet 40 mg  40 mg Oral QAM AC    dilTIAZem (CARDIZEM CD) extended release capsule 120 mg  120 mg Oral Daily    predniSONE (DELTASONE) tablet 60 mg  60 mg Oral Daily    oxyCODONE-acetaminophen (PERCOCET) 5-325 MG per tablet 1 tablet  1 tablet Oral Q4H PRN         Allergy:   No Known Allergies     Objective:     BP (!) 141/73   Pulse 86   Temp 98.1 °F (36.7 °C) (Oral)   Resp 18   Ht 1.702 m (5' 7.01\")   Wt 87.9 kg (193 lb 12.6 oz)   SpO2 100%   BMI 30.34 kg/m²       Intake/Output Summary (Last 24 hours) at 4/1/2024 0929  Last data filed at 4/1/2024 0815  Gross per 24 hour   Intake 480 ml   Output 2250 ml   Net -1770 ml         Physical Exam:       General: No acute distress   HENT: Atraumatic and normocephalic   Eyes: Sclera clear   Neck: Supple, No JVD   Cardiovascular: Normal S1 & S2, RRR, no m/r/g   Pulmonary/Chest Wall: Clear to auscultation bilaterally   Abdominal: Soft and non-tender, Bowel sounds normal   Musculoskeletal: +++ Pedal  edema bilaterally   Neurological: AA and O X 3. No focal deficits     Data Review:  Lab Results   Component Value Date/Time     03/31/2024 03:42 AM    K 3.4 03/31/2024 03:42 AM     03/31/2024 03:42 AM    CO2 22 03/31/2024 03:42 AM     03/31/2024 03:42 AM    CREATININE 2.90 03/31/2024 03:42 AM    GFRAA >60 02/02/2022 09:14 AM     Lab Results   Component Value Date/Time    WBC 11.0 03/29/2024 03:35 AM    HGB 8.4 03/31/2024 03:42 AM    HCT 25.4 03/31/2024 03:42

## 2024-04-01 NOTE — PROGRESS NOTES
1055 Patient stated has previously had the stocking on but it is irritating the boils on his legs. Has the boils at L heel, R toe, L toe, L 2nd toe.   Stated redness on the right foot due to Strep infection and Atul Collins would provide amoxicillin when it reappears.      1230 Page MD Mendes to ask about compression socks, medication for boils/blisters/ and amoxicillin

## 2024-04-01 NOTE — PROGRESS NOTES
D/C Plan: Home with physician follow up and family to drive    CM notes discharge order. CM spoke with patient to revisit the idea of HH at discharge and the patient again declined. Patient reports \"I am getting better, see?\" And showed CM his that the edema in his arms has improved. Patient reports his wife will drive him at discharge but also reports that the physician told him he'd be here \"another 1-2 days.\" CM verified discharge with provider and notified primary RN of patient's discharge order.

## 2024-04-01 NOTE — PROGRESS NOTES
DISCHARGE SUMMARY from Nurse      What to do at Home:  Recommended activity: activity as tolerated and ambulate in house    If you experience any of the following symptoms Fever, please follow up with PCP.    *  Please give a list of your current medications to your Primary Care Provider.    *  Please update this list whenever your medications are discontinued, doses are      changed, or new medications (including over-the-counter products) are added.    *  Please carry medication information at all times in case of emergency situations.    These are general instructions for a healthy lifestyle:    No smoking/ No tobacco products/ Avoid exposure to second hand smoke    Obesity, smoking, and sedentary lifestyle greatly increases your risk for illness    A healthy diet, regular physical exercise & weight monitoring are important for maintaining a healthy lifestyle    You may be retaining fluid if you have a history of heart failure or if you experience any of the following symptoms:  Weight gain of 3 pounds or more overnight or 5 pounds in a week, increased swelling in our hands or feet or shortness of breath while lying flat in bed.  Please call your doctor as soon as you notice any of these symptoms; do not wait until your next office visit.        The discharge information has been reviewed with the patient.  The patient verbalized understanding.  Discharge medications reviewed with the patient and spouse and appropriate educational materials and side effects teaching were provided.    1850 Patient left unit via nurse Allegra     ___________________________________________________________________________________________________________________________________

## 2024-04-01 NOTE — PROGRESS NOTES
Accessed chart due to impending transfer. Charge RN and RN supervisor aware. Patient going to 329, per RN supervisor will be transferred during day shift.

## 2024-04-01 NOTE — PROGRESS NOTES
Care Mgmt visited patient in Room to review/discuss 2nd IMM and right to appeal.  Patient refused to sign letter, acknowledged understanding, however refused to sign another letter.  Patient did not want a copy of the letter.  Original letter placed in patient's chart.

## 2024-04-01 NOTE — DISCHARGE SUMMARY
Discharge Summary    Patient: Efrain Mayorga MRN: [id number]  CSN: [id number]    YOB: 1955  Age: 69 y.o.  Sex: male    DOA: 3/19/2024 LOS:  LOS: 13 days   Discharge Date:      Primary Care Provider:  Arnoldo St DO    Admission Diagnoses: Anasarca [R60.1]  Nephrotic syndrome [N04.9]    Discharge Diagnoses:    Active Hospital Problems    Diagnosis     Hypernatremia [E87.0]     Focal segmental glomerulosclerosis [N05.1]     Hypokalemia [E87.6]     Cellulitis [L03.90]     History of urostomy [Z98.890]     Nephrotic syndrome [N04.9]     Anasarca [R60.1]     Hypoalbuminemia [E88.09]     Prostate cancer (HCC) [C61]     Anemia [D64.9]     Malignant neoplasm of urinary bladder (HCC) [C67.9]        Discharge Medications:        Medication List        START taking these medications      amoxicillin 250 MG capsule  Commonly known as: AMOXIL  Take 1 capsule by mouth every 12 hours for 14 doses     enoxaparin Sodium 30 MG/0.3ML injection  Commonly known as: LOVENOX  Inject 0.3 mLs into the skin daily     sevelamer 800 MG tablet  Commonly known as: RENVELA  Take 1 tablet by mouth 3 times daily (with meals)     torsemide 20 MG tablet  Commonly known as: DEMADEX  Take 1 tablet by mouth daily  Start taking on: April 2, 2024            CONTINUE taking these medications      aspirin 81 MG EC tablet  Take 1 tablet by mouth daily     atorvastatin 40 MG tablet  Commonly known as: LIPITOR     Claritin 10 MG tablet  Generic drug: loratadine     dilTIAZem 120 MG extended release capsule  Commonly known as: TIAZAC     famotidine 20 MG tablet  Commonly known as: PEPCID     levothyroxine 137 MCG tablet  Commonly known as: SYNTHROID     predniSONE 20 MG tablet  Commonly known as: DELTASONE  Take 3 tablets by mouth daily            STOP taking these medications      furosemide 40 MG tablet  Commonly known as: LASIX     sodium bicarbonate 650 MG tablet               Where to Get Your Medications        These medications were  5 days ago.  ED workup revealed acute renal failure with a BUN of 123, and a creatinine of 3.21, up from a creatinine of 1.29 in December.  He was also noted to have albumin of 1.5, and urine protein above 1000.  Renal ultrasound showed no hydronephrosis and chest x-ray unremarkable.  Nephrology and VOA were consulted by ED.  The patient received IV albumin in ED and admitted for further management.  He has no other concerns.    Hospital Course :   Mr. Mayorga was admitted to monitored floor. He was seen and followed by nephrology, heme/onc, ID.     Focal segmental glomerulosclerosis -   anasarca  Continued on prednisone 60 mg  Nephrology following recommend to continue  prednisone   Started on torsemide 20 mg daily. Was on hold due to hypernatremia then restarted.  Monitored renal function.  Keep legs elevated, compression stockings  Also high risk for dvt  Repeated  leg ultrasound negative.  Hematologist on board for ac recommendation -recommend dc on lovenox 30 mg once daily        Hypernatremia-  improving      Hypokalemia -  Received K replacement       Cellulitis history of left arm-improving   Wbc 13  K stable now due to steroid and infection   PVL no DVT, superficial thrombosis  ID seeing pt and recommended po amoxicillin.   He completed course of 7 days and was stopped   He developed left foot blisters and was started again on amoxicillin for 7 days.     Prostate cancer, bladder cancer  Seeing Dr. Debbie MCCLAIN oncologist  Received immunotherapy before, no active treatment right now  s/p   1. Radical cystoprostatectomy  2. Extended pelvic lymph node dissection  3. Ileal conduit urinary diversion with single-J stents bilaterally   On 03/28/2023  Urostomy bag noted urine clear     Anemia  Due to chronic illness  No bleeding reported   Due to high risk for thrombosis -on lovenox per heme/onc  Iron and epogen      Hypertension  Well controlled        Hyperlipidemia  Continued statin      This writer had long

## 2024-04-01 NOTE — PROGRESS NOTES
Hospitalist Progress Note    Patient: Efrain Mayorga MRN: [id number]  [facility]: [id number]    YOB: 1955  Age: 69 y.o.  Sex: male    DOA: 3/19/2024 LOS:  LOS: 13 days                Assessment/Plan     Active Hospital Problems    Diagnosis     Hypernatremia [E87.0]     Focal segmental glomerulosclerosis [N05.1]     Hypokalemia [E87.6]     Cellulitis [L03.90]     History of urostomy [Z98.890]     Nephrotic syndrome [N04.9]     Anasarca [R60.1]     Hypoalbuminemia [E88.09]     Prostate cancer (HCC) [C61]     Anemia [D64.9]     Malignant neoplasm of urinary bladder (HCC) [C67.9]         Chief complaint :  Anasarca     Focal segmental glomerulosclerosis -   anasarca  On prednisone 60 mg  Nephrology following recommend to continue  prednisone   Started on torsemide 20 mg daily  Monitor renal function.  Keep legs elevated, compression stockings  Hematologist on board for ac recommendation -recommend dc with  lovenox 30 mg once daily       Hypernatremia-  improving   Considering dialysis??  Also high risk for dvt  Repeated  leg ultrasound neg one week ago.     Hypokalemia -  K replacement       Cellulitis history of left arm-improving   Wbc 13  K stable now due to steroid and infection   PVL no DVT, superficial thrombosis  ID seeing pt and recommend po amoxicillin      Prostate cancer, bladder cancer  Seeing Dr. Debbie MCCLAIN oncologist  Received immunotherapy before, no active treatment right now  s/p   1. Radical cystoprostatectomy  2. Extended pelvic lymph node dissection  3. Ileal conduit urinary diversion with single-J stents bilaterally   On 03/28/2023  Urostomy bag noted urine clear     Anemia  Due to chronic illness  No bleeding reported   Due to high risk for thrombosis -on lovenox per heme/onc  Iron and epogen      Hypertension  Well controlled      CAD  No acute issues     Hyperlipidemia  Continue statin      Disposition : 1-2 days    Review of systems  General: No fevers or chills.  Cardiovascular: No  chest pain or pressure. No palpitations.   Pulmonary: No shortness of breath.   Gastrointestinal: No nausea, vomiting.     Physical Exam:  General: Awake, cooperative, no acute distress    HEENT: NC, Atraumatic.  PERRLA, anicteric sclerae.  Lungs: CTA Bilaterally. No Wheezing/Rhonchi/Rales.  Heart:  S1 S2,  No murmur, No Rubs, No Gallops  Abdomen: Soft, Non distended, Non tender.  +Bowel sounds,   Extremities: Edema bilaterally 3+, left upper ext swelling improving.  Psych:   Not anxious or agitated.  Neurologic:  No acute neurological deficit.         Vital signs/Intake and Output:  Visit Vitals  BP (!) 141/73   Pulse 86   Temp 98.1 °F (36.7 °C) (Oral)   Resp 18   Ht 1.702 m (5' 7.01\")   Wt 87.9 kg (193 lb 12.6 oz)   SpO2 100%   BMI 30.34 kg/m²     Current Shift:  04/01 0701 - 04/01 1900  In: -   Out: 700 [Urine:700]  Last three shifts:  03/30 1901 - 04/01 0700  In: 1370 [P.O.:1360; I.V.:10]  Out: 2825 [Urine:2825]            Labs: Results:       Chemistry Recent Labs     03/30/24  0937 03/31/24  0342 04/01/24  0854   * 148* 144   K 3.4* 3.4* 3.4*   * 117* 114*   CO2 20* 22 21   * 135* 130*   GLOB 1.2* 1.2* 1.6*   ,No results found for: \"LACTA\"   CBC w/Diff Recent Labs     03/31/24  0342 04/01/24  0854   HGB 8.4* 9.3*   HCT 25.4* 28.5*      Cardiac Enzymes Lab Results   Component Value Date    TROPHS 26 03/19/2024   ,No results found for: \"BNP\"   Coagulation No results for input(s): \"INR\", \"APTT\" in the last 72 hours.    Invalid input(s): \"PTP\"    Lipid Panel Lab Results   Component Value Date/Time    CHOL 239 03/11/2024 02:18 PM    HDL 40 03/11/2024 02:18 PM      Pancreas No results for input(s): \"LIPASE\" in the last 72 hours.,No results for input(s): \"AMYLASE\" in the last 72 hours.   Liver Enzymes No results for input(s): \"TP\", \"ALB\" in the last 72 hours.    Invalid input(s): \"TBIL\", \"AP\", \"SGOT\", \"GPT\", \"DBIL\"   Thyroid Studies No results found for: \"T4\", \"T3RU\", \"TSH\"     Procedures/imaging:  No

## 2024-04-01 NOTE — PROGRESS NOTES
TRANSFER - IN REPORT:    Verbal report received from 3N on Cono Mukesh  being received from 3N for routine progression of patient care      Report consisted of patient's Situation, Background, Assessment and   Recommendations(SBAR).     Information from the following report(s) Nurse Handoff Report, Intake/Output, MAR, and Recent Results was reviewed with the receiving nurse.    Opportunity for questions and clarification was provided.      Assessment completed upon patient's arrival to unit and care assumed.

## 2024-04-03 ENCOUNTER — APPOINTMENT (OUTPATIENT)
Facility: HOSPITAL | Age: 69
End: 2024-04-03
Payer: MEDICARE

## 2024-04-03 ENCOUNTER — APPOINTMENT (OUTPATIENT)
Facility: HOSPITAL | Age: 69
DRG: 683 | End: 2024-04-03
Payer: MEDICARE

## 2024-04-03 ENCOUNTER — HOSPITAL ENCOUNTER (INPATIENT)
Facility: HOSPITAL | Age: 69
LOS: 8 days | Discharge: HOME | DRG: 683 | End: 2024-04-11
Attending: FAMILY MEDICINE | Admitting: FAMILY MEDICINE
Payer: MEDICARE

## 2024-04-03 DIAGNOSIS — E87.70 HYPERVOLEMIA, UNSPECIFIED HYPERVOLEMIA TYPE: ICD-10-CM

## 2024-04-03 DIAGNOSIS — N18.9 CHRONIC KIDNEY DISEASE, UNSPECIFIED CKD STAGE: ICD-10-CM

## 2024-04-03 DIAGNOSIS — R60.1 ANASARCA: Primary | ICD-10-CM

## 2024-04-03 DIAGNOSIS — E88.09 HYPOALBUMINEMIA: ICD-10-CM

## 2024-04-03 PROBLEM — N04.9 ANASARCA ASSOCIATED WITH DISORDER OF KIDNEY: Status: ACTIVE | Noted: 2024-04-03

## 2024-04-03 LAB
ALBUMIN SERPL-MCNC: 2.7 G/DL (ref 3.4–5)
ALBUMIN/GLOB SERPL: 1.7 (ref 0.8–1.7)
ALP SERPL-CCNC: 90 U/L (ref 45–117)
ALT SERPL-CCNC: 29 U/L (ref 16–61)
ANION GAP SERPL CALC-SCNC: 9 MMOL/L (ref 3–18)
AST SERPL-CCNC: 30 U/L (ref 10–38)
BASOPHILS # BLD: 0 K/UL (ref 0–0.1)
BASOPHILS NFR BLD: 0 % (ref 0–2)
BILIRUB SERPL-MCNC: 0.5 MG/DL (ref 0.2–1)
BUN SERPL-MCNC: 130 MG/DL (ref 7–18)
BUN/CREAT SERPL: 47 (ref 12–20)
CALCIUM SERPL-MCNC: 7.9 MG/DL (ref 8.5–10.1)
CHLORIDE SERPL-SCNC: 116 MMOL/L (ref 100–111)
CO2 SERPL-SCNC: 21 MMOL/L (ref 21–32)
CREAT SERPL-MCNC: 2.78 MG/DL (ref 0.6–1.3)
DIFFERENTIAL METHOD BLD: ABNORMAL
EOSINOPHIL # BLD: 0 K/UL (ref 0–0.4)
EOSINOPHIL NFR BLD: 0 % (ref 0–5)
ERYTHROCYTE [DISTWIDTH] IN BLOOD BY AUTOMATED COUNT: 17.4 % (ref 11.6–14.5)
GLOBULIN SER CALC-MCNC: 1.6 G/DL (ref 2–4)
GLUCOSE SERPL-MCNC: 187 MG/DL (ref 74–99)
HCT VFR BLD AUTO: 31.3 % (ref 36–48)
HGB BLD-MCNC: 10.2 G/DL (ref 13–16)
IMM GRANULOCYTES # BLD AUTO: 0.2 K/UL (ref 0–0.04)
IMM GRANULOCYTES NFR BLD AUTO: 1 % (ref 0–0.5)
IRON SATN MFR SERPL: 28 % (ref 20–50)
IRON SERPL-MCNC: 36 UG/DL (ref 50–175)
LYMPHOCYTES # BLD: 0.2 K/UL (ref 0.9–3.6)
LYMPHOCYTES NFR BLD: 1 % (ref 21–52)
MCH RBC QN AUTO: 30 PG (ref 24–34)
MCHC RBC AUTO-ENTMCNC: 32.6 G/DL (ref 31–37)
MCV RBC AUTO: 92.1 FL (ref 78–100)
MONOCYTES # BLD: 0.6 K/UL (ref 0.05–1.2)
MONOCYTES NFR BLD: 4 % (ref 3–10)
NEUTS SEG # BLD: 14.5 K/UL (ref 1.8–8)
NEUTS SEG NFR BLD: 93 % (ref 40–73)
NRBC # BLD: 0 K/UL (ref 0–0.01)
NRBC BLD-RTO: 0 PER 100 WBC
NT PRO BNP: 9249 PG/ML (ref 0–900)
PHOSPHATE SERPL-MCNC: 3.6 MG/DL (ref 2.5–4.9)
PLATELET # BLD AUTO: 139 K/UL (ref 135–420)
PMV BLD AUTO: 10.7 FL (ref 9.2–11.8)
POTASSIUM SERPL-SCNC: 3.7 MMOL/L (ref 3.5–5.5)
PROT SERPL-MCNC: 4.3 G/DL (ref 6.4–8.2)
RBC # BLD AUTO: 3.4 M/UL (ref 4.35–5.65)
SODIUM SERPL-SCNC: 146 MMOL/L (ref 136–145)
T4 FREE SERPL-MCNC: 0.9 NG/DL (ref 0.7–1.5)
TIBC SERPL-MCNC: 129 UG/DL (ref 250–450)
TSH SERPL DL<=0.05 MIU/L-ACNC: 0.63 UIU/ML (ref 0.36–3.74)
WBC # BLD AUTO: 15.5 K/UL (ref 4.6–13.2)

## 2024-04-03 PROCEDURE — 96374 THER/PROPH/DIAG INJ IV PUSH: CPT

## 2024-04-03 PROCEDURE — 83540 ASSAY OF IRON: CPT

## 2024-04-03 PROCEDURE — 80053 COMPREHEN METABOLIC PANEL: CPT

## 2024-04-03 PROCEDURE — 84100 ASSAY OF PHOSPHORUS: CPT

## 2024-04-03 PROCEDURE — 83550 IRON BINDING TEST: CPT

## 2024-04-03 PROCEDURE — 99285 EMERGENCY DEPT VISIT HI MDM: CPT

## 2024-04-03 PROCEDURE — 6360000002 HC RX W HCPCS: Performed by: PHYSICIAN ASSISTANT

## 2024-04-03 PROCEDURE — 84443 ASSAY THYROID STIM HORMONE: CPT

## 2024-04-03 PROCEDURE — C9113 INJ PANTOPRAZOLE SODIUM, VIA: HCPCS | Performed by: FAMILY MEDICINE

## 2024-04-03 PROCEDURE — 2580000003 HC RX 258: Performed by: FAMILY MEDICINE

## 2024-04-03 PROCEDURE — 6360000002 HC RX W HCPCS: Performed by: FAMILY MEDICINE

## 2024-04-03 PROCEDURE — 71045 X-RAY EXAM CHEST 1 VIEW: CPT

## 2024-04-03 PROCEDURE — 83880 ASSAY OF NATRIURETIC PEPTIDE: CPT

## 2024-04-03 PROCEDURE — 84439 ASSAY OF FREE THYROXINE: CPT

## 2024-04-03 PROCEDURE — A4216 STERILE WATER/SALINE, 10 ML: HCPCS | Performed by: FAMILY MEDICINE

## 2024-04-03 PROCEDURE — 85025 COMPLETE CBC W/AUTO DIFF WBC: CPT

## 2024-04-03 PROCEDURE — 1100000003 HC PRIVATE W/ TELEMETRY

## 2024-04-03 RX ORDER — FUROSEMIDE 10 MG/ML
80 INJECTION INTRAMUSCULAR; INTRAVENOUS
Status: COMPLETED | OUTPATIENT
Start: 2024-04-03 | End: 2024-04-03

## 2024-04-03 RX ORDER — FUROSEMIDE 10 MG/ML
40 INJECTION INTRAMUSCULAR; INTRAVENOUS 2 TIMES DAILY
Status: DISCONTINUED | OUTPATIENT
Start: 2024-04-03 | End: 2024-04-06

## 2024-04-03 RX ORDER — SODIUM CHLORIDE 0.9 % (FLUSH) 0.9 %
5-40 SYRINGE (ML) INJECTION EVERY 12 HOURS SCHEDULED
Status: DISCONTINUED | OUTPATIENT
Start: 2024-04-03 | End: 2024-04-11 | Stop reason: HOSPADM

## 2024-04-03 RX ORDER — ACETAMINOPHEN 325 MG/1
650 TABLET ORAL EVERY 6 HOURS PRN
Status: DISCONTINUED | OUTPATIENT
Start: 2024-04-03 | End: 2024-04-11 | Stop reason: HOSPADM

## 2024-04-03 RX ORDER — SODIUM CHLORIDE 0.9 % (FLUSH) 0.9 %
5-40 SYRINGE (ML) INJECTION PRN
Status: DISCONTINUED | OUTPATIENT
Start: 2024-04-03 | End: 2024-04-11 | Stop reason: HOSPADM

## 2024-04-03 RX ORDER — ONDANSETRON 4 MG/1
4 TABLET, ORALLY DISINTEGRATING ORAL EVERY 8 HOURS PRN
Status: DISCONTINUED | OUTPATIENT
Start: 2024-04-03 | End: 2024-04-11 | Stop reason: HOSPADM

## 2024-04-03 RX ORDER — ENOXAPARIN SODIUM 100 MG/ML
30 INJECTION SUBCUTANEOUS DAILY
Status: DISCONTINUED | OUTPATIENT
Start: 2024-04-03 | End: 2024-04-09

## 2024-04-03 RX ORDER — POLYETHYLENE GLYCOL 3350 17 G/17G
17 POWDER, FOR SOLUTION ORAL DAILY PRN
Status: DISCONTINUED | OUTPATIENT
Start: 2024-04-03 | End: 2024-04-11 | Stop reason: HOSPADM

## 2024-04-03 RX ORDER — SODIUM CHLORIDE 9 MG/ML
INJECTION, SOLUTION INTRAVENOUS PRN
Status: DISCONTINUED | OUTPATIENT
Start: 2024-04-03 | End: 2024-04-11 | Stop reason: HOSPADM

## 2024-04-03 RX ORDER — ACETAMINOPHEN 650 MG/1
650 SUPPOSITORY RECTAL EVERY 6 HOURS PRN
Status: DISCONTINUED | OUTPATIENT
Start: 2024-04-03 | End: 2024-04-11 | Stop reason: HOSPADM

## 2024-04-03 RX ORDER — PREDNISONE 20 MG/1
60 TABLET ORAL DAILY
Status: DISCONTINUED | OUTPATIENT
Start: 2024-04-04 | End: 2024-04-11 | Stop reason: HOSPADM

## 2024-04-03 RX ORDER — NITROGLYCERIN 0.4 MG/1
0.4 TABLET SUBLINGUAL EVERY 5 MIN PRN
Status: DISCONTINUED | OUTPATIENT
Start: 2024-04-03 | End: 2024-04-11 | Stop reason: HOSPADM

## 2024-04-03 RX ORDER — ONDANSETRON 2 MG/ML
4 INJECTION INTRAMUSCULAR; INTRAVENOUS EVERY 6 HOURS PRN
Status: DISCONTINUED | OUTPATIENT
Start: 2024-04-03 | End: 2024-04-11 | Stop reason: HOSPADM

## 2024-04-03 RX ADMIN — PANTOPRAZOLE SODIUM 40 MG: 40 INJECTION, POWDER, FOR SOLUTION INTRAVENOUS at 18:56

## 2024-04-03 RX ADMIN — FUROSEMIDE 80 MG: 10 INJECTION, SOLUTION INTRAMUSCULAR; INTRAVENOUS at 12:46

## 2024-04-03 RX ADMIN — ENOXAPARIN SODIUM 30 MG: 100 INJECTION SUBCUTANEOUS at 18:56

## 2024-04-03 RX ADMIN — FUROSEMIDE 40 MG: 10 INJECTION, SOLUTION INTRAMUSCULAR; INTRAVENOUS at 18:56

## 2024-04-03 ASSESSMENT — PAIN - FUNCTIONAL ASSESSMENT: PAIN_FUNCTIONAL_ASSESSMENT: NONE - DENIES PAIN

## 2024-04-03 NOTE — ED PROVIDER NOTES
[facility] EMERGENCY DEPT  EMERGENCY DEPARTMENT ENCOUNTER       Pt Name: Efrain Mayorga  MRN: [id number]  Birthdate 1955  Date of evaluation: 4/3/2024  Provider: Amy Reynoso PA-C   PCP: Arnoldo St DO  Note Started: 12:31 PM 4/3/24     CHIEF COMPLAINT       No chief complaint on file.       HISTORY OF PRESENT ILLNESS: 1 or more elements      History From: Patient and Patient's Wife  HPI Limitations: none     Efrain Mayorga is a 69 y.o. male who presents to the emergency department with a chief complaint of fluid overload.  Patient had an appointment with his nephrologist this morning and was told to come straight to the hospital for admission and IV diuresis.  Patient has a history of chronic kidney disease as well as bladder cancer.  Patient with urostomy.  Patient makes urine.  Wife states that he has a 30 pound weight gain over the last several weeks.  Complaining of leg swelling and weakness.  He is not currently on dialysis.  He specifically denies chest pain, shortness of breath, fever, recent illness, palpitations, abdominal pain, vomiting, diarrhea, rectal bleeding.     Nursing Notes were all reviewed and agreed with or any disagreements were addressed in the HPI.    PAST HISTORY     Past Medical History:  Past Medical History:   Diagnosis Date    Arthritis     Bladder cancer (HCC)     Fibromyalgia     GERD (gastroesophageal reflux disease)     Heart attack (HCC) 03/2017    Hematuria, gross     Hypertension 2017       Past Surgical History:  Past Surgical History:   Procedure Laterality Date    CT BIOPSY RENAL  3/14/2024    CT BIOPSY RENAL 3/14/2024 [facility] RAD CT    HERNIA REPAIR Right     inguinal    LEFT HEART PERCUTANEOUS  03/29/2017    ERNESTO CORONARY ARTERIOGRAPH  03/29/2017    TRANSURETHRAL RESEC BLADDER NECK         Family History:  History reviewed. No pertinent family history.    Social History:  Social History     Socioeconomic History    Marital status:      Spouse name: None    Number of  interpreted by the Emergency Department Provider in the absence of a cardiologist.  Please see their note for interpretation of EKG.     Normal sinus rhythm at 71 bpm, QTc 421, low voltage, no STEMI, read by Dr. Reese at 1216    Read by me.      RADIOLOGY:  Non-plain film images such as CT, Ultrasound and MRI are read by the radiologist. Plain radiographic images are visualized and preliminarily interpreted by the ED Provider with the below findings:     CXR with no evidence of pulmonary edema    Read by me, pending review by radiologist.     Interpretation per the Radiologist below, if available at the time of this note:  XR CHEST 1 VIEW   Final Result   1. The lungs are clear              PROCEDURES   Unless otherwise noted below, none  Procedures         CRITICAL CARE TIME       EMERGENCY DEPARTMENT COURSE and DIFFERENTIAL DIAGNOSIS/MDM   Vitals:    Vitals:    04/03/24 1615 04/03/24 1630 04/03/24 1645 04/03/24 1700   BP:  125/67     Pulse: 67 67 73 67   Resp: 10 15 18 10   Temp:       TempSrc:       SpO2: 100% 100% 100% 96%   Weight:       Height:           Patient was given the following medications:  Medications   sodium chloride flush 0.9 % injection 5-40 mL (has no administration in time range)   sodium chloride flush 0.9 % injection 5-40 mL (has no administration in time range)   0.9 % sodium chloride infusion (has no administration in time range)   ondansetron (ZOFRAN-ODT) disintegrating tablet 4 mg (has no administration in time range)     Or   ondansetron (ZOFRAN) injection 4 mg (has no administration in time range)   polyethylene glycol (GLYCOLAX) packet 17 g (has no administration in time range)   acetaminophen (TYLENOL) tablet 650 mg (has no administration in time range)     Or   acetaminophen (TYLENOL) suppository 650 mg (has no administration in time range)   enoxaparin Sodium (LOVENOX) injection 30 mg (has no administration in time range)   pantoprazole (PROTONIX) 40 mg in sodium chloride (PF)

## 2024-04-03 NOTE — ED NOTES
Report called with no further questions.     Outstanding orders reviewed and discussed with the receiving nurse (if applicable).     Patient taken to inpatient floor.     Will remove from ED trackboard.

## 2024-04-03 NOTE — H&P
History & Physical    Patient: Efrain Mayorga MRN: [id number]  CSN: [id number]    YOB: 1955  Age: 69 y.o.  Sex: male      DOA: 4/3/2024  Primary Care Provider:  Arnoldo St DO      Assessment/Plan   Admit to telemetry floor    Fluid overload -  Significant good diuresis with lasix 80mg IV given in ed  Will continue lasix 40mg IV q12  Nephrology consulted by ED  Was inpt 2 days ago, insisted on going home, medically should not have left    Focal segmental glomerulosclerosis -   anasarca  Continued on prednisone 60 mg  Nephrology consulted  torsemide 20 mg daily  Monitor renal function.  Keep legs elevated, compression stockings  high risk for dvt  lovenox 30 mg once daily       Hypernatremia-  mild  Trend bmp     Prostate cancer, bladder cancer  Seeing Dr. Debbie MCCLAIN oncologist  Received immunotherapy before, no active treatment right now  s/p   1. Radical cystoprostatectomy  2. Extended pelvic lymph node dissection  3. Ileal conduit urinary diversion with single-J stents bilaterally   On 03/28/2023  Urostomy bag noted urine clear     Anemia  Due to chronic illness  No bleeding reported   Due to high risk for thrombosis -on lovenox per heme/onc previous admission  Iron and epogen      Hypertension  Well controlled      Hyperlipidemia  Continued statin       Acute renal failure on chronic kidney disease  Nephrotic syndrome  Congestive health failure   Anasarca  Lymphedema      Anasarca, due to nephrotic syndrome  Continue albumin, albumin 1.7  Lasix  Check tsh      Nephrotic syndrome  On prednisone 60 mg  Will continue  Nephrology has been consulted  Renal biopsy done last admission  Continue aspirin daily       Active Hospital Problems    Diagnosis Date Noted    Anasarca associated with disorder of kidney [N04.9] 04/03/2024    Focal segmental glomerulosclerosis [N05.1] 03/27/2024    History of urostomy [Z98.890] 03/19/2024    Nephrotic syndrome [N04.9] 03/12/2024    Anasarca [R60.1] 03/09/2024     Hypoalbuminemia [E88.09] 2023    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 male who presents to the emergency department with a chief complaint of fluid overload.  Patient had an appointment with his nephrologist this morning and was told to come straight to the hospital for admission and IV diuresis. Patient has a history of chronic kidney disease as well as bladder cancer. Patient with urostomy.  Patient makes urine.  Wife states that he has a 30 pound weight gain over the last several weeks. Complaining of leg swelling and weakness.  He is not currently on dialysis.  He specifically denies chest pain, shortness of breath, fever, recent illness, palpitations, abdominal pain, vomiting, diarrhea, rectal bleeding.   Was just inpatient 2 days ago. Insisted on DC home even though he was advised by physcian that this was not the safest decision.    Past Medical History:   Diagnosis Date    Arthritis     Bladder cancer (HCC)     Fibromyalgia     GERD (gastroesophageal reflux disease)     Heart attack (HCC) 2017    Hematuria, gross     Hypertension        Past Surgical History:   Procedure Laterality Date    CT BIOPSY RENAL  3/14/2024    CT BIOPSY RENAL 3/14/2024 [facility] RAD CT    HERNIA REPAIR Right     inguinal    LEFT HEART PERCUTANEOUS  2017    ERNESTO CORONARY ARTERIOGRAPH  2017    TRANSURETHRAL RESEC BLADDER NECK         History reviewed. No pertinent family history.    Social History     Socioeconomic History    Marital status:      Spouse name: None    Number of children: None    Years of education: None    Highest education level: None   Tobacco Use    Smoking status: Former     Current packs/day: 0.00     Types: Cigarettes     Quit date: 10/18/2022     Years since quittin.4    Smokeless tobacco: Never  111 mmol/L    CO2 21 21 - 32 mmol/L    Anion Gap 9 3.0 - 18 mmol/L    Glucose 187 (H) 74 - 99 mg/dL     (H) 7.0 - 18 MG/DL    Creatinine 2.78 (H) 0.6 - 1.3 MG/DL    Bun/Cre Ratio 47 (H) 12 - 20      Est, Glom Filt Rate 24 (L) >60 ml/min/1.73m2    Calcium 7.9 (L) 8.5 - 10.1 MG/DL    Total Bilirubin 0.5 0.2 - 1.0 MG/DL    ALT 29 16 - 61 U/L    AST 30 10 - 38 U/L    Alk Phosphatase 90 45 - 117 U/L    Total Protein 4.3 (L) 6.4 - 8.2 g/dL    Albumin 2.7 (L) 3.4 - 5.0 g/dL    Globulin 1.6 (L) 2.0 - 4.0 g/dL    Albumin/Globulin Ratio 1.7 0.8 - 1.7     Phosphorus    Collection Time: 04/03/24 12:35 PM   Result Value Ref Range    Phosphorus 3.6 2.5 - 4.9 MG/DL   Brain Natriuretic Peptide    Collection Time: 04/03/24 12:35 PM   Result Value Ref Range    NT Pro-BNP 9,249 (H) 0 - 900 PG/ML   Iron and TIBC    Collection Time: 04/03/24 12:35 PM   Result Value Ref Range    Iron 36 (L) 50 - 175 ug/dL    TIBC 129 (L) 250 - 450 ug/dL    Iron % Saturation 28 20 - 50 %   TSH without Reflex    Collection Time: 04/03/24 12:35 PM   Result Value Ref Range    TSH, 3RD GENERATION 0.63 0.36 - 3.74 uIU/mL   T4, free    Collection Time: 04/03/24 12:35 PM   Result Value Ref Range    T4 Free 0.9 0.7 - 1.5 NG/DL   Magnesium    Collection Time: 04/04/24  2:04 AM   Result Value Ref Range    Magnesium 1.7 1.6 - 2.6 mg/dL   Renal Function Panel    Collection Time: 04/04/24  2:04 AM   Result Value Ref Range    Sodium 145 136 - 145 mmol/L    Potassium 3.5 3.5 - 5.5 mmol/L    Chloride 116 (H) 100 - 111 mmol/L    CO2 20 (L) 21 - 32 mmol/L    Anion Gap 9 3.0 - 18 mmol/L    Glucose 171 (H) 74 - 99 mg/dL     (H) 7.0 - 18 MG/DL    Creatinine 2.67 (H) 0.6 - 1.3 MG/DL    Bun/Cre Ratio 48 (H) 12 - 20      Est, Glom Filt Rate 25 (L) >60 ml/min/1.73m2    Calcium 7.6 (L) 8.5 - 10.1 MG/DL    Phosphorus 4.2 2.5 - 4.9 MG/DL    Albumin 2.3 (L) 3.4 - 5.0 g/dL   CBC with Auto Differential    Collection Time: 04/04/24  2:04 AM   Result Value Ref Range    WBC

## 2024-04-04 ENCOUNTER — APPOINTMENT (OUTPATIENT)
Facility: HOSPITAL | Age: 69
DRG: 683 | End: 2024-04-04
Attending: FAMILY MEDICINE
Payer: MEDICARE

## 2024-04-04 LAB
ALBUMIN SERPL-MCNC: 2.3 G/DL (ref 3.4–5)
ANION GAP SERPL CALC-SCNC: 9 MMOL/L (ref 3–18)
BASOPHILS # BLD: 0 K/UL (ref 0–0.1)
BASOPHILS NFR BLD: 0 % (ref 0–2)
BUN SERPL-MCNC: 127 MG/DL (ref 7–18)
BUN/CREAT SERPL: 48 (ref 12–20)
CALCIUM SERPL-MCNC: 7.6 MG/DL (ref 8.5–10.1)
CHLORIDE SERPL-SCNC: 116 MMOL/L (ref 100–111)
CO2 SERPL-SCNC: 20 MMOL/L (ref 21–32)
CREAT SERPL-MCNC: 2.67 MG/DL (ref 0.6–1.3)
DIFFERENTIAL METHOD BLD: ABNORMAL
ECHO AO ASC DIAM: 3.8 CM
ECHO AO ASCENDING AORTA INDEX: 1.94 CM/M2
ECHO AO ROOT DIAM: 4 CM
ECHO AO ROOT INDEX: 2.04 CM/M2
ECHO AV AREA PEAK VELOCITY: 3.7 CM2
ECHO AV AREA VTI: 3.9 CM2
ECHO AV AREA/BSA PEAK VELOCITY: 1.9 CM2/M2
ECHO AV AREA/BSA VTI: 2 CM2/M2
ECHO AV MEAN GRADIENT: 2 MMHG
ECHO AV MEAN VELOCITY: 0.7 M/S
ECHO AV PEAK GRADIENT: 5 MMHG
ECHO AV PEAK VELOCITY: 1.1 M/S
ECHO AV VELOCITY RATIO: 0.82
ECHO AV VTI: 20.8 CM
ECHO BSA: 2 M2
ECHO EST RA PRESSURE: 3 MMHG
ECHO LA DIAMETER INDEX: 1.79 CM/M2
ECHO LA DIAMETER: 3.5 CM
ECHO LA TO AORTIC ROOT RATIO: 0.88
ECHO LA VOL A-L A2C: 59 ML (ref 18–58)
ECHO LA VOL A-L A4C: 46 ML (ref 18–58)
ECHO LA VOL BP: 51 ML (ref 18–58)
ECHO LA VOL MOD A2C: 55 ML (ref 18–58)
ECHO LA VOL MOD A4C: 43 ML (ref 18–58)
ECHO LA VOL/BSA BIPLANE: 26 ML/M2 (ref 16–34)
ECHO LA VOLUME AREA LENGTH: 55 ML
ECHO LA VOLUME INDEX A-L A2C: 30 ML/M2 (ref 16–34)
ECHO LA VOLUME INDEX A-L A4C: 23 ML/M2 (ref 16–34)
ECHO LA VOLUME INDEX AREA LENGTH: 28 ML/M2 (ref 16–34)
ECHO LA VOLUME INDEX MOD A2C: 28 ML/M2 (ref 16–34)
ECHO LA VOLUME INDEX MOD A4C: 22 ML/M2 (ref 16–34)
ECHO LV E' LATERAL VELOCITY: 11 CM/S
ECHO LV E' SEPTAL VELOCITY: 7 CM/S
ECHO LV EDV A2C: 135 ML
ECHO LV EDV A4C: 169 ML
ECHO LV EDV BP: 155 ML (ref 67–155)
ECHO LV EDV INDEX A4C: 86 ML/M2
ECHO LV EDV INDEX BP: 79 ML/M2
ECHO LV EDV NDEX A2C: 69 ML/M2
ECHO LV EJECTION FRACTION A2C: 44 %
ECHO LV EJECTION FRACTION A4C: 42 %
ECHO LV EJECTION FRACTION BIPLANE: 44 % (ref 55–100)
ECHO LV ESV A2C: 75 ML
ECHO LV ESV A4C: 97 ML
ECHO LV ESV BP: 86 ML (ref 22–58)
ECHO LV ESV INDEX A2C: 38 ML/M2
ECHO LV ESV INDEX A4C: 49 ML/M2
ECHO LV ESV INDEX BP: 44 ML/M2
ECHO LV FRACTIONAL SHORTENING: 22 % (ref 28–44)
ECHO LV INTERNAL DIMENSION DIASTOLE INDEX: 3.06 CM/M2
ECHO LV INTERNAL DIMENSION DIASTOLIC: 6 CM (ref 4.2–5.9)
ECHO LV INTERNAL DIMENSION SYSTOLIC INDEX: 2.4 CM/M2
ECHO LV INTERNAL DIMENSION SYSTOLIC: 4.7 CM
ECHO LV IVSD: 0.7 CM (ref 0.6–1)
ECHO LV MASS 2D: 158 G (ref 88–224)
ECHO LV MASS INDEX 2D: 80.6 G/M2 (ref 49–115)
ECHO LV POSTERIOR WALL DIASTOLIC: 0.7 CM (ref 0.6–1)
ECHO LV RELATIVE WALL THICKNESS RATIO: 0.23
ECHO LVOT AREA: 4.5 CM2
ECHO LVOT AV VTI INDEX: 0.82
ECHO LVOT DIAM: 2.4 CM
ECHO LVOT MEAN GRADIENT: 2 MMHG
ECHO LVOT PEAK GRADIENT: 3 MMHG
ECHO LVOT PEAK VELOCITY: 0.9 M/S
ECHO LVOT STROKE VOLUME INDEX: 39.4 ML/M2
ECHO LVOT SV: 77.3 ML
ECHO LVOT VTI: 17.1 CM
ECHO MV A VELOCITY: 0.85 M/S
ECHO MV E DECELERATION TIME (DT): 209.7 MS
ECHO MV E VELOCITY: 0.48 M/S
ECHO MV E/A RATIO: 0.56
ECHO MV E/E' LATERAL: 4.36
ECHO MV E/E' RATIO (AVERAGED): 5.61
ECHO RA END SYSTOLIC VOLUME APICAL 4 CHAMBER INDEX BSA: 25 ML/M2
ECHO RA VOLUME: 49 ML
ECHO RV INTERNAL DIMENSION: 3.6 CM
ECHO RV TAPSE: 2.9 CM (ref 1.7–?)
EOSINOPHIL # BLD: 0 K/UL (ref 0–0.4)
EOSINOPHIL NFR BLD: 0 % (ref 0–5)
ERYTHROCYTE [DISTWIDTH] IN BLOOD BY AUTOMATED COUNT: 17.3 % (ref 11.6–14.5)
FERRITIN SERPL-MCNC: 502 NG/ML (ref 8–388)
GLUCOSE SERPL-MCNC: 171 MG/DL (ref 74–99)
HCT VFR BLD AUTO: 29.8 % (ref 36–48)
HGB BLD-MCNC: 9.6 G/DL (ref 13–16)
IMM GRANULOCYTES # BLD AUTO: 0.1 K/UL (ref 0–0.04)
IMM GRANULOCYTES NFR BLD AUTO: 1 % (ref 0–0.5)
LYMPHOCYTES # BLD: 0.3 K/UL (ref 0.9–3.6)
LYMPHOCYTES NFR BLD: 3 % (ref 21–52)
MAGNESIUM SERPL-MCNC: 1.7 MG/DL (ref 1.6–2.6)
MCH RBC QN AUTO: 30.7 PG (ref 24–34)
MCHC RBC AUTO-ENTMCNC: 32.2 G/DL (ref 31–37)
MCV RBC AUTO: 95.2 FL (ref 78–100)
MONOCYTES # BLD: 0.8 K/UL (ref 0.05–1.2)
MONOCYTES NFR BLD: 7 % (ref 3–10)
NEUTS SEG # BLD: 10 K/UL (ref 1.8–8)
NEUTS SEG NFR BLD: 89 % (ref 40–73)
NRBC # BLD: 0.06 K/UL (ref 0–0.01)
NRBC BLD-RTO: 0.5 PER 100 WBC
PHOSPHATE SERPL-MCNC: 4.2 MG/DL (ref 2.5–4.9)
PLATELET # BLD AUTO: 112 K/UL (ref 135–420)
PMV BLD AUTO: 11.7 FL (ref 9.2–11.8)
POTASSIUM SERPL-SCNC: 3.5 MMOL/L (ref 3.5–5.5)
RBC # BLD AUTO: 3.13 M/UL (ref 4.35–5.65)
SODIUM SERPL-SCNC: 145 MMOL/L (ref 136–145)
WBC # BLD AUTO: 11.3 K/UL (ref 4.6–13.2)

## 2024-04-04 PROCEDURE — 83735 ASSAY OF MAGNESIUM: CPT

## 2024-04-04 PROCEDURE — 93306 TTE W/DOPPLER COMPLETE: CPT

## 2024-04-04 PROCEDURE — 85025 COMPLETE CBC W/AUTO DIFF WBC: CPT

## 2024-04-04 PROCEDURE — 2580000003 HC RX 258: Performed by: FAMILY MEDICINE

## 2024-04-04 PROCEDURE — 82728 ASSAY OF FERRITIN: CPT

## 2024-04-04 PROCEDURE — 80069 RENAL FUNCTION PANEL: CPT

## 2024-04-04 PROCEDURE — 1100000003 HC PRIVATE W/ TELEMETRY

## 2024-04-04 PROCEDURE — A4216 STERILE WATER/SALINE, 10 ML: HCPCS | Performed by: FAMILY MEDICINE

## 2024-04-04 PROCEDURE — 6370000000 HC RX 637 (ALT 250 FOR IP): Performed by: INTERNAL MEDICINE

## 2024-04-04 PROCEDURE — C9113 INJ PANTOPRAZOLE SODIUM, VIA: HCPCS | Performed by: FAMILY MEDICINE

## 2024-04-04 PROCEDURE — 36415 COLL VENOUS BLD VENIPUNCTURE: CPT

## 2024-04-04 PROCEDURE — 6360000002 HC RX W HCPCS: Performed by: FAMILY MEDICINE

## 2024-04-04 RX ADMIN — ENOXAPARIN SODIUM 30 MG: 100 INJECTION SUBCUTANEOUS at 09:29

## 2024-04-04 RX ADMIN — PREDNISONE 60 MG: 20 TABLET ORAL at 09:26

## 2024-04-04 RX ADMIN — SODIUM CHLORIDE, PRESERVATIVE FREE 10 ML: 5 INJECTION INTRAVENOUS at 09:37

## 2024-04-04 RX ADMIN — FUROSEMIDE 40 MG: 10 INJECTION, SOLUTION INTRAMUSCULAR; INTRAVENOUS at 09:27

## 2024-04-04 RX ADMIN — FUROSEMIDE 40 MG: 10 INJECTION, SOLUTION INTRAMUSCULAR; INTRAVENOUS at 18:46

## 2024-04-04 RX ADMIN — PANTOPRAZOLE SODIUM 40 MG: 40 INJECTION, POWDER, FOR SOLUTION INTRAVENOUS at 09:27

## 2024-04-04 NOTE — CONSULTS
Nephrology Consult    Patient: Efrain Mayorga  Requesting physician: BOOKER Bartholomew  Reason for consult: Renal failure    CC: Worsening edema    History of Present Illness:  Efrain Mayorga is a 69 y.o.  male with a past medical history significant for prostate CA/ bladder CA, s/p radical cystoprostatectomy with extensive LN dissection with ileal conduit, s/p immunotherapy in the past, CAD, HTN, nephrotic syndrome, CKD who follows me at [facility] office.  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  Pt has been on po prednisone and s/p a recent hospital admission at [facility].  On Lovenox per previous hematology's recommendations for high risk of thrombosis.  He has been taking torsemide 20mg po daily at home and noticed worsening anasarca, including facial.  I asked pt to come back to the hospital for IV diuretics.  Pt was given lasix 80mg IV x 1 in ED and admitted to medicine overnight, now on lasix 40mg IV bid.  UO 1.6L/2hrs.  Pt reports feeling better with no cp or sob.  Nephrology was consulted for further evaluation and management of his renal failure and nephrotic syndrome.  Cr 2.7 yesterday and 2.6 today.    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    3/30/24 CT:   KIDNEYS: No nephrolithiasis or hydronephrosis.   1. Cystectomy. Right lower quadrant ileostomy.  2. Interval small left pleural effusion.  3. Moderate peritoneal fluid demonstrated in the interval. This is located in  the pelvic, perihepatic and perisplenic spaces.  4. Interval retroperitoneal and left pelvic sidewall adenopathy.  5. Anasarca.   6. Hypoalbuminemia    Impression:     WERO on CKD, Cr improving overnight.  No hydronephrosis on recent CT scan.  Nephrotic syndrome, secondary to FSGS  Bx proven FSGS, tip variant.  Resistant to steroid.  Anasarca  Hypernatremia, mild  Prostate and bladder CA  Anemia    Plan:     His FSGS is resistant to steroid as monotherapy.  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    I would like to add tacrolimus to his prednisone therapy.  However, would need Oncology's input regarding tacrolimus' effect to his oncologic conditions.  Will need a formal oncology consult.    Continue IV lasix and strict I/Os    Continue anticoagulation due to high risk of thrombosis.    Pt agrees to start dialysis if needed.  No urgent need for HD currently.    AM labs with renal panel, cbc and PTH    Thanks for inviting us to participate in this patient's medical care.  We'll follow the patient closely with the medical team.    Sofya Newton MD  [facility]  [phone]

## 2024-04-04 NOTE — PLAN OF CARE
Problem: Safety - Adult  Goal: Free from fall injury  4/3/2024 2232 by Chanelle Suazo RN  Outcome: Progressing  4/3/2024 1825 by Kirsty Salvador RN  Outcome: Progressing     Problem: Discharge Planning  Goal: Discharge to home or other facility with appropriate resources  4/3/2024 2232 by Chanelle Suazo RN  Outcome: Progressing  Flowsheets (Taken 4/3/2024 2000)  Discharge to home or other facility with appropriate resources:   Identify barriers to discharge with patient and caregiver   Arrange for needed discharge resources and transportation as appropriate   Identify discharge learning needs (meds, wound care, etc)  4/3/2024 1825 by Kirsty Salvador RN  Outcome: Progressing     Problem: Skin/Tissue Integrity  Goal: Absence of new skin breakdown  Description: 1.  Monitor for areas of redness and/or skin breakdown  2.  Assess vascular access sites hourly  3.  Every 4-6 hours minimum:  Change oxygen saturation probe site  4.  Every 4-6 hours:  If on nasal continuous positive airway pressure, respiratory therapy assess nares and determine need for appliance change or resting period.  4/3/2024 2232 by Chanelle Suazo, RN  Outcome: Progressing  4/3/2024 1825 by Kirsty Salvador RN  Outcome: Progressing     Problem: ABCDS Injury Assessment  Goal: Absence of physical injury  4/3/2024 2232 by Chanelle Suazo RN  Outcome: Progressing  4/3/2024 1825 by Kirsty Salvador RN  Outcome: Progressing

## 2024-04-04 NOTE — PROGRESS NOTES
Hospitalist Progress Note    Patient: Efrain Mayorga MRN: [id number]  CSN: [id number]    YOB: 1955  Age: 69 y.o.  Sex: male    DOA: 4/3/2024 LOS:  LOS: 1 day          Chief Complaint:    Fluid overload    Assessment/Plan   Admit to telemetry floor     Fluid overload -  Significant good diuresis with lasix 80mg IV given in ed  Will continue lasix 40mg IV q12  Nephrology consulted by ED  Was inpt 2 days ago, insisted on going home, medically should not have left     Focal segmental glomerulosclerosis -   anasarca  Continued on prednisone 60 mg  Nephrology consulted  torsemide 20 mg daily  Monitor renal function.  Keep legs elevated, compression stockings  high risk for dvt  lovenox 30 mg once daily    Consulted oncology -Dr. Yeager- for discussion of new medication rec by nephrology vs it causing complications for cancer regimen      Hypernatremia-  mild  Trend bmp     Prostate cancer, bladder cancer  Seeing Dr. Debbie MCCLAIN oncologist  Received immunotherapy before, no active treatment right now  s/p   1. Radical cystoprostatectomy  2. Extended pelvic lymph node dissection  3. Ileal conduit urinary diversion with single-J stents bilaterally   On 03/28/2023  Urostomy bag noted urine clear     Anemia  Due to chronic illness  No bleeding reported   Due to high risk for thrombosis -on lovenox per heme/onc previous admission  Iron and epogen      Hypertension  Well controlled      Hyperlipidemia  Continued statin       Acute renal failure on chronic kidney disease  Nephrotic syndrome  Congestive health failure   Anasarca  Lymphedema      Anasarca, due to nephrotic syndrome  Continue albumin, albumin 1.7  Lasix  Check tsh      Nephrotic syndrome  On prednisone 60 mg  Will continue  Nephrology has been consulted  Renal biopsy done last admission  Continue aspirin daily    Active Hospital Problems    Diagnosis Date Noted    Anasarca associated with disorder of kidney [N04.9] 04/03/2024    Focal segmental

## 2024-04-04 NOTE — PLAN OF CARE
Problem: Safety - Adult  Goal: Free from fall injury  Outcome: Progressing     Problem: Discharge Planning  Goal: Discharge to home or other facility with appropriate resources  Outcome: Progressing  Flowsheets (Taken 4/4/2024 0800)  Discharge to home or other facility with appropriate resources:   Identify barriers to discharge with patient and caregiver   Arrange for needed discharge resources and transportation as appropriate   Identify discharge learning needs (meds, wound care, etc)     Problem: Skin/Tissue Integrity  Goal: Absence of new skin breakdown  Description: 1.  Monitor for areas of redness and/or skin breakdown  2.  Assess vascular access sites hourly  3.  Every 4-6 hours minimum:  Change oxygen saturation probe site  4.  Every 4-6 hours:  If on nasal continuous positive airway pressure, respiratory therapy assess nares and determine need for appliance change or resting period.  Outcome: Progressing     Problem: ABCDS Injury Assessment  Goal: Absence of physical injury  Outcome: Progressing     Problem: Nutrition Deficit:  Goal: Optimize nutritional status  4/4/2024 1703 by Stacey Brown, RN  Outcome: Progressing  4/4/2024 1652 by Yolande Summers RD  Flowsheets (Taken 4/4/2024 1652)  Nutrient intake appropriate for improving, restoring, or maintaining nutritional needs:   Assess nutritional status and recommend course of action   Recommend appropriate diets, oral nutritional supplements, and vitamin/mineral supplements   Provide specific nutrition education to patient or family as appropriate

## 2024-04-04 NOTE — CARE COORDINATION
04/04/24 1148   Service Assessment   Patient Orientation Alert and Oriented   Cognition Alert   History Provided By Patient   Primary Caregiver Self   Accompanied By/Relationship N/A   Support Systems Spouse/Significant Other   Patient's Healthcare Decision Maker is: Named in Scanned ACP Document   PCP Verified by CM Yes   Last Visit to PCP Within last 3 months   Prior Functional Level Independent in ADLs/IADLs   Current Functional Level Independent in ADLs/IADLs   Can patient return to prior living arrangement Yes   Ability to make needs known: Good   Family able to assist with home care needs: Yes   Would you like for me to discuss the discharge plan with any other family members/significant others, and if so, who? Yes  (Spouse)   Financial Resources Medicare   Community Resources Other (Comment)  (Chemotherapy)   Social/Functional History   Lives With Spouse   Type of Home House   Home Layout One level   Home Access Stairs to enter with rails   Bathroom Equipment Grab bars in shower;Shower chair   Home Equipment None   Receives Help From Family   ADL Assistance Independent   Homemaking Assistance Independent   Ambulation Assistance Independent   Transfer Assistance Independent   Active  Yes   Mode of Transportation Car   Occupation Retired   Discharge Planning   Type of Residence House   Living Arrangements Spouse/Significant Other   Current Services Prior To Admission None   Potential Assistance Needed N/A   DME Ordered? No   Potential Assistance Purchasing Medications No   Type of Home Care Services None   Patient expects to be discharged to: House   One/Two Story Residence One story   History of falls? 0   Services At/After Discharge   Transition of Care Consult (CM Consult) Discharge Planning   Services At/After Discharge None   [place] Resource Information Provided? No   Mode of Transport at Discharge Other (see comment)  (Family)   Confirm Follow Up Transport Family   Condition of Participation:

## 2024-04-04 NOTE — CONSULTS
Comprehensive Nutrition Assessment      Type and Reason for Visit:  Initial, Consult (for CHF diet education)    Nutrition Recommendations/Plan:   Provide education on Low Na+ diet  Diet as ordered  Monitor wt/ fluid status for trend  Continue to monitor tolerance of PO, weight, labs (BMP), and plan of care during admission.           Nutrition History and Allergies:   NKFA. Hx of CAD, hypertension, prostate bladder cancer, with urostomy bag, nephrotic syndrome presented to ER due to fluid overload. Recently discharged home AMA. Readmitted after his visit with his neprhologist yesterday. Complaining of leg swelling and weakness. Patient makes urine. About 30# wt gain noticed since the begining of this year. Wt hx: 3/22/23 156lb, 7/5/23 155lb, 1/3/24 164lb, 3/7/24 190lb, 3/13/24 193.3lb, 3/29/24 193.8lb.    Nutrition Assessment:      Efrain Mayorga is a 69 y.o. male  admitted on 4/3 for anasarca secondary to kidney insufficiency.  Significant wt gain over the past 3 months, yet wt loss observed over the past week: - 5.3 % (~5lbs) x 1 week - suspect fluid related.  Current on Low Sodium (2gm) diet -  po intake % this AM.   Meds and Labs reviewed - noted improving Na+, but trending down Ca2+  NFPE indicates mild malnutrition. See malnutrition assessment for details.    Per visit pt is laying in bed, alert and oriented , pleasant and agree to the nutrition education. Despite pt fluid overload status, after the nutrition assessment, RD found a general education on low sodium intake is more appropriate for pt than CHF diet at this time. Also, RD recommended pt to not self-restricted his fluid intake as he shares been drinking less fluid and displays s/sx of dehydration including elevated serum Na, sunken eyes, and dry mouth. Nutrition handouts left at bedside.      Wt Readings from Last 10 Encounters:   04/04/24 86.2 kg (190 lb)   03/27/24 87.9 kg (193 lb 12.6 oz)   03/15/24 91 kg (200 lb 9.9 oz)   07/05/23 70.3 kg  (155 lb)   03/22/23 70.8 kg (156 lb)   12/28/22 73.5 kg (162 lb)   11/30/22 71.7 kg (158 lb)   10/31/22 71.7 kg (158 lb)        Nutrition Related Findings:    Pertinent meds: lasix, PPI , prednisone. Pertinent labs: Iron 36, TIBC 129, eGFR 25, Ca2+ 7.6 (trending down), , bun/crea 48   Wound Type: None     Last BM (including prior to admit): 04/02/24  Edema: Generalized, Lower extremities, +3, pitting    Current Nutrition Intake & Therapies:    Average Meal Intake: Unable to assess  Average Supplements Intake: Unable to assess  ADULT DIET; Regular; Low Sodium (2 gm)       Anthropometric Measures:  Height: 167.6 cm (5' 6\")  Ideal Body Weight (IBW): 142 lbs (65 kg)    Admission Body Weight: 86.2 kg (190 lb)  Current Body Weight: 85.5 kg (188 lb 7.9 oz), 132.7 % IBW. Weight Source: Bed Scale  Current BMI (kg/m2): 30.4  Usual Body Weight: 74.4 kg (164 lb)  % Weight Change (Calculated): 14.9  Weight Adjustment For: No Adjustment                 BMI Categories: Obese Class 1 (BMI 30.0-34.9)    Estimated Daily Nutrient Needs:  Energy Requirements Based On: Kcal/kg  Weight Used for Energy Requirements: Usual  Energy (kcal/day): 1860 - 2232 (kcal/kg)  Weight Used for Protein Requirements: Usual  Protein (g/day): 74-89 (1-1.2 g/kg)  Method Used for Fluid Requirements: 1 ml/kcal  Fluid (ml/day): or per MD    Malnutrition Assessment:  Malnutrition Status:  Mild malnutrition (r/t moderate to severe fluid accumulation) (04/04/24 1621)    Context:  Acute Illness     Findings of the 6 clinical characteristics of malnutrition:  Energy Intake:  No significant decrease in energy intake  Weight Loss:  Unable to assess     Body Fat Loss:  Mild body fat loss Orbital   Muscle Mass Loss:  Mild muscle mass loss Temples (temporalis)  Fluid Accumulation:  Moderate to Severe Generalized, Extremities (generalize +3, pitting; Lower extremities +3, pitting)   Strength:  Normal  strength    Nutrition Diagnosis:   Food &

## 2024-04-04 NOTE — CARE COORDINATION
04/04/24 1152   Readmission Assessment   Number of Days since last admission? 1-7 days   Previous Disposition Home with Family   Who is being Interviewed Patient   What was the patient's/caregiver's perception as to why they think they needed to return back to the hospital? Other (Comment)  (Patient was at nephrology appointment and instructed to come here)   Did you visit your Primary Care Physician after you left the hospital, before you returned this time? No   Why weren't you able to visit your PCP? Other (Comment)  (Was not kiara for scheduled PCP appointment)   Did you see a specialist, such as Cardiac, Pulmonary, Orthopedic Physician, etc. after you left the hospital? Yes  (Nephrologist)   Who advised the patient to return to the hospital? Physician's Nurse/Office staff   Does the patient report anything that got in the way of taking their medications? No   In our efforts to provide the best possible care to you and others like you, can you think of anything that we could have done to help you after you left the hospital the first time, so that you might not have needed to return so soon? Arrange for more help when leaving the hospital;Teach back instructions regarding management of illness;Identify patient's health literacy needs;Discharge instructions that are concise, clear, and non contradictory

## 2024-04-05 LAB
ALBUMIN SERPL-MCNC: 2.3 G/DL (ref 3.4–5)
ANION GAP SERPL CALC-SCNC: 7 MMOL/L (ref 3–18)
BASOPHILS # BLD: 0 K/UL (ref 0–0.1)
BASOPHILS NFR BLD: 0 % (ref 0–2)
BUN SERPL-MCNC: 122 MG/DL (ref 7–18)
BUN/CREAT SERPL: 51 (ref 12–20)
CALCIUM SERPL-MCNC: 7.4 MG/DL (ref 8.5–10.1)
CALCIUM SERPL-MCNC: 7.6 MG/DL (ref 8.5–10.1)
CHLORIDE SERPL-SCNC: 115 MMOL/L (ref 100–111)
CO2 SERPL-SCNC: 23 MMOL/L (ref 21–32)
CREAT SERPL-MCNC: 2.41 MG/DL (ref 0.6–1.3)
DIFFERENTIAL METHOD BLD: ABNORMAL
EOSINOPHIL # BLD: 0 K/UL (ref 0–0.4)
EOSINOPHIL NFR BLD: 0 % (ref 0–5)
ERYTHROCYTE [DISTWIDTH] IN BLOOD BY AUTOMATED COUNT: 17.2 % (ref 11.6–14.5)
GLUCOSE SERPL-MCNC: 165 MG/DL (ref 74–99)
HCT VFR BLD AUTO: 31 % (ref 36–48)
HGB BLD-MCNC: 10.1 G/DL (ref 13–16)
IMM GRANULOCYTES # BLD AUTO: 0.1 K/UL (ref 0–0.04)
IMM GRANULOCYTES NFR BLD AUTO: 1 % (ref 0–0.5)
LYMPHOCYTES # BLD: 0.3 K/UL (ref 0.9–3.6)
LYMPHOCYTES NFR BLD: 3 % (ref 21–52)
MAGNESIUM SERPL-MCNC: 1.6 MG/DL (ref 1.6–2.6)
MCH RBC QN AUTO: 30.1 PG (ref 24–34)
MCHC RBC AUTO-ENTMCNC: 32.6 G/DL (ref 31–37)
MCV RBC AUTO: 92.5 FL (ref 78–100)
MONOCYTES # BLD: 0.5 K/UL (ref 0.05–1.2)
MONOCYTES NFR BLD: 6 % (ref 3–10)
NEUTS SEG # BLD: 8.8 K/UL (ref 1.8–8)
NEUTS SEG NFR BLD: 90 % (ref 40–73)
NRBC # BLD: 0 K/UL (ref 0–0.01)
NRBC BLD-RTO: 0 PER 100 WBC
PHOSPHATE SERPL-MCNC: 4.2 MG/DL (ref 2.5–4.9)
PLATELET # BLD AUTO: 124 K/UL (ref 135–420)
PMV BLD AUTO: 10.7 FL (ref 9.2–11.8)
POTASSIUM SERPL-SCNC: 3.4 MMOL/L (ref 3.5–5.5)
PTH-INTACT SERPL-MCNC: 466.7 PG/ML (ref 18.4–88)
RBC # BLD AUTO: 3.35 M/UL (ref 4.35–5.65)
SODIUM SERPL-SCNC: 145 MMOL/L (ref 136–145)
WBC # BLD AUTO: 9.8 K/UL (ref 4.6–13.2)

## 2024-04-05 PROCEDURE — A4216 STERILE WATER/SALINE, 10 ML: HCPCS | Performed by: FAMILY MEDICINE

## 2024-04-05 PROCEDURE — 80069 RENAL FUNCTION PANEL: CPT

## 2024-04-05 PROCEDURE — 6360000002 HC RX W HCPCS: Performed by: INTERNAL MEDICINE

## 2024-04-05 PROCEDURE — 97602 WOUND(S) CARE NON-SELECTIVE: CPT

## 2024-04-05 PROCEDURE — 83735 ASSAY OF MAGNESIUM: CPT

## 2024-04-05 PROCEDURE — 1100000003 HC PRIVATE W/ TELEMETRY

## 2024-04-05 PROCEDURE — 6370000000 HC RX 637 (ALT 250 FOR IP): Performed by: INTERNAL MEDICINE

## 2024-04-05 PROCEDURE — 36415 COLL VENOUS BLD VENIPUNCTURE: CPT

## 2024-04-05 PROCEDURE — 6370000000 HC RX 637 (ALT 250 FOR IP): Performed by: FAMILY MEDICINE

## 2024-04-05 PROCEDURE — 83970 ASSAY OF PARATHORMONE: CPT

## 2024-04-05 PROCEDURE — 0H9LXZZ DRAINAGE OF LEFT LOWER LEG SKIN, EXTERNAL APPROACH: ICD-10-PCS | Performed by: FAMILY MEDICINE

## 2024-04-05 PROCEDURE — 6360000002 HC RX W HCPCS: Performed by: FAMILY MEDICINE

## 2024-04-05 PROCEDURE — C9113 INJ PANTOPRAZOLE SODIUM, VIA: HCPCS | Performed by: FAMILY MEDICINE

## 2024-04-05 PROCEDURE — 2580000003 HC RX 258: Performed by: FAMILY MEDICINE

## 2024-04-05 PROCEDURE — 0H9KXZZ DRAINAGE OF RIGHT LOWER LEG SKIN, EXTERNAL APPROACH: ICD-10-PCS | Performed by: FAMILY MEDICINE

## 2024-04-05 PROCEDURE — 85025 COMPLETE CBC W/AUTO DIFF WBC: CPT

## 2024-04-05 RX ORDER — TACROLIMUS 1 MG/1
3 CAPSULE ORAL 2 TIMES DAILY
Status: DISCONTINUED | OUTPATIENT
Start: 2024-04-05 | End: 2024-04-11 | Stop reason: HOSPADM

## 2024-04-05 RX ORDER — AMOXICILLIN 250 MG/1
250 CAPSULE ORAL EVERY 12 HOURS SCHEDULED
Status: DISCONTINUED | OUTPATIENT
Start: 2024-04-05 | End: 2024-04-11 | Stop reason: HOSPADM

## 2024-04-05 RX ORDER — ASPIRIN 81 MG/1
81 TABLET ORAL DAILY
Status: DISCONTINUED | OUTPATIENT
Start: 2024-04-05 | End: 2024-04-11 | Stop reason: HOSPADM

## 2024-04-05 RX ORDER — LEVOTHYROXINE SODIUM 0.07 MG/1
112 TABLET ORAL DAILY
Status: DISCONTINUED | OUTPATIENT
Start: 2024-04-06 | End: 2024-04-11 | Stop reason: HOSPADM

## 2024-04-05 RX ORDER — METOLAZONE 5 MG/1
5 TABLET ORAL 2 TIMES DAILY
Status: DISCONTINUED | OUTPATIENT
Start: 2024-04-05 | End: 2024-04-08

## 2024-04-05 RX ORDER — ATORVASTATIN CALCIUM 20 MG/1
40 TABLET, FILM COATED ORAL DAILY
Status: DISCONTINUED | OUTPATIENT
Start: 2024-04-05 | End: 2024-04-11 | Stop reason: HOSPADM

## 2024-04-05 RX ADMIN — ASPIRIN 81 MG: 81 TABLET, COATED ORAL at 20:04

## 2024-04-05 RX ADMIN — SODIUM CHLORIDE, PRESERVATIVE FREE 10 ML: 5 INJECTION INTRAVENOUS at 08:23

## 2024-04-05 RX ADMIN — PREDNISONE 60 MG: 20 TABLET ORAL at 08:23

## 2024-04-05 RX ADMIN — FUROSEMIDE 40 MG: 10 INJECTION, SOLUTION INTRAMUSCULAR; INTRAVENOUS at 17:52

## 2024-04-05 RX ADMIN — AMOXICILLIN 250 MG: 250 CAPSULE ORAL at 21:14

## 2024-04-05 RX ADMIN — ENOXAPARIN SODIUM 30 MG: 100 INJECTION SUBCUTANEOUS at 08:23

## 2024-04-05 RX ADMIN — PANTOPRAZOLE SODIUM 40 MG: 40 INJECTION, POWDER, FOR SOLUTION INTRAVENOUS at 08:22

## 2024-04-05 RX ADMIN — FUROSEMIDE 40 MG: 10 INJECTION, SOLUTION INTRAMUSCULAR; INTRAVENOUS at 08:23

## 2024-04-05 RX ADMIN — METOLAZONE 5 MG: 5 TABLET ORAL at 20:13

## 2024-04-05 RX ADMIN — METOLAZONE 5 MG: 5 TABLET ORAL at 09:43

## 2024-04-05 RX ADMIN — TACROLIMUS 3 MG: 1 CAPSULE ORAL at 15:35

## 2024-04-05 RX ADMIN — ATORVASTATIN CALCIUM 40 MG: 20 TABLET, FILM COATED ORAL at 20:15

## 2024-04-05 ASSESSMENT — PAIN SCALES - GENERAL
PAINLEVEL_OUTOF10: 0

## 2024-04-05 NOTE — PROGRESS NOTES
Nephrology Progress Note    Patient: Efrain Mayorga  Requesting physician: BOOKER Bartholomew  Reason for consult: Renal failure    CC: Worsening edema    History of Present Illness:  Efrain Mayorga is a 69 y.o.  male with a past medical history significant for prostate CA/ bladder CA, s/p radical cystoprostatectomy with extensive LN dissection with ileal conduit, s/p immunotherapy in the past, CAD, HTN, nephrotic syndrome, CKD who follows me at [facility] office.  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  Pt has been on po prednisone and s/p a recent hospital admission at [facility].  On Lovenox per previous hematology's recommendations for high risk of thrombosis.  He has been taking torsemide 20mg po daily at home and noticed worsening anasarca, including facial.  I asked pt to come back to the hospital for IV diuretics.  Pt was given lasix 80mg IV x 1 in ED and admitted to medicine overnight, now on lasix 40mg IV bid.  UO 1.6L/2hrs.  Pt reports feeling better with no cp or sob.  Nephrology was consulted for further evaluation and management of his renal failure and nephrotic syndrome.  Cr has continued to improve to 2.4 today with 1.4L of UO yesterday on IV lasix.  Seen by oncology, cleared us to start tacrolimus for his FSGS.  Pt reports feeling better.    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 This is located in  the pelvic, perihepatic and perisplenic spaces.  4. Interval retroperitoneal and left pelvic sidewall adenopathy.  5. Anasarca.   6. Hypoalbuminemia    Impression:     WERO on CKD.  No hydronephrosis on recent CT scan.  Cr coming down slowly.  Nephrotic syndrome, secondary to FSGS  Bx proven FSGS, tip variant.  Resistant to steroid.  Anasarca  Hypernatremia, mild  Prostate and bladder CA  Anemia    Plan:     Appreciate Oncology's input regarding tacrolimus' effect to his oncologic conditions.    His FSGS is resistant to steroid as monotherapy.  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  Will keep pt on prednisone 60mg po daily  Will add tacrolimus 3mg po bid, starting today.    Add metolazone bid.  Continue IV lasix bid and strict I/Os    Continue anticoagulation due to high risk of thrombosis.    Pt agrees to start dialysis if needed.  No urgent need for HD as Cr slowly improving  Pending PTH    AM labs with renal panel    Sofya Newton MD  [facility]  [phone]         Admission

## 2024-04-05 NOTE — CONSULTS
Patient seen today for fluid filled blister to bilateral great toes, left second toe and left 4th toe.  Blister on second toe was circumlental and wound care nurse drained blister by puncturing it with a sterile blunt tip needle to prevent traumatic rupture that would cause a larger wound.  Patient tolerated well.  Betadine dressing applied to 2nd toe.  Xeroform gauze applied over other intact blisters.  ABD applied over toes of both feet and secured with Kerlix and loosely wrapped ACE.

## 2024-04-05 NOTE — PLAN OF CARE
Problem: Safety - Adult  Goal: Free from fall injury  4/5/2024 0115 by Chanelle Suazo RN  Outcome: Progressing  Flowsheets (Taken 4/4/2024 2000)  Free From Fall Injury:   Instruct family/caregiver on patient safety   Based on caregiver fall risk screen, instruct family/caregiver to ask for assistance with transferring infant if caregiver noted to have fall risk factors  4/4/2024 1703 by Stacey Brown RN  Outcome: Progressing     Problem: Discharge Planning  Goal: Discharge to home or other facility with appropriate resources  4/5/2024 0115 by Chanelle Suazo RN  Outcome: Progressing  Flowsheets (Taken 4/4/2024 2000)  Discharge to home or other facility with appropriate resources:   Identify barriers to discharge with patient and caregiver   Arrange for needed discharge resources and transportation as appropriate   Identify discharge learning needs (meds, wound care, etc)  4/4/2024 1703 by Stacey Brown RN  Outcome: Progressing  Flowsheets (Taken 4/4/2024 0800)  Discharge to home or other facility with appropriate resources:   Identify barriers to discharge with patient and caregiver   Arrange for needed discharge resources and transportation as appropriate   Identify discharge learning needs (meds, wound care, etc)     Problem: Skin/Tissue Integrity  Goal: Absence of new skin breakdown  Description: 1.  Monitor for areas of redness and/or skin breakdown  2.  Assess vascular access sites hourly  3.  Every 4-6 hours minimum:  Change oxygen saturation probe site  4.  Every 4-6 hours:  If on nasal continuous positive airway pressure, respiratory therapy assess nares and determine need for appliance change or resting period.  4/5/2024 0115 by Chanelle Suazo RN  Outcome: Progressing  4/4/2024 1703 by Stacey Brown RN  Outcome: Progressing     Problem: ABCDS Injury Assessment  Goal: Absence of physical injury  4/5/2024 0115 by Chanelle Suazo RN  Outcome: Progressing  Flowsheets (Taken 4/4/2024 2000)  Absence of

## 2024-04-05 NOTE — CONSULTS
Phone: [phone]  Paging : [phone]     Hematology/Oncology Consult Note    Patient: Efrain Mayorga MRN: [id number]  [facility]: [id number]    YOB: 1955  Age: 69 y.o.  Sex: male    DOA: 4/3/2024 LOS:  LOS: 2 days            REASON FOR CONSULTATION:     Tacrolimus in setting of bladder cancer    ASSESSMENT:     Mr. Mayorga is a 68 y/o M with a history of urothelial carcinoma of the bladder on treatment holiday who is re-admitted for fluid overload 2/2 FSGS. Hematology consulted on use of tacro in setting of bladder cancer    PLAN:   #Urothelial carcinoma of the bladder  - Okay to use tacrolimus. No interaction with EV or other future therapies for bladder cancer if he ever needs it. I discussed with patient risk of using immunosuppressive in setting of cancer and potentially causing cancer growth. However, I explained his renal failure and FSGS is priority as this could be deadly and ultimately we would control his risk of cancer progression with treatment so no limitations in using tacrolimus  - CT C/A/P shows questionable progression in RP and L sidewall Lns + peritoneal+pleural fluid (?malignant), would not address at this time, follow up outpatient   - He is off treatment right now, will plan to have him follow up closely with Dr. Lewis    #FSGS  - Previous consult for admission, recommended LMWH prophyactic dosing for DVT prophylaxis, continue for now, managed by nephrology      HPI:     \"Efrain Mayorga is a 69 y.o. male with a history of prostate and bladder cancer, , s/p neoadjvuant chemo cis/gem, cystoprostatectomy 3/2023 [id number], s/p adjvuant nivolumab 6/2023-10/17/2023 stopped due to gemerzlied swelling chronic kidney disease. Startedenfortumab in 1/2024, but stopped due to rash and diarrhea, off therapy, followed at [facility] by Dr. Lewis, admitted to [facility] from 3/7/24-3/15/24 for WERO with nephrotic range proteinuria, who presented on 3/19/24 for LUE cellulitis and diagnosed with    atorvastatin (LIPITOR) 40 MG tablet Take 1 tablet by mouth daily 3/27/17   Automatic Reconciliation, Ar   famotidine (PEPCID) 20 MG tablet Take 1 tablet by mouth 2 times daily 3/27/17   Automatic Reconciliation, Ar       No Known Allergies      ROS:     CONSTITUTION: No fevers, chills, night sweats, anorexia, or weight loss.   HEENT: No visual changes. No change in hearing acuity, tinnitus, hoarseness, sore throat, or postnasal drip.   CARDIOVASCULAR: No chest pain, palpitations, or syncope. No extremity edema.   RESPIRATORY: No shortness of breath, cough, wheezing, or hemoptysis.   GASTROINTESINAL: No dysphagia, odynophagia, nausea, or vomiting. No reflux symptoms, abdominal pain, or bloating. No constipation, diarrhea, or rectal bleeding.   GENITOURINARY: no complaints.   NEUROLOGICALl: No diplopia, dysarthria, weakness, or headaches. No seizures, focal weakness, or abnormal gait.   PSYCHIATRIC: No anxiety, depression, or memory loss.   HEMATOLOGIC: No easy bruising. No unusual bleeding. No enlarged lymph nodes.   MUSCULOSKELETAL: No unusual joint or muscle ache.   SKIN: No rash or pruritus.    Physical Exam:      VITAL SIGNS: Patient Vitals for the past 24 hrs:   BP Temp Temp src Pulse Resp SpO2 Height Weight   04/05/24 0416 (!) 140/83 97.7 °F (36.5 °C) Oral 84 16 98 % -- --   04/04/24 2346 (!) 148/97 97.4 °F (36.3 °C) Oral 88 16 96 % -- --   04/04/24 1956 125/77 97 °F (36.1 °C) Temporal 75 18 100 % -- --   04/04/24 1430 139/87 97.8 °F (36.6 °C) Oral 78 16 100 % -- --   04/04/24 1425 -- -- -- -- -- -- 1.676 m (5' 6\") --   04/04/24 1130 116/64 98 °F (36.7 °C) Oral 81 16 100 % -- --   04/04/24 0832 127/79 -- -- 95 -- -- 1.676 m (5' 6\") 86.2 kg (190 lb)   04/04/24 0755 127/79 97.4 °F (36.3 °C) Oral 95 18 100 % -- --     GENERAL: normal appearance, no acute distress.   HEENT: conjunctivae normal, eyelids normal, PERRLA, anicteric, external ears and nose normal, hearing grossly normal.   NECK: supple, no masses.  There is also concern for a left internal iliac lymph node with short axis dimension of 2.0 cm. Aortoiliac atherosclerotic calcifications are again noted. 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 URINARY BLADDER: Cystectomy. BONES: No destructive bone lesion. ADDITIONAL COMMENTS: Diffuse abdominal and pelvic wall edema consistent with anasarca.     1. Cystectomy. Right lower quadrant ileostomy. 2. Interval small left pleural effusion. 3. Moderate peritoneal fluid demonstrated in the interval. This is located in the pelvic, perihepatic and perisplenic spaces. 4. Interval retroperitoneal and left pelvic sidewall adenopathy. 5. Anasarca.     Vascular duplex lower extremity venous bilateral    Result Date: 3/29/2024  INDICATION: Edema right calf. Edema of right leg. Rule out DVT     No evidence of bilateral lower extremity deep venous thrombosis.    Vascular duplex upper extremity venous bilateral    Result Date: 3/20/2024    Acute superficial vein thrombosis in the cephalic vein of the left forearm.   No evidence of acute and chronic deep vein thrombosis in the right upper extremity.     XR CHEST PORTABLE    Result Date: 3/19/2024  INDICATION: nephrotic syndrome  EXAM:  AP CHEST RADIOGRAPH COMPARISON: March 7, 2024 FINDINGS: AP portable view of the chest demonstrates normal heart size. Right IJ Port-A-Cath unchanged. There is no edema, effusion, consolidation, or pneumothorax. The osseous structures are unremarkable.     No acute process.    CT BIOPSY RENAL    Result Date: 3/14/2024  PROCEDURE:  CT GUIDED RENAL BIOPSY HISTORY: NIRMAL HOLLIDAY is a 69 year old Male with Nephrotic Syndrome and Renal failure. :  Yisel Sebastian NP ATTENDING:  Min Blanc MD CONSENT:  After full discussion of the procedure, including risks, benefits and alternatives, both verbal and written consent were obtained. TECHNIQUE: A timeout was called to verify the correct patient, procedure,

## 2024-04-06 LAB
ALBUMIN SERPL-MCNC: 2 G/DL (ref 3.4–5)
ANION GAP SERPL CALC-SCNC: 7 MMOL/L (ref 3–18)
BUN SERPL-MCNC: 118 MG/DL (ref 7–18)
BUN/CREAT SERPL: 51 (ref 12–20)
CALCIUM SERPL-MCNC: 7.5 MG/DL (ref 8.5–10.1)
CHLORIDE SERPL-SCNC: 112 MMOL/L (ref 100–111)
CO2 SERPL-SCNC: 25 MMOL/L (ref 21–32)
CREAT SERPL-MCNC: 2.32 MG/DL (ref 0.6–1.3)
GLUCOSE SERPL-MCNC: 162 MG/DL (ref 74–99)
MAGNESIUM SERPL-MCNC: 1.5 MG/DL (ref 1.6–2.6)
NT PRO BNP: 4920 PG/ML (ref 0–900)
PHOSPHATE SERPL-MCNC: 4.8 MG/DL (ref 2.5–4.9)
POTASSIUM SERPL-SCNC: 3.4 MMOL/L (ref 3.5–5.5)
SODIUM SERPL-SCNC: 144 MMOL/L (ref 136–145)

## 2024-04-06 PROCEDURE — 6370000000 HC RX 637 (ALT 250 FOR IP): Performed by: INTERNAL MEDICINE

## 2024-04-06 PROCEDURE — 6360000002 HC RX W HCPCS: Performed by: FAMILY MEDICINE

## 2024-04-06 PROCEDURE — 83735 ASSAY OF MAGNESIUM: CPT

## 2024-04-06 PROCEDURE — 36415 COLL VENOUS BLD VENIPUNCTURE: CPT

## 2024-04-06 PROCEDURE — 1100000003 HC PRIVATE W/ TELEMETRY

## 2024-04-06 PROCEDURE — 80069 RENAL FUNCTION PANEL: CPT

## 2024-04-06 PROCEDURE — 83880 ASSAY OF NATRIURETIC PEPTIDE: CPT

## 2024-04-06 PROCEDURE — A4216 STERILE WATER/SALINE, 10 ML: HCPCS | Performed by: FAMILY MEDICINE

## 2024-04-06 PROCEDURE — 6370000000 HC RX 637 (ALT 250 FOR IP): Performed by: FAMILY MEDICINE

## 2024-04-06 PROCEDURE — C9113 INJ PANTOPRAZOLE SODIUM, VIA: HCPCS | Performed by: FAMILY MEDICINE

## 2024-04-06 PROCEDURE — 6360000002 HC RX W HCPCS: Performed by: INTERNAL MEDICINE

## 2024-04-06 PROCEDURE — 2580000003 HC RX 258: Performed by: FAMILY MEDICINE

## 2024-04-06 RX ORDER — FUROSEMIDE 10 MG/ML
60 INJECTION INTRAMUSCULAR; INTRAVENOUS 2 TIMES DAILY
Status: DISCONTINUED | OUTPATIENT
Start: 2024-04-06 | End: 2024-04-07

## 2024-04-06 RX ORDER — LANOLIN ALCOHOL/MO/W.PET/CERES
400 CREAM (GRAM) TOPICAL DAILY
Status: DISCONTINUED | OUTPATIENT
Start: 2024-04-06 | End: 2024-04-07

## 2024-04-06 RX ADMIN — PANTOPRAZOLE SODIUM 40 MG: 40 INJECTION, POWDER, FOR SOLUTION INTRAVENOUS at 09:17

## 2024-04-06 RX ADMIN — MAGNESIUM OXIDE TAB 400 MG (241.3 MG ELEMENTAL MG) 400 MG: 400 (241.3 MG) TAB at 18:25

## 2024-04-06 RX ADMIN — TACROLIMUS 3 MG: 1 CAPSULE ORAL at 20:30

## 2024-04-06 RX ADMIN — SODIUM CHLORIDE, PRESERVATIVE FREE 10 ML: 5 INJECTION INTRAVENOUS at 09:23

## 2024-04-06 RX ADMIN — TACROLIMUS 3 MG: 1 CAPSULE ORAL at 09:17

## 2024-04-06 RX ADMIN — METOLAZONE 5 MG: 5 TABLET ORAL at 09:17

## 2024-04-06 RX ADMIN — PREDNISONE 60 MG: 20 TABLET ORAL at 09:17

## 2024-04-06 RX ADMIN — AMOXICILLIN 250 MG: 250 CAPSULE ORAL at 20:30

## 2024-04-06 RX ADMIN — LEVOTHYROXINE SODIUM 112 MCG: 0.07 TABLET ORAL at 09:17

## 2024-04-06 RX ADMIN — AMOXICILLIN 250 MG: 250 CAPSULE ORAL at 09:17

## 2024-04-06 RX ADMIN — FUROSEMIDE 60 MG: 10 INJECTION, SOLUTION INTRAMUSCULAR; INTRAVENOUS at 18:25

## 2024-04-06 RX ADMIN — FUROSEMIDE 40 MG: 10 INJECTION, SOLUTION INTRAMUSCULAR; INTRAVENOUS at 09:17

## 2024-04-06 RX ADMIN — ASPIRIN 81 MG: 81 TABLET, COATED ORAL at 09:17

## 2024-04-06 RX ADMIN — METOLAZONE 5 MG: 5 TABLET ORAL at 20:30

## 2024-04-06 RX ADMIN — ATORVASTATIN CALCIUM 40 MG: 20 TABLET, FILM COATED ORAL at 09:17

## 2024-04-06 RX ADMIN — ENOXAPARIN SODIUM 30 MG: 100 INJECTION SUBCUTANEOUS at 09:17

## 2024-04-06 RX ADMIN — SODIUM CHLORIDE, PRESERVATIVE FREE 10 ML: 5 INJECTION INTRAVENOUS at 20:29

## 2024-04-06 ASSESSMENT — PAIN SCALES - GENERAL: PAINLEVEL_OUTOF10: 0

## 2024-04-06 NOTE — PROGRESS NOTES
Hospitalist Progress Note    Patient: Efrain Mayorga MRN: [id number]  CSN: [id number]    YOB: 1955  Age: 69 y.o.  Sex: male    DOA: 4/3/2024 LOS:  LOS: 3 days          Chief Complaint:    Fluid overload    Assessment/Plan   Telemetry floor     Fluid overload -  Significant good diuresis with lasix 80mg IV given in ed  Will continue lasix 40mg IV q12  Nephrology following   pBNP improving      Focal segmental glomerulosclerosis -   anasarca  Continued on prednisone 60 mg  Nephrology consulted, added tacrolimus 3mg po bid, metolazone bid  torsemide 20 mg daily  Monitor renal function.  Keep legs elevated, compression stockings  high risk for dvt  lovenox 30 mg once daily      AM labs with renal panel     Consulted oncology -Dr. Yeager- for discussion of new medication rec by nephrology vs it causing complications for cancer regimen      Hypernatremia-  improved     Prostate cancer, bladder cancer  Seeing Dr. Debbie MCCLAIN oncologist  Received immunotherapy before, no active treatment right now  s/p   1. Radical cystoprostatectomy  2. Extended pelvic lymph node dissection  3. Ileal conduit urinary diversion with single-J stents bilaterally   On 03/28/2023    Anemia  Due to chronic illness  No bleeding reported   Due to high risk for thrombosis -on lovenox per heme/onc previous admission  Iron and epogen      Hypertension  Well controlled      Hyperlipidemia  Continued statin       Acute renal failure on chronic kidney disease  Nephrotic syndrome  Congestive health failure   Anasarca  Lymphedema      Anasarca, due to nephrotic syndrome  Continue albumin, albumin 1.7  Lasix       Active Hospital Problems    Diagnosis Date Noted    Anasarca associated with disorder of kidney [N04.9] 04/03/2024    Focal segmental glomerulosclerosis [N05.1] 03/27/2024    History of urostomy [Z98.890] 03/19/2024    Nephrotic syndrome [N04.9] 03/12/2024    Anasarca [R60.1] 03/09/2024    Hypoalbuminemia [E88.09] 08/30/2023

## 2024-04-06 NOTE — PLAN OF CARE
Problem: Safety - Adult  Goal: Free from fall injury  4/6/2024 1224 by Stacey Brown RN  Outcome: Progressing  4/6/2024 0222 by Nandini Hager RN  Outcome: Progressing     Problem: Discharge Planning  Goal: Discharge to home or other facility with appropriate resources  4/6/2024 1224 by Stacey Brown RN  Outcome: Progressing  Flowsheets (Taken 4/6/2024 0800)  Discharge to home or other facility with appropriate resources:   Identify barriers to discharge with patient and caregiver   Arrange for needed discharge resources and transportation as appropriate   Identify discharge learning needs (meds, wound care, etc)  4/6/2024 0222 by Nandini Hager RN  Outcome: Progressing     Problem: Skin/Tissue Integrity  Goal: Absence of new skin breakdown  Description: 1.  Monitor for areas of redness and/or skin breakdown  2.  Assess vascular access sites hourly  3.  Every 4-6 hours minimum:  Change oxygen saturation probe site  4.  Every 4-6 hours:  If on nasal continuous positive airway pressure, respiratory therapy assess nares and determine need for appliance change or resting period.  4/6/2024 1224 by Stacey Brown RN  Outcome: Progressing  4/6/2024 0222 by Nandini Hager RN  Outcome: Progressing     Problem: ABCDS Injury Assessment  Goal: Absence of physical injury  4/6/2024 1224 by Stacey Brown RN  Outcome: Progressing  4/6/2024 0222 by Nandini Hager RN  Flowsheets (Taken 4/4/2024 2000 by Chanelle Suazo, RN)  Absence of Physical Injury: Implement safety measures based on patient assessment     Problem: Nutrition Deficit:  Goal: Optimize nutritional status  4/6/2024 1224 by Stacey Brown RN  Outcome: Progressing  4/6/2024 0222 by Nandini Hager RN  Outcome: Progressing     Problem: Pain  Goal: Verbalizes/displays adequate comfort level or baseline comfort level  4/6/2024 1224 by Stacey Brown RN  Outcome: Progressing  4/6/2024 0222 by Nandini Hager RN  Outcome: Progressing  Flowsheets (Taken 4/5/2024  2100)  Verbalizes/displays adequate comfort level or baseline comfort level:   Encourage patient to monitor pain and request assistance   Assess pain using appropriate pain scale   Administer analgesics based on type and severity of pain and evaluate response   Implement non-pharmacological measures as appropriate and evaluate response

## 2024-04-06 NOTE — PROGRESS NOTES
Nephrology Progress Note    Patient: Efrain Mayorga  Requesting physician: BOOKER Bartholomew  Reason for consult: Renal failure    CC: Worsening edema    History of Present Illness:  Efrain Mayorga is a 69 y.o.  male with a past medical history significant for prostate CA/ bladder CA, s/p radical cystoprostatectomy with extensive LN dissection with ileal conduit, s/p immunotherapy in the past, CAD, HTN, nephrotic syndrome, CKD who follows me at [facility] office.  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  Pt has been on po prednisone and s/p a recent hospital admission at [facility].  On Lovenox per previous hematology's recommendations for high risk of thrombosis.  He has been taking torsemide 20mg po daily at home and noticed worsening anasarca, including facial.  I asked pt to come back to the hospital for IV diuretics.  Pt was given lasix 80mg IV x 1 in ED and admitted to medicine overnight, now on lasix 40mg IV bid.  UO 1.6L/2hrs.  Pt reports feeling better with no cp or sob.  Nephrology was consulted for further evaluation and management of his renal failure and nephrotic syndrome.  Cr has continued to improve to 2.4 today with 1.4L of UO yesterday on IV lasix.  Seen by oncology, cleared us to start tacrolimus for his FSGS.  Tacro 3mg po bid started.  Pt reports no cp or sob.  Cr down to 2.3 today, UO 1.5L yesterday.    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    ARF (acute renal failure) (HCC)    Anasarca    Hyperkalemia    Nephrotic syndrome    Cellulitis     History of urostomy    Hypokalemia    Focal segmental glomerulosclerosis    Hypernatremia    Anasarca associated with disorder of kidney       Social History:  Social History     Socioeconomic History    Marital status:      Spouse name: None    Number of children: None    Years of education: None    Highest education level: None   Tobacco Use    Smoking status: Former     Current packs/day: 0.00     Types: Cigarettes     Quit date: 10/18/2022     Years since quittin.4    Smokeless tobacco: Never   Substance and Sexual Activity    Alcohol use: No    Drug use: No     Social Determinants of Health     Food Insecurity: No Food Insecurity (4/3/2024)    Hunger Vital Sign     Worried About Running Out of Food in the Last Year: Never true     Ran Out of Food in the Last Year: Never true   Transportation Needs: No Transportation Needs (4/3/2024)    PRAPARE - Transportation     Lack of Transportation (Medical): No     Lack of Transportation (Non-Medical): No   Housing Stability: Low Risk  (4/3/2024)    Housing Stability Vital Sign     Unable to Pay for Housing in the Last Year: No     Number of Places Lived in the Last Year: 1     Unstable Housing in the Last Year: No       Allergy:  No Known Allergies     Medication:  Home meds: reviewed    Inpatient meds:  Current Facility-Administered Medications   Medication Dose Route Frequency    metOLazone (ZAROXOLYN) tablet 5 mg  5 mg Oral BID    tacrolimus (PROGRAF) capsule 3 mg  3 mg Oral BID    amoxicillin (AMOXIL) capsule 250 mg  250 mg Oral 2 times per day    levothyroxine (SYNTHROID) tablet 112 mcg  112 mcg Oral Daily    aspirin EC tablet 81 mg  81 mg Oral Daily    atorvastatin (LIPITOR) tablet 40 mg  40 mg Oral Daily    sodium chloride flush 0.9 % injection 5-40 mL  5-40 mL IntraVENous 2 times per day    sodium chloride flush 0.9 % injection 5-40 mL  5-40 mL IntraVENous PRN    0.9 % sodium chloride infusion   IntraVENous PRN    ondansetron (ZOFRAN-ODT)

## 2024-04-06 NOTE — PLAN OF CARE
70 Y/o male full code  Arrived   Diagnosis: fluid overload, anasarca  Nephrologist informed pt to return to the hospital d/t swelling for diuretics via IV, now with anasarca    History: prostate cancer, bladder cancer, malignant neoplasm of urinary bladder, edema lower extremities and face, urostomy post cystoprostatectomy (03/2023) was on immunotherapy, CAD, HTN, nephrotic syndrome, CKD          04/05/24 Fluid blisters bilateral toes, wound care visited drained blisters by puncturing then wrapped.    Neuro alert and oriented no fevers  Resp room air  Cardiac EF 50-55% edema both lower extremities and lymphedema is baseline   Gi low sodium diet (Renal) with trending fluids, I and Os  Gu urostomy empties himself and BM yesterday 04/05/24  Skin fragile ecchymosis, scars, back with rash red  lines          Problem: Safety - Adult  Goal: Free from fall injury  4/6/2024 0222 by Nandini Hager RN  Outcome: Progressing  4/5/2024 1816 by Tila Connor RN  Outcome: Progressing     Problem: Discharge Planning  Goal: Discharge to home or other facility with appropriate resources  4/6/2024 0222 by Nandini Hager RN  Outcome: Progressing  4/5/2024 1816 by Tila Connor RN  Outcome: Progressing  Flowsheets (Taken 4/5/2024 0815)  Discharge to home or other facility with appropriate resources:   Identify barriers to discharge with patient and caregiver   Identify discharge learning needs (meds, wound care, etc)   Arrange for needed discharge resources and transportation as appropriate   Arrange for interpreters to assist at discharge as needed     Problem: Skin/Tissue Integrity  Goal: Absence of new skin breakdown  4/6/2024 0222 by Nandini Hager RN  Outcome: Progressing  4/5/2024 1816 by Tila Connor RN  Outcome: Progressing     Problem: ABCDS Injury Assessment  Goal: Absence of physical injury  4/6/2024 0222 by Nandini Hager RN  Flowsheets (Taken 4/4/2024 2000 by Chanelle Suazo, RN)  Absence of

## 2024-04-07 LAB
ALBUMIN SERPL-MCNC: 1.9 G/DL (ref 3.4–5)
ANION GAP SERPL CALC-SCNC: 11 MMOL/L (ref 3–18)
BUN SERPL-MCNC: 123 MG/DL (ref 7–18)
BUN/CREAT SERPL: 54 (ref 12–20)
CALCIUM SERPL-MCNC: 7.3 MG/DL (ref 8.5–10.1)
CHLORIDE SERPL-SCNC: 111 MMOL/L (ref 100–111)
CO2 SERPL-SCNC: 21 MMOL/L (ref 21–32)
CREAT SERPL-MCNC: 2.28 MG/DL (ref 0.6–1.3)
EKG ATRIAL RATE: 71 BPM
EKG DIAGNOSIS: NORMAL
EKG P AXIS: -1 DEGREES
EKG P-R INTERVAL: 142 MS
EKG Q-T INTERVAL: 388 MS
EKG QRS DURATION: 90 MS
EKG QTC CALCULATION (BAZETT): 421 MS
EKG R AXIS: 100 DEGREES
EKG T AXIS: -12 DEGREES
EKG VENTRICULAR RATE: 71 BPM
GLUCOSE SERPL-MCNC: 157 MG/DL (ref 74–99)
MAGNESIUM SERPL-MCNC: 1.6 MG/DL (ref 1.6–2.6)
PHOSPHATE SERPL-MCNC: 4.6 MG/DL (ref 2.5–4.9)
POTASSIUM SERPL-SCNC: 3.6 MMOL/L (ref 3.5–5.5)
SODIUM SERPL-SCNC: 143 MMOL/L (ref 136–145)

## 2024-04-07 PROCEDURE — 80069 RENAL FUNCTION PANEL: CPT

## 2024-04-07 PROCEDURE — C9113 INJ PANTOPRAZOLE SODIUM, VIA: HCPCS | Performed by: FAMILY MEDICINE

## 2024-04-07 PROCEDURE — 6360000002 HC RX W HCPCS: Performed by: INTERNAL MEDICINE

## 2024-04-07 PROCEDURE — P9047 ALBUMIN (HUMAN), 25%, 50ML: HCPCS | Performed by: INTERNAL MEDICINE

## 2024-04-07 PROCEDURE — 2580000003 HC RX 258: Performed by: FAMILY MEDICINE

## 2024-04-07 PROCEDURE — 6370000000 HC RX 637 (ALT 250 FOR IP): Performed by: FAMILY MEDICINE

## 2024-04-07 PROCEDURE — 6360000002 HC RX W HCPCS: Performed by: FAMILY MEDICINE

## 2024-04-07 PROCEDURE — 83735 ASSAY OF MAGNESIUM: CPT

## 2024-04-07 PROCEDURE — 1100000003 HC PRIVATE W/ TELEMETRY

## 2024-04-07 PROCEDURE — A4216 STERILE WATER/SALINE, 10 ML: HCPCS | Performed by: FAMILY MEDICINE

## 2024-04-07 PROCEDURE — 6370000000 HC RX 637 (ALT 250 FOR IP): Performed by: INTERNAL MEDICINE

## 2024-04-07 PROCEDURE — 36415 COLL VENOUS BLD VENIPUNCTURE: CPT

## 2024-04-07 RX ORDER — BUMETANIDE 0.25 MG/ML
2 INJECTION INTRAMUSCULAR; INTRAVENOUS 2 TIMES DAILY
Status: DISCONTINUED | OUTPATIENT
Start: 2024-04-07 | End: 2024-04-08

## 2024-04-07 RX ORDER — ALBUMIN (HUMAN) 12.5 G/50ML
25 SOLUTION INTRAVENOUS 2 TIMES DAILY
Status: DISCONTINUED | OUTPATIENT
Start: 2024-04-07 | End: 2024-04-11 | Stop reason: HOSPADM

## 2024-04-07 RX ORDER — LANOLIN ALCOHOL/MO/W.PET/CERES
400 CREAM (GRAM) TOPICAL 2 TIMES DAILY
Status: DISCONTINUED | OUTPATIENT
Start: 2024-04-07 | End: 2024-04-11 | Stop reason: HOSPADM

## 2024-04-07 RX ADMIN — Medication 400 MG: at 20:33

## 2024-04-07 RX ADMIN — LEVOTHYROXINE SODIUM 112 MCG: 0.07 TABLET ORAL at 06:08

## 2024-04-07 RX ADMIN — AMOXICILLIN 250 MG: 250 CAPSULE ORAL at 08:39

## 2024-04-07 RX ADMIN — FUROSEMIDE 60 MG: 10 INJECTION, SOLUTION INTRAMUSCULAR; INTRAVENOUS at 08:38

## 2024-04-07 RX ADMIN — SODIUM CHLORIDE, PRESERVATIVE FREE 10 ML: 5 INJECTION INTRAVENOUS at 09:00

## 2024-04-07 RX ADMIN — ATORVASTATIN CALCIUM 40 MG: 20 TABLET, FILM COATED ORAL at 08:39

## 2024-04-07 RX ADMIN — TACROLIMUS 3 MG: 1 CAPSULE ORAL at 20:33

## 2024-04-07 RX ADMIN — METOLAZONE 5 MG: 5 TABLET ORAL at 08:38

## 2024-04-07 RX ADMIN — PREDNISONE 60 MG: 20 TABLET ORAL at 08:38

## 2024-04-07 RX ADMIN — ALBUMIN (HUMAN) 25 G: 0.25 INJECTION, SOLUTION INTRAVENOUS at 20:33

## 2024-04-07 RX ADMIN — ALBUMIN (HUMAN) 25 G: 0.25 INJECTION, SOLUTION INTRAVENOUS at 14:00

## 2024-04-07 RX ADMIN — TACROLIMUS 3 MG: 1 CAPSULE ORAL at 08:38

## 2024-04-07 RX ADMIN — BUMETANIDE 2 MG: 0.25 INJECTION INTRAMUSCULAR; INTRAVENOUS at 20:33

## 2024-04-07 RX ADMIN — ENOXAPARIN SODIUM 30 MG: 100 INJECTION SUBCUTANEOUS at 08:38

## 2024-04-07 RX ADMIN — MAGNESIUM OXIDE TAB 400 MG (241.3 MG ELEMENTAL MG) 400 MG: 400 (241.3 MG) TAB at 08:38

## 2024-04-07 RX ADMIN — AMOXICILLIN 250 MG: 250 CAPSULE ORAL at 20:33

## 2024-04-07 RX ADMIN — BUMETANIDE 2 MG: 0.25 INJECTION INTRAMUSCULAR; INTRAVENOUS at 14:00

## 2024-04-07 RX ADMIN — ASPIRIN 81 MG: 81 TABLET, COATED ORAL at 08:38

## 2024-04-07 RX ADMIN — SODIUM CHLORIDE, PRESERVATIVE FREE 10 ML: 5 INJECTION INTRAVENOUS at 20:39

## 2024-04-07 RX ADMIN — PANTOPRAZOLE SODIUM 40 MG: 40 INJECTION, POWDER, FOR SOLUTION INTRAVENOUS at 08:38

## 2024-04-07 RX ADMIN — METOLAZONE 5 MG: 5 TABLET ORAL at 20:07

## 2024-04-07 NOTE — PROGRESS NOTES
Nephrology Progress Note    Patient: Efrain Mayorga  Requesting physician: BOOKER Bartholomew  Reason for consult: Renal failure    CC: Worsening edema    History of Present Illness:  Efrain Mayorga is a 69 y.o.  male with a past medical history significant for prostate CA/ bladder CA, s/p radical cystoprostatectomy with extensive LN dissection with ileal conduit, s/p immunotherapy in the past, CAD, HTN, nephrotic syndrome, CKD who follows me at [facility] office.  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  Pt has been on po prednisone and s/p a recent hospital admission at [facility].  On Lovenox per previous hematology's recommendations for high risk of thrombosis.  He has been taking torsemide 20mg po daily at home and noticed worsening anasarca, including facial.  I asked pt to come back to the hospital for IV diuretics.  Pt was given lasix 80mg IV x 1 in ED and admitted to medicine overnight, now on lasix 40mg IV bid.  UO 1.6L/2hrs.  Pt reports feeling better with no cp or sob.  Nephrology was consulted for further evaluation and management of his renal failure and nephrotic syndrome.  Cr has continued to improve to 2.4 today with 1.4L of UO yesterday on IV lasix.  Seen by oncology, cleared us to start tacrolimus for his FSGS.  Tacro 3mg po bid started.      Pt reports no cp or sob, doing well overnight.  Cr down to 2.2 today, Mg 1.6, K 3.6, UO 1.36L yesterday.    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    ARF (acute renal failure) (HCC)    Anasarca    Hyperkalemia     Nephrotic syndrome    Cellulitis    History of urostomy    Hypokalemia    Focal segmental glomerulosclerosis    Hypernatremia    Anasarca associated with disorder of kidney       Social History:  Social History     Socioeconomic History    Marital status:      Spouse name: None    Number of children: None    Years of education: None    Highest education level: None   Tobacco Use    Smoking status: Former     Current packs/day: 0.00     Types: Cigarettes     Quit date: 10/18/2022     Years since quittin.4    Smokeless tobacco: Never   Substance and Sexual Activity    Alcohol use: No    Drug use: No     Social Determinants of Health     Food Insecurity: No Food Insecurity (4/3/2024)    Hunger Vital Sign     Worried About Running Out of Food in the Last Year: Never true     Ran Out of Food in the Last Year: Never true   Transportation Needs: No Transportation Needs (4/3/2024)    PRAPARE - Transportation     Lack of Transportation (Medical): No     Lack of Transportation (Non-Medical): No   Housing Stability: Low Risk  (4/3/2024)    Housing Stability Vital Sign     Unable to Pay for Housing in the Last Year: No     Number of Places Lived in the Last Year: 1     Unstable Housing in the Last Year: No       Allergy:  No Known Allergies     Medication:  Home meds: reviewed    Inpatient meds:  Current Facility-Administered Medications   Medication Dose Route Frequency    magnesium oxide (MAG-OX) tablet 400 mg  400 mg Oral Daily    furosemide (LASIX) injection 60 mg  60 mg IntraVENous BID    metOLazone (ZAROXOLYN) tablet 5 mg  5 mg Oral BID    tacrolimus (PROGRAF) capsule 3 mg  3 mg Oral BID    amoxicillin (AMOXIL) capsule 250 mg  250 mg Oral 2 times per day    levothyroxine (SYNTHROID) tablet 112 mcg  112 mcg Oral Daily    aspirin EC tablet 81 mg  81 mg Oral Daily    atorvastatin (LIPITOR) tablet 40 mg  40 mg Oral Daily    sodium chloride flush 0.9 % injection 5-40 mL  5-40 mL IntraVENous 2 times per day    sodium  a cardiac monitor. Port-A-Cath overlies the SVC. Heart size is  normal. Lungs are well aerated. Lungs are clear. No pneumonia or pulmonary  edema.    3/30/24 CT:   KIDNEYS: No nephrolithiasis or hydronephrosis.   1. Cystectomy. Right lower quadrant ileostomy.  2. Interval small left pleural effusion.  3. Moderate peritoneal fluid demonstrated in the interval. This is located in  the pelvic, perihepatic and perisplenic spaces.  4. Interval retroperitoneal and left pelvic sidewall adenopathy.  5. Anasarca.   6. Hypoalbuminemia    Impression:     WERO on CKD.  No hydronephrosis on recent CT scan.  Cr coming down slowly.  Nephrotic syndrome, secondary to FSGS  Bx proven FSGS, tip variant.  Resistant to steroid. Tacrolimus added.  Anasarca  Hypernatremia, mild  Prostate and bladder CA  Anemia    Plan:     Continue prednisone 60mg po daily  Cont tacrolimus 3mg po bid  Cont po metolazone bid.  Change IV lasix to IV bumex 2mg bid  IV albumin  Strict I/Os  Increase MgOxide to bid  Continue anticoagulation due to high risk of thrombosis.  Pt agrees to start dialysis if needed.  No urgent need for HD as Cr slowly improving  Pending PTH    AM labs with renal panel, cbc, trough tacro and urine PCR    D/w pt and wife    Sofya Newton MD  [facility]  [phone]

## 2024-04-07 NOTE — PLAN OF CARE
Problem: Safety - Adult  Goal: Free from fall injury  4/7/2024 1216 by Stacey Brown RN  Outcome: Progressing  4/7/2024 0021 by Fabrizio Garner RN  Outcome: Progressing     Problem: Discharge Planning  Goal: Discharge to home or other facility with appropriate resources  4/7/2024 1216 by Stacey Brown RN  Outcome: Progressing  Flowsheets (Taken 4/7/2024 0800)  Discharge to home or other facility with appropriate resources:   Identify barriers to discharge with patient and caregiver   Arrange for needed discharge resources and transportation as appropriate   Identify discharge learning needs (meds, wound care, etc)  4/7/2024 0021 by Fabrizio Garner RN  Outcome: Progressing  Flowsheets (Taken 4/6/2024 2000)  Discharge to home or other facility with appropriate resources:   Identify barriers to discharge with patient and caregiver   Identify discharge learning needs (meds, wound care, etc)     Problem: Skin/Tissue Integrity  Goal: Absence of new skin breakdown  Description: 1.  Monitor for areas of redness and/or skin breakdown  2.  Assess vascular access sites hourly  3.  Every 4-6 hours minimum:  Change oxygen saturation probe site  4.  Every 4-6 hours:  If on nasal continuous positive airway pressure, respiratory therapy assess nares and determine need for appliance change or resting period.  4/7/2024 1216 by Stacey Brown RN  Outcome: Progressing  4/7/2024 0021 by Fabrizio Garner RN  Outcome: Progressing     Problem: ABCDS Injury Assessment  Goal: Absence of physical injury  4/7/2024 1216 by Stacey Brown RN  Outcome: Progressing  4/7/2024 0021 by Fabrizio Garner RN  Outcome: Progressing     Problem: Nutrition Deficit:  Goal: Optimize nutritional status  4/7/2024 1216 by Stacey Brown RN  Outcome: Progressing  4/7/2024 0021 by Fabrizio Garner RN  Outcome: Progressing     Problem: Pain  Goal: Verbalizes/displays adequate comfort level or baseline comfort level  4/7/2024 1216 by Stacey Brown RN  Outcome: Progressing  4/7/2024

## 2024-04-07 NOTE — PROGRESS NOTES
Hospitalist Progress Note    Patient: Efrain Mayorga MRN: [id number]  CSN: [id number]    YOB: 1955  Age: 69 y.o.  Sex: male    DOA: 4/3/2024 LOS:  LOS: 4 days          Chief Complaint:    Fluid overload    Assessment/Plan   Telemetry floor     Fluid overload -  Lasix incr to 60mg IV q12  Nephrology following   pBNP improving      Focal segmental glomerulosclerosis -   anasarca  Continued on prednisone 60 mg  Nephrology following   tacrolimus 3mg po bid  metolazone po bid  torsemide 20 mg daily  Monitor renal function  Strict I/Os   Keep legs elevated, compression stockings  high risk for dvt, sp cont AC  lovenox 30 mg once daily      AM labs with renal panel     Consulted oncology -Dr. Yeager- for discussion of new medication rec by nephrology vs it causing complications for cancer regimen      Hypernatremia-  improved     Prostate cancer, bladder cancer  Seeing Dr. Debbie MCCLAIN oncologist  Received immunotherapy before, no active treatment right now  s/p   1. Radical cystoprostatectomy  2. Extended pelvic lymph node dissection  3. Ileal conduit urinary diversion with single-J stents bilaterally   On 03/28/2023    Anemia  Due to chronic illness  No bleeding reported   Due to high risk for thrombosis -on lovenox per heme/onc previous admission  Iron and epogen      Hypertension  Well controlled      Hyperlipidemia  Continued statin       Acute renal failure on chronic kidney disease  Nephrotic syndrome  Congestive health failure   Anasarca  Lymphedema      Anasarca, due to nephrotic syndrome     Active Hospital Problems    Diagnosis Date Noted    Anasarca associated with disorder of kidney [N04.9] 04/03/2024    Focal segmental glomerulosclerosis [N05.1] 03/27/2024    History of urostomy [Z98.890] 03/19/2024    Nephrotic syndrome [N04.9] 03/12/2024    Anasarca [R60.1] 03/09/2024    Hypoalbuminemia [E88.09] 08/30/2023    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

## 2024-04-07 NOTE — PLAN OF CARE
Problem: Safety - Adult  Goal: Free from fall injury  4/7/2024 0021 by Fabrizio Garner RN  Outcome: Progressing  4/6/2024 1224 by Stacey Brown RN  Outcome: Progressing     Problem: Discharge Planning  Goal: Discharge to home or other facility with appropriate resources  4/7/2024 0021 by Fabrizio Garner RN  Outcome: Progressing  Flowsheets (Taken 4/6/2024 2000)  Discharge to home or other facility with appropriate resources:   Identify barriers to discharge with patient and caregiver   Identify discharge learning needs (meds, wound care, etc)  4/6/2024 1224 by Stacey Brown RN  Outcome: Progressing  Flowsheets (Taken 4/6/2024 0800)  Discharge to home or other facility with appropriate resources:   Identify barriers to discharge with patient and caregiver   Arrange for needed discharge resources and transportation as appropriate   Identify discharge learning needs (meds, wound care, etc)     Problem: Skin/Tissue Integrity  Goal: Absence of new skin breakdown  Description: 1.  Monitor for areas of redness and/or skin breakdown  2.  Assess vascular access sites hourly  3.  Every 4-6 hours minimum:  Change oxygen saturation probe site  4.  Every 4-6 hours:  If on nasal continuous positive airway pressure, respiratory therapy assess nares and determine need for appliance change or resting period.  4/7/2024 0021 by Fabrizio Garner RN  Outcome: Progressing  4/6/2024 1224 by Stacey Brown RN  Outcome: Progressing     Problem: ABCDS Injury Assessment  Goal: Absence of physical injury  4/7/2024 0021 by Fabrizio Garner RN  Outcome: Progressing  4/6/2024 1224 by Stacey Brown RN  Outcome: Progressing     Problem: Nutrition Deficit:  Goal: Optimize nutritional status  4/7/2024 0021 by Fabrizio Garner RN  Outcome: Progressing  4/6/2024 1224 by Stacey Brown RN  Outcome: Progressing     Problem: Pain  Goal: Verbalizes/displays adequate comfort level or baseline comfort level  4/7/2024 0021 by Fabrizio Garner RN  Outcome: Progressing  Flowsheets  (Taken 4/6/2024 2000)  Verbalizes/displays adequate comfort level or baseline comfort level:   Encourage patient to monitor pain and request assistance   Assess pain using appropriate pain scale   Administer analgesics based on type and severity of pain and evaluate response   Implement non-pharmacological measures as appropriate and evaluate response  4/6/2024 1224 by Stacey Brown, RN  Outcome: Progressing

## 2024-04-08 ENCOUNTER — TELEPHONE (OUTPATIENT)
Facility: HOSPITAL | Age: 69
End: 2024-04-08

## 2024-04-08 LAB
ALBUMIN SERPL-MCNC: 3.5 G/DL (ref 3.4–5)
ANION GAP SERPL CALC-SCNC: 10 MMOL/L (ref 3–18)
BASOPHILS # BLD: 0 K/UL (ref 0–0.1)
BASOPHILS NFR BLD: 0 % (ref 0–2)
BUN SERPL-MCNC: 114 MG/DL (ref 7–18)
BUN/CREAT SERPL: 55 (ref 12–20)
CALCIUM SERPL-MCNC: 8.1 MG/DL (ref 8.5–10.1)
CHLORIDE SERPL-SCNC: 105 MMOL/L (ref 100–111)
CO2 SERPL-SCNC: 26 MMOL/L (ref 21–32)
CREAT SERPL-MCNC: 2.09 MG/DL (ref 0.6–1.3)
CREAT UR-MCNC: 15 MG/DL (ref 30–125)
DIFFERENTIAL METHOD BLD: ABNORMAL
EOSINOPHIL # BLD: 0 K/UL (ref 0–0.4)
EOSINOPHIL NFR BLD: 0 % (ref 0–5)
ERYTHROCYTE [DISTWIDTH] IN BLOOD BY AUTOMATED COUNT: 16.4 % (ref 11.6–14.5)
GLUCOSE SERPL-MCNC: 118 MG/DL (ref 74–99)
HCT VFR BLD AUTO: 34.1 % (ref 36–48)
HGB BLD-MCNC: 11.2 G/DL (ref 13–16)
IMM GRANULOCYTES # BLD AUTO: 0.1 K/UL (ref 0–0.04)
IMM GRANULOCYTES NFR BLD AUTO: 1 % (ref 0–0.5)
LYMPHOCYTES # BLD: 0.9 K/UL (ref 0.9–3.6)
LYMPHOCYTES NFR BLD: 10 % (ref 21–52)
MAGNESIUM SERPL-MCNC: 1.6 MG/DL (ref 1.6–2.6)
MCH RBC QN AUTO: 30.6 PG (ref 24–34)
MCHC RBC AUTO-ENTMCNC: 32.8 G/DL (ref 31–37)
MCV RBC AUTO: 93.2 FL (ref 78–100)
MONOCYTES # BLD: 0.7 K/UL (ref 0.05–1.2)
MONOCYTES NFR BLD: 7 % (ref 3–10)
NEUTS SEG # BLD: 7.3 K/UL (ref 1.8–8)
NEUTS SEG NFR BLD: 82 % (ref 40–73)
NRBC # BLD: 0 K/UL (ref 0–0.01)
NRBC BLD-RTO: 0 PER 100 WBC
PHOSPHATE SERPL-MCNC: 4.4 MG/DL (ref 2.5–4.9)
PLATELET # BLD AUTO: 139 K/UL (ref 135–420)
PMV BLD AUTO: 10.4 FL (ref 9.2–11.8)
POTASSIUM SERPL-SCNC: 3.3 MMOL/L (ref 3.5–5.5)
PROT UR-MCNC: 568 MG/DL
PROT/CREAT UR-RTO: 37.9
RBC # BLD AUTO: 3.66 M/UL (ref 4.35–5.65)
SODIUM SERPL-SCNC: 141 MMOL/L (ref 136–145)
WBC # BLD AUTO: 8.9 K/UL (ref 4.6–13.2)

## 2024-04-08 PROCEDURE — 36415 COLL VENOUS BLD VENIPUNCTURE: CPT

## 2024-04-08 PROCEDURE — 82570 ASSAY OF URINE CREATININE: CPT

## 2024-04-08 PROCEDURE — 2580000003 HC RX 258: Performed by: FAMILY MEDICINE

## 2024-04-08 PROCEDURE — 2500000003 HC RX 250 WO HCPCS: Performed by: HOSPITALIST

## 2024-04-08 PROCEDURE — 6360000002 HC RX W HCPCS: Performed by: FAMILY MEDICINE

## 2024-04-08 PROCEDURE — 6360000002 HC RX W HCPCS: Performed by: INTERNAL MEDICINE

## 2024-04-08 PROCEDURE — 80069 RENAL FUNCTION PANEL: CPT

## 2024-04-08 PROCEDURE — 6370000000 HC RX 637 (ALT 250 FOR IP): Performed by: INTERNAL MEDICINE

## 2024-04-08 PROCEDURE — 6370000000 HC RX 637 (ALT 250 FOR IP): Performed by: FAMILY MEDICINE

## 2024-04-08 PROCEDURE — 1100000003 HC PRIVATE W/ TELEMETRY

## 2024-04-08 PROCEDURE — A4216 STERILE WATER/SALINE, 10 ML: HCPCS | Performed by: FAMILY MEDICINE

## 2024-04-08 PROCEDURE — C9113 INJ PANTOPRAZOLE SODIUM, VIA: HCPCS | Performed by: FAMILY MEDICINE

## 2024-04-08 PROCEDURE — 83735 ASSAY OF MAGNESIUM: CPT

## 2024-04-08 PROCEDURE — 6370000000 HC RX 637 (ALT 250 FOR IP): Performed by: HOSPITALIST

## 2024-04-08 PROCEDURE — 84156 ASSAY OF PROTEIN URINE: CPT

## 2024-04-08 PROCEDURE — P9047 ALBUMIN (HUMAN), 25%, 50ML: HCPCS | Performed by: INTERNAL MEDICINE

## 2024-04-08 PROCEDURE — 85025 COMPLETE CBC W/AUTO DIFF WBC: CPT

## 2024-04-08 RX ORDER — POTASSIUM CHLORIDE 20 MEQ/1
40 TABLET, EXTENDED RELEASE ORAL ONCE
Status: COMPLETED | OUTPATIENT
Start: 2024-04-08 | End: 2024-04-08

## 2024-04-08 RX ORDER — PANTOPRAZOLE SODIUM 40 MG/1
40 TABLET, DELAYED RELEASE ORAL
Status: DISCONTINUED | OUTPATIENT
Start: 2024-04-09 | End: 2024-04-11 | Stop reason: HOSPADM

## 2024-04-08 RX ORDER — METOLAZONE 5 MG/1
10 TABLET ORAL 2 TIMES DAILY
Status: DISCONTINUED | OUTPATIENT
Start: 2024-04-08 | End: 2024-04-11 | Stop reason: HOSPADM

## 2024-04-08 RX ORDER — BUMETANIDE 0.25 MG/ML
2 INJECTION INTRAMUSCULAR; INTRAVENOUS 3 TIMES DAILY
Status: DISCONTINUED | OUTPATIENT
Start: 2024-04-08 | End: 2024-04-11 | Stop reason: HOSPADM

## 2024-04-08 RX ORDER — MAGNESIUM SULFATE HEPTAHYDRATE 40 MG/ML
2000 INJECTION, SOLUTION INTRAVENOUS ONCE
Status: COMPLETED | OUTPATIENT
Start: 2024-04-08 | End: 2024-04-08

## 2024-04-08 RX ADMIN — TACROLIMUS 3 MG: 1 CAPSULE ORAL at 20:47

## 2024-04-08 RX ADMIN — SODIUM CHLORIDE, PRESERVATIVE FREE 10 ML: 5 INJECTION INTRAVENOUS at 20:48

## 2024-04-08 RX ADMIN — TACROLIMUS 3 MG: 1 CAPSULE ORAL at 08:48

## 2024-04-08 RX ADMIN — PANTOPRAZOLE SODIUM 40 MG: 40 INJECTION, POWDER, FOR SOLUTION INTRAVENOUS at 08:46

## 2024-04-08 RX ADMIN — ASPIRIN 81 MG: 81 TABLET, COATED ORAL at 08:48

## 2024-04-08 RX ADMIN — BUMETANIDE 2 MG: 0.25 INJECTION INTRAMUSCULAR; INTRAVENOUS at 20:47

## 2024-04-08 RX ADMIN — POTASSIUM CHLORIDE 40 MEQ: 1500 TABLET, EXTENDED RELEASE ORAL at 16:13

## 2024-04-08 RX ADMIN — ATORVASTATIN CALCIUM 40 MG: 20 TABLET, FILM COATED ORAL at 08:47

## 2024-04-08 RX ADMIN — LEVOTHYROXINE SODIUM 112 MCG: 0.07 TABLET ORAL at 05:44

## 2024-04-08 RX ADMIN — MAGNESIUM SULFATE HEPTAHYDRATE 2000 MG: 40 INJECTION, SOLUTION INTRAVENOUS at 16:13

## 2024-04-08 RX ADMIN — AMOXICILLIN 250 MG: 250 CAPSULE ORAL at 20:47

## 2024-04-08 RX ADMIN — ENOXAPARIN SODIUM 30 MG: 100 INJECTION SUBCUTANEOUS at 08:46

## 2024-04-08 RX ADMIN — Medication 400 MG: at 08:47

## 2024-04-08 RX ADMIN — ALBUMIN (HUMAN) 25 G: 0.25 INJECTION, SOLUTION INTRAVENOUS at 08:46

## 2024-04-08 RX ADMIN — SODIUM CHLORIDE, PRESERVATIVE FREE 10 ML: 5 INJECTION INTRAVENOUS at 08:48

## 2024-04-08 RX ADMIN — METOLAZONE 10 MG: 5 TABLET ORAL at 20:47

## 2024-04-08 RX ADMIN — PREDNISONE 60 MG: 20 TABLET ORAL at 08:47

## 2024-04-08 RX ADMIN — Medication 400 MG: at 20:47

## 2024-04-08 RX ADMIN — BUMETANIDE 2 MG: 0.25 INJECTION INTRAMUSCULAR; INTRAVENOUS at 08:48

## 2024-04-08 RX ADMIN — BUMETANIDE 2 MG: 0.25 INJECTION INTRAMUSCULAR; INTRAVENOUS at 16:13

## 2024-04-08 RX ADMIN — ALBUMIN (HUMAN) 25 G: 0.25 INJECTION, SOLUTION INTRAVENOUS at 20:48

## 2024-04-08 RX ADMIN — AMOXICILLIN 250 MG: 250 CAPSULE ORAL at 08:46

## 2024-04-08 ASSESSMENT — PAIN SCALES - GENERAL: PAINLEVEL_OUTOF10: 0

## 2024-04-08 NOTE — PLAN OF CARE
Problem: Safety - Adult  Goal: Free from fall injury  4/8/2024 1029 by Obdulia Camacho RN  Outcome: Progressing  4/8/2024 0030 by Fabrizio Garner RN  Outcome: Progressing     Problem: Discharge Planning  Goal: Discharge to home or other facility with appropriate resources  4/8/2024 1029 by Obdulia Camacho RN  Outcome: Progressing  Flowsheets (Taken 4/8/2024 1027)  Discharge to home or other facility with appropriate resources:   Identify barriers to discharge with patient and caregiver   Arrange for needed discharge resources and transportation as appropriate   Identify discharge learning needs (meds, wound care, etc)  4/8/2024 0030 by Fabrizio Garner RN  Outcome: Progressing  Flowsheets (Taken 4/7/2024 2000)  Discharge to home or other facility with appropriate resources:   Identify barriers to discharge with patient and caregiver   Identify discharge learning needs (meds, wound care, etc)     Problem: Skin/Tissue Integrity  Goal: Absence of new skin breakdown  Description: 1.  Monitor for areas of redness and/or skin breakdown  2.  Assess vascular access sites hourly  3.  Every 4-6 hours minimum:  Change oxygen saturation probe site  4.  Every 4-6 hours:  If on nasal continuous positive airway pressure, respiratory therapy assess nares and determine need for appliance change or resting period.  4/8/2024 1029 by Obdulia Camacho RN  Outcome: Progressing  4/8/2024 0030 by Fabrizio Garner RN  Outcome: Progressing     Problem: ABCDS Injury Assessment  Goal: Absence of physical injury  4/8/2024 1029 by Obdulia Camacho RN  Outcome: Progressing  4/8/2024 0030 by Fabrizio Garner RN  Outcome: Progressing     Problem: Nutrition Deficit:  Goal: Optimize nutritional status  4/8/2024 1029 by Obdulia Camacho RN  Outcome: Progressing  4/8/2024 0030 by Fabrizio Garner RN  Outcome: Progressing     Problem: Pain  Goal: Verbalizes/displays adequate comfort level or baseline comfort level  4/8/2024 1029 by Obdulia Camacho RN  Outcome:  Progressing  4/8/2024 0030 by Fabrizio Garner, RN  Outcome: Progressing  Flowsheets (Taken 4/7/2024 2000)  Verbalizes/displays adequate comfort level or baseline comfort level:   Encourage patient to monitor pain and request assistance   Assess pain using appropriate pain scale   Administer analgesics based on type and severity of pain and evaluate response   Implement non-pharmacological measures as appropriate and evaluate response

## 2024-04-08 NOTE — PROGRESS NOTES
Nephrology Progress Note    Patient: Efrain Mayorga  Requesting physician: BOOKER Bartholomew  Reason for consult: Renal failure    CC: Worsening edema    History of Present Illness:  Efrain Mayorga is a 69 y.o.  male with a past medical history significant for prostate CA/ bladder CA, s/p radical cystoprostatectomy with extensive LN dissection with ileal conduit, s/p immunotherapy in the past, CAD, HTN, nephrotic syndrome, CKD who follows me at [facility] office.  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  Pt has been on po prednisone and s/p a recent hospital admission at [facility].  On Lovenox per previous hematology's recommendations for high risk of thrombosis.  He has been taking torsemide 20mg po daily at home and noticed worsening anasarca, including facial.  I asked pt to come back to the hospital for IV diuretics.  Pt was given lasix 80mg IV x 1 in ED and admitted to medicine overnight, now on lasix 40mg IV bid.  UO 1.6L/2hrs.  Pt reports feeling better with no cp or sob.  Nephrology was consulted for further evaluation and management of his renal failure and nephrotic syndrome.  Cr has continued to improve to 2.4 today with 1.4L of UO yesterday on IV lasix.  Seen by oncology, cleared us to start tacrolimus for his FSGS.  Tacro 3mg po bid started.      No new complnts today.  Cr down to 2.2 today, Mg 1.6, K 3.3.    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

## 2024-04-08 NOTE — PROGRESS NOTES
Phone: [phone]  Paging : [phone]      Hematology / Oncology Progress Note    Admit Date: 4/3/2024    Assessment:     Hem/Onc Issues:   Prostate and Bladder cancer, , s/p neoadjvuant chemo cis/gem, cystoprostatectomy 3/2023 [id number], s/p adjvuant nivolumab 6/2023-10/17/2023 stopped due to gemerzlied swelling chronic kidney disease. Started enfortumab in 1/2024, but stopped due to rash and diarrhea, off therapy, followed at [facility] by Dr. 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 *      Plan:     Okay to continue tacrolimus and prednisone for his FSGS per nephrology  Continue  metolazone and  bumex  Hematologically, he should be on AC due to hypercoagulable state with nephrotic syndrome  Oncologically, he is anti cancer treatment. Follow up with Dr. Lewis after discharge for treatment planning.   Other management per primary and nephrology teams      Subjectives:     Edema    Objectives:      VITAL SIGNS: Patient Vitals for the past 24 hrs:   BP Temp Temp src Pulse Resp SpO2 Weight   04/08/24 1146 119/75 97.9 °F (36.6 °C) Oral 90 18 100 % --   04/08/24 0734 129/87 98.6 °F (37 °C) Oral 87 18 100 % --   04/08/24 0545 -- -- -- -- -- -- 85.2 kg (187 lb 13.3 oz)   04/08/24 0445 123/88 97.7 °F (36.5 °C) Oral 84 18 100 % --   04/07/24 1935 133/83 97.4 °F (36.3 °C) Oral 82 18 100 % --   04/07/24 1530 (!) 147/77 97.2 °F (36.2  through this 3 core specimens were obtained. These were submitted directly to the on-site pathologist and tissue sample adequacy confirmed.  Gelfoam slurry was injected as the needle was removed. Pressure was applied locally at the biopsy site.  A dry sterile dressing was applied.  There were no immediate complications.  The patient tolerated the procedure without difficulty. SEDATION: Moderate intravenous conscious sedation was supervised by Dr. Min Blanc.  The patient was independently monitored by a registered nurse assigned to the Department of Radiology using automated blood pressure, ECG and pulse oximetry.  The detailed conscious sedation record is stored in the hospital information system. Medication: Versed:   1  mg Fentanyl:   50  mcg Intraprocedure time:   20  minutes ESTIMATED BLOOD LOSS:  < 5 ml SPECIMENS:  Cores sent for pathology DLP:  1097.17  mGy*cm     Technically successful CT guided core renal biopsy.  Pathology results are pending.            Cody Herrera MD, PhD, FACP  Phone: [phone]  Pager: [phone]

## 2024-04-08 NOTE — PROGRESS NOTES
Physician Progress Note      PATIENT:               NIRMAL HOLLIDAY  CSN #:                  [id number]  :                       1955  ADMIT DATE:       4/3/2024 11:58 AM  DISCH DATE:  RESPONDING  PROVIDER #:        Sofya Newton MD        QUERY TEXT:    Stage of Chronic Kidney Disease: Please provide further specificity, if known.    Clinical indicators include: chronic kidney disease, dialysis, bun,   creatinine, brain natriuretic peptide, pro-bnp, bnp, ckd, cr, hd  Options provided:  -- Chronic kidney disease stage 1  -- Chronic kidney disease stage 2  -- Chronic kidney disease stage 3  -- Chronic kidney disease stage 3a  -- Chronic kidney disease stage 3b  -- Chronic kidney disease stage 4  -- Chronic kidney disease stage 5  -- Chronic kidney disease stage 5, requiring dialysis  -- End stage renal disease  -- Other - I will add my own diagnosis  -- Disagree - Not applicable / Not valid  -- Disagree - Clinically Unable to determine / Unknown        PROVIDER RESPONSE TEXT:    The patient has chronic kidney disease stage 3b.      Electronically signed by:  Sofya Newton MD 2024 10:14 PM

## 2024-04-08 NOTE — PROGRESS NOTES
SW spoke with patient at bedside regarding DC plan. Patient stated that he does not feel that home care services are needed upon DC that he is independent at home. SW to follow.

## 2024-04-08 NOTE — PLAN OF CARE
Problem: Safety - Adult  Goal: Free from fall injury  4/8/2024 0030 by Fabrizio Garner RN  Outcome: Progressing  4/7/2024 1216 by Stacey Brown RN  Outcome: Progressing     Problem: Discharge Planning  Goal: Discharge to home or other facility with appropriate resources  4/8/2024 0030 by Fabrizio Garner RN  Outcome: Progressing  Flowsheets (Taken 4/7/2024 2000)  Discharge to home or other facility with appropriate resources:   Identify barriers to discharge with patient and caregiver   Identify discharge learning needs (meds, wound care, etc)  4/7/2024 1216 by Stacey Brown RN  Outcome: Progressing  Flowsheets (Taken 4/7/2024 0800)  Discharge to home or other facility with appropriate resources:   Identify barriers to discharge with patient and caregiver   Arrange for needed discharge resources and transportation as appropriate   Identify discharge learning needs (meds, wound care, etc)     Problem: Skin/Tissue Integrity  Goal: Absence of new skin breakdown  Description: 1.  Monitor for areas of redness and/or skin breakdown  2.  Assess vascular access sites hourly  3.  Every 4-6 hours minimum:  Change oxygen saturation probe site  4.  Every 4-6 hours:  If on nasal continuous positive airway pressure, respiratory therapy assess nares and determine need for appliance change or resting period.  4/8/2024 0030 by Fabrizio Garner RN  Outcome: Progressing  4/7/2024 1216 by Stacey Brown RN  Outcome: Progressing     Problem: ABCDS Injury Assessment  Goal: Absence of physical injury  4/8/2024 0030 by Fabrizio Garner RN  Outcome: Progressing  4/7/2024 1216 by Stacey Brown RN  Outcome: Progressing     Problem: Nutrition Deficit:  Goal: Optimize nutritional status  4/8/2024 0030 by Fabrizio Garner RN  Outcome: Progressing  4/7/2024 1216 by Stacey Brown RN  Outcome: Progressing     Problem: Pain  Goal: Verbalizes/displays adequate comfort level or baseline comfort level  4/8/2024 0030 by Fabrizio Garner RN  Outcome: Progressing  Flowsheets

## 2024-04-08 NOTE — PROGRESS NOTES
Pharmacy Note:  IV - PO Conversion    Pantoprazole has been adjusted to oral therapy based on the [facility] P&T approved automatic conversion policy for eligible patients.     Pt has a regular diet ordered and is taking other oral medications.    Order adjusted to Pantoprazole 40 mg oral daily before breakfast    (Prior order: Pantoprazole 40 mg IV daily)      Pharmacy will continue to monitor patient's status daily and adjust therapy back to IV if needed.     Ernestine Edward, PharmD   [phone]

## 2024-04-08 NOTE — PROGRESS NOTES
BNP Invalid input(s): \"BNPP\"   Liver Enzymes No results for input(s): \"TP\", \"ALB\" in the last 72 hours.    Invalid input(s): \"TBIL\", \"AP\", \"SGOT\", \"GPT\", \"DBIL\"   Thyroid Studies No results found for: \"T4\", \"T3RU\", \"TSH\"     Procedures/imaging: see electronic medical records for all procedures/Xrays and details which were not copied into this note but were reviewed prior to creation of Plan    Echo (TTE) complete (PRN contrast/bubble/strain/3D)    Result Date: 4/4/2024    Left Ventricle: Low normal left ventricular systolic function with a visually estimated EF of 50 - 55%. Left ventricle is mildly dilated. Normal wall thickness. Mild global hypokinesis present. Normal diastolic function.   Aortic Valve: Mildly thickened right and noncoronary cusps.   Mitral Valve: Mildly thickened leaflet, at the anterior leaflet. Mild annular calcification at the anterior leaflet of the mitral valve.   Aorta: Mildly dilated aortic root. Ao root diameter is 4.0 cm. Mildly dilated ascending aorta. Ao ascending diameter is 3.8 cm.   Image quality is adequate.     XR CHEST 1 VIEW    Result Date: 4/3/2024  EXAM: XR CHEST 1 VIEW INDICATION: fluid overload COMPARISON: 3/19/2024 FINDINGS: A portable AP radiograph of the chest was obtained at 1254 hours. The patient is on a cardiac monitor. Port-A-Cath overlies the SVC. Heart size is normal. Lungs are well aerated. Lungs are clear. No pneumonia or pulmonary edema.     1. The lungs are clear    CT CHEST ABDOMEN PELVIS WO CONTRAST Additional Contrast? None    Result Date: 3/30/2024  INDICATION: staging COMPARISON: CT 9/5/2023 TECHNIQUE: Noncontrast helical CT of the chest, abdomen, and pelvis. Oral contrast was not administered.  Coronal and sagittal reconstructions were generated. CT dose reduction was achieved through use of a standardized protocol tailored for this examination and automatic exposure control for dose modulation. Absence of intravenous and oral contrast limits  biopsy system was utilized.  Under CT guidance, the introducer needle was advanced into the lower pole of the left kidney and through this 3 core specimens were obtained. These were submitted directly to the on-site pathologist and tissue sample adequacy confirmed.  Gelfoam slurry was injected as the needle was removed. Pressure was applied locally at the biopsy site.  A dry sterile dressing was applied.  There were no immediate complications.  The patient tolerated the procedure without difficulty. SEDATION: Moderate intravenous conscious sedation was supervised by Dr. Min Blanc.  The patient was independently monitored by a registered nurse assigned to the Department of Radiology using automated blood pressure, ECG and pulse oximetry.  The detailed conscious sedation record is stored in the hospital information system. Medication: Versed:   1  mg Fentanyl:   50  mcg Intraprocedure time:   20  minutes ESTIMATED BLOOD LOSS:  < 5 ml SPECIMENS:  Cores sent for pathology DLP:  1097.17  mGy*cm     Technically successful CT guided core renal biopsy.  Pathology results are pending.      ROCAEL ARRINGTON MD

## 2024-04-09 LAB
ALBUMIN SERPL-MCNC: 2.6 G/DL (ref 3.4–5)
ANION GAP SERPL CALC-SCNC: 6 MMOL/L (ref 3–18)
BASOPHILS # BLD: 0 K/UL (ref 0–0.1)
BASOPHILS NFR BLD: 0 % (ref 0–2)
BUN SERPL-MCNC: 104 MG/DL (ref 7–18)
BUN/CREAT SERPL: 51 (ref 12–20)
CALCIUM SERPL-MCNC: 7.9 MG/DL (ref 8.5–10.1)
CHLORIDE SERPL-SCNC: 105 MMOL/L (ref 100–111)
CO2 SERPL-SCNC: 31 MMOL/L (ref 21–32)
CREAT SERPL-MCNC: 2.04 MG/DL (ref 0.6–1.3)
DIFFERENTIAL METHOD BLD: ABNORMAL
EOSINOPHIL # BLD: 0 K/UL (ref 0–0.4)
EOSINOPHIL NFR BLD: 0 % (ref 0–5)
ERYTHROCYTE [DISTWIDTH] IN BLOOD BY AUTOMATED COUNT: 16.5 % (ref 11.6–14.5)
GLUCOSE SERPL-MCNC: 118 MG/DL (ref 74–99)
HCT VFR BLD AUTO: 29.6 % (ref 36–48)
HGB BLD-MCNC: 9.7 G/DL (ref 13–16)
IMM GRANULOCYTES # BLD AUTO: 0.1 K/UL (ref 0–0.04)
IMM GRANULOCYTES NFR BLD AUTO: 1 % (ref 0–0.5)
LYMPHOCYTES # BLD: 0.6 K/UL (ref 0.9–3.6)
LYMPHOCYTES NFR BLD: 7 % (ref 21–52)
MCH RBC QN AUTO: 30.6 PG (ref 24–34)
MCHC RBC AUTO-ENTMCNC: 32.8 G/DL (ref 31–37)
MCV RBC AUTO: 93.4 FL (ref 78–100)
MONOCYTES # BLD: 0.7 K/UL (ref 0.05–1.2)
MONOCYTES NFR BLD: 9 % (ref 3–10)
NEUTS SEG # BLD: 6.5 K/UL (ref 1.8–8)
NEUTS SEG NFR BLD: 83 % (ref 40–73)
NRBC # BLD: 0 K/UL (ref 0–0.01)
NRBC BLD-RTO: 0 PER 100 WBC
NT PRO BNP: 4177 PG/ML (ref 0–900)
PHOSPHATE SERPL-MCNC: 3.5 MG/DL (ref 2.5–4.9)
PLATELET # BLD AUTO: 109 K/UL (ref 135–420)
PMV BLD AUTO: 10.5 FL (ref 9.2–11.8)
POTASSIUM SERPL-SCNC: 3.2 MMOL/L (ref 3.5–5.5)
RBC # BLD AUTO: 3.17 M/UL (ref 4.35–5.65)
SODIUM SERPL-SCNC: 142 MMOL/L (ref 136–145)
WBC # BLD AUTO: 7.8 K/UL (ref 4.6–13.2)

## 2024-04-09 PROCEDURE — 6360000002 HC RX W HCPCS: Performed by: INTERNAL MEDICINE

## 2024-04-09 PROCEDURE — 6370000000 HC RX 637 (ALT 250 FOR IP): Performed by: HOSPITALIST

## 2024-04-09 PROCEDURE — 2580000003 HC RX 258: Performed by: FAMILY MEDICINE

## 2024-04-09 PROCEDURE — 80197 ASSAY OF TACROLIMUS: CPT

## 2024-04-09 PROCEDURE — 6370000000 HC RX 637 (ALT 250 FOR IP): Performed by: FAMILY MEDICINE

## 2024-04-09 PROCEDURE — 6360000002 HC RX W HCPCS: Performed by: FAMILY MEDICINE

## 2024-04-09 PROCEDURE — P9047 ALBUMIN (HUMAN), 25%, 50ML: HCPCS | Performed by: INTERNAL MEDICINE

## 2024-04-09 PROCEDURE — 80069 RENAL FUNCTION PANEL: CPT

## 2024-04-09 PROCEDURE — 1100000003 HC PRIVATE W/ TELEMETRY

## 2024-04-09 PROCEDURE — 36415 COLL VENOUS BLD VENIPUNCTURE: CPT

## 2024-04-09 PROCEDURE — 85025 COMPLETE CBC W/AUTO DIFF WBC: CPT

## 2024-04-09 PROCEDURE — 84156 ASSAY OF PROTEIN URINE: CPT

## 2024-04-09 PROCEDURE — 6370000000 HC RX 637 (ALT 250 FOR IP): Performed by: INTERNAL MEDICINE

## 2024-04-09 PROCEDURE — 83880 ASSAY OF NATRIURETIC PEPTIDE: CPT

## 2024-04-09 RX ORDER — ENOXAPARIN SODIUM 100 MG/ML
40 INJECTION SUBCUTANEOUS DAILY
Status: DISCONTINUED | OUTPATIENT
Start: 2024-04-10 | End: 2024-04-11 | Stop reason: HOSPADM

## 2024-04-09 RX ORDER — POTASSIUM CHLORIDE 20 MEQ/1
40 TABLET, EXTENDED RELEASE ORAL 2 TIMES DAILY
Status: COMPLETED | OUTPATIENT
Start: 2024-04-09 | End: 2024-04-09

## 2024-04-09 RX ADMIN — Medication 400 MG: at 08:31

## 2024-04-09 RX ADMIN — ENOXAPARIN SODIUM 30 MG: 100 INJECTION SUBCUTANEOUS at 08:31

## 2024-04-09 RX ADMIN — AMOXICILLIN 250 MG: 250 CAPSULE ORAL at 08:31

## 2024-04-09 RX ADMIN — Medication 400 MG: at 21:19

## 2024-04-09 RX ADMIN — TACROLIMUS 3 MG: 1 CAPSULE ORAL at 21:19

## 2024-04-09 RX ADMIN — ALBUMIN (HUMAN) 25 G: 0.25 INJECTION, SOLUTION INTRAVENOUS at 21:18

## 2024-04-09 RX ADMIN — SODIUM CHLORIDE, PRESERVATIVE FREE 10 ML: 5 INJECTION INTRAVENOUS at 08:31

## 2024-04-09 RX ADMIN — POTASSIUM CHLORIDE 40 MEQ: 1500 TABLET, EXTENDED RELEASE ORAL at 09:01

## 2024-04-09 RX ADMIN — BUMETANIDE 2 MG: 0.25 INJECTION INTRAMUSCULAR; INTRAVENOUS at 21:19

## 2024-04-09 RX ADMIN — SODIUM CHLORIDE, PRESERVATIVE FREE 10 ML: 5 INJECTION INTRAVENOUS at 21:20

## 2024-04-09 RX ADMIN — POTASSIUM CHLORIDE 40 MEQ: 1500 TABLET, EXTENDED RELEASE ORAL at 21:19

## 2024-04-09 RX ADMIN — AMOXICILLIN 250 MG: 250 CAPSULE ORAL at 21:19

## 2024-04-09 RX ADMIN — BUMETANIDE 2 MG: 0.25 INJECTION INTRAMUSCULAR; INTRAVENOUS at 08:37

## 2024-04-09 RX ADMIN — BUMETANIDE 2 MG: 0.25 INJECTION INTRAMUSCULAR; INTRAVENOUS at 15:23

## 2024-04-09 RX ADMIN — PREDNISONE 60 MG: 20 TABLET ORAL at 08:31

## 2024-04-09 RX ADMIN — METOLAZONE 10 MG: 5 TABLET ORAL at 21:19

## 2024-04-09 RX ADMIN — TACROLIMUS 3 MG: 1 CAPSULE ORAL at 08:31

## 2024-04-09 RX ADMIN — METOLAZONE 10 MG: 5 TABLET ORAL at 08:30

## 2024-04-09 RX ADMIN — LEVOTHYROXINE SODIUM 112 MCG: 0.07 TABLET ORAL at 06:01

## 2024-04-09 RX ADMIN — ATORVASTATIN CALCIUM 40 MG: 20 TABLET, FILM COATED ORAL at 08:31

## 2024-04-09 RX ADMIN — ALBUMIN (HUMAN) 25 G: 0.25 INJECTION, SOLUTION INTRAVENOUS at 08:30

## 2024-04-09 RX ADMIN — PANTOPRAZOLE SODIUM 40 MG: 40 TABLET, DELAYED RELEASE ORAL at 06:01

## 2024-04-09 RX ADMIN — ASPIRIN 81 MG: 81 TABLET, COATED ORAL at 08:31

## 2024-04-09 ASSESSMENT — PAIN SCALES - GENERAL
PAINLEVEL_OUTOF10: 0

## 2024-04-09 NOTE — PROGRESS NOTES
conducted an initial consultation and spiritual assessment for Efrain Mayorga, who is a 69 y.o.,male.     Patient appreciated the visit.    Initiated a relationship of care and support.   Explored issues of kobe, belief, spirituality and Islam/ritual needs.  Listened empathically.  Provided information about Spiritual Care Services.   confirmed Patient's [employer] Affiliation.  Patient processed feelings about current hospitalization.  Offered prayer and assurance of continued prayers on patients behalf.   Chart reviewed.  Patient expressed gratitude for Spiritual Care visit.    Chaplains will continue to follow and will provide pastoral care as needed or requested.    recommends bedside caregivers page  on duty if patient shows signs of acute spiritual or emotional distress.    Sister Gloria Cabrera MA, [facility]     Spiritual Care  [phone]

## 2024-04-09 NOTE — PROGRESS NOTES
Nutrition Follow-up Assessment       Nutrition Recommendations/Plan:   Diet as ordered,   Monitor wt/fluid status for trend  Continue to monitor tolerance of PO, compliance of oral supplements, weight, labs (K+), and plan of care during admission.         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 male is on hospital D#6 for anasarca secondary to kidney insufficiency.  Per visit pt is sitting in bed, alert and oriented . Pt shares feel overall better.  Wt continue trending down, but not drastic: -0.4% (0.3 kg) x 5 days.   Edema on upper extremites improving.   No change in po intake. No N/V, diarrhea or constipation.   Labs: Na+ and H/H improving. Noted trending down K+, on supplementation  NFPE reassessed. Mild muscle and subcutaneous fat wasting noticed. See malnutrition assessment for details        4/8/2024     5:45 AM 4/7/2024     6:00 AM 4/4/2024     8:32 AM 4/3/2024    11:03 AM 3/27/2024    12:21 AM 3/19/2024    12:38 PM 3/15/2024     1:04 AM   Weight Metrics   Weight 187 lb 13.3 oz 189 lb 13.1 oz 190 lb 190 lb 193 lb 12.6 oz 200 lb 9.9 oz 200 lb 9.9 oz   BMI (Calculated) 30.4 kg/m2 30.7 kg/m2 30.7 kg/m2 30.7 kg/m2 30.4 kg/m2 31.5 kg/m2 31.5 kg/m2       Nutrition Related Findings:   Pertinent meds: prednisone, KCl, PPI, Mg-ox,

## 2024-04-09 NOTE — PROGRESS NOTES
Hospitalist Progress Note-critical care note     Patient: Efrain Mayorga MRN: [id number]  [facility]: [id number]    YOB: 1955  Age: 69 y.o.  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 Newton   Continue aspirin daily  Monitor renal function.  Hematologist on board            Hypokalemia -  K replacement  ,continue monitoring      Cellulitis history of left arm   PVL no DVT, superficial thrombosis  ID seeing pt and recommend po amoxicillin      Prostate cancer, bladder cancer  Seeing Dr. Debbie MCCLAIN oncologist  Received immunotherapy before, no active treatment right now  s/p   1. Radical cystoprostatectomy  2. Extended pelvic lymph node dissection  3. Ileal conduit urinary diversion with single-J stents bilaterally   On 03/28/2023  Urostomy bag noted urine clear     Anemia  Due to chronic illness  No bleeding reported      Hypertension  Well controlled      CAD  No acute issues     Hyperlipidemia  Continue statin         Subjective I feel fine, still swelling            TIME: E/M Time spent with patient and patient care issues: [] 31-40 mins  [] 41-49 mins  [x] 50 mins or more.      Disposition :2-3 days   Review of  procedures/Xrays and details which were not copied into this note but were reviewed prior to creation of Plan    Echo (TTE) complete (PRN contrast/bubble/strain/3D)    Result Date: 4/4/2024    Left Ventricle: Low normal left ventricular systolic function with a visually estimated EF of 50 - 55%. Left ventricle is mildly dilated. Normal wall thickness. Mild global hypokinesis present. Normal diastolic function.   Aortic Valve: Mildly thickened right and noncoronary cusps.   Mitral Valve: Mildly thickened leaflet, at the anterior leaflet. Mild annular calcification at the anterior leaflet of the mitral valve.   Aorta: Mildly dilated aortic root. Ao root diameter is 4.0 cm. Mildly dilated ascending aorta. Ao ascending diameter is 3.8 cm.   Image quality is adequate.     XR CHEST 1 VIEW    Result Date: 4/3/2024  EXAM: XR CHEST 1 VIEW INDICATION: fluid overload COMPARISON: 3/19/2024 FINDINGS: A portable AP radiograph of the chest was obtained at 1254 hours. The patient is on a cardiac monitor. Port-A-Cath overlies the SVC. Heart size is normal. Lungs are well aerated. Lungs are clear. No pneumonia or pulmonary edema.     1. The lungs are clear    CT CHEST ABDOMEN PELVIS WO CONTRAST Additional Contrast? None    Result Date: 3/30/2024  INDICATION: staging COMPARISON: CT 9/5/2023 TECHNIQUE: Noncontrast helical CT of the chest, abdomen, and pelvis. Oral contrast was not administered.  Coronal and sagittal reconstructions were generated. CT dose reduction was achieved through use of a standardized protocol tailored for this examination and automatic exposure control for dose modulation. Absence of intravenous and oral contrast limits evaluation of the mediastinum, jerry, vasculature, bowel, and solid organs. FINDINGS: CHEST WALL: Portacatheter terminates in superior vena cava. No chest wall or axillary mass demonstrated. THYROID: No nodule. MEDIASTINUM: No mass or lymphadenopathy. JERRY: No gross pathology. THORACIC AORTA:

## 2024-04-09 NOTE — PLAN OF CARE
Problem: Safety - Adult  Goal: Free from fall injury  Outcome: Progressing     Problem: Discharge Planning  Goal: Discharge to home or other facility with appropriate resources  Outcome: Progressing  Flowsheets (Taken 4/9/2024 0800)  Discharge to home or other facility with appropriate resources:   Identify barriers to discharge with patient and caregiver   Arrange for needed discharge resources and transportation as appropriate   Identify discharge learning needs (meds, wound care, etc)   Arrange for interpreters to assist at discharge as needed     Problem: Skin/Tissue Integrity  Goal: Absence of new skin breakdown  Description: 1.  Monitor for areas of redness and/or skin breakdown  2.  Assess vascular access sites hourly  3.  Every 4-6 hours minimum:  Change oxygen saturation probe site  4.  Every 4-6 hours:  If on nasal continuous positive airway pressure, respiratory therapy assess nares and determine need for appliance change or resting period.  Outcome: Progressing     Problem: ABCDS Injury Assessment  Goal: Absence of physical injury  Outcome: Progressing     Problem: Nutrition Deficit:  Goal: Optimize nutritional status  Outcome: Progressing     Problem: Pain  Goal: Verbalizes/displays adequate comfort level or baseline comfort level  Outcome: Progressing

## 2024-04-09 NOTE — PROGRESS NOTES
Pharmacist Review and Automatic Dose Adjustment of Prophylactic Enoxaparin    Reviewed reason(s) for admission/hospital problem list    The reviewing pharmacist has made an adjustment to the ordered enoxaparin dose or converted to UFH per the approved [facility] protocol and table as identified below.        Efrain Mayorga is a 69 y.o. male.     Recent Labs     04/07/24  0421 04/08/24  0407 04/09/24  0528   CREATININE 2.28* 2.09* 2.04*       Estimated Creatinine Clearance: 35 mL/min (A) (based on SCr of 2.04 mg/dL (H)).    Recent Labs     04/08/24  0407 04/09/24  0528   HGB 11.2* 9.7*   HCT 34.1* 29.6*    109*     No results for input(s): \"INR\" in the last 72 hours.    Height:   Ht Readings from Last 1 Encounters:   04/04/24 1.676 m (5' 6\")     Weight:  Wt Readings from Last 1 Encounters:   04/08/24 85.2 kg (187 lb 13.3 oz)               Plan: Based upon the patient's weight and renal function    Ordered: Enoxaparin 30mg SUBQ Daily    Changed/converted to    New Order: Enoxaparin 40mg SUBQ Daily      Thank you,  Jolene Hair [facility]  4/9/2024, 10:17 AM

## 2024-04-09 NOTE — PROGRESS NOTES
Nephrology Inpatient Progress Note    Patient: Efrain Mayorga  Requesting physician: BOOKER Bartholomew  Reason for consult: Renal failure    CC: Worsening edema    Subjectives:  Efrain Mayorga is a 69 y.o.  male with a past medical history significant for prostate CA/ bladder CA, s/p radical cystoprostatectomy with extensive LN dissection with ileal conduit, s/p immunotherapy in the past, CAD, HTN, nephrotic syndrome, CKD who follows me at [facility] office.  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  Pt has been on po prednisone and s/p a recent hospital admission at [facility].  On Lovenox per previous hematology's recommendations for high risk of thrombosis.  He has been taking torsemide 20mg po daily at home and noticed worsening anasarca, including facial.  I asked pt to come back to the hospital for IV diuretics.  Pt was given lasix 80mg IV x 1 in ED and admitted to medicine overnight, now on lasix 40mg IV bid.  UO 1.6L/2hrs.  Pt reports feeling better with no cp or sob.  Nephrology was consulted for further evaluation and management of his renal failure and nephrotic syndrome.  Cr has continued to improve to 2.4 today with 1.4L of UO yesterday on IV lasix.  Seen by oncology, cleared us to start tacrolimus for his FSGS.  Tacro 3mg po bid started.      No new complnts today.  Cr down to 2.0, K 3.2, alb 2.6.  UO 2.6L yesterday with IV bumex and po metolazone.    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 The  patient is on a cardiac monitor. Port-A-Cath overlies the SVC. Heart size is  normal. Lungs are well aerated. Lungs are clear. No pneumonia or pulmonary  edema.    3/30/24 CT:   KIDNEYS: No nephrolithiasis or hydronephrosis.   1. Cystectomy. Right lower quadrant ileostomy.  2. Interval small left pleural effusion.  3. Moderate peritoneal fluid demonstrated in the interval. This is located in  the pelvic, perihepatic and perisplenic spaces.  4. Interval retroperitoneal and left pelvic sidewall adenopathy.  5. Anasarca.   6. Hypoalbuminemia    Impression:     WERO on CKD 3B.  No hydronephrosis on recent CT scan.  Cr coming down slowly on current diuretics regimen  Nephrotic syndrome, secondary to FSGS  Bx proven FSGS, tip variant.  Resistant to steroid. Tacrolimus added.  Anasarca  Hypernatremia, mild and resolved.  Na 142 today  H.o. Prostate and bladder CA  Anemia, h/h improving  Secondary hyperparathyroidism,     Plan:     Continue prednisone 60mg po daily  Cont tacrolimus 3mg po bid    Pending trough tacro level (sent out per [facility] lab) and urine PCR    Cont IV bumex 2mg IV TID  Cont po metolazone 10mg bid, giving 30 mins prior to bumex dose    IV albumin bid    Need nutritional consult regarding home diet for nephrotic syndrome    Cont to track I/O and daily wt    Cont MgOxide  bid  Cont calcitriol 0.25 po daily    Continue anticoagulation due to high risk of thrombosis.    Pt agrees to start dialysis if needed.  No urgent need for HD as Cr slowly improving    Will repeat a 24h urine for total protein today    AM labs with renal panel, cbc    Sofya Newton MD  [facility]  [phone]

## 2024-04-10 ENCOUNTER — APPOINTMENT (OUTPATIENT)
Facility: HOSPITAL | Age: 69
End: 2024-04-10
Payer: MEDICARE

## 2024-04-10 LAB
ALBUMIN SERPL-MCNC: 3 G/DL (ref 3.4–5)
ANION GAP SERPL CALC-SCNC: 7 MMOL/L (ref 3–18)
BASOPHILS # BLD: 0 K/UL (ref 0–0.1)
BASOPHILS NFR BLD: 0 % (ref 0–2)
BUN SERPL-MCNC: 101 MG/DL (ref 7–18)
BUN/CREAT SERPL: 55 (ref 12–20)
CALCIUM SERPL-MCNC: 8.1 MG/DL (ref 8.5–10.1)
CHLORIDE SERPL-SCNC: 103 MMOL/L (ref 100–111)
CO2 SERPL-SCNC: 32 MMOL/L (ref 21–32)
CREAT SERPL-MCNC: 1.83 MG/DL (ref 0.6–1.3)
DIFFERENTIAL METHOD BLD: ABNORMAL
EOSINOPHIL # BLD: 0 K/UL (ref 0–0.4)
EOSINOPHIL NFR BLD: 0 % (ref 0–5)
ERYTHROCYTE [DISTWIDTH] IN BLOOD BY AUTOMATED COUNT: 16.4 % (ref 11.6–14.5)
GLUCOSE SERPL-MCNC: 223 MG/DL (ref 74–99)
HCT VFR BLD AUTO: 26.5 % (ref 36–48)
HGB BLD-MCNC: 8.8 G/DL (ref 13–16)
IMM GRANULOCYTES # BLD AUTO: 0.1 K/UL (ref 0–0.04)
IMM GRANULOCYTES NFR BLD AUTO: 1 % (ref 0–0.5)
LYMPHOCYTES # BLD: 0.5 K/UL (ref 0.9–3.6)
LYMPHOCYTES NFR BLD: 7 % (ref 21–52)
MCH RBC QN AUTO: 30.6 PG (ref 24–34)
MCHC RBC AUTO-ENTMCNC: 33.2 G/DL (ref 31–37)
MCV RBC AUTO: 92 FL (ref 78–100)
MONOCYTES # BLD: 0.6 K/UL (ref 0.05–1.2)
MONOCYTES NFR BLD: 8 % (ref 3–10)
NEUTS SEG # BLD: 5.8 K/UL (ref 1.8–8)
NEUTS SEG NFR BLD: 84 % (ref 40–73)
NRBC # BLD: 0 K/UL (ref 0–0.01)
NRBC BLD-RTO: 0 PER 100 WBC
PHOSPHATE SERPL-MCNC: 2.7 MG/DL (ref 2.5–4.9)
PLATELET # BLD AUTO: 107 K/UL (ref 135–420)
PMV BLD AUTO: 10.3 FL (ref 9.2–11.8)
POTASSIUM SERPL-SCNC: 3.3 MMOL/L (ref 3.5–5.5)
RBC # BLD AUTO: 2.88 M/UL (ref 4.35–5.65)
SODIUM SERPL-SCNC: 142 MMOL/L (ref 136–145)
WBC # BLD AUTO: 6.9 K/UL (ref 4.6–13.2)

## 2024-04-10 PROCEDURE — 29580 STRAPPING UNNA BOOT: CPT

## 2024-04-10 PROCEDURE — 6360000002 HC RX W HCPCS: Performed by: FAMILY MEDICINE

## 2024-04-10 PROCEDURE — 1100000003 HC PRIVATE W/ TELEMETRY

## 2024-04-10 PROCEDURE — 6370000000 HC RX 637 (ALT 250 FOR IP): Performed by: INTERNAL MEDICINE

## 2024-04-10 PROCEDURE — 2580000003 HC RX 258: Performed by: FAMILY MEDICINE

## 2024-04-10 PROCEDURE — 6370000000 HC RX 637 (ALT 250 FOR IP): Performed by: HOSPITALIST

## 2024-04-10 PROCEDURE — P9047 ALBUMIN (HUMAN), 25%, 50ML: HCPCS | Performed by: INTERNAL MEDICINE

## 2024-04-10 PROCEDURE — 36415 COLL VENOUS BLD VENIPUNCTURE: CPT

## 2024-04-10 PROCEDURE — 80069 RENAL FUNCTION PANEL: CPT

## 2024-04-10 PROCEDURE — 6360000002 HC RX W HCPCS: Performed by: INTERNAL MEDICINE

## 2024-04-10 PROCEDURE — 85025 COMPLETE CBC W/AUTO DIFF WBC: CPT

## 2024-04-10 PROCEDURE — 6370000000 HC RX 637 (ALT 250 FOR IP): Performed by: FAMILY MEDICINE

## 2024-04-10 RX ORDER — POTASSIUM CHLORIDE 20 MEQ/1
40 TABLET, EXTENDED RELEASE ORAL 2 TIMES DAILY
Status: COMPLETED | OUTPATIENT
Start: 2024-04-10 | End: 2024-04-11

## 2024-04-10 RX ADMIN — SODIUM CHLORIDE, PRESERVATIVE FREE 10 ML: 5 INJECTION INTRAVENOUS at 22:30

## 2024-04-10 RX ADMIN — METOLAZONE 10 MG: 5 TABLET ORAL at 22:27

## 2024-04-10 RX ADMIN — POTASSIUM CHLORIDE 40 MEQ: 1500 TABLET, EXTENDED RELEASE ORAL at 22:29

## 2024-04-10 RX ADMIN — ATORVASTATIN CALCIUM 40 MG: 20 TABLET, FILM COATED ORAL at 08:47

## 2024-04-10 RX ADMIN — ALBUMIN (HUMAN) 25 G: 0.25 INJECTION, SOLUTION INTRAVENOUS at 22:29

## 2024-04-10 RX ADMIN — PANTOPRAZOLE SODIUM 40 MG: 40 TABLET, DELAYED RELEASE ORAL at 05:09

## 2024-04-10 RX ADMIN — POTASSIUM CHLORIDE 40 MEQ: 1500 TABLET, EXTENDED RELEASE ORAL at 14:10

## 2024-04-10 RX ADMIN — SODIUM CHLORIDE, PRESERVATIVE FREE 10 ML: 5 INJECTION INTRAVENOUS at 09:13

## 2024-04-10 RX ADMIN — PREDNISONE 60 MG: 20 TABLET ORAL at 08:46

## 2024-04-10 RX ADMIN — Medication 400 MG: at 08:47

## 2024-04-10 RX ADMIN — TACROLIMUS 3 MG: 1 CAPSULE ORAL at 22:29

## 2024-04-10 RX ADMIN — ASPIRIN 81 MG: 81 TABLET, COATED ORAL at 08:47

## 2024-04-10 RX ADMIN — TACROLIMUS 3 MG: 1 CAPSULE ORAL at 08:46

## 2024-04-10 RX ADMIN — AMOXICILLIN 250 MG: 250 CAPSULE ORAL at 08:46

## 2024-04-10 RX ADMIN — LEVOTHYROXINE SODIUM 112 MCG: 0.07 TABLET ORAL at 05:09

## 2024-04-10 RX ADMIN — ALBUMIN (HUMAN) 25 G: 0.25 INJECTION, SOLUTION INTRAVENOUS at 08:47

## 2024-04-10 RX ADMIN — BUMETANIDE 2 MG: 0.25 INJECTION INTRAMUSCULAR; INTRAVENOUS at 22:26

## 2024-04-10 RX ADMIN — ENOXAPARIN SODIUM 40 MG: 100 INJECTION SUBCUTANEOUS at 08:47

## 2024-04-10 RX ADMIN — BUMETANIDE 2 MG: 0.25 INJECTION INTRAMUSCULAR; INTRAVENOUS at 15:00

## 2024-04-10 RX ADMIN — AMOXICILLIN 250 MG: 250 CAPSULE ORAL at 22:29

## 2024-04-10 RX ADMIN — METOLAZONE 10 MG: 5 TABLET ORAL at 08:46

## 2024-04-10 RX ADMIN — Medication 400 MG: at 22:29

## 2024-04-10 RX ADMIN — BUMETANIDE 2 MG: 0.25 INJECTION INTRAMUSCULAR; INTRAVENOUS at 08:47

## 2024-04-10 NOTE — PROGRESS NOTES
Phone: [phone]  Paging : [phone]      Hematology / Oncology Progress Note    Admit Date: 4/3/2024    Assessment:     Hem/Onc Issues:   Prostate and Bladder cancer, , s/p neoadjvuant chemo cis/gem, cystoprostatectomy 3/2023 [id number], s/p adjvuant nivolumab 6/2023-10/17/2023 stopped due to gemerzlied swelling chronic kidney disease. Started enfortumab in 1/2024, but stopped due to rash and diarrhea, off therapy, followed at [facility] by Dr. 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 *      Plan:     Okay to continue tacrolimus and prednisone for his FSGS per nephrology  Continue metolazone and  bumex  Hematologically, he should be on AC due to hypercoagulable state with nephrotic syndrome  Oncologically, his anti cancer treatment is on hold. Follow up with Dr. Lewis after discharge for treatment planning.   Other management per primary and nephrology teams      Subjectives:     Edema but feel better overall.     Objectives:      VITAL SIGNS: Patient Vitals for the past 24 hrs:   BP Temp Temp src Pulse Resp SpO2   04/10/24 1630 126/72 97.9 °F (36.6 °C) Oral 79 18 98 %   04/10/24 1500 117/74 -- -- -- -- --   04/10/24 1215 132/75 98 °F (36.7 °C) Oral 82 18 98 %   04/10/24 0815 126/73 98.1 °F (36.7 °C) Oral 88 18 100 %   04/10/24 0400 121/81 98.2 °F (36.8 °C) Oral 90 18 100 %   04/09/24 2345 117/65 98 °F (36.7 °C) Oral 79 18  99 %   04/09/24 1945 129/81 97.7 °F (36.5 °C) Oral 84 18 98 %       GENERAL: normal appearance, no acute distress.   HEENT: conjunctivae normal, eyelids normal, PERRLA, anicteric, external ears and nose normal, hearing grossly normal.   NECK: supple, no masses.   LUNG: breathing comfortably, lungs clear to auscultation and percussion.   CARDIOVASCULAR: normal heart sounds, heart rhythm regular, no peripheral edema.   ABDOMEN: soft. bowel sounds normal, non-tender non distension, no hepatosplenomegaly   PELVIS: pelvic exam deferred.   RECTUM: rectal exam deferred.   LYMPH NODES: no cervical adenopathy, no axillary adenopathy, no supraclavicular adenopathy, no infraclavicular adenopathy.   SKIN: no rash, no petechiae, no ecchymosis, no pallor, no cutaneous nodules.   EXT: anasarca    NEUROLOGIC: no focal motor deficit, normal gait, no abnormal mental status.   PSYCHIATRIC: oriented to person, time and place, mood and affect appropriate.      Medications:      Current Medications:    Current Facility-Administered Medications   Medication Dose Route Frequency    potassium chloride (KLOR-CON M) extended release tablet 40 mEq  40 mEq Oral BID    enoxaparin (LOVENOX) injection 40 mg  40 mg SubCUTAneous Daily    metOLazone (ZAROXOLYN) tablet 10 mg  10 mg Oral BID    bumetanide (BUMEX) injection 2 mg  2 mg IntraVENous TID    pantoprazole (PROTONIX) tablet 40 mg  40 mg Oral QAM AC    magnesium oxide (MAG-OX) tablet 400 mg  400 mg Oral BID    albumin human 25% IV solution 25 g  25 g IntraVENous BID    tacrolimus (PROGRAF) capsule 3 mg  3 mg Oral BID    amoxicillin (AMOXIL) capsule 250 mg  250 mg Oral 2 times per day    levothyroxine (SYNTHROID) tablet 112 mcg  112 mcg Oral Daily    aspirin EC tablet 81 mg  81 mg Oral Daily    atorvastatin (LIPITOR) tablet 40 mg  40 mg Oral Daily    sodium chloride flush 0.9 % injection 5-40 mL  5-40 mL IntraVENous 2 times per day    sodium chloride flush 0.9 % injection 5-40 mL  5-40 mL

## 2024-04-10 NOTE — PROGRESS NOTES
Nephrology Inpatient Progress Note    Patient: Efrain Mayorga  Requesting physician: BOOKER Bartholomew  Reason for consult: Renal failure    CC: Worsening edema    Subjectives:  Efrain Mayorga is a 69 y.o.  male with a past medical history significant for prostate CA/ bladder CA, s/p radical cystoprostatectomy with extensive LN dissection with ileal conduit, s/p immunotherapy in the past, CAD, HTN, nephrotic syndrome, CKD who follows me at [facility] office.  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  Pt has been on po prednisone and s/p a recent hospital admission at [facility].  On Lovenox per previous hematology's recommendations for high risk of thrombosis.  He has been taking torsemide 20mg po daily at home and noticed worsening anasarca, including facial.  I asked pt to come back to the hospital for IV diuretics.  Pt was given lasix 80mg IV x 1 in ED and admitted to medicine overnight, now on lasix 40mg IV bid.  UO 1.6L/2hrs.  Pt reports feeling better with no cp or sob.  Nephrology was consulted for further evaluation and management of his renal failure and nephrotic syndrome.  Cr has continued to improve to 2.4 today with 1.4L of UO yesterday on IV lasix.  Seen by oncology, cleared us to start tacrolimus for his FSGS.  Tacro 3mg po bid started.      No new complnts today.  Cr down to 2.0, K 3.2, alb 2.6.  UO 2.6L yesterday with IV bumex and po metolazone.  Cr 1.8, >5 L out. 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 The  patient is on a cardiac monitor. Port-A-Cath overlies the SVC. Heart size is  normal. Lungs are well aerated. Lungs are clear. No pneumonia or pulmonary  edema.    3/30/24 CT:   KIDNEYS: No nephrolithiasis or hydronephrosis.   1. Cystectomy. Right lower quadrant ileostomy.  2. Interval small left pleural effusion.  3. Moderate peritoneal fluid demonstrated in the interval. This is located in  the pelvic, perihepatic and perisplenic spaces.  4. Interval retroperitoneal and left pelvic sidewall adenopathy.  5. Anasarca.   6. Hypoalbuminemia    Impression:     WERO on CKD 3B.  No hydronephrosis on recent CT scan.  Cr coming down slowly on current diuretics regimen  Nephrotic syndrome, secondary to FSGS  Bx proven FSGS, tip variant.  Resistant to steroid. Tacrolimus added.  Anasarca  Hypernatremia, mild and resolved.  Na 142 today  H.o. Prostate and bladder CA  Anemia, h/h improving  Secondary hyperparathyroidism,     Plan:     Continue prednisone 60mg po daily  Cont tacrolimus 3mg po bid    Pending trough tacro level (sent out per [facility] lab) and urine PCR    Cont IV bumex 2mg IV TID until DC , can switch to PO mumex  Cont po metolazone 10mg bid, giving 30 mins prior to bumex dose    Need nutritional consult regarding home diet for nephrotic syndrome    Cont to track I/O and daily wt    Cont MgOxide  bid  Cont calcitriol 0.25 po daily    Continue anticoagulation due to high risk of thrombosis.    No need for RRT  OK for DC today on PO diuretics with f/u Monday    Lorelei Garcia MD  [facility]  [phone]

## 2024-04-10 NOTE — PROGRESS NOTES
Physician Progress Note      PATIENT:               NIRMAL HOLLIDAY  CSN #:                  [id number]  :                       1955  ADMIT DATE:       4/3/2024 11:58 AM  DISCH DATE:  RESPONDING  PROVIDER #:        Char Harding MD          QUERY TEXT:    Pt admitted with WERO and has mild malnutrition documented in RD consult 4/3.   Please further specify type of malnutrition with documentation in the medical   record.    The medical record reflects the following:  Risk Factors: prostate bladder cancer, with urostomy bag, nephrotic syndrome    Clinical Indicators:  RD consult   Malnutrition Status:  Mild malnutrition (r/t moderate to severe fluid   accumulation) (24 1621)  Context:  Acute Illness  Findings of the 6 clinical characteristics of malnutrition:  Energy Intake:  No significant decrease in energy intake  Weight Loss:  Unable to assess  Body Fat Loss:  Mild body fat loss Orbital  Muscle Mass Loss:  Mild muscle mass loss Temples (temporalis)  Fluid Accumulation:  Moderate to Severe Generalized, Extremities (generalize   +3, pitting; Lower extremities +3, pitting)   Strength:  Normal  strength  Ideal Body Weight (IBW): 142 lbs (65 kg)  Admission Body Weight: 86.2 kg (190 lb)  Current Body Weight: 85.5 kg (188 lb 7.9 oz), 132.7 % IBW. Weight Source: Bed   Scale  Current BMI (kg/m2): 30.4  Usual Body Weight: 74.4 kg (164 lb)  % Weight Change (Calculated): 14.9    Treatment:  receiving RD-Continue Current Adult regular Diet    ASPEN Criteria:    https://aspenjournals.onlinelibrary.serrano.com/doi/full/10.1177/942680975459295  5    Summer Wood, RN, BSN, CRCR  [address] [place] [address]  [email or web address]  Options provided:  -- Mild Malnutrition  -- Other - I will add my own diagnosis  -- Disagree - Not applicable / Not valid  -- Disagree - Clinically unable to determine / Unknown  -- Refer to Clinical Documentation Reviewer    PROVIDER RESPONSE TEXT:    This patient has

## 2024-04-10 NOTE — PROGRESS NOTES
Hospitalist Progress Note-critical care note     Patient: Efrain Mayorga MRN: [id number]  [facility]: [id number]    YOB: 1955  Age: 69 y.o.  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 Newton on 4/9   Continue aspirin daily  Monitor renal function.  Hematologist on board            Hypokalemia -   K replacement  ,continue monitoring      Cellulitis history of left arm   PVL no DVT, superficial thrombosis  ID seeing pt and recommend po amoxicillin      Prostate cancer, bladder cancer  Seeing Dr. Debbie MCCLAIN oncologist  Received immunotherapy before, no active treatment right now  s/p   1. Radical cystoprostatectomy  2. Extended pelvic lymph node dissection  3. Ileal conduit urinary diversion with single-J stents bilaterally   On 03/28/2023  Urostomy bag noted urine clear     Anemia  Due to chronic illness  No bleeding reported      Hypertension  Well controlled      CAD  No acute issues     Hyperlipidemia  Continue statin         Subjective I feel better, thigh not swelling any more      Talked with rn, 24 hr protein urine collected   Swelling better, will give one more day iv diuretics , replace K     TIME: E/M Time

## 2024-04-10 NOTE — PLAN OF CARE
Problem: Safety - Adult  Goal: Free from fall injury  Outcome: Progressing     Problem: Discharge Planning  Goal: Discharge to home or other facility with appropriate resources  Outcome: Progressing  Flowsheets (Taken 4/10/2024 0815)  Discharge to home or other facility with appropriate resources:   Identify barriers to discharge with patient and caregiver   Arrange for needed discharge resources and transportation as appropriate   Identify discharge learning needs (meds, wound care, etc)     Problem: Skin/Tissue Integrity  Goal: Absence of new skin breakdown  Description: 1.  Monitor for areas of redness and/or skin breakdown  2.  Assess vascular access sites hourly  3.  Every 4-6 hours minimum:  Change oxygen saturation probe site  4.  Every 4-6 hours:  If on nasal continuous positive airway pressure, respiratory therapy assess nares and determine need for appliance change or resting period.  Outcome: Progressing     Problem: ABCDS Injury Assessment  Goal: Absence of physical injury  Outcome: Progressing     Problem: Nutrition Deficit:  Goal: Optimize nutritional status  Outcome: Progressing     Problem: Pain  Goal: Verbalizes/displays adequate comfort level or baseline comfort level  Outcome: Progressing

## 2024-04-10 NOTE — WOUND CARE
Wound care nurse called [facility] to speak with Dr. Garcia for permission to apply Unna boots for compression.  Received pager number and page was sent for call back.

## 2024-04-10 NOTE — WOUND CARE
IP WOUND CONSULT    Cono Mukesh  MEDICAL RECORD NUMBER:  [id number]  AGE: 69 y.o.   GENDER: male  : 1955  TODAY'S DATE:  4/10/2024    GENERAL     [x] Follow-up   [] New Consult    [x] Present on Admission  [] Hospital Acquired    Efrain Mayorga is a 69 y.o. male referred by:   [x] Physician  [] Nursing  [] Other:         PAST MEDICAL HISTORY    Past Medical History:   Diagnosis Date    Arthritis     Bladder cancer (HCC)     Fibromyalgia     GERD (gastroesophageal reflux disease)     Heart attack (HCC) 2017    Hematuria, gross     Hypertension         PAST SURGICAL HISTORY    Past Surgical History:   Procedure Laterality Date    CT BIOPSY RENAL  3/14/2024    CT BIOPSY RENAL 3/14/2024 [facility] RAD CT    HERNIA REPAIR Right     inguinal    LEFT HEART PERCUTANEOUS  2017    ERNESTO CORONARY ARTERIOGRAPH  2017    TRANSURETHRAL RESEC BLADDER NECK         FAMILY HISTORY    History reviewed. No pertinent family history.    SOCIAL HISTORY    Social History     Tobacco Use    Smoking status: Former     Current packs/day: 0.00     Types: Cigarettes     Quit date: 10/18/2022     Years since quittin.4    Smokeless tobacco: Never   Substance Use Topics    Alcohol use: No    Drug use: No       ALLERGIES    No Known Allergies    MEDICATIONS    No current facility-administered medications on file prior to encounter.     Current Outpatient Medications on File Prior to Encounter   Medication Sig Dispense Refill    torsemide (DEMADEX) 20 MG tablet Take 1 tablet by mouth daily 30 tablet 0    sevelamer (RENVELA) 800 MG tablet Take 1 tablet by mouth 3 times daily (with meals) 90 tablet 0    enoxaparin Sodium (LOVENOX) 30 MG/0.3ML injection Inject 0.3 mLs into the skin daily 30 each 0    predniSONE (DELTASONE) 20 MG tablet Take 3 tablets by mouth daily (Patient not taking: Reported on 4/3/2024) 90 tablet 0    aspirin 81 MG EC tablet Take 1 tablet by mouth daily 30 tablet 3    levothyroxine (SYNTHROID) 137 MCG    Applied ace wrap or coban from toes to below the knee.   Secured with tape and/or metal clips covered with tape.   Instructed patient/caregiver to keep dressing dry and intact. DO NOT REMOVE DRESSING.   Instructed pt/family/caregiver to report excessive draining, loose bandage, wet dressing, severe pain or tingling in toes.  Applied Unna Boot dressing below the knee to right lower leg.  Applied Unna Boot dressing below the knee to left lower leg.    Unna Boot(s) were applied per  Guidelines.     Electronically signed by Yareli Santamaria RN on 4/10/2024 at 3:13 PM       PLAN     Skin Care & Pressure Relief Recommendations  Minimize layers of linen, Pads under patient to Optimize support surface, and Turn/reposition approximately every 2 hours    Physician/Provider notified: Yes  Dr. Garcia  Recommendations: leave Unna boots in place     Teaching completed with:   [] Patient           [x] Family member       [] Caregiver          [x] Nursing  [] Other    Patient/Caregiver Teaching:  Level of patient/caregiver understanding able to:   [x] Indicates understanding       [] Needs reinforcement  [] Unsuccessful      [] Verbal Understanding  [] Demonstrated understanding       [] No evidence of learning  [] Refused teaching         [] N/A       Electronically signed by Yareli Santamaria RN on 4/10/2024 at 3:13 PM

## 2024-04-11 VITALS
HEART RATE: 80 BPM | RESPIRATION RATE: 18 BRPM | HEIGHT: 66 IN | DIASTOLIC BLOOD PRESSURE: 63 MMHG | SYSTOLIC BLOOD PRESSURE: 114 MMHG | WEIGHT: 187.83 LBS | OXYGEN SATURATION: 100 % | BODY MASS INDEX: 30.19 KG/M2 | TEMPERATURE: 98.2 F

## 2024-04-11 LAB
ALBUMIN SERPL-MCNC: 3.7 G/DL (ref 3.4–5)
ANION GAP SERPL CALC-SCNC: 5 MMOL/L (ref 3–18)
BUN SERPL-MCNC: 95 MG/DL (ref 7–18)
BUN/CREAT SERPL: 56 (ref 12–20)
CALCIUM SERPL-MCNC: 8.4 MG/DL (ref 8.5–10.1)
CHLORIDE SERPL-SCNC: 97 MMOL/L (ref 100–111)
CO2 SERPL-SCNC: 36 MMOL/L (ref 21–32)
COLLECT DURATION TIME UR: 24 HR
CREAT SERPL-MCNC: 1.7 MG/DL (ref 0.6–1.3)
GLUCOSE SERPL-MCNC: 143 MG/DL (ref 74–99)
MAGNESIUM SERPL-MCNC: 1.5 MG/DL (ref 1.6–2.6)
PHOSPHATE SERPL-MCNC: 1.5 MG/DL (ref 2.5–4.9)
POTASSIUM SERPL-SCNC: 3.9 MMOL/L (ref 3.5–5.5)
PROT 24H UR-MRATE: ABNORMAL MG/24HR
PROT UR-MCNC: 378 MG/DL
SODIUM SERPL-SCNC: 138 MMOL/L (ref 136–145)
SPECIMEN VOL ?TM UR: 5100 ML
TACROLIMUS BLD-MCNC: 4.1 NG/ML (ref 2–20)

## 2024-04-11 PROCEDURE — 83735 ASSAY OF MAGNESIUM: CPT

## 2024-04-11 PROCEDURE — 6360000002 HC RX W HCPCS: Performed by: INTERNAL MEDICINE

## 2024-04-11 PROCEDURE — P9047 ALBUMIN (HUMAN), 25%, 50ML: HCPCS | Performed by: INTERNAL MEDICINE

## 2024-04-11 PROCEDURE — 2580000003 HC RX 258: Performed by: FAMILY MEDICINE

## 2024-04-11 PROCEDURE — 6370000000 HC RX 637 (ALT 250 FOR IP): Performed by: FAMILY MEDICINE

## 2024-04-11 PROCEDURE — 6360000002 HC RX W HCPCS: Performed by: FAMILY MEDICINE

## 2024-04-11 PROCEDURE — 36415 COLL VENOUS BLD VENIPUNCTURE: CPT

## 2024-04-11 PROCEDURE — 6370000000 HC RX 637 (ALT 250 FOR IP): Performed by: INTERNAL MEDICINE

## 2024-04-11 PROCEDURE — 6370000000 HC RX 637 (ALT 250 FOR IP): Performed by: HOSPITALIST

## 2024-04-11 PROCEDURE — 80069 RENAL FUNCTION PANEL: CPT

## 2024-04-11 RX ORDER — BUMETANIDE 2 MG/1
2 TABLET ORAL 3 TIMES DAILY
Qty: 90 TABLET | Refills: 0 | Status: SHIPPED | OUTPATIENT
Start: 2024-04-11

## 2024-04-11 RX ORDER — POTASSIUM CHLORIDE 20 MEQ/1
20 TABLET, EXTENDED RELEASE ORAL DAILY
Qty: 30 TABLET | Refills: 0 | Status: SHIPPED | OUTPATIENT
Start: 2024-04-11

## 2024-04-11 RX ORDER — AMOXICILLIN 250 MG/1
250 CAPSULE ORAL EVERY 12 HOURS SCHEDULED
Qty: 14 CAPSULE | Refills: 0 | Status: SHIPPED | OUTPATIENT
Start: 2024-04-11 | End: 2024-04-18

## 2024-04-11 RX ORDER — METOLAZONE 10 MG/1
10 TABLET ORAL 2 TIMES DAILY
Qty: 60 TABLET | Refills: 0 | Status: SHIPPED | OUTPATIENT
Start: 2024-04-11

## 2024-04-11 RX ORDER — TACROLIMUS 1 MG/1
3 CAPSULE ORAL 2 TIMES DAILY
Qty: 60 CAPSULE | Refills: 0
Start: 2024-04-11

## 2024-04-11 RX ADMIN — SODIUM CHLORIDE, PRESERVATIVE FREE 10 ML: 5 INJECTION INTRAVENOUS at 08:23

## 2024-04-11 RX ADMIN — LEVOTHYROXINE SODIUM 112 MCG: 0.07 TABLET ORAL at 06:01

## 2024-04-11 RX ADMIN — Medication 400 MG: at 08:15

## 2024-04-11 RX ADMIN — ATORVASTATIN CALCIUM 40 MG: 20 TABLET, FILM COATED ORAL at 08:15

## 2024-04-11 RX ADMIN — ALBUMIN (HUMAN) 25 G: 0.25 INJECTION, SOLUTION INTRAVENOUS at 08:38

## 2024-04-11 RX ADMIN — TACROLIMUS 3 MG: 1 CAPSULE ORAL at 08:15

## 2024-04-11 RX ADMIN — AMOXICILLIN 250 MG: 250 CAPSULE ORAL at 08:14

## 2024-04-11 RX ADMIN — ENOXAPARIN SODIUM 40 MG: 100 INJECTION SUBCUTANEOUS at 08:16

## 2024-04-11 RX ADMIN — METOLAZONE 10 MG: 5 TABLET ORAL at 08:14

## 2024-04-11 RX ADMIN — ASPIRIN 81 MG: 81 TABLET, COATED ORAL at 08:15

## 2024-04-11 RX ADMIN — PREDNISONE 60 MG: 20 TABLET ORAL at 08:15

## 2024-04-11 RX ADMIN — BUMETANIDE 2 MG: 0.25 INJECTION INTRAMUSCULAR; INTRAVENOUS at 08:16

## 2024-04-11 RX ADMIN — POTASSIUM CHLORIDE 40 MEQ: 1500 TABLET, EXTENDED RELEASE ORAL at 08:15

## 2024-04-11 RX ADMIN — PANTOPRAZOLE SODIUM 40 MG: 40 TABLET, DELAYED RELEASE ORAL at 06:01

## 2024-04-11 NOTE — PROGRESS NOTES
AVS reviewed with pt and spouse, all questions answered. Pt and spouse verbalized understanding. IV removed, no complications.

## 2024-04-11 NOTE — PLAN OF CARE
Problem: Safety - Adult  Goal: Free from fall injury  4/11/2024 1237 by Elyssa Zelaya RN  Outcome: Adequate for Discharge  4/11/2024 1151 by Crystal Ann RN  Outcome: Progressing  Flowsheets (Taken 4/11/2024 1039 by Stacey Brown RN)  Free From Fall Injury: Based on caregiver fall risk screen, instruct family/caregiver to ask for assistance with transferring infant if caregiver noted to have fall risk factors     Problem: Discharge Planning  Goal: Discharge to home or other facility with appropriate resources  4/11/2024 1237 by Elyssa Zelaya RN  Outcome: Adequate for Discharge  4/11/2024 1151 by Crystal Ann RN  Outcome: Progressing     Problem: Skin/Tissue Integrity  Goal: Absence of new skin breakdown  Description: 1.  Monitor for areas of redness and/or skin breakdown  2.  Assess vascular access sites hourly  3.  Every 4-6 hours minimum:  Change oxygen saturation probe site  4.  Every 4-6 hours:  If on nasal continuous positive airway pressure, respiratory therapy assess nares and determine need for appliance change or resting period.  4/11/2024 1237 by Elyssa Zelaya RN  Outcome: Adequate for Discharge  4/11/2024 1151 by Crystal Ann RN  Outcome: Progressing     Problem: ABCDS Injury Assessment  Goal: Absence of physical injury  4/11/2024 1237 by Elyssa Zelaya RN  Outcome: Adequate for Discharge  4/11/2024 1151 by Crystal Ann RN  Outcome: Progressing  Flowsheets (Taken 4/11/2024 1039 by Stacey Brown RN)  Absence of Physical Injury: Implement safety measures based on patient assessment     Problem: Nutrition Deficit:  Goal: Optimize nutritional status  4/11/2024 1237 by Elyssa Zelaya RN  Outcome: Adequate for Discharge  4/11/2024 1151 by Crystal Ann RN  Outcome: Progressing     Problem: Pain  Goal: Verbalizes/displays adequate comfort level or baseline comfort level  4/11/2024 1237 by Elyssa Zelaya RN  Outcome: Adequate for  Discharge  4/11/2024 1151 by Crystal Ann, RN  Outcome: Progressing

## 2024-04-11 NOTE — PLAN OF CARE
Problem: Safety - Adult  Goal: Free from fall injury  Outcome: Progressing  Flowsheets (Taken 4/11/2024 1039 by Stacey Brown, RN)  Free From Fall Injury: Based on caregiver fall risk screen, instruct family/caregiver to ask for assistance with transferring infant if caregiver noted to have fall risk factors     Problem: Discharge Planning  Goal: Discharge to home or other facility with appropriate resources  Outcome: Progressing     Problem: Skin/Tissue Integrity  Goal: Absence of new skin breakdown  Description: 1.  Monitor for areas of redness and/or skin breakdown  2.  Assess vascular access sites hourly  3.  Every 4-6 hours minimum:  Change oxygen saturation probe site  4.  Every 4-6 hours:  If on nasal continuous positive airway pressure, respiratory therapy assess nares and determine need for appliance change or resting period.  Outcome: Progressing     Problem: ABCDS Injury Assessment  Goal: Absence of physical injury  Outcome: Progressing  Flowsheets (Taken 4/11/2024 1039 by Stacey Brown, RN)  Absence of Physical Injury: Implement safety measures based on patient assessment     Problem: Nutrition Deficit:  Goal: Optimize nutritional status  Outcome: Progressing     Problem: Pain  Goal: Verbalizes/displays adequate comfort level or baseline comfort level  Outcome: Progressing

## 2024-04-11 NOTE — DISCHARGE SUMMARY
Discharge Summary    Patient: Efrain Mayorga MRN: [id number]  CSN: [id number]    YOB: 1955  Age: 69 y.o.  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] 03/27/2024    History of urostomy [Z98.890] 03/19/2024    Nephrotic syndrome [N04.9] 03/12/2024    Anasarca [R60.1] 03/09/2024    Hypoalbuminemia [E88.09] 08/30/2023    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    Hyperlipidemia [E78.5] 03/27/2017       Discharge Condition: stable     Discharge Medications:        Medication List        START taking these medications      bumetanide 2 MG tablet  Commonly known as: BUMEX  Take 1 tablet by mouth in the morning, at noon, and at bedtime     metOLazone 10 MG tablet  Commonly known as: ZAROXOLYN  Take 1 tablet by mouth in the morning and at bedtime     potassium chloride 20 MEQ extended release tablet  Commonly known as: KLOR-CON M  Take 1 tablet by mouth daily Take this medication while on diuretics (bumetanide and metolazone), need to check lab in 3 days     tacrolimus 1 MG capsule  Commonly known as: PROGRAF  Take 3 capsules by mouth 2 times daily            CONTINUE taking these medications      amoxicillin 250 MG capsule  Commonly known as: AMOXIL  Take 1 capsule by mouth every 12 hours for 14 doses     aspirin 81 MG EC tablet  Take 1 tablet by mouth daily     atorvastatin 40 MG tablet  Commonly known as: LIPITOR     Claritin 10 MG tablet  Generic drug: loratadine     enoxaparin  Sodium 30 MG/0.3ML injection  Commonly known as: LOVENOX  Inject 0.3 mLs into the skin daily     famotidine 20 MG tablet  Commonly known as: PEPCID     levothyroxine 137 MCG tablet  Commonly known as: SYNTHROID     predniSONE 20 MG tablet  Commonly known as: DELTASONE  Take 3 tablets by mouth daily     sevelamer 800 MG tablet  Commonly known as: RENVELA  Take 1 tablet by mouth 3 times daily (with meals)            STOP taking these medications      dilTIAZem 120 MG extended release capsule  Commonly known as: TIAZAC     torsemide 20 MG tablet  Commonly known as: DEMADEX               Where to Get Your Medications        These medications were sent to [facility] [id number] - [place], [place] - [address] [address] [facility] - P [phone] - F [phone] [address] [address]      Phone: [phone]   amoxicillin 250 MG capsule  bumetanide 2 MG tablet  metOLazone 10 MG tablet  potassium chloride 20 MEQ extended release tablet       Information about where to get these medications is not yet available    Ask your nurse or doctor about these medications  tacrolimus 1 MG capsule         Procedures : none     Consults: Nephrology      PHYSICAL EXAM   Visit Vitals  /78   Pulse 88   Temp 98.2 °F (36.8 °C) (Oral)   Resp 18   Ht 1.676 m (5' 6\")   Wt (P) 73.3 kg (161 lb 9.6 oz)   SpO2 100%   BMI (P) 26.08 kg/m²     General: Awake, cooperative, no acute distress    HEENT: NC, Atraumatic.  PERRLA, EOMI. Anicteric sclerae.  Lungs:  CTA Bilaterally. No Wheezing/Rhonchi/Rales.  Heart:  Regular  rhythm,  No murmur, No Rubs, No Gallops  Abdomen: Soft, Non distended, Non tender. +Bowel sounds,   Extremities: No c/c/ bilateral leg wrapped with   Psych:   Not anxious or agitated.  Neurologic:  No acute neurological deficits.                                     Admission HPI :   Efrain Mayorga is a 69 y.o. male who presents to the emergency department with a chief complaint of fluid overload.  Patient had an

## 2024-04-15 ENCOUNTER — TELEPHONE (OUTPATIENT)
Facility: HOSPITAL | Age: 69
End: 2024-04-15

## 2024-04-15 ENCOUNTER — HOSPITAL ENCOUNTER (OUTPATIENT)
Facility: HOSPITAL | Age: 69
Discharge: HOME | End: 2024-04-15
Attending: FAMILY MEDICINE
Payer: MEDICARE

## 2024-04-15 VITALS
HEART RATE: 91 BPM | DIASTOLIC BLOOD PRESSURE: 79 MMHG | RESPIRATION RATE: 18 BRPM | SYSTOLIC BLOOD PRESSURE: 112 MMHG | TEMPERATURE: 97.7 F | OXYGEN SATURATION: 100 %

## 2024-04-15 DIAGNOSIS — L97.321 ANKLE ULCER, LEFT, LIMITED TO BREAKDOWN OF SKIN (HCC): ICD-10-CM

## 2024-04-15 DIAGNOSIS — C80.1 LYMPHEDEMA DUE TO MALIGNANT NEOPLASM (HCC): Primary | ICD-10-CM

## 2024-04-15 DIAGNOSIS — I89.0 LYMPHEDEMA DUE TO MALIGNANT NEOPLASM (HCC): Primary | ICD-10-CM

## 2024-04-15 DIAGNOSIS — R60.0 EDEMA OF LOWER EXTREMITY: ICD-10-CM

## 2024-04-15 DIAGNOSIS — L97.521 SKIN ULCER OF LEFT GREAT TOE, LIMITED TO BREAKDOWN OF SKIN (HCC): ICD-10-CM

## 2024-04-15 DIAGNOSIS — L97.511 CHRONIC ULCER OF RIGHT GREAT TOE, LIMITED TO BREAKDOWN OF SKIN (HCC): ICD-10-CM

## 2024-04-15 PROCEDURE — 6370000000 HC RX 637 (ALT 250 FOR IP): Performed by: FAMILY MEDICINE

## 2024-04-15 PROCEDURE — 29581 APPL MULTLAYER CMPRN SYS LEG: CPT

## 2024-04-15 RX ORDER — GINSENG 100 MG
CAPSULE ORAL ONCE
Status: CANCELLED | OUTPATIENT
Start: 2024-04-15 | End: 2024-04-15

## 2024-04-15 RX ORDER — TRIAMCINOLONE ACETONIDE 1 MG/G
OINTMENT TOPICAL ONCE
OUTPATIENT
Start: 2024-04-15 | End: 2024-04-15

## 2024-04-15 RX ORDER — BACITRACIN ZINC AND POLYMYXIN B SULFATE 500; 1000 [USP'U]/G; [USP'U]/G
OINTMENT TOPICAL ONCE
Status: CANCELLED | OUTPATIENT
Start: 2024-04-15 | End: 2024-04-15

## 2024-04-15 RX ORDER — CLOBETASOL PROPIONATE 0.5 MG/G
OINTMENT TOPICAL ONCE
OUTPATIENT
Start: 2024-04-15 | End: 2024-04-15

## 2024-04-15 RX ORDER — LIDOCAINE HYDROCHLORIDE 40 MG/ML
SOLUTION TOPICAL ONCE
OUTPATIENT
Start: 2024-04-15 | End: 2024-04-15

## 2024-04-15 RX ORDER — GENTAMICIN SULFATE 1 MG/G
OINTMENT TOPICAL ONCE
Status: CANCELLED | OUTPATIENT
Start: 2024-04-15 | End: 2024-04-15

## 2024-04-15 RX ORDER — LIDOCAINE 40 MG/G
CREAM TOPICAL ONCE
OUTPATIENT
Start: 2024-04-15 | End: 2024-04-15

## 2024-04-15 RX ORDER — LIDOCAINE HYDROCHLORIDE 20 MG/ML
JELLY TOPICAL ONCE
OUTPATIENT
Start: 2024-04-15 | End: 2024-04-15

## 2024-04-15 RX ORDER — LIDOCAINE 50 MG/G
OINTMENT TOPICAL ONCE
Status: CANCELLED | OUTPATIENT
Start: 2024-04-15 | End: 2024-04-15

## 2024-04-15 RX ORDER — BETAMETHASONE DIPROPIONATE 0.5 MG/G
CREAM TOPICAL ONCE
OUTPATIENT
Start: 2024-04-15 | End: 2024-04-15

## 2024-04-15 RX ORDER — SODIUM CHLOR/HYPOCHLOROUS ACID 0.033 %
SOLUTION, IRRIGATION IRRIGATION ONCE
OUTPATIENT
Start: 2024-04-15 | End: 2024-04-15

## 2024-04-15 RX ORDER — GENTAMICIN SULFATE 1 MG/G
OINTMENT TOPICAL ONCE
OUTPATIENT
Start: 2024-04-15 | End: 2024-04-15

## 2024-04-15 RX ORDER — IBUPROFEN 200 MG
TABLET ORAL ONCE
OUTPATIENT
Start: 2024-04-15 | End: 2024-04-15

## 2024-04-15 RX ORDER — LIDOCAINE HYDROCHLORIDE 40 MG/ML
SOLUTION TOPICAL ONCE
Status: CANCELLED | OUTPATIENT
Start: 2024-04-15 | End: 2024-04-15

## 2024-04-15 RX ORDER — TRIAMCINOLONE ACETONIDE 1 MG/G
OINTMENT TOPICAL ONCE
Status: CANCELLED | OUTPATIENT
Start: 2024-04-15 | End: 2024-04-15

## 2024-04-15 RX ORDER — CLOBETASOL PROPIONATE 0.5 MG/G
OINTMENT TOPICAL ONCE
Status: CANCELLED | OUTPATIENT
Start: 2024-04-15 | End: 2024-04-15

## 2024-04-15 RX ORDER — LIDOCAINE 40 MG/G
CREAM TOPICAL ONCE
Status: CANCELLED | OUTPATIENT
Start: 2024-04-15 | End: 2024-04-15

## 2024-04-15 RX ORDER — LIDOCAINE HYDROCHLORIDE 20 MG/ML
JELLY TOPICAL ONCE
Status: CANCELLED | OUTPATIENT
Start: 2024-04-15 | End: 2024-04-15

## 2024-04-15 RX ORDER — SODIUM CHLOR/HYPOCHLOROUS ACID 0.033 %
SOLUTION, IRRIGATION IRRIGATION ONCE
Status: COMPLETED | OUTPATIENT
Start: 2024-04-15 | End: 2024-04-15

## 2024-04-15 RX ORDER — GINSENG 100 MG
CAPSULE ORAL ONCE
OUTPATIENT
Start: 2024-04-15 | End: 2024-04-15

## 2024-04-15 RX ORDER — IBUPROFEN 200 MG
TABLET ORAL ONCE
Status: CANCELLED | OUTPATIENT
Start: 2024-04-15 | End: 2024-04-15

## 2024-04-15 RX ORDER — LIDOCAINE HYDROCHLORIDE 20 MG/ML
JELLY TOPICAL ONCE
Status: COMPLETED | OUTPATIENT
Start: 2024-04-15 | End: 2024-04-15

## 2024-04-15 RX ORDER — LIDOCAINE 50 MG/G
OINTMENT TOPICAL ONCE
OUTPATIENT
Start: 2024-04-15 | End: 2024-04-15

## 2024-04-15 RX ORDER — BETAMETHASONE DIPROPIONATE 0.5 MG/G
CREAM TOPICAL ONCE
Status: CANCELLED | OUTPATIENT
Start: 2024-04-15 | End: 2024-04-15

## 2024-04-15 RX ORDER — BACITRACIN ZINC AND POLYMYXIN B SULFATE 500; 1000 [USP'U]/G; [USP'U]/G
OINTMENT TOPICAL ONCE
OUTPATIENT
Start: 2024-04-15 | End: 2024-04-15

## 2024-04-15 RX ADMIN — Medication: at 13:52

## 2024-04-15 RX ADMIN — LIDOCAINE HYDROCHLORIDE: 20 JELLY TOPICAL at 13:52

## 2024-04-15 NOTE — TELEPHONE ENCOUNTER
Called pt to get bcak on sched. spoke with pt' wife because pt does not hahve voice at the moment. Sched f/u for 4/17

## 2024-04-15 NOTE — FLOWSHEET NOTE
04/15/24 1015   Wound 04/10/24 Ankle Left;Medial   Date First Assessed/Time First Assessed: 04/10/24 1505   Present on Original Admission: Yes  Location: Ankle  Wound Location Orientation: Left;Medial   Wound Image    Wound Etiology Venous   Dressing Status New dressing applied   Wound Cleansed Vashe   Dressing/Treatment Alginate with Ag;Dry dressing;Roll gauze  (2 layer wrap)   Offloading for Diabetic Foot Ulcers Offloading not required   Dressing Change Due 04/22/24   Wound Length (cm) 2.3 cm   Wound Width (cm) 2.5 cm   Wound Depth (cm) 0.1 cm   Wound Surface Area (cm^2) 5.75 cm^2   Change in Wound Size % (l*w) 8   Wound Volume (cm^3) 0.575 cm^3   Wound Healing % 8   Wound Assessment Devitalized tissue   Drainage Amount Small (< 25%)   Drainage Description Serous   Odor None   Jeri-wound Assessment Edematous   Margins Attached edges   Wound Thickness Description not for Pressure Injury Partial thickness   Wound 04/05/24 Toe (Comment  which one) Anterior;Right great toe   Date First Assessed: 04/05/24   Present on Original Admission: Yes  Primary Wound Type: Venous Ulcer  Location: Toe (Comment  which one)  Wound Location Orientation: Anterior;Right  Wound Description (Comments): great toe   Wound Image    Dressing/Treatment Alginate with Ag  (soft roll)   Wound Length (cm)   (resolving blister)   Wound 04/05/24 Foot Right;Lateral   Date First Assessed: 04/05/24   Primary Wound Type: Venous Ulcer  Location: Foot  Wound Location Orientation: Right;Lateral   Wound Image    Dressing/Treatment Alginate with Ag  (soft roll, 2 layer wrap)   Wound Length (cm)   (resolving blister)   Wound 04/05/24 Toe (Comment  which one) Left;Dorsal great toe   Date First Assessed: 04/05/24   Primary Wound Type: Venous Ulcer  Location: Toe (Comment  which one)  Wound Location Orientation: Left;Dorsal  Wound Description (Comments): great toe   Wound Image    Dressing/Treatment Alginate with Ag;Roll gauze  (soft roll)   Wound Length

## 2024-04-15 NOTE — PROGRESS NOTES
30 MG/0.3ML injection Inject 0.3 mLs into the skin daily 4/1/24   AmFaustino lindquist MD   predniSONE (DELTASONE) 20 MG tablet Take 3 tablets by mouth daily 3/16/24 4/15/24  Faustino Mendes MD   aspirin 81 MG EC tablet Take 1 tablet by mouth daily 3/15/24   AmFaustino lindquist MD   levothyroxine (SYNTHROID) 137 MCG tablet Take 1 tablet by mouth Daily    Provider, MD Brown   loratadine (CLARITIN) 10 MG tablet Take 1 tablet by mouth daily as needed    Provider, MD Brown   atorvastatin (LIPITOR) 40 MG tablet Take 1 tablet by mouth daily 3/27/17   Automatic Reconciliation, Ar   famotidine (PEPCID) 20 MG tablet Take 1 tablet by mouth 2 times daily 3/27/17   Automatic Reconciliation, Ar      No Known Allergies       Signed By: Maribell Garcia MD      04/15/24

## 2024-04-15 NOTE — DISCHARGE INSTRUCTIONS
Compression Wraps Discharge Instructions   Location: Bilateral legs  Type: 2 layer wrap    Your doctor has ordered compression therapy for your wound. Compression bandages reduce the swelling, or edema, in your legs and prevent it from returning. The wound care staff will apply your compression wrap. It must be removed and re-applied at least weekly. As the swelling decreases, the boot no longer provides adequate compression and you need a new one. Once applied, you need to know how to take care of your compression wrap.     The boot must stay dry. Do not get it wet in the shower or tub. You may do a partial bath, or you can cover the boot with a large plastic bag, secured at the top, so that no water can get in.    Avoid standing in one place for long periods of time. If you must  one place, shift your weight and change positions often.    If you have CHF, consult your doctor before following the next two recommendations for leg elevation.     When sitting, elevate your legs on pillows, or put blocks under the foot of your bed. Your legs should be higher than your heart.    If your boot becomes painful, or you notice an increase in swelling in your toes, numbness or tingling, or purple color to your toes, remove the wrap and call the Wound Care Center. If it is after hours, call your doctor for instructions or go to the nearest emergency room.

## 2024-04-16 ENCOUNTER — TRANSCRIBE ORDERS (OUTPATIENT)
Facility: HOSPITAL | Age: 69
End: 2024-04-16

## 2024-04-16 ENCOUNTER — HOSPITAL ENCOUNTER (OUTPATIENT)
Facility: HOSPITAL | Age: 69
Discharge: HOME | End: 2024-04-19
Payer: MEDICARE

## 2024-04-16 DIAGNOSIS — C67.9 MALIGNANT NEOPLASM OF URINARY BLADDER, UNSPECIFIED SITE (HCC): ICD-10-CM

## 2024-04-16 DIAGNOSIS — C67.9 MALIGNANT NEOPLASM OF URINARY BLADDER, UNSPECIFIED SITE (HCC): Primary | ICD-10-CM

## 2024-04-16 DIAGNOSIS — N18.4 CHRONIC KIDNEY DISEASE, STAGE IV (SEVERE) (HCC): ICD-10-CM

## 2024-04-16 LAB
BASOPHILS # BLD: 0 K/UL (ref 0–0.1)
BASOPHILS NFR BLD: 0 % (ref 0–2)
CALCIUM SERPL-MCNC: 7.8 MG/DL (ref 8.5–10.1)
DIFFERENTIAL METHOD BLD: ABNORMAL
EOSINOPHIL # BLD: 0 K/UL (ref 0–0.4)
EOSINOPHIL NFR BLD: 0 % (ref 0–5)
ERYTHROCYTE [DISTWIDTH] IN BLOOD BY AUTOMATED COUNT: 15.5 % (ref 11.6–14.5)
HCT VFR BLD AUTO: 36 % (ref 36–48)
HGB BLD-MCNC: 11.5 G/DL (ref 13–16)
IMM GRANULOCYTES # BLD AUTO: 0.1 K/UL (ref 0–0.04)
IMM GRANULOCYTES NFR BLD AUTO: 2 % (ref 0–0.5)
LYMPHOCYTES # BLD: 1.2 K/UL (ref 0.9–3.6)
LYMPHOCYTES NFR BLD: 15 % (ref 21–52)
MCH RBC QN AUTO: 30.4 PG (ref 24–34)
MCHC RBC AUTO-ENTMCNC: 31.9 G/DL (ref 31–37)
MCV RBC AUTO: 95.2 FL (ref 78–100)
MONOCYTES # BLD: 0.9 K/UL (ref 0.05–1.2)
MONOCYTES NFR BLD: 11 % (ref 3–10)
NEUTS SEG # BLD: 6 K/UL (ref 1.8–8)
NEUTS SEG NFR BLD: 72 % (ref 40–73)
NRBC # BLD: 0 K/UL (ref 0–0.01)
NRBC BLD-RTO: 0 PER 100 WBC
PLATELET # BLD AUTO: 167 K/UL (ref 135–420)
PMV BLD AUTO: 9.6 FL (ref 9.2–11.8)
PTH-INTACT SERPL-MCNC: 363.1 PG/ML (ref 18.4–88)
RBC # BLD AUTO: 3.78 M/UL (ref 4.35–5.65)
WBC # BLD AUTO: 8.3 K/UL (ref 4.6–13.2)

## 2024-04-16 PROCEDURE — 36415 COLL VENOUS BLD VENIPUNCTURE: CPT

## 2024-04-16 PROCEDURE — 80069 RENAL FUNCTION PANEL: CPT

## 2024-04-16 PROCEDURE — 85025 COMPLETE CBC W/AUTO DIFF WBC: CPT

## 2024-04-16 PROCEDURE — 80197 ASSAY OF TACROLIMUS: CPT

## 2024-04-16 PROCEDURE — 83970 ASSAY OF PARATHORMONE: CPT

## 2024-04-17 ENCOUNTER — APPOINTMENT (OUTPATIENT)
Facility: HOSPITAL | Age: 69
End: 2024-04-17
Payer: MEDICARE

## 2024-04-17 ENCOUNTER — HOSPITAL ENCOUNTER (OUTPATIENT)
Facility: HOSPITAL | Age: 69
Setting detail: RECURRING SERIES
Discharge: HOME | End: 2024-04-20
Payer: MEDICARE

## 2024-04-17 LAB
ALBUMIN SERPL-MCNC: 2.9 G/DL (ref 3.4–5)
ANION GAP SERPL CALC-SCNC: 8 MMOL/L (ref 3–18)
BUN SERPL-MCNC: 79 MG/DL (ref 7–18)
BUN/CREAT SERPL: 37 (ref 12–20)
CALCIUM SERPL-MCNC: 7.5 MG/DL (ref 8.5–10.1)
CHLORIDE SERPL-SCNC: 93 MMOL/L (ref 100–111)
CO2 SERPL-SCNC: 37 MMOL/L (ref 21–32)
CREAT SERPL-MCNC: 2.14 MG/DL (ref 0.6–1.3)
GLUCOSE SERPL-MCNC: 159 MG/DL (ref 74–99)
PHOSPHATE SERPL-MCNC: 2.2 MG/DL (ref 2.5–4.9)
POTASSIUM SERPL-SCNC: 4 MMOL/L (ref 3.5–5.5)
SODIUM SERPL-SCNC: 138 MMOL/L (ref 136–145)

## 2024-04-17 PROCEDURE — 97535 SELF CARE MNGMENT TRAINING: CPT

## 2024-04-17 NOTE — PROGRESS NOTES
[facility] at [facility]  [address] [place] [address]  Ph [phone]  Fx [phone]    Physical Therapy Progress Note  Patient name: Efrain Mayorga Start of Care: 2024   Referral source: Francisco Lewis MD : 1955               Medical Diagnosis: Lymphedema, not elsewhere classified [I89.0]    Onset Date:23               Treatment Diagnosis: I89.0 Lymphedema, not elsewhere classified     Prior Hospitalization: see medical history Provider#: [id number]   Medications: Verified on Patient summary List   Comorbidities: urostomy , MI  s/p cardiac cath, bladder cancer s/p removal with lymph node removal   Prior Level of Function: functionally independent, no AD, active lifestyle     Visits from Start of Care: 6    Missed Visits: 5    Updated Goals/Measure of Progress: To be achieved in 24 treatments:    Short Term Goals: To be accomplished in 8 treatments:  Patient will be independent and compliant with HEP to progress toward goals and restore functional mobility.   Eval Status: issued at eval  24:  Pt reported that he hasn't had time for his AROM with being in the hospital; as pt is home now, encouraged pt to complete AROM daily for improved venous return      Patient will be independent and compliant with Self MLD to progress toward goals and improve independent management of Lymphedema in order to improve pain and dressing.     Eval Status: to be issued at future visit        24: held today due to possible poor kidney function         24: Have not taught yet due to needing permission from kidney MD for self MLD due to kidney failure      Patient will improve LLIS score by 10 %  in order to maximize function and promote patient satisfaction with overall outcome.  Eval Status: LLIS 47%  24: not taken today      Patient will improve pain in quyen  LE to 1/10  to improve standing and walking tolerance and restore prior level of function.  Eval Status: 
cm proximal to malleoli 25.8 25.5   20 cm proximal to malleoli 30.5 28.5   10 cm distal to knee 34.4 32.5   Patella  37.5 36.4      PLAN  Yes  Continue plan of care  []  Upgrade activities as tolerated  []  Discharge due to :  []  Other:    Chanelle Sears, PT    4/17/2024    11:28 AM    Future Appointments   Date Time Provider Department Center   4/22/2024  9:45 AM Maribell Garcia MD [facility]

## 2024-04-18 LAB — TACROLIMUS BLD-MCNC: 6.3 NG/ML (ref 2–20)

## 2024-04-22 ENCOUNTER — TRANSCRIBE ORDERS (OUTPATIENT)
Facility: HOSPITAL | Age: 69
End: 2024-04-22

## 2024-04-22 ENCOUNTER — HOSPITAL ENCOUNTER (OUTPATIENT)
Facility: HOSPITAL | Age: 69
Discharge: HOME | End: 2024-04-25
Payer: MEDICARE

## 2024-04-22 ENCOUNTER — HOSPITAL ENCOUNTER (OUTPATIENT)
Facility: HOSPITAL | Age: 69
Discharge: HOME | End: 2024-04-22
Payer: MEDICARE

## 2024-04-22 VITALS
SYSTOLIC BLOOD PRESSURE: 106 MMHG | DIASTOLIC BLOOD PRESSURE: 73 MMHG | HEART RATE: 81 BPM | RESPIRATION RATE: 18 BRPM | TEMPERATURE: 97.8 F

## 2024-04-22 DIAGNOSIS — R80.9 PROTEINURIA, UNSPECIFIED TYPE: ICD-10-CM

## 2024-04-22 DIAGNOSIS — L97.521 SKIN ULCER OF LEFT GREAT TOE, LIMITED TO BREAKDOWN OF SKIN (HCC): ICD-10-CM

## 2024-04-22 DIAGNOSIS — L97.321 ANKLE ULCER, LEFT, LIMITED TO BREAKDOWN OF SKIN (HCC): Primary | ICD-10-CM

## 2024-04-22 DIAGNOSIS — N26.9 RENAL SCLEROSIS, UNSPECIFIED: ICD-10-CM

## 2024-04-22 DIAGNOSIS — L97.511 CHRONIC ULCER OF RIGHT GREAT TOE, LIMITED TO BREAKDOWN OF SKIN (HCC): ICD-10-CM

## 2024-04-22 DIAGNOSIS — N18.4 CHRONIC KIDNEY DISEASE, STAGE IV (SEVERE) (HCC): Primary | ICD-10-CM

## 2024-04-22 DIAGNOSIS — R60.0 EDEMA OF LOWER EXTREMITY: ICD-10-CM

## 2024-04-22 DIAGNOSIS — N18.4 CHRONIC KIDNEY DISEASE, STAGE IV (SEVERE) (HCC): ICD-10-CM

## 2024-04-22 LAB
ALBUMIN SERPL-MCNC: 2.9 G/DL (ref 3.4–5)
ANION GAP SERPL CALC-SCNC: 8 MMOL/L (ref 3–18)
BUN SERPL-MCNC: 74 MG/DL (ref 7–18)
BUN/CREAT SERPL: 47 (ref 12–20)
CALCIUM SERPL-MCNC: 8.4 MG/DL (ref 8.5–10.1)
CHLORIDE SERPL-SCNC: 98 MMOL/L (ref 100–111)
CO2 SERPL-SCNC: 29 MMOL/L (ref 21–32)
CREAT SERPL-MCNC: 1.57 MG/DL (ref 0.6–1.3)
GLUCOSE SERPL-MCNC: 183 MG/DL (ref 74–99)
PHOSPHATE SERPL-MCNC: 1.9 MG/DL (ref 2.5–4.9)
POTASSIUM SERPL-SCNC: 3.9 MMOL/L (ref 3.5–5.5)
SODIUM SERPL-SCNC: 135 MMOL/L (ref 136–145)

## 2024-04-22 PROCEDURE — 84100 ASSAY OF PHOSPHORUS: CPT

## 2024-04-22 PROCEDURE — 36415 COLL VENOUS BLD VENIPUNCTURE: CPT

## 2024-04-22 PROCEDURE — 82040 ASSAY OF SERUM ALBUMIN: CPT

## 2024-04-22 PROCEDURE — 99213 OFFICE O/P EST LOW 20 MIN: CPT

## 2024-04-22 PROCEDURE — 80048 BASIC METABOLIC PNL TOTAL CA: CPT

## 2024-04-22 RX ORDER — GINSENG 100 MG
CAPSULE ORAL ONCE
Status: CANCELLED | OUTPATIENT
Start: 2024-04-22 | End: 2024-04-22

## 2024-04-22 RX ORDER — IBUPROFEN 200 MG
TABLET ORAL ONCE
Status: CANCELLED | OUTPATIENT
Start: 2024-04-22 | End: 2024-04-22

## 2024-04-22 RX ORDER — TRIAMCINOLONE ACETONIDE 1 MG/G
OINTMENT TOPICAL ONCE
Status: CANCELLED | OUTPATIENT
Start: 2024-04-22 | End: 2024-04-22

## 2024-04-22 RX ORDER — SODIUM CHLOR/HYPOCHLOROUS ACID 0.033 %
SOLUTION, IRRIGATION IRRIGATION ONCE
Status: CANCELLED | OUTPATIENT
Start: 2024-04-22 | End: 2024-04-22

## 2024-04-22 RX ORDER — BACITRACIN ZINC AND POLYMYXIN B SULFATE 500; 1000 [USP'U]/G; [USP'U]/G
OINTMENT TOPICAL ONCE
Status: CANCELLED | OUTPATIENT
Start: 2024-04-22 | End: 2024-04-22

## 2024-04-22 RX ORDER — CLOBETASOL PROPIONATE 0.5 MG/G
OINTMENT TOPICAL ONCE
Status: CANCELLED | OUTPATIENT
Start: 2024-04-22 | End: 2024-04-22

## 2024-04-22 RX ORDER — LIDOCAINE 40 MG/G
CREAM TOPICAL ONCE
Status: CANCELLED | OUTPATIENT
Start: 2024-04-22 | End: 2024-04-22

## 2024-04-22 RX ORDER — BETAMETHASONE DIPROPIONATE 0.5 MG/G
CREAM TOPICAL ONCE
Status: CANCELLED | OUTPATIENT
Start: 2024-04-22 | End: 2024-04-22

## 2024-04-22 RX ORDER — LIDOCAINE HYDROCHLORIDE 40 MG/ML
SOLUTION TOPICAL ONCE
Status: CANCELLED | OUTPATIENT
Start: 2024-04-22 | End: 2024-04-22

## 2024-04-22 RX ORDER — GENTAMICIN SULFATE 1 MG/G
OINTMENT TOPICAL ONCE
Status: CANCELLED | OUTPATIENT
Start: 2024-04-22 | End: 2024-04-22

## 2024-04-22 RX ORDER — LIDOCAINE HYDROCHLORIDE 20 MG/ML
JELLY TOPICAL ONCE
Status: CANCELLED | OUTPATIENT
Start: 2024-04-22 | End: 2024-04-22

## 2024-04-22 RX ORDER — LIDOCAINE 50 MG/G
OINTMENT TOPICAL ONCE
Status: CANCELLED | OUTPATIENT
Start: 2024-04-22 | End: 2024-04-22

## 2024-04-22 NOTE — PROGRESS NOTES
Wound Center  Progress Note       Chief Complaint:  Efrain Mayorga is a 69 y.o. male  with B/L leg wound of >1 months duration.        Assessment/Plan     69 y.o. male with     -Left toe wounds  Multiple Blisters- resorbing  healed    -Right great toe wound  Blister- resorbing  Healed    -Left medial ankle wound  Blister- resorbing  Healed    -lymphedema  F/b Lymphedema clinic- first assessment this last week  Previous appts were cancelled as he was in hosp for 31 days    Tubis x 2 layers put on both legs    Follow up prn  F/u  lymphedema clinic    Subjective:   Since last visit  No issues    HPI:     Not very good historian, jokes off our questions or goes into tangents  Here for blisters ob leg and feet  Has lymphedema, [address] LN removed s/p cancer  F/bLymphedema clinic; last seen end of March. Hasn't gone back since hospital admission.      History/Chart/Medications reviewed  Recent admission on 4/3 for fluid overload/anasarca  Admission HPI :   Efrain Mayorga is a 69 y.o. male who presents to the emergency department with a chief complaint of fluid overload.  Patient had an appointment with his nephrologist this morning and was told to come straight to the hospital for admission and IV diuresis. Patient has a history of chronic kidney disease as well as bladder cancer. Patient with urostomy.  Patient makes urine.  Wife states that he has a 30 pound weight gain over the last several weeks. Complaining of leg swelling and weakness.  He is not currently on dialysis.  He specifically denies chest pain, shortness of breath, fever, recent illness, palpitations, abdominal pain, vomiting, diarrhea, rectal bleeding.   Was just inpatient 2 days ago. Insisted on DC home even though he was advised by physcian that this was not the safest decision.     Hospital Course :   Pt was admitted for anasarca   Anasarca -resolving on dc. He received, he received diuretics per nephrologist recommendation  and albumin    his swelling

## 2024-04-22 NOTE — DISCHARGE INSTRUCTIONS
Discharge Instructions from  [place] [address] [phone] Fax [phone]    Do not remove dressing     Return Appointment:  [] Wound and dressing supply provider:   [] ECF or Home Healthcare:  [] Wound Assessment: [] Physician or NP scheduled for Wound Assessment:   [x] Return Appointment: With MD  in  1 Week(s)  [] Ordered tests:       Wound Care Center Information: Should you experience any significant changes in your wound(s) or have questions about your wound care, please contact the [facility] at MONDAY - FRIDAY 8:00 am - 4:30.  If you need help with your wound outside these hours and cannot wait until we are again available, contact your PCP or go to the hospital emergency room.     PLEASE NOTE: IF YOU ARE UNABLE TO OBTAIN WOUND SUPPLIES, CONTINUE TO USE THE SUPPLIES YOU HAVE AVAILABLE UNTIL YOU ARE ABLE TO REACH US. IT IS MOST IMPORTANT TO KEEP THE WOUND COVERED AT ALL TIMES.

## 2024-04-23 LAB
FAX TO NUMBER: NORMAL
TEST RESULTS FAXED TO: NORMAL

## 2024-04-24 ENCOUNTER — APPOINTMENT (OUTPATIENT)
Facility: HOSPITAL | Age: 69
End: 2024-04-24
Payer: MEDICARE

## 2024-04-24 ENCOUNTER — TELEPHONE (OUTPATIENT)
Facility: HOSPITAL | Age: 69
End: 2024-04-24

## 2024-04-30 ENCOUNTER — HOSPITAL ENCOUNTER (OUTPATIENT)
Facility: HOSPITAL | Age: 69
Discharge: HOME | End: 2024-05-03
Payer: MEDICARE

## 2024-04-30 LAB
ALBUMIN SERPL-MCNC: 2.7 G/DL (ref 3.4–5)
ALBUMIN/GLOB SERPL: 1.1 (ref 0.8–1.7)
ALP SERPL-CCNC: 123 U/L (ref 45–117)
ALT SERPL-CCNC: 38 U/L (ref 16–61)
ANION GAP SERPL CALC-SCNC: 5 MMOL/L (ref 3–18)
AST SERPL-CCNC: 22 U/L (ref 10–38)
BILIRUB SERPL-MCNC: 0.4 MG/DL (ref 0.2–1)
BUN SERPL-MCNC: 77 MG/DL (ref 7–18)
BUN/CREAT SERPL: 40 (ref 12–20)
CALCIUM SERPL-MCNC: 8.6 MG/DL (ref 8.5–10.1)
CALCIUM SERPL-MCNC: 8.7 MG/DL (ref 8.5–10.1)
CHLORIDE SERPL-SCNC: 106 MMOL/L (ref 100–111)
CO2 SERPL-SCNC: 26 MMOL/L (ref 21–32)
CREAT SERPL-MCNC: 1.91 MG/DL (ref 0.6–1.3)
GLOBULIN SER CALC-MCNC: 2.4 G/DL (ref 2–4)
GLUCOSE SERPL-MCNC: 265 MG/DL (ref 74–99)
PHOSPHATE SERPL-MCNC: 2 MG/DL (ref 2.5–4.9)
POTASSIUM SERPL-SCNC: 5.4 MMOL/L (ref 3.5–5.5)
PROT SERPL-MCNC: 5.1 G/DL (ref 6.4–8.2)
SODIUM SERPL-SCNC: 137 MMOL/L (ref 136–145)

## 2024-04-30 PROCEDURE — 80053 COMPREHEN METABOLIC PANEL: CPT

## 2024-04-30 PROCEDURE — 36415 COLL VENOUS BLD VENIPUNCTURE: CPT

## 2024-04-30 PROCEDURE — 82310 ASSAY OF CALCIUM: CPT

## 2024-04-30 PROCEDURE — 84100 ASSAY OF PHOSPHORUS: CPT

## 2024-05-01 ENCOUNTER — APPOINTMENT (OUTPATIENT)
Facility: HOSPITAL | Age: 69
End: 2024-05-01
Payer: MEDICARE

## 2024-05-03 ENCOUNTER — HOSPITAL ENCOUNTER (OUTPATIENT)
Facility: HOSPITAL | Age: 69
Setting detail: RECURRING SERIES
Discharge: HOME | End: 2024-05-06
Payer: MEDICARE

## 2024-05-03 PROCEDURE — 97530 THERAPEUTIC ACTIVITIES: CPT

## 2024-05-03 PROCEDURE — 97535 SELF CARE MNGMENT TRAINING: CPT

## 2024-05-03 NOTE — PROGRESS NOTES
PHYSICAL / OCCUPATIONAL THERAPY - DAILY TREATMENT NOTE     Patient Name: Efrain Mayorga    Date: 5/3/2024    : 1955  Insurance: Payor: MEDICARE / Plan: MEDICARE PART A AND B / Product Type: *No Product type* /      Patient  verified Yes     Visit #   Current / Total 7 24   Time   In / Out 950 1030   Pain   In / Out 0 0   Subjective Functional Status/Changes: Pt reported that his legs are doing well.  Pts wife reported that nephrologist is not going to approve because he thinks swelling is from kidneys.    Changes to:  Allergies, Med Hx, Sx Hx?   no       TREATMENT AREA =  Lymphedema, not elsewhere classified [I89.0]    OBJECTIVE    Therapeutic Procedures:  Tx Min Billable or 1:1 Min (if diff from Tx Min) Procedure, Rationale, Specifics   30 30 66132 Self Care/Home Management (timed):  improve patient knowledge and understanding of long term management of lymphedema  to improve patient's ability to progress to PLOF and address remaining functional goals.  (see flow sheet as applicable)    Details if applicable:       10 10 69573 Therapeutic Activity (timed):  use of dynamic activities replicating functional movements to increase ROM, strength, coordination, balance, and proprioception in order to improve patient's ability to progress to PLOF and address remaining functional goals.  (see flow sheet as applicable)    Details if applicable:  reassessment   40 40 [facility] Totals Reminder: bill using total billable min of TIMED therapeutic procedures (example: do not include dry needle or estim unattended, both untimed codes, in totals to left)  8-22 min = 1 unit; 23-37 min = 2 units; 38-52 min = 3 units; 53-67 min = 4 units; 68-82 min = 5 units   Total Total     TOTAL TREATMENT TIME:        40     [x]  Patient Education billed concurrently with other procedures   [x] Review HEP    [] Progressed/Changed HEP, detail:    [] Other detail:       Objective Information/Functional Measures/Assessment    Patient has been

## 2024-05-03 NOTE — PROGRESS NOTES
[facility]  [address] [place] [address]  Ph [phone]  Fx [phone]    Physical Therapy Progress Note  Patient name: Efrain Mayorga Start of Care: 2024   Referral source: Francisco Lewis MD : 1955               Medical Diagnosis: Lymphedema, not elsewhere classified [I89.0]    Onset Date:23               Treatment Diagnosis: I89.0 Lymphedema, not elsewhere classified     Prior Hospitalization: see medical history Provider#: [id number]   Medications: Verified on Patient summary List   Comorbidities: urostomy , MI  s/p cardiac cath, bladder cancer s/p removal with lymph node removal   Prior Level of Function: functionally independent, no AD, active lifestyle     Visits from Start of Care: 7      Missed Visits: 5    Updated Goals/Measure of Progress: To be achieved in 24 treatments:    Short Term Goals: To be accomplished in 8 treatments:  Patient will be independent and compliant with HEP to progress toward goals and restore functional mobility.   Eval Status: issued at eval  24:  Pt reported that he hasn't had time for his AROM with being in the hospital; as pt is home now, encouraged pt to complete AROM daily for improved venous return   5/3/24: pt and wife walking daily encouraged AROM exercises to supplement      Patient will be independent and compliant with Self MLD to progress toward goals and improve independent management of Lymphedema in order to improve pain and dressing.     Eval Status: to be issued at future visit        24: held today due to possible poor kidney function         24: Have not taught yet due to needing permission from kidney MD for self MLD due to kidney failure         5/3/24: being treated for kidney failure recommend DC of goal.      Patient will improve LLIS score by 10 %  in order to maximize function and promote patient satisfaction with overall outcome.  Eval Status: LLIS 47%  24: not taken

## 2024-05-06 ENCOUNTER — HOSPITAL ENCOUNTER (OUTPATIENT)
Facility: HOSPITAL | Age: 69
Discharge: HOME | End: 2024-05-09
Payer: MEDICARE

## 2024-05-06 LAB
ALBUMIN SERPL-MCNC: 2.8 G/DL (ref 3.4–5)
ANION GAP SERPL CALC-SCNC: 2 MMOL/L (ref 3–18)
BUN SERPL-MCNC: 51 MG/DL (ref 7–18)
BUN/CREAT SERPL: 46 (ref 12–20)
CALCIUM SERPL-MCNC: 9.4 MG/DL (ref 8.5–10.1)
CHLORIDE SERPL-SCNC: 107 MMOL/L (ref 100–111)
CO2 SERPL-SCNC: 29 MMOL/L (ref 21–32)
CREAT SERPL-MCNC: 1.11 MG/DL (ref 0.6–1.3)
GLUCOSE SERPL-MCNC: 178 MG/DL (ref 74–99)
PHOSPHATE SERPL-MCNC: 2 MG/DL (ref 2.5–4.9)
POTASSIUM SERPL-SCNC: 5.4 MMOL/L (ref 3.5–5.5)
SODIUM SERPL-SCNC: 138 MMOL/L (ref 136–145)

## 2024-05-06 PROCEDURE — 80048 BASIC METABOLIC PNL TOTAL CA: CPT

## 2024-05-06 PROCEDURE — 84100 ASSAY OF PHOSPHORUS: CPT

## 2024-05-06 PROCEDURE — 82040 ASSAY OF SERUM ALBUMIN: CPT

## 2024-05-06 PROCEDURE — 36415 COLL VENOUS BLD VENIPUNCTURE: CPT

## 2024-05-08 ENCOUNTER — APPOINTMENT (OUTPATIENT)
Facility: HOSPITAL | Age: 69
End: 2024-05-08
Payer: MEDICARE

## 2024-05-09 LAB
FAX TO NUMBER: NORMAL
TEST RESULTS FAXED TO: NORMAL

## 2024-05-13 ENCOUNTER — TRANSCRIBE ORDERS (OUTPATIENT)
Facility: HOSPITAL | Age: 69
End: 2024-05-13

## 2024-05-13 ENCOUNTER — HOSPITAL ENCOUNTER (OUTPATIENT)
Facility: HOSPITAL | Age: 69
Discharge: HOME | End: 2024-05-16
Payer: MEDICARE

## 2024-05-13 DIAGNOSIS — N18.4 CHRONIC KIDNEY DISEASE, STAGE IV (SEVERE) (HCC): ICD-10-CM

## 2024-05-13 DIAGNOSIS — R80.9 PROTEINURIA, UNSPECIFIED TYPE: Primary | ICD-10-CM

## 2024-05-13 DIAGNOSIS — N26.9 RENAL SCLEROSIS, UNSPECIFIED: ICD-10-CM

## 2024-05-13 DIAGNOSIS — R80.9 PROTEINURIA, UNSPECIFIED TYPE: ICD-10-CM

## 2024-05-13 LAB
ALBUMIN SERPL-MCNC: 3 G/DL (ref 3.4–5)
ANION GAP SERPL CALC-SCNC: 2 MMOL/L (ref 3–18)
BUN SERPL-MCNC: 43 MG/DL (ref 7–18)
BUN/CREAT SERPL: 38 (ref 12–20)
CALCIUM SERPL-MCNC: 9.2 MG/DL (ref 8.5–10.1)
CHLORIDE SERPL-SCNC: 107 MMOL/L (ref 100–111)
CO2 SERPL-SCNC: 29 MMOL/L (ref 21–32)
CREAT SERPL-MCNC: 1.12 MG/DL (ref 0.6–1.3)
FAX TO NUMBER: NORMAL
GLUCOSE SERPL-MCNC: 193 MG/DL (ref 74–99)
PHOSPHATE SERPL-MCNC: 2.1 MG/DL (ref 2.5–4.9)
POTASSIUM SERPL-SCNC: 5.2 MMOL/L (ref 3.5–5.5)
SODIUM SERPL-SCNC: 138 MMOL/L (ref 136–145)
TEST RESULTS FAXED TO: NORMAL

## 2024-05-13 PROCEDURE — 80048 BASIC METABOLIC PNL TOTAL CA: CPT

## 2024-05-13 PROCEDURE — 36415 COLL VENOUS BLD VENIPUNCTURE: CPT

## 2024-05-13 PROCEDURE — 82040 ASSAY OF SERUM ALBUMIN: CPT

## 2024-05-13 PROCEDURE — 84100 ASSAY OF PHOSPHORUS: CPT

## 2024-05-20 ENCOUNTER — HOSPITAL ENCOUNTER (OUTPATIENT)
Facility: HOSPITAL | Age: 69
Discharge: HOME | End: 2024-05-23
Payer: MEDICARE

## 2024-05-20 LAB
ALBUMIN SERPL-MCNC: 2.7 G/DL (ref 3.4–5)
ANION GAP SERPL CALC-SCNC: 1 MMOL/L (ref 3–18)
BUN SERPL-MCNC: 47 MG/DL (ref 7–18)
BUN/CREAT SERPL: 42 (ref 12–20)
CALCIUM SERPL-MCNC: 9 MG/DL (ref 8.5–10.1)
CHLORIDE SERPL-SCNC: 107 MMOL/L (ref 100–111)
CO2 SERPL-SCNC: 30 MMOL/L (ref 21–32)
CREAT SERPL-MCNC: 1.12 MG/DL (ref 0.6–1.3)
FAX TO NUMBER: NORMAL
GLUCOSE SERPL-MCNC: 235 MG/DL (ref 74–99)
PHOSPHATE SERPL-MCNC: 1.6 MG/DL (ref 2.5–4.9)
POTASSIUM SERPL-SCNC: 5.1 MMOL/L (ref 3.5–5.5)
SODIUM SERPL-SCNC: 138 MMOL/L (ref 136–145)
TEST RESULTS FAXED TO: NORMAL

## 2024-05-20 PROCEDURE — 80048 BASIC METABOLIC PNL TOTAL CA: CPT

## 2024-05-20 PROCEDURE — 82040 ASSAY OF SERUM ALBUMIN: CPT

## 2024-05-20 PROCEDURE — 36415 COLL VENOUS BLD VENIPUNCTURE: CPT

## 2024-05-20 PROCEDURE — 84100 ASSAY OF PHOSPHORUS: CPT

## 2024-05-28 ENCOUNTER — HOSPITAL ENCOUNTER (OUTPATIENT)
Facility: HOSPITAL | Age: 69
Discharge: HOME | End: 2024-05-31
Payer: MEDICARE

## 2024-05-28 LAB
ALBUMIN SERPL-MCNC: 3 G/DL (ref 3.4–5)
ANION GAP SERPL CALC-SCNC: 6 MMOL/L (ref 3–18)
BUN SERPL-MCNC: 62 MG/DL (ref 7–18)
BUN/CREAT SERPL: 47 (ref 12–20)
CALCIUM SERPL-MCNC: 9.3 MG/DL (ref 8.5–10.1)
CHLORIDE SERPL-SCNC: 101 MMOL/L (ref 100–111)
CO2 SERPL-SCNC: 28 MMOL/L (ref 21–32)
CREAT SERPL-MCNC: 1.33 MG/DL (ref 0.6–1.3)
GLUCOSE SERPL-MCNC: 366 MG/DL (ref 74–99)
PHOSPHATE SERPL-MCNC: 2.5 MG/DL (ref 2.5–4.9)
POTASSIUM SERPL-SCNC: 5.2 MMOL/L (ref 3.5–5.5)
SODIUM SERPL-SCNC: 135 MMOL/L (ref 136–145)

## 2024-05-28 PROCEDURE — 82040 ASSAY OF SERUM ALBUMIN: CPT

## 2024-05-28 PROCEDURE — 80048 BASIC METABOLIC PNL TOTAL CA: CPT

## 2024-05-28 PROCEDURE — 36415 COLL VENOUS BLD VENIPUNCTURE: CPT

## 2024-05-28 PROCEDURE — 84100 ASSAY OF PHOSPHORUS: CPT

## 2024-05-29 LAB
FAX TO NUMBER: NORMAL
TEST RESULTS FAXED TO: NORMAL

## 2024-06-03 ENCOUNTER — HOSPITAL ENCOUNTER (OUTPATIENT)
Facility: HOSPITAL | Age: 69
Discharge: HOME | End: 2024-06-06
Payer: MEDICARE

## 2024-06-03 ENCOUNTER — TRANSCRIBE ORDERS (OUTPATIENT)
Facility: HOSPITAL | Age: 69
End: 2024-06-03

## 2024-06-03 DIAGNOSIS — N18.4 CHRONIC KIDNEY DISEASE, STAGE IV (SEVERE) (HCC): ICD-10-CM

## 2024-06-03 DIAGNOSIS — N26.9 RENAL SCLEROSIS, UNSPECIFIED: ICD-10-CM

## 2024-06-03 DIAGNOSIS — R80.9 PROTEINURIA, UNSPECIFIED TYPE: ICD-10-CM

## 2024-06-03 DIAGNOSIS — N18.4 CHRONIC KIDNEY DISEASE, STAGE IV (SEVERE) (HCC): Primary | ICD-10-CM

## 2024-06-03 LAB
ALBUMIN SERPL-MCNC: 2.8 G/DL (ref 3.4–5)
ANION GAP SERPL CALC-SCNC: 7 MMOL/L (ref 3–18)
BUN SERPL-MCNC: 66 MG/DL (ref 7–18)
BUN/CREAT SERPL: 43 (ref 12–20)
CALCIUM SERPL-MCNC: 8.9 MG/DL (ref 8.5–10.1)
CHLORIDE SERPL-SCNC: 102 MMOL/L (ref 100–111)
CO2 SERPL-SCNC: 27 MMOL/L (ref 21–32)
CREAT SERPL-MCNC: 1.53 MG/DL (ref 0.6–1.3)
GLUCOSE SERPL-MCNC: 394 MG/DL (ref 74–99)
PHOSPHATE SERPL-MCNC: 2.3 MG/DL (ref 2.5–4.9)
POTASSIUM SERPL-SCNC: 5.2 MMOL/L (ref 3.5–5.5)
SODIUM SERPL-SCNC: 136 MMOL/L (ref 136–145)

## 2024-06-03 PROCEDURE — 84100 ASSAY OF PHOSPHORUS: CPT

## 2024-06-03 PROCEDURE — 80048 BASIC METABOLIC PNL TOTAL CA: CPT

## 2024-06-03 PROCEDURE — 36415 COLL VENOUS BLD VENIPUNCTURE: CPT

## 2024-06-03 PROCEDURE — 82040 ASSAY OF SERUM ALBUMIN: CPT

## 2024-06-05 ENCOUNTER — TELEPHONE (OUTPATIENT)
Facility: HOSPITAL | Age: 69
End: 2024-06-05

## 2024-06-05 NOTE — TELEPHONE ENCOUNTER
Called pt to sched or dC. Pt self-DC because he's feeling better and has a lot of other medical things going on. Informed of how to restart PT if needed

## 2024-06-10 ENCOUNTER — HOSPITAL ENCOUNTER (OUTPATIENT)
Facility: HOSPITAL | Age: 69
Discharge: HOME | End: 2024-06-13
Payer: MEDICARE

## 2024-06-10 ENCOUNTER — TRANSCRIBE ORDERS (OUTPATIENT)
Facility: HOSPITAL | Age: 69
End: 2024-06-10

## 2024-06-10 DIAGNOSIS — N18.32 CHRONIC KIDNEY DISEASE (CKD) STAGE G3B/A1, MODERATELY DECREASED GLOMERULAR FILTRATION RATE (GFR) BETWEEN 30-44 ML/MIN/1.73 SQUARE METER AND ALBUMINURIA CREATININE RATIO LESS THAN 30 MG/G (HCC): Primary | ICD-10-CM

## 2024-06-10 DIAGNOSIS — N18.32 CHRONIC KIDNEY DISEASE (CKD) STAGE G3B/A1, MODERATELY DECREASED GLOMERULAR FILTRATION RATE (GFR) BETWEEN 30-44 ML/MIN/1.73 SQUARE METER AND ALBUMINURIA CREATININE RATIO LESS THAN 30 MG/G (HCC): ICD-10-CM

## 2024-06-10 DIAGNOSIS — N04.9 CONGENITAL NEPHROTIC SYNDROME: ICD-10-CM

## 2024-06-10 DIAGNOSIS — N26.9 RENAL SCLEROSIS, UNSPECIFIED: ICD-10-CM

## 2024-06-10 LAB
ALBUMIN SERPL-MCNC: 3.5 G/DL (ref 3.4–5)
ANION GAP SERPL CALC-SCNC: 5 MMOL/L (ref 3–18)
BUN SERPL-MCNC: 70 MG/DL (ref 7–18)
BUN/CREAT SERPL: 52 (ref 12–20)
CALCIUM SERPL-MCNC: 9.4 MG/DL (ref 8.5–10.1)
CHLORIDE SERPL-SCNC: 104 MMOL/L (ref 100–111)
CO2 SERPL-SCNC: 28 MMOL/L (ref 21–32)
CREAT SERPL-MCNC: 1.35 MG/DL (ref 0.6–1.3)
CREAT UR-MCNC: 76 MG/DL (ref 30–125)
GLUCOSE SERPL-MCNC: 126 MG/DL (ref 74–99)
PHOSPHATE SERPL-MCNC: 2.4 MG/DL (ref 2.5–4.9)
POTASSIUM SERPL-SCNC: 5.1 MMOL/L (ref 3.5–5.5)
PROT UR-MCNC: 104 MG/DL
PROT/CREAT UR-RTO: 1.4

## 2024-06-10 PROCEDURE — 36415 COLL VENOUS BLD VENIPUNCTURE: CPT

## 2024-06-10 PROCEDURE — 84156 ASSAY OF PROTEIN URINE: CPT

## 2024-06-10 PROCEDURE — 80069 RENAL FUNCTION PANEL: CPT

## 2024-06-10 PROCEDURE — 82570 ASSAY OF URINE CREATININE: CPT

## 2024-06-14 ENCOUNTER — HOSPITAL ENCOUNTER (OUTPATIENT)
Facility: HOSPITAL | Age: 69
End: 2024-06-14
Attending: INTERNAL MEDICINE
Payer: MEDICARE

## 2024-06-14 DIAGNOSIS — M79.622 PAIN IN LEFT UPPER ARM: ICD-10-CM

## 2024-06-14 DIAGNOSIS — R60.9 EDEMA, UNSPECIFIED TYPE: ICD-10-CM

## 2024-06-14 PROCEDURE — 93971 EXTREMITY STUDY: CPT

## 2024-06-21 ENCOUNTER — HOSPITAL ENCOUNTER (OUTPATIENT)
Facility: HOSPITAL | Age: 69
End: 2024-06-21
Attending: INTERNAL MEDICINE
Payer: MEDICARE

## 2024-06-21 DIAGNOSIS — R91.1 PULMONARY NODULE: ICD-10-CM

## 2024-06-21 DIAGNOSIS — R10.9 ABDOMINAL PAIN, UNSPECIFIED ABDOMINAL LOCATION: ICD-10-CM

## 2024-06-21 DIAGNOSIS — C67.2 MALIGNANT NEOPLASM OF LATERAL WALL OF BLADDER (HCC): ICD-10-CM

## 2024-06-21 PROCEDURE — 74176 CT ABD & PELVIS W/O CONTRAST: CPT

## 2024-07-09 ENCOUNTER — TRANSCRIBE ORDERS (OUTPATIENT)
Facility: HOSPITAL | Age: 69
End: 2024-07-09

## 2024-07-09 ENCOUNTER — HOSPITAL ENCOUNTER (OUTPATIENT)
Facility: HOSPITAL | Age: 69
Discharge: HOME | End: 2024-07-12
Payer: MEDICARE

## 2024-07-09 DIAGNOSIS — N26.9 RENAL SCLEROSIS, UNSPECIFIED: ICD-10-CM

## 2024-07-09 DIAGNOSIS — N18.32 CHRONIC KIDNEY DISEASE (CKD) STAGE G3B/A1, MODERATELY DECREASED GLOMERULAR FILTRATION RATE (GFR) BETWEEN 30-44 ML/MIN/1.73 SQUARE METER AND ALBUMINURIA CREATININE RATIO LESS THAN 30 MG/G (HCC): ICD-10-CM

## 2024-07-09 DIAGNOSIS — N04.9 CONGENITAL NEPHROTIC SYNDROME: ICD-10-CM

## 2024-07-09 DIAGNOSIS — N18.32 CHRONIC KIDNEY DISEASE (CKD) STAGE G3B/A1, MODERATELY DECREASED GLOMERULAR FILTRATION RATE (GFR) BETWEEN 30-44 ML/MIN/1.73 SQUARE METER AND ALBUMINURIA CREATININE RATIO LESS THAN 30 MG/G (HCC): Primary | ICD-10-CM

## 2024-07-09 LAB
ALBUMIN SERPL-MCNC: 2.9 G/DL (ref 3.4–5)
CALCIUM SERPL-MCNC: 9 MG/DL (ref 8.5–10.1)
CREAT UR-MCNC: 104 MG/DL (ref 30–125)
PHOSPHATE SERPL-MCNC: 3.6 MG/DL (ref 2.5–4.9)
PROT UR-MCNC: 89 MG/DL
PROT/CREAT UR-RTO: 0.9

## 2024-07-09 PROCEDURE — 84100 ASSAY OF PHOSPHORUS: CPT

## 2024-07-09 PROCEDURE — 84156 ASSAY OF PROTEIN URINE: CPT

## 2024-07-09 PROCEDURE — 82040 ASSAY OF SERUM ALBUMIN: CPT

## 2024-07-09 PROCEDURE — 82310 ASSAY OF CALCIUM: CPT

## 2024-07-09 PROCEDURE — 82570 ASSAY OF URINE CREATININE: CPT

## 2024-07-09 PROCEDURE — 36415 COLL VENOUS BLD VENIPUNCTURE: CPT

## 2024-07-12 ENCOUNTER — APPOINTMENT (OUTPATIENT)
Facility: HOSPITAL | Age: 69
DRG: 603 | End: 2024-07-12
Payer: MEDICARE

## 2024-07-12 ENCOUNTER — HOSPITAL ENCOUNTER (INPATIENT)
Facility: HOSPITAL | Age: 69
LOS: 3 days | Discharge: HOME | DRG: 603 | End: 2024-07-15
Attending: HOSPITALIST | Admitting: HOSPITALIST
Payer: MEDICARE

## 2024-07-12 DIAGNOSIS — D72.829 LEUKOCYTOSIS, UNSPECIFIED TYPE: ICD-10-CM

## 2024-07-12 DIAGNOSIS — L08.9 SKIN PUSTULE: Primary | ICD-10-CM

## 2024-07-12 DIAGNOSIS — R07.9 CHEST PAIN, UNSPECIFIED TYPE: ICD-10-CM

## 2024-07-12 PROBLEM — R91.1 LUNG NODULE: Status: ACTIVE | Noted: 2024-07-12

## 2024-07-12 LAB
ALBUMIN SERPL-MCNC: 2.9 G/DL (ref 3.4–5)
ALBUMIN/GLOB SERPL: 1.1 (ref 0.8–1.7)
ALP SERPL-CCNC: 234 U/L (ref 45–117)
ALT SERPL-CCNC: 42 U/L (ref 16–61)
ANION GAP SERPL CALC-SCNC: 6 MMOL/L (ref 3–18)
APPEARANCE UR: ABNORMAL
APTT PPP: 25.5 SEC (ref 23–36.4)
AST SERPL-CCNC: 29 U/L (ref 10–38)
BACTERIA URNS QL MICRO: ABNORMAL /HPF
BASOPHILS # BLD: 0 K/UL (ref 0–0.1)
BASOPHILS NFR BLD: 0 % (ref 0–2)
BILIRUB SERPL-MCNC: 0.6 MG/DL (ref 0.2–1)
BILIRUB UR QL: NEGATIVE
BUN SERPL-MCNC: 77 MG/DL (ref 7–18)
BUN/CREAT SERPL: 55 (ref 12–20)
CALCIUM SERPL-MCNC: 9.1 MG/DL (ref 8.5–10.1)
CHLORIDE SERPL-SCNC: 108 MMOL/L (ref 100–111)
CO2 SERPL-SCNC: 23 MMOL/L (ref 21–32)
COLOR UR: YELLOW
CREAT SERPL-MCNC: 1.39 MG/DL (ref 0.6–1.3)
D DIMER PPP FEU-MCNC: 1.11 UG/ML(FEU)
DIFFERENTIAL METHOD BLD: ABNORMAL
ECHO BSA: 1.68 M2
EKG ATRIAL RATE: 61 BPM
EKG ATRIAL RATE: 76 BPM
EKG DIAGNOSIS: NORMAL
EKG DIAGNOSIS: NORMAL
EKG P AXIS: 69 DEGREES
EKG P AXIS: 70 DEGREES
EKG P-R INTERVAL: 132 MS
EKG P-R INTERVAL: 134 MS
EKG Q-T INTERVAL: 358 MS
EKG Q-T INTERVAL: 388 MS
EKG QRS DURATION: 90 MS
EKG QRS DURATION: 92 MS
EKG QTC CALCULATION (BAZETT): 390 MS
EKG QTC CALCULATION (BAZETT): 402 MS
EKG R AXIS: -58 DEGREES
EKG R AXIS: -67 DEGREES
EKG T AXIS: 65 DEGREES
EKG T AXIS: 85 DEGREES
EKG VENTRICULAR RATE: 61 BPM
EKG VENTRICULAR RATE: 76 BPM
EOSINOPHIL # BLD: 0 K/UL (ref 0–0.4)
EOSINOPHIL NFR BLD: 0 % (ref 0–5)
EPITH CASTS URNS QL MICRO: ABNORMAL /LPF (ref 0–5)
ERYTHROCYTE [DISTWIDTH] IN BLOOD BY AUTOMATED COUNT: 14.8 % (ref 11.6–14.5)
EST. AVERAGE GLUCOSE BLD GHB EST-MCNC: 192 MG/DL
GLOBULIN SER CALC-MCNC: 2.7 G/DL (ref 2–4)
GLUCOSE BLD STRIP.AUTO-MCNC: 154 MG/DL (ref 70–110)
GLUCOSE BLD STRIP.AUTO-MCNC: 255 MG/DL (ref 70–110)
GLUCOSE SERPL-MCNC: 258 MG/DL (ref 74–99)
GLUCOSE UR STRIP.AUTO-MCNC: NEGATIVE MG/DL
HBA1C MFR BLD: 8.3 % (ref 4.2–5.6)
HCT VFR BLD AUTO: 38.3 % (ref 36–48)
HGB BLD-MCNC: 12.2 G/DL (ref 13–16)
HGB UR QL STRIP: ABNORMAL
IMM GRANULOCYTES # BLD AUTO: 0.2 K/UL (ref 0–0.04)
IMM GRANULOCYTES NFR BLD AUTO: 1 % (ref 0–0.5)
INR PPP: 1.1 (ref 0.9–1.1)
KETONES UR QL STRIP.AUTO: NEGATIVE MG/DL
LEUKOCYTE ESTERASE UR QL STRIP.AUTO: ABNORMAL
LYMPHOCYTES # BLD: 0.6 K/UL (ref 0.9–3.6)
LYMPHOCYTES NFR BLD: 4 % (ref 21–52)
MCH RBC QN AUTO: 30.3 PG (ref 24–34)
MCHC RBC AUTO-ENTMCNC: 31.9 G/DL (ref 31–37)
MCV RBC AUTO: 95.3 FL (ref 78–100)
MONOCYTES # BLD: 1 K/UL (ref 0.05–1.2)
MONOCYTES NFR BLD: 7 % (ref 3–10)
NEUTS SEG # BLD: 12.2 K/UL (ref 1.8–8)
NEUTS SEG NFR BLD: 87 % (ref 40–73)
NITRITE UR QL STRIP.AUTO: NEGATIVE
NRBC # BLD: 0.02 K/UL (ref 0–0.01)
NRBC BLD-RTO: 0.1 PER 100 WBC
PH UR STRIP: >8.5 [PH] (ref 5–8)
PLATELET # BLD AUTO: 214 K/UL (ref 135–420)
PMV BLD AUTO: 8.9 FL (ref 9.2–11.8)
POTASSIUM SERPL-SCNC: 4.7 MMOL/L (ref 3.5–5.5)
PROT SERPL-MCNC: 5.6 G/DL (ref 6.4–8.2)
PROT UR STRIP-MCNC: 100 MG/DL
PROTHROMBIN TIME: 14.9 SEC (ref 11.9–14.9)
RBC # BLD AUTO: 4.02 M/UL (ref 4.35–5.65)
RBC #/AREA URNS HPF: ABNORMAL /HPF (ref 0–5)
SODIUM SERPL-SCNC: 137 MMOL/L (ref 136–145)
SP GR UR REFRACTOMETRY: 1.01 (ref 1–1.03)
TRI-PHOS CRY URNS QL MICRO: ABNORMAL
TROPONIN I SERPL HS-MCNC: 54 NG/L (ref 0–78)
TROPONIN I SERPL HS-MCNC: 63 NG/L (ref 0–78)
UROBILINOGEN UR QL STRIP.AUTO: 0.2 EU/DL (ref 0.2–1)
WBC # BLD AUTO: 14 K/UL (ref 4.6–13.2)
WBC URNS QL MICRO: ABNORMAL /HPF (ref 0–5)

## 2024-07-12 PROCEDURE — 2580000003 HC RX 258: Performed by: NURSE PRACTITIONER

## 2024-07-12 PROCEDURE — 3430000000 HC RX DIAGNOSTIC RADIOPHARMACEUTICAL: Performed by: NURSE PRACTITIONER

## 2024-07-12 PROCEDURE — 6370000000 HC RX 637 (ALT 250 FOR IP): Performed by: HOSPITALIST

## 2024-07-12 PROCEDURE — A9540 TC99M MAA: HCPCS | Performed by: NURSE PRACTITIONER

## 2024-07-12 PROCEDURE — 80053 COMPREHEN METABOLIC PANEL: CPT

## 2024-07-12 PROCEDURE — 93971 EXTREMITY STUDY: CPT

## 2024-07-12 PROCEDURE — 99285 EMERGENCY DEPT VISIT HI MDM: CPT

## 2024-07-12 PROCEDURE — 1100000003 HC PRIVATE W/ TELEMETRY

## 2024-07-12 PROCEDURE — 71250 CT THORAX DX C-: CPT

## 2024-07-12 PROCEDURE — 71045 X-RAY EXAM CHEST 1 VIEW: CPT

## 2024-07-12 PROCEDURE — 6360000002 HC RX W HCPCS: Performed by: NURSE PRACTITIONER

## 2024-07-12 PROCEDURE — 85025 COMPLETE CBC W/AUTO DIFF WBC: CPT

## 2024-07-12 PROCEDURE — 96367 TX/PROPH/DG ADDL SEQ IV INF: CPT

## 2024-07-12 PROCEDURE — 96375 TX/PRO/DX INJ NEW DRUG ADDON: CPT

## 2024-07-12 PROCEDURE — 83036 HEMOGLOBIN GLYCOSYLATED A1C: CPT

## 2024-07-12 PROCEDURE — 85610 PROTHROMBIN TIME: CPT

## 2024-07-12 PROCEDURE — 82962 GLUCOSE BLOOD TEST: CPT

## 2024-07-12 PROCEDURE — 85379 FIBRIN DEGRADATION QUANT: CPT

## 2024-07-12 PROCEDURE — 85730 THROMBOPLASTIN TIME PARTIAL: CPT

## 2024-07-12 PROCEDURE — 96366 THER/PROPH/DIAG IV INF ADDON: CPT

## 2024-07-12 PROCEDURE — 81001 URINALYSIS AUTO W/SCOPE: CPT

## 2024-07-12 PROCEDURE — 96365 THER/PROPH/DIAG IV INF INIT: CPT

## 2024-07-12 PROCEDURE — 2580000003 HC RX 258: Performed by: HOSPITALIST

## 2024-07-12 PROCEDURE — 6360000002 HC RX W HCPCS: Performed by: HOSPITALIST

## 2024-07-12 PROCEDURE — 93005 ELECTROCARDIOGRAM TRACING: CPT | Performed by: HOSPITALIST

## 2024-07-12 PROCEDURE — 6370000000 HC RX 637 (ALT 250 FOR IP): Performed by: NURSE PRACTITIONER

## 2024-07-12 PROCEDURE — 84484 ASSAY OF TROPONIN QUANT: CPT

## 2024-07-12 PROCEDURE — 93005 ELECTROCARDIOGRAM TRACING: CPT | Performed by: NURSE PRACTITIONER

## 2024-07-12 RX ORDER — INSULIN LISPRO 100 [IU]/ML
0-8 INJECTION, SOLUTION INTRAVENOUS; SUBCUTANEOUS
Status: DISCONTINUED | OUTPATIENT
Start: 2024-07-12 | End: 2024-07-15 | Stop reason: HOSPADM

## 2024-07-12 RX ORDER — ACETAMINOPHEN 325 MG/1
650 TABLET ORAL EVERY 6 HOURS PRN
Status: DISCONTINUED | OUTPATIENT
Start: 2024-07-12 | End: 2024-07-15 | Stop reason: HOSPADM

## 2024-07-12 RX ORDER — MORPHINE SULFATE 4 MG/ML
4 INJECTION, SOLUTION INTRAMUSCULAR; INTRAVENOUS
Status: COMPLETED | OUTPATIENT
Start: 2024-07-12 | End: 2024-07-12

## 2024-07-12 RX ORDER — GLUCAGON 1 MG/ML
1 KIT INJECTION PRN
Status: DISCONTINUED | OUTPATIENT
Start: 2024-07-12 | End: 2024-07-15 | Stop reason: HOSPADM

## 2024-07-12 RX ORDER — TACROLIMUS 1 MG/1
3 CAPSULE ORAL 2 TIMES DAILY
Status: DISCONTINUED | OUTPATIENT
Start: 2024-07-12 | End: 2024-07-13

## 2024-07-12 RX ORDER — SODIUM CHLORIDE 9 MG/ML
INJECTION, SOLUTION INTRAVENOUS PRN
Status: DISCONTINUED | OUTPATIENT
Start: 2024-07-12 | End: 2024-07-15 | Stop reason: HOSPADM

## 2024-07-12 RX ORDER — POLYETHYLENE GLYCOL 3350 17 G/17G
17 POWDER, FOR SOLUTION ORAL DAILY PRN
Status: DISCONTINUED | OUTPATIENT
Start: 2024-07-12 | End: 2024-07-14

## 2024-07-12 RX ORDER — ONDANSETRON 4 MG/1
4 TABLET, ORALLY DISINTEGRATING ORAL
Status: COMPLETED | OUTPATIENT
Start: 2024-07-12 | End: 2024-07-12

## 2024-07-12 RX ORDER — ACETAMINOPHEN 650 MG/1
650 SUPPOSITORY RECTAL EVERY 6 HOURS PRN
Status: DISCONTINUED | OUTPATIENT
Start: 2024-07-12 | End: 2024-07-15 | Stop reason: HOSPADM

## 2024-07-12 RX ORDER — DEXTROSE MONOHYDRATE 100 MG/ML
INJECTION, SOLUTION INTRAVENOUS CONTINUOUS PRN
Status: DISCONTINUED | OUTPATIENT
Start: 2024-07-12 | End: 2024-07-15 | Stop reason: HOSPADM

## 2024-07-12 RX ORDER — INSULIN LISPRO 100 [IU]/ML
0-4 INJECTION, SOLUTION INTRAVENOUS; SUBCUTANEOUS NIGHTLY
Status: DISCONTINUED | OUTPATIENT
Start: 2024-07-12 | End: 2024-07-15 | Stop reason: HOSPADM

## 2024-07-12 RX ORDER — ONDANSETRON 4 MG/1
4 TABLET, ORALLY DISINTEGRATING ORAL EVERY 8 HOURS PRN
Status: DISCONTINUED | OUTPATIENT
Start: 2024-07-12 | End: 2024-07-15 | Stop reason: HOSPADM

## 2024-07-12 RX ORDER — SODIUM CHLORIDE 0.9 % (FLUSH) 0.9 %
5-40 SYRINGE (ML) INJECTION EVERY 12 HOURS SCHEDULED
Status: DISCONTINUED | OUTPATIENT
Start: 2024-07-12 | End: 2024-07-15 | Stop reason: HOSPADM

## 2024-07-12 RX ORDER — SODIUM CHLORIDE 0.9 % (FLUSH) 0.9 %
5-40 SYRINGE (ML) INJECTION PRN
Status: DISCONTINUED | OUTPATIENT
Start: 2024-07-12 | End: 2024-07-15 | Stop reason: HOSPADM

## 2024-07-12 RX ORDER — ONDANSETRON 2 MG/ML
4 INJECTION INTRAMUSCULAR; INTRAVENOUS EVERY 6 HOURS PRN
Status: DISCONTINUED | OUTPATIENT
Start: 2024-07-12 | End: 2024-07-15 | Stop reason: HOSPADM

## 2024-07-12 RX ORDER — OXYCODONE HYDROCHLORIDE AND ACETAMINOPHEN 5; 325 MG/1; MG/1
1 TABLET ORAL EVERY 4 HOURS PRN
Status: DISCONTINUED | OUTPATIENT
Start: 2024-07-12 | End: 2024-07-15 | Stop reason: HOSPADM

## 2024-07-12 RX ORDER — ENOXAPARIN SODIUM 100 MG/ML
40 INJECTION SUBCUTANEOUS DAILY
Status: DISCONTINUED | OUTPATIENT
Start: 2024-07-12 | End: 2024-07-12

## 2024-07-12 RX ADMIN — TACROLIMUS 3 MG: 1 CAPSULE ORAL at 21:18

## 2024-07-12 RX ADMIN — ONDANSETRON 4 MG: 4 TABLET, ORALLY DISINTEGRATING ORAL at 12:25

## 2024-07-12 RX ADMIN — OXYCODONE HYDROCHLORIDE AND ACETAMINOPHEN 1 TABLET: 5; 325 TABLET ORAL at 21:21

## 2024-07-12 RX ADMIN — KIT FOR THE PREPARATION OF TECHNETIUM TC 99M ALBUMIN AGGREGATED 6.6 MILLICURIE: 2.5 INJECTION, POWDER, FOR SOLUTION INTRAVENOUS at 13:47

## 2024-07-12 RX ADMIN — SODIUM CHLORIDE, PRESERVATIVE FREE 10 ML: 5 INJECTION INTRAVENOUS at 21:21

## 2024-07-12 RX ADMIN — VANCOMYCIN HYDROCHLORIDE 1500 MG: 10 INJECTION, POWDER, LYOPHILIZED, FOR SOLUTION INTRAVENOUS at 13:24

## 2024-07-12 RX ADMIN — OXYCODONE HYDROCHLORIDE AND ACETAMINOPHEN 1 TABLET: 5; 325 TABLET ORAL at 16:08

## 2024-07-12 RX ADMIN — MORPHINE SULFATE 4 MG: 4 INJECTION, SOLUTION INTRAMUSCULAR; INTRAVENOUS at 12:24

## 2024-07-12 RX ADMIN — PIPERACILLIN AND TAZOBACTAM 4500 MG: 4; .5 INJECTION, POWDER, LYOPHILIZED, FOR SOLUTION INTRAVENOUS at 12:25

## 2024-07-12 ASSESSMENT — PAIN DESCRIPTION - DESCRIPTORS
DESCRIPTORS: ACHING
DESCRIPTORS: ACHING

## 2024-07-12 ASSESSMENT — PAIN SCALES - GENERAL
PAINLEVEL_OUTOF10: 4
PAINLEVEL_OUTOF10: 6
PAINLEVEL_OUTOF10: 8
PAINLEVEL_OUTOF10: 4
PAINLEVEL_OUTOF10: 10

## 2024-07-12 ASSESSMENT — PAIN DESCRIPTION - LOCATION
LOCATION: ABDOMEN
LOCATION: GENERALIZED
LOCATION: CHEST
LOCATION: ABDOMEN

## 2024-07-12 ASSESSMENT — PAIN DESCRIPTION - PAIN TYPE: TYPE: CHRONIC PAIN

## 2024-07-12 ASSESSMENT — PAIN - FUNCTIONAL ASSESSMENT: PAIN_FUNCTIONAL_ASSESSMENT: 0-10

## 2024-07-12 ASSESSMENT — PAIN SCALES - WONG BAKER: WONGBAKER_NUMERICALRESPONSE: HURTS LITTLE MORE

## 2024-07-12 ASSESSMENT — HEART SCORE: ECG: NON-SPECIFC REPOLARIZATION DISTURBANCE/LBTB/PM

## 2024-07-12 NOTE — PROGRESS NOTES
[facility]   Pharmacy Pharmacokinetic Monitoring Service - Vancomycin     Efrain Mayorga is a 69 y.o. male starting on Vancomycin therapy for SSTI - 7 day Tx. Pharmacy consulted by Dr. Char Harding for monitoring and adjustment.    Target Concentration: Goal AUC/HENRY 400-600 mg*hr/L    Additional Antimicrobials: Zosyn    Pertinent Laboratory Values:   Wt Readings from Last 1 Encounters:   07/12/24 60.3 kg (133 lb)     Temp Readings from Last 1 Encounters:   07/12/24 97.7 °F (36.5 °C) (Temporal)     Estimated Creatinine Clearance: 43 mL/min (A) (based on SCr of 1.39 mg/dL (H)).  Recent Labs     07/12/24  0945   CREATININE 1.39*   BUN 77*   WBC 14.0*     Plan:  Dosing recommendations based on Bayesian software  Patient received Vancomycin 1500 mg IV at 15:18 today, will continue with Vancomycin 56210 mg IV q24hrs x 7 days  Anticipated AUC of 498 mg/l.hr and Trough concentration of 13.3 mg/l at steady state  Renal labs as indicated   Pharmacy will continue to monitor patient and adjust therapy as indicated    JOSE BLOCK RPH, BCPS  7/12/2024 4:49 PM   [phone]

## 2024-07-12 NOTE — H&P
History & Physical    Patient: Efrain Mayorga MRN: [id number]  CSN: [id number]    YOB: 1955  Age: 69 y.o.  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 Left ventricle is mildly dilated. Normal wall thickness. Mild global hypokinesis present. Normal diastolic function   V/q scan normal     Cellulitis of left arm   D consulted   Received  vanc and zosyn in [place], recommend to hold abx for now . Skin biopsy , send pus for cx   Pain control     Focal segmental glomerulosclerosis -  Was on prednisone and tacrolimus -last admission, continue also lovenox for dvt prophy -verified with rn not on steroid now       Prostate cancer, bladder cancer. Urostomy   Seeing Dr. Debbie MCCLAIN oncologist  Received immunotherapy before, no active treatment right now  s/p   1. Radical cystoprostatectomy  2. Extended pelvic lymph node dissection  3. Ileal conduit urinary diversion with single-J stents bilaterally   On 03/28/2023     Lung nodule   Will have chest ct     Anemia stable   Due to chronic illness  No bleeding reported      Hypertension  Well controlled      CAD  No acute issues     Hyperlipidemia  Continue statin      Hypothyroidism  continue synthyroid     DM -on steroid ssi   Full code     Please note that this dictation was completed with [employer], the [employer] voice recognition software.  Quite often  lymphadenopathy. PLEURAL FLUID: No pleural effusion. PERICARDIAL FLUID: No pericardial effusion. CORONARY ARTERY CALCIFICATIONS: Present OTHER: Borderline aneurysmal dilatation of the ascending aorta measuring 3.9 cm is stable. Right chest port. Abdomen: Lack of IV contrast limits evaluation of the solid abdominal organs. LIVER: New nonspecific low-density lesion in the central left hepatic lobe measuring 1.5 cm (2-56). No biliary ductal dilatation GALLBLADDER: Contracted with tiny calcified stones SPLEEN: Unremarkable PANCREAS: No mass or ductal dilatation. ADRENALS: Unremarkable. KIDNEYS/URETERS: No abnormal mass or hydronephrosis. Right anterior abdominal ileal conduit. PERITONEUM: New and larger numerous enlarged retroperitoneal lymph nodes. A left retroperitoneal lymph node measures 2.2 cm (66), compared to 1.4 cm. Another left retroperitoneal lymph node measures 1.3 cm (76), compared to 0.7 cm. No ascites. COLON: Mild short segment areas of bowel wall thickening and colitis at the splenic flexure and mid transverse colon. APPENDIX: Not visualized SMALL BOWEL: No evidence for acute abnormality or obstruction STOMACH: Unremarkable. Pelvis:  PELVIS: Cystectomy and pelvic lymph node dissection. No pelvic lymphadenopathy or free fluid. BONES: Moderate degenerative changes in the spine ADDITIONAL COMMENTS: N/A     1. Progression of retroperitoneal lymphadenopathy. 2. A few new tiny nonspecific pulmonary nodules. 3. New small nonspecific low-density left hepatic lobe lesion. 4. Refer to above findings for complete details.. Electronically signed by Boyd Stovall    Vascular duplex upper extremity venous left    Result Date: 6/14/2024    Chronic non-occlusive superficial vein thrombosis in the cephalic vein of the left upper arm.   No evidence of acute and chronic deep vein thrombosis in the left upper extremity.   For comparison purposes, the right subclavian vein was investigated. This vein appeared to show normal  color filling and Doppler interrogation showed phasic, spontaneous, and somewhat pulsatile flow.       ROCAEL ARRINGTON MD, Internal Medicine     CC: Arnoldo St DO

## 2024-07-12 NOTE — ED TRIAGE NOTES
Patient arrives via private vehicle for chest pain that started a couple months ago. Patient also has a reddened rash with pustules on his left forearm. Patient also endorses left leg swelling.       AxOx4.       Hx of bladder cancer, diabetes, ESRD.

## 2024-07-12 NOTE — ED PROVIDER NOTES
[address] CARDIAC/MEDICAL  EMERGENCY DEPARTMENT ENCOUNTER       Pt Name: Efrain Mayorga  MRN: [id number]  Birthdate 1955  Date of evaluation: 7/12/2024  PCP: Arnoldo St DO  Note Started: 7:00 PM 7/12/24     CHIEF COMPLAINT       Chief Complaint   Patient presents with    Chest Pain        HISTORY OF PRESENT ILLNESS: 1 or more elements      History From: Patient  HPI Limitations: None  Chronic Conditions: Hypertension, anemia, stage III CKD, fibromyalgia, GERD, bladder cancer  Social Determinants affecting Dx or Tx: None      Efrain Mayorga is a 69 y.o. male with history hypertension, anemia, stage III CKD, fibromyalgia, GERD, bladder cancer (was doing chemo and immunosuppressants but states this has been stopped) who presents to ED c/o chest pain and rash to left arm.  Patient sees Dr. Faust for cardiology, states chest pain has been intermittent and happening for approximately 2 months.  Pain is generally central and patient states it \"feels nothing like my heart attack.\"  Patient reports this pain feels \"like my whole core is coming out or like something is crunching.\"  Pain is worse with certain movements.  Patient denies new medications, reports rash to left forearm began yesterday, states it is pruritic but not painful, went to [facility] and was told it was an ingrown hair.  Patient began p.o. doxycycline yesterday.  Patient denies headache, fever/chills, body aches.  Patient was admitted to the hospital in March for anasarca.  States he has seen Dr. Faust since the onset of this chest pain.  Patient also notes edema to left lower extremity but he is having difficulty pinning down exactly when this started.  Patient reports he believes he has shortness of breath sometimes due to pain.     Nursing Notes were all reviewed and agreed with or any disagreements were addressed in the HPI.    PAST HISTORY     Past Medical History:  Past Medical History:   Diagnosis Date    Arthritis     Bladder  Normocephalic and atraumatic.      Right Ear: External ear normal.      Left Ear: External ear normal.      Nose: Nose normal.      Mouth/Throat:      Mouth: Mucous membranes are moist.      Pharynx: Oropharynx is clear.   Eyes:      Extraocular Movements: Extraocular movements intact.   Cardiovascular:      Rate and Rhythm: Normal rate and regular rhythm.      Pulses: Normal pulses.      Heart sounds: Normal heart sounds.   Pulmonary:      Effort: Pulmonary effort is normal.      Breath sounds: Normal breath sounds.   Chest:      Chest wall: No tenderness.       Abdominal:      General: Bowel sounds are normal.      Palpations: Abdomen is soft.      Tenderness: There is no abdominal tenderness.       Musculoskeletal:         General: Normal range of motion.      Cervical back: Normal range of motion and neck supple.      Left lower le+ Pitting Edema present.   Skin:     General: Skin is warm and dry.      Capillary Refill: Capillary refill takes less than 2 seconds.      Findings: Erythema and rash present. Rash is purpuric and pustular.      Comments: Scattered purpura, most notably to upper extremities, patient and wife state this is baseline  Scattered pustular lesions to left forearm, erythema is present, no drainage at this point   Neurological:      General: No focal deficit present.      Mental Status: He is alert and oriented to person, place, and time.   Psychiatric:         Mood and Affect: Mood normal.         Behavior: Behavior normal. Behavior is cooperative.                DIAGNOSTIC RESULTS   LABS:    Recent Results (from the past 24 hour(s))   EKG 12 Lead    Collection Time: 24  9:25 AM   Result Value Ref Range    Ventricular Rate 76 BPM    Atrial Rate 76 BPM    P-R Interval 132 ms    QRS Duration 90 ms    Q-T Interval 358 ms    QTc Calculation (Bazett) 402 ms    P Axis 69 degrees    R Axis -67 degrees    T Axis 65 degrees    Diagnosis       Normal sinus rhythm  Left axis deviation  Left  chest pain has been intermittent and happening for approximately 2 months.  Pain is generally central and patient states it \"feels nothing like my heart attack.\"  Patient reports this pain feels \"like my whole core is coming out or like something is crunching.\"  Pain is worse with certain movements.  Patient denies new medications, reports rash to left forearm began yesterday, states it is pruritic but not painful, went to [facility] and was told it was an ingrown hair.  Patient began p.o. doxycycline yesterday.  Patient denies headache, fever/chills, body aches.  Patient was admitted to the hospital in March for anasarca.  States he has seen Dr. Faust since the onset of this chest pain.  Patient also notes edema to left lower extremity but he is having difficulty pinning down exactly when this started.  Patient reports he believes he has shortness of breath sometimes due to pain.In evaluation of the above differential diagnosis, consideration was given to the following tests and treatments: Patient is in no acute distress, vital signs are stable.  Patient is hypertensive but has history of same, patient does not meet SIRS criteria, not tachypneic not hypoxic and not tachycardic.  Pustular rash to left forearm on erythematous base on exam, pitting edema to left lower extremity without erythema or warmth.  CMP showing elevated creatinine at 1.39, GFR of 55, this is at patient's baseline.  .  High-sensitivity troponins x 2 was 63 and 54 respectively, EKGs x 2 negative for STEMI.  Elevated WBCs at 14, there are no bands, patient does not meet SIRS criteria.  Patient is neurovascularly intact on exam, D-dimer is elevated at 1.11, VQ scan normal, chest x-ray does show new left lung nodule, concern for mets due to patient's history.  No DVT on venous duplex.  Patient is on long-term steroids and this may be the cause of leukocytosis, however patient does not have history of same on recent CBCs.  Case discussed

## 2024-07-12 NOTE — PROGRESS NOTES
Occupational Therapy  Orders received and chart reviewed. Patient unable to be seen at this time as he is off the floor.   Will follow up as patient schedule allows  Jo Mathis OTR/ISAI

## 2024-07-12 NOTE — ED NOTES
TRANSFER - OUT REPORT:    Verbal report given to JUSTIN Kasper on Efrain Mayorga  being transferred to  for routine progression of patient care       Report consisted of patient's Situation, Background, Assessment and   Recommendations(SBAR).     Information from the following report(s) Nurse Handoff Report, ED Encounter Summary, ED SBAR, MAR, Recent Results, Quality Measures, and Event Log was reviewed with the receiving nurse.    [place] Fall Assessment:    Presents to emergency department  because of falls (Syncope, seizure, or loss of consciousness): No  Age > 70: No  Altered Mental Status, Intoxication with alcohol or substance confusion (Disorientation, impaired judgment, poor safety awaremess, or inability to follow instructions): No  Impaired Mobility: Ambulates or transfers with assistive devices or assistance; Unable to ambulate or transer.: Yes  Nursing Judgement: Yes          Lines:   Peripheral IV 07/12/24 Right Forearm (Active)   Site Assessment Clean, dry & intact 07/12/24 0946        Opportunity for questions and clarification was provided.      Patient transported with:  Registered Nurse

## 2024-07-12 NOTE — CONSULTS
[facility]  (A Division of [facility])                                                           Date of Admission: 7/12/2024       Reason for Consult: left arm skin rash  Referring MD: Dr Richardson    C/C: chest /abd pain and left arm rash    Current Antimicrobials:    Prior Antimicrobials:  Vanco   Zosyn X1 doses each in ED   PO doxycycline 7   Allergy to antibiotics: NA       Assessment--ID related:     Immunosuppressed patient due to Cellcept and high dose Prednisone with:    Pustular left arm skin rash--- superimposed on ecchymosis-can be infectious Vs non-infectious  -- no calor/tenderness typical of bacterial cellultitis such as strep/staph that is expected even in immunosuppressed pt. Suspect atypical skin infection such as nocardia/actino/ or fungal infection    Leucocytosis to 14K, hemodynamically stable.     Left lung nodule on CXR  Left leg edema-- no DVT on PVL    Nephrotic syndrome( FSGS)- on cellcept/high prednisone down to 45mg daily-- prophylaxis for PCP?    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  CKD    Bladder cancer- post resecton, diverting ostomy in place    History of arm cellulitis in March 2024  Mediport-right chest  HTN  CAD  Hypothyroid  History of prostate cancer    Microlab data:    Blood culture --none yet      Recommendation -- ID related:     This is atypical skin infection and can be anyting- if infectious, and need tissue cultures and histology-- can send aspirate/pus for GS and bacterial culture, fungal stain and culture and modified AFB stain and culture to look for nocardia/actinomyces ,fungal infection etc      Surgical tissue biopsy - for bacterial-aerobic/anaerobic cultur, fungal stain and culture, afb stain ( including modified AFB) and AFB culture and histology needed. Consult with surgical team    Will send off fungitell/galactomannan    Can hold off emperic antibiotic and monitor closely.If skin rash becomes more cellulitic, will  consider Zosyn. If hemodynamically unstable, can add vanco    Nystatin Po for oral thrush  Needs PCP prophylaxis if maintained on Prednisone high dose for long time- can start once tissue biopsy is completed  CT scan CAP    Supportive care and additional treatment per respective team     [facility] ED team , Dr Harding    Thank you for involving me in the care of this patient. Dr Akbar will follow from Monday        HPI:      Efrain Mayorga is a 69 y.o. male with PMH NS follows with renal MD, on cellcept and Prednisone, history of bladder cancer post resection/diversion and few rounds of chemo via [employer], currently not active per patient.    Came in due to upper abd pain for about a week or so on left arm but looks worse last 1-2 days . No pain on left arm. No subjective fever or chills.  Started on doxycycline by Urgent care yesterday. Denies cough/dysphagia. Has lost much body swelling after his treatment for NS.    Denies travel/ contact with plants or activities such as gardening-skin trauma. No fish tank. No travel. No febrile sick contact.         Past Medical History:   Diagnosis Date    Arthritis     Bladder cancer (HCC)     Fibromyalgia     GERD (gastroesophageal reflux disease)     Heart attack (HCC) 03/2017    Hematuria, gross     Hypertension 2017     Past Surgical History:   Procedure Laterality Date    CT BIOPSY RENAL  3/14/2024    CT BIOPSY RENAL 3/14/2024 [facility] RAD CT    HERNIA REPAIR Right     inguinal    LEFT HEART PERCUTANEOUS  03/29/2017    ERNESTO CORONARY ARTERIOGRAPH  03/29/2017    TRANSURETHRAL RESEC BLADDER NECK       No family history on file.      Medications:  Current Facility-Administered Medications   Medication Dose Route Frequency Provider Last Rate Last Admin    iopamidol (ISOVUE-370) 76 % injection 75 mL  75 mL IntraVENous ONCE PRN Ashley Cisneros R, APRN - NP        sodium chloride flush 0.9 % injection 5-40 mL  5-40 mL IntraVENous 2 times per day Char Harding MD        sodium chloride flush 0.9 %

## 2024-07-13 PROBLEM — L12.0 BULLOUS PEMPHIGOID: Status: ACTIVE | Noted: 2024-07-13

## 2024-07-13 LAB
ANION GAP SERPL CALC-SCNC: 4 MMOL/L (ref 3–18)
BASOPHILS # BLD: 0 K/UL (ref 0–0.1)
BASOPHILS NFR BLD: 0 % (ref 0–2)
BUN SERPL-MCNC: 69 MG/DL (ref 7–18)
BUN/CREAT SERPL: 59 (ref 12–20)
CALCIUM SERPL-MCNC: 9.3 MG/DL (ref 8.5–10.1)
CHLORIDE SERPL-SCNC: 109 MMOL/L (ref 100–111)
CO2 SERPL-SCNC: 26 MMOL/L (ref 21–32)
CREAT SERPL-MCNC: 1.17 MG/DL (ref 0.6–1.3)
DIFFERENTIAL METHOD BLD: ABNORMAL
EOSINOPHIL # BLD: 0 K/UL (ref 0–0.4)
EOSINOPHIL NFR BLD: 0 % (ref 0–5)
ERYTHROCYTE [DISTWIDTH] IN BLOOD BY AUTOMATED COUNT: 15.1 % (ref 11.6–14.5)
GLUCOSE BLD STRIP.AUTO-MCNC: 104 MG/DL (ref 70–110)
GLUCOSE BLD STRIP.AUTO-MCNC: 149 MG/DL (ref 70–110)
GLUCOSE BLD STRIP.AUTO-MCNC: 194 MG/DL (ref 70–110)
GLUCOSE BLD STRIP.AUTO-MCNC: 215 MG/DL (ref 70–110)
GLUCOSE SERPL-MCNC: 111 MG/DL (ref 74–99)
HCT VFR BLD AUTO: 36.7 % (ref 36–48)
HGB BLD-MCNC: 11.5 G/DL (ref 13–16)
IMM GRANULOCYTES # BLD AUTO: 0.2 K/UL (ref 0–0.04)
IMM GRANULOCYTES NFR BLD AUTO: 2 % (ref 0–0.5)
LYMPHOCYTES # BLD: 0.6 K/UL (ref 0.9–3.6)
LYMPHOCYTES NFR BLD: 5 % (ref 21–52)
MAGNESIUM SERPL-MCNC: 1.8 MG/DL (ref 1.6–2.6)
MCH RBC QN AUTO: 30 PG (ref 24–34)
MCHC RBC AUTO-ENTMCNC: 31.3 G/DL (ref 31–37)
MCV RBC AUTO: 95.8 FL (ref 78–100)
MONOCYTES # BLD: 1.1 K/UL (ref 0.05–1.2)
MONOCYTES NFR BLD: 9 % (ref 3–10)
NEUTS SEG # BLD: 10.2 K/UL (ref 1.8–8)
NEUTS SEG NFR BLD: 84 % (ref 40–73)
NRBC # BLD: 0.02 K/UL (ref 0–0.01)
NRBC BLD-RTO: 0.2 PER 100 WBC
PLATELET # BLD AUTO: 180 K/UL (ref 135–420)
PMV BLD AUTO: 9.1 FL (ref 9.2–11.8)
POTASSIUM SERPL-SCNC: 4.7 MMOL/L (ref 3.5–5.5)
RBC # BLD AUTO: 3.83 M/UL (ref 4.35–5.65)
SODIUM SERPL-SCNC: 139 MMOL/L (ref 136–145)
TROPONIN I SERPL HS-MCNC: 44 NG/L (ref 0–78)
TROPONIN I SERPL HS-MCNC: 46 NG/L (ref 0–78)
WBC # BLD AUTO: 12.1 K/UL (ref 4.6–13.2)

## 2024-07-13 PROCEDURE — 6360000002 HC RX W HCPCS: Performed by: INTERNAL MEDICINE

## 2024-07-13 PROCEDURE — 87206 SMEAR FLUORESCENT/ACID STAI: CPT

## 2024-07-13 PROCEDURE — 84484 ASSAY OF TROPONIN QUANT: CPT

## 2024-07-13 PROCEDURE — 6360000002 HC RX W HCPCS: Performed by: HOSPITALIST

## 2024-07-13 PROCEDURE — 1100000003 HC PRIVATE W/ TELEMETRY

## 2024-07-13 PROCEDURE — 87205 SMEAR GRAM STAIN: CPT

## 2024-07-13 PROCEDURE — 87116 MYCOBACTERIA CULTURE: CPT

## 2024-07-13 PROCEDURE — 87102 FUNGUS ISOLATION CULTURE: CPT

## 2024-07-13 PROCEDURE — 80048 BASIC METABOLIC PNL TOTAL CA: CPT

## 2024-07-13 PROCEDURE — 36415 COLL VENOUS BLD VENIPUNCTURE: CPT

## 2024-07-13 PROCEDURE — 97161 PT EVAL LOW COMPLEX 20 MIN: CPT

## 2024-07-13 PROCEDURE — 85025 COMPLETE CBC W/AUTO DIFF WBC: CPT

## 2024-07-13 PROCEDURE — 2580000003 HC RX 258: Performed by: HOSPITALIST

## 2024-07-13 PROCEDURE — 6370000000 HC RX 637 (ALT 250 FOR IP): Performed by: HOSPITALIST

## 2024-07-13 PROCEDURE — 87077 CULTURE AEROBIC IDENTIFY: CPT

## 2024-07-13 PROCEDURE — 6370000000 HC RX 637 (ALT 250 FOR IP): Performed by: INTERNAL MEDICINE

## 2024-07-13 PROCEDURE — 87075 CULTR BACTERIA EXCEPT BLOOD: CPT

## 2024-07-13 PROCEDURE — 87147 CULTURE TYPE IMMUNOLOGIC: CPT

## 2024-07-13 PROCEDURE — 82962 GLUCOSE BLOOD TEST: CPT

## 2024-07-13 PROCEDURE — 83735 ASSAY OF MAGNESIUM: CPT

## 2024-07-13 PROCEDURE — 87070 CULTURE OTHR SPECIMN AEROBIC: CPT

## 2024-07-13 PROCEDURE — 87186 SC STD MICRODIL/AGAR DIL: CPT

## 2024-07-13 RX ORDER — TACROLIMUS 1 MG/1
2 CAPSULE ORAL 2 TIMES DAILY
Status: DISCONTINUED | OUTPATIENT
Start: 2024-07-13 | End: 2024-07-15 | Stop reason: HOSPADM

## 2024-07-13 RX ADMIN — OXYCODONE HYDROCHLORIDE AND ACETAMINOPHEN 1 TABLET: 5; 325 TABLET ORAL at 09:38

## 2024-07-13 RX ADMIN — TACROLIMUS 3 MG: 1 CAPSULE ORAL at 09:38

## 2024-07-13 RX ADMIN — TACROLIMUS 2 MG: 1 CAPSULE ORAL at 20:39

## 2024-07-13 RX ADMIN — NYSTATIN 500000 UNITS: 100000 SUSPENSION ORAL at 13:18

## 2024-07-13 RX ADMIN — SODIUM CHLORIDE, PRESERVATIVE FREE 10 ML: 5 INJECTION INTRAVENOUS at 20:45

## 2024-07-13 RX ADMIN — NYSTATIN 500000 UNITS: 100000 SUSPENSION ORAL at 20:39

## 2024-07-13 RX ADMIN — OXYCODONE HYDROCHLORIDE AND ACETAMINOPHEN 1 TABLET: 5; 325 TABLET ORAL at 23:43

## 2024-07-13 RX ADMIN — SODIUM CHLORIDE, PRESERVATIVE FREE 10 ML: 5 INJECTION INTRAVENOUS at 10:50

## 2024-07-13 RX ADMIN — LEVOTHYROXINE SODIUM 137.5 MCG: 0.1 TABLET ORAL at 06:33

## 2024-07-13 ASSESSMENT — PAIN DESCRIPTION - LOCATION
LOCATION: ABDOMEN
LOCATION: ABDOMEN

## 2024-07-13 ASSESSMENT — PAIN SCALES - GENERAL
PAINLEVEL_OUTOF10: 10
PAINLEVEL_OUTOF10: 9
PAINLEVEL_OUTOF10: 10

## 2024-07-13 ASSESSMENT — PAIN DESCRIPTION - DESCRIPTORS
DESCRIPTORS: ACHING
DESCRIPTORS: ACHING;CRAMPING;DISCOMFORT

## 2024-07-13 NOTE — CONSULTS
Nephrology Consult    Patient: Efrain Mayorga  Requesting physician: Char Garcaí  Reason for consult: Nephrotic syndrome on Immunosuppression    CC: Worsening skin lesions    History of Present Illness:  Efrain Mayorga is a 69 y.o. male with a past medical history significant for anxiety, CAD, HTN, tobacco use, bladder urothelial CA and also adenoCA of prostate, seen Dr Ya for urology and then Dr Garza oncology. He underwent neoadjuvant chemo with split dose cisplatin/gemcitabine since 12/20/22, until 3/2023. S/p cystectomy/ prostatectomy on 3/23 and RLQ ileostomy with urine bag. His Nivolumab/Opdivo was the placed on hold after the last dose on 9/18/23 due to hypoalbuminemia and worsening edema. Started on levothyroxine in 9/2023 for newly dx. Pt was on NSAID for his arthritic pain which was stopped.  CT scan in 12/23 showed new retroperitoneal adenopathy on the left concerning for recurrent/metastatic disease. Bone scan showed a lesion in right third rib and at T1 spinous process. Pt was admitted to [facility] in March for edema and worsening renal function, then readmitted April, on amox for strep cellulitis. Lasix was switched to bumex.  Pt was d/c on higher dose of prednisone, 60mg po daily.  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  Since early April 24, he was started on tacrolimus 3mg po bid as Oncology was consulted and cleared to use tacrolimus. His anasarca was treated with IV bumex/ po metolazone with significant diuresis. Pt was then sent home on bumex 2mg tid and metolazone 10mg po bid.    Pt was last seen in our [facility] office in June 2024, has been off metolazone and on on bumex 2mg prn only. Stoma working ok. Pt also has been seeing endocrinology at [facility], Dr Gomez for glycemic control.  Has continued taking tacrolimus and starting pred slow weaning, now on 45mg daily.   Transportation Needs (7/12/2024)    PRAPARE - Transportation     Lack of Transportation (Medical): No     Lack of Transportation (Non-Medical): No   Housing Stability: Low Risk  (7/12/2024)    Housing Stability Vital Sign     Unable to Pay for Housing in the Last Year: No     Number of Places Lived in the Last Year: 1     Unstable Housing in the Last Year: No       Family History:  No family history on file.  Pt reports no kidney disease in the family.    Allergy:  No Known Allergies     Medication:  Home meds: reviewed    Inpatient meds:  Current Facility-Administered Medications   Medication Dose Route Frequency    nystatin (MYCOSTATIN) 272497 UNIT/ML suspension 500,000 Units  5 mL Oral 4x Daily    iopamidol (ISOVUE-370) 76 % injection 75 mL  75 mL IntraVENous ONCE PRN    sodium chloride flush 0.9 % injection 5-40 mL  5-40 mL IntraVENous 2 times per day    sodium chloride flush 0.9 % injection 5-40 mL  5-40 mL IntraVENous PRN    0.9 % sodium chloride infusion   IntraVENous PRN    ondansetron (ZOFRAN-ODT) disintegrating tablet 4 mg  4 mg Oral Q8H PRN    Or    ondansetron (ZOFRAN) injection 4 mg  4 mg IntraVENous Q6H PRN    polyethylene glycol (GLYCOLAX) packet 17 g  17 g Oral Daily PRN    acetaminophen (TYLENOL) tablet 650 mg  650 mg Oral Q6H PRN    Or    acetaminophen (TYLENOL) suppository 650 mg  650 mg Rectal Q6H PRN    oxyCODONE-acetaminophen (PERCOCET) 5-325 MG per tablet 1 tablet  1 tablet Oral Q4H PRN    glucose chewable tablet 16 g  4 tablet Oral PRN    dextrose bolus 10% 125 mL  125 mL IntraVENous PRN    Or    dextrose bolus 10% 250 mL  250 mL IntraVENous PRN    glucagon injection 1 mg  1 mg SubCUTAneous PRN    dextrose 10 % infusion   IntraVENous Continuous PRN    insulin lispro (HUMALOG,ADMELOG) injection vial 0-8 Units  0-8 Units SubCUTAneous TID WC    insulin lispro (HUMALOG,ADMELOG) injection vial 0-4 Units  0-4 Units SubCUTAneous Nightly    [Held by provider] piperacillin-tazobactam (ZOSYN) 3,375 mg

## 2024-07-13 NOTE — H&P (VIEW-ONLY)
Consult Note    Patient: Efrain Mayorga               Sex: male          DOA: 2024         YOB: 1955      Age:  69 y.o.        LOS:  LOS: 1 day              HPI:     Efrain Mayorga is a 69 y.o. male who has been seen for lesions on his left arm.  Patient states that this came on relatively suddenly.  He states that it is not tender, but that it is very itchy.  In addition the patient is complaining of mid epigastric pain that radiates directly through to the back.  He was attributing this to rib pain although he denies any thoracic trauma.  He is very anxious to go home and take care of his wife who is also ill.    Past Medical History:   Diagnosis Date    Arthritis     Bladder cancer (HCC)     Fibromyalgia     GERD (gastroesophageal reflux disease)     Heart attack (HCC) 2017    Hematuria, gross     Hypertension        Past Surgical History:   Procedure Laterality Date    CT BIOPSY RENAL  3/14/2024    CT BIOPSY RENAL 3/14/2024 [facility] RAD CT    HERNIA REPAIR Right     inguinal    LEFT HEART PERCUTANEOUS  2017    ERNESTO CORONARY ARTERIOGRAPH  2017    TRANSURETHRAL RESEC BLADDER NECK         No family history on file.    Social History     Socioeconomic History    Marital status:    Tobacco Use    Smoking status: Former     Current packs/day: 0.00     Types: Cigarettes     Quit date: 10/18/2022     Years since quittin.7    Smokeless tobacco: Never   Substance and Sexual Activity    Alcohol use: No    Drug use: No     Social Determinants of Health     Food Insecurity: No Food Insecurity (2024)    Hunger Vital Sign     Worried About Running Out of Food in the Last Year: Never true     Ran Out of Food in the Last Year: Never true   Transportation Needs: No Transportation Needs (2024)    PRAPARE - Transportation     Lack of Transportation (Medical): No     Lack of Transportation (Non-Medical): No   Housing Stability: Low Risk  (2024)    Housing Stability Vital

## 2024-07-13 NOTE — CARE COORDINATION
Care manager met with patient and only child son at bedside.  Per patient, his wife is an eight year lung transplant recipient; patient and son discussed that he no longer wishes to have chemo, that the cancer has spread to his liver; encouraged patient to have rob POST discussion with PCP and answered questions regarding what Hospice care entails.  Per patient, he clearly desires choice in his future planning; care manager will support with information and assist as needed.     07/13/24 1215   Service Assessment   Patient Orientation Alert and Oriented   Cognition Alert   History Provided By Patient   Primary Caregiver Self   Support Systems Spouse/Significant Other;Children   Patient's Healthcare Decision Maker is: Named in Scanned ACP Document   PCP Verified by CM Yes   Last Visit to PCP Within last 3 months   Prior Functional Level Independent in ADLs/IADLs   Current Functional Level Independent in ADLs/IADLs   Can patient return to prior living arrangement Yes   Ability to make needs known: Good   Family able to assist with home care needs: Yes   Financial Resources Medicare   Social/Functional History   Lives With Spouse   Type of Home House   Home Layout One level   Home Access Stairs to enter with rails   Entrance Stairs - Number of Steps 4   Home Equipment Walker - Rolling;Wheelchair - Electric   ADL Assistance Independent   Homemaking Assistance Independent   Homemaking Responsibilities No   Ambulation Assistance Independent   Transfer Assistance Independent   Active  No   Occupation Retired   Discharge Planning   Living Arrangements Spouse/Significant Other   Current Services Prior To Admission None   Potential Assistance Needed N/A   DME Ordered? No   Potential Assistance Purchasing Medications No   Type of Home Care Services None   Patient expects to be discharged to: House   Follow Up Appointment: Best Day/Time  Wednesday AM   History of falls? 0   Services At/After Discharge   Transition of

## 2024-07-13 NOTE — PLAN OF CARE
Problem: Discharge Planning  Goal: Discharge to home or other facility with appropriate resources  Outcome: Progressing  Flowsheets (Taken 7/13/2024 0720)  Discharge to home or other facility with appropriate resources:   Identify barriers to discharge with patient and caregiver   Arrange for needed discharge resources and transportation as appropriate   Identify discharge learning needs (meds, wound care, etc)     Problem: Pain  Goal: Verbalizes/displays adequate comfort level or baseline comfort level  Outcome: Progressing     Problem: Skin/Tissue Integrity  Goal: Absence of new skin breakdown  Description: 1.  Monitor for areas of redness and/or skin breakdown  2.  Assess vascular access sites hourly  3.  Every 4-6 hours minimum:  Change oxygen saturation probe site  4.  Every 4-6 hours:  If on nasal continuous positive airway pressure, respiratory therapy assess nares and determine need for appliance change or resting period.  Outcome: Progressing

## 2024-07-13 NOTE — PROGRESS NOTES
Physical Therapy Goals:  Initiated 7/13/2024 to be met within 7-10 days.  Short Term Goals  Short Term Goal 1: Patient will perform supine to/from sit with independence  Short Term Goal 2: Patient will perform sit to/from stand with supervision in prep for gait progression  Short Term Goal 3: Patient will ambulate 150 ft with LRAD and supervision to progress OOB mobility  Short Term Goal 4: Patient will negotiate 4 stairs with handrails and CGA  PHYSICAL THERAPY EVALUATION    Patient: Efrain Mayorga (69 y.o. male)  Date: 7/13/2024  Diagnosis: Cellulitis [L03.90]  Skin pustule [L08.9]  Leukocytosis, unspecified type [D72.829]  Chest pain, unspecified type [R07.9] Cellulitis  Precautions: Fall Risk  PLOF: Independent ambulation without AD    ASSESSMENT :  Patient received supine in bed. Pt presents with decreased LE strength, decreased endurance, decreased gait quailty, and decreased bed mobility and transfers . Patient reports at rest pain is 0/10 however with bed mobility pt ain increases to 10/10. Patient performed rolling and overall bed mobility with stand by assist. Patient declined EOB and OOB attempts at this time secondary to pain. Patient would benefit from continued skilled PT to progress functional mobility and OOB as tolerated.     DEFICITS/IMPAIRMENTS:    , Body Structures, Functions, Activity Limitations Requiring Skilled Therapeutic Intervention: Decreased functional mobility ;Decreased strength;Decreased endurance;Decreased balance;Increased pain    Patient will benefit from skilled intervention to address the above impairments.  Patient's rehabilitation potential/Therapy Prognosis: Good.  Factors which may influence rehabilitation potential include:   []         None noted  []         Mental ability/status  [x]         Medical condition  []         Home/family situation and support systems  []         Safety awareness  [x]         Pain tolerance/management  []         Other:      Recommendations and  Planned Interventions:   Strengthening;ROM;Balance training;Functional mobility training;Transfer training;Endurance training;Gait training;Stair training;Neuromuscular re-education;Home exercise program;Safety education & training;Patient/Caregiver education & training;Equipment evaluation, education, & procurement;Therapeutic activities;Positioning    Frequency/Duration: Patient will be followed by physical therapy to address goals, 1-2 times per day/4-7 days per week to address goals.    Further Equipment Recommendations for Discharge:  No    Discharge Recommendations: Home with Home health PT (pending progress)    AMPAC:   AM-PAC Inpatient Mobility without Stair Climbing Raw Score : 16    This AMPAC score should be considered in conjunction with interdisciplinary team recommendations to determine the most appropriate discharge setting. Patient's social support, diagnosis, medical stability, and prior level of function should also be taken into consideration.     SUBJECTIVE:   Patient stated: \"I am doing ok\"    OBJECTIVE DATA SUMMARY:     Past Medical History:   Diagnosis Date    Arthritis     Bladder cancer (HCC)     Fibromyalgia     GERD (gastroesophageal reflux disease)     Heart attack (HCC) 03/2017    Hematuria, gross     Hypertension 2017     Past Surgical History:   Procedure Laterality Date    CT BIOPSY RENAL  3/14/2024    CT BIOPSY RENAL 3/14/2024 [facility] RAD CT    HERNIA REPAIR Right     inguinal    LEFT HEART PERCUTANEOUS  03/29/2017    ERNESTO CORONARY ARTERIOGRAPH  03/29/2017    TRANSURETHRAL RESEC BLADDER NECK       Barriers to Learning/Limitations: none  Compensate with: visual, verbal, tactile, kinesthetic cues/model  Home Situation:  Social/Functional History  Lives With: Family  Type of Home: House  Home Layout: One level  Home Access: Stairs to enter with rails  Entrance Stairs - Number of Steps: 4  Entrance Stairs - Rails: Both  Home Equipment: Walker - Rolling (transport w/c)  Strength:    Strength:  Generally decreased, functional  Tone & Sensation:   Tone: Normal  Sensation: Intact  Range Of Motion:  AROM: Within functional limits  PROM: Within functional limits  Functional Mobility:  Bed Mobility:     Bed Mobility Training  Bed Mobility Training: Yes  Rolling: Stand-by assistance  Pain:  Pain level pre-treatment: 0/10   Pain level post-treatment: 10/10   Pain Intervention(s): Medication (see MAR); Rest, Ice, Repositioning  Response to intervention: Nurse notified, See doc flow    Activity Tolerance:   Activity Tolerance: Patient tolerated evaluation without incident;Patient limited by pain    After treatment:   []         Patient left in no apparent distress sitting up in chair  [x]         Patient left in no apparent distress in bed  [x]         Call bell left within reach  [x]         Nursing notified  []         Caregiver present  []         Bed alarm activated  []         SCDs applied    COMMUNICATION/EDUCATION:   Patient Education  Education Given To: Patient  Education Provided: Role of Therapy;Plan of Care;Home Exercise Program;Precautions;Fall Prevention Strategies  Education Method: Verbal  Barriers to Learning: None  Education Outcome: Verbalized understanding;Continued education needed    Thank you for this referral.  Krysta Pelletier, PT  Minutes: 16

## 2024-07-13 NOTE — PLAN OF CARE
Problem: Discharge Planning  Goal: Discharge to home or other facility with appropriate resources  7/13/2024 1947 by Michael Kenny RN  Outcome: Progressing  7/13/2024 1847 by Crystal Ann RN  Outcome: Progressing  Flowsheets (Taken 7/13/2024 0720)  Discharge to home or other facility with appropriate resources:   Identify barriers to discharge with patient and caregiver   Arrange for needed discharge resources and transportation as appropriate   Identify discharge learning needs (meds, wound care, etc)     Problem: Pain  Goal: Verbalizes/displays adequate comfort level or baseline comfort level  7/13/2024 1947 by Michael Kenny RN  Outcome: Progressing  7/13/2024 1847 by Crystal Ann, RN  Outcome: Progressing     Problem: Skin/Tissue Integrity  Goal: Absence of new skin breakdown  Description: 1.  Monitor for areas of redness and/or skin breakdown  2.  Assess vascular access sites hourly  3.  Every 4-6 hours minimum:  Change oxygen saturation probe site  4.  Every 4-6 hours:  If on nasal continuous positive airway pressure, respiratory therapy assess nares and determine need for appliance change or resting period.  7/13/2024 1947 by Michael Kenny, RN  Outcome: Progressing  7/13/2024 1847 by Crystal Ann, RN  Outcome: Progressing

## 2024-07-13 NOTE — PLAN OF CARE
Problem: Discharge Planning  Goal: Discharge to home or other facility with appropriate resources  Outcome: Progressing  Flowsheets (Taken 7/12/2024 1654 by Nina Springer RN)  Discharge to home or other facility with appropriate resources: Identify barriers to discharge with patient and caregiver     Problem: Pain  Goal: Verbalizes/displays adequate comfort level or baseline comfort level  Outcome: Progressing

## 2024-07-13 NOTE — PROGRESS NOTES
Hospitalist Progress Note    Patient: Efrain Mayorga MRN: [id number]  [facility]: [id number]    YOB: 1955  Age: 69 y.o.  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  Need a biopsy for further evaluation.  Discussed the case with ID Dr. Meyer and surgery Dr. Ralph    Chest pain: Improved. the trend: flat and negative, no chest pain now   Last echo done in April 2024 :ef 50 - 55%. Left ventricle is mildly dilated. Normal wall thickness. Mild global hypokinesis present. Normal diastolic function   V/q scan normal      Erythema and blisters of left arm: less likely 2nd to infection.   ID consulted   Received  vanc and zosyn in [place], recommend to hold abx for now . Skin biopsy , send pus for cx   Pain control      Focal segmental glomerulosclerosis : Was on prednisone and tacrolimus.  Discussed the case with nephrology /Dr. Newton who is going to see the patient today.      Prostate cancer, bladder cancer. Urostomy   Seeing Dr. Debbie MCCLAIN oncologist  Received immunotherapy before, no active treatment right now  s/p   1. Radical cystoprostatectomy  2. Extended pelvic lymph node dissection  3. Ileal conduit urinary diversion with single-J stents bilaterally   On 03/28/2023     Lung nodule: New. Per chest ct      Anemia stable. Due to chronic illness. No bleeding reported      Hypertension: Well controlled      CAD: No acute issues     Hyperlipidemia: Continue statin       Hypothyroidism     RBC 4.02* 3.83*   HGB 12.2* 11.5*   HCT 38.3 36.7    180   LYMPHOPCT 4* 5*      Cardiac Enzymes No results for input(s): \"CKTOTAL\", \"CKMB\", \"CKMBINDEX\", \"TROPONINI\" in the last 72 hours.    Invalid input(s): \"CHRISTIANO\"   Coagulation Recent Labs     07/12/24  0945   PROTIME 14.9   INR 1.1   APTT 25.5       Lipid Panel Lab Results   Component Value Date/Time    CHOL 239 03/11/2024 02:18 PM    TRIG 240 03/11/2024 02:18 PM    HDL 40 03/11/2024 02:18 PM    VLDL 48 03/11/2024 02:18 PM    CHOLHDLRATIO 6.0 03/11/2024 02:18 PM      BNP No results for input(s): \"BNP\" in the last 72 hours.   Liver Enzymes Recent Labs     07/12/24  0945   ALT 42   AST 29   ALKPHOS 234*   BILITOT 0.6      Thyroid Studies Lab Results   Component Value Date/Time    T4FREE 0.9 04/03/2024 12:35 PM    TSH 0.63 04/03/2024 12:35 PM        Procedures/imaging: see electronic medical records for all procedures/Xrays and details which were not copied into this note but were reviewed prior to creation of Plan      Medications Reviewed  Mireille Ha MD

## 2024-07-14 LAB
ALBUMIN SERPL-MCNC: 2.4 G/DL (ref 3.4–5)
ANION GAP SERPL CALC-SCNC: 6 MMOL/L (ref 3–18)
BASOPHILS # BLD: 0 K/UL (ref 0–0.1)
BASOPHILS NFR BLD: 0 % (ref 0–2)
BUN SERPL-MCNC: 62 MG/DL (ref 7–18)
BUN/CREAT SERPL: 51 (ref 12–20)
CALCIUM SERPL-MCNC: 8.8 MG/DL (ref 8.5–10.1)
CHLORIDE SERPL-SCNC: 109 MMOL/L (ref 100–111)
CO2 SERPL-SCNC: 23 MMOL/L (ref 21–32)
CREAT SERPL-MCNC: 1.21 MG/DL (ref 0.6–1.3)
DIFFERENTIAL METHOD BLD: ABNORMAL
EOSINOPHIL # BLD: 0 K/UL (ref 0–0.4)
EOSINOPHIL NFR BLD: 0 % (ref 0–5)
ERYTHROCYTE [DISTWIDTH] IN BLOOD BY AUTOMATED COUNT: 15.2 % (ref 11.6–14.5)
GLUCOSE BLD STRIP.AUTO-MCNC: 143 MG/DL (ref 70–110)
GLUCOSE BLD STRIP.AUTO-MCNC: 153 MG/DL (ref 70–110)
GLUCOSE BLD STRIP.AUTO-MCNC: 163 MG/DL (ref 70–110)
GLUCOSE SERPL-MCNC: 151 MG/DL (ref 74–99)
HCT VFR BLD AUTO: 36.4 % (ref 36–48)
HGB BLD-MCNC: 11.4 G/DL (ref 13–16)
IMM GRANULOCYTES # BLD AUTO: 0.3 K/UL (ref 0–0.04)
IMM GRANULOCYTES NFR BLD AUTO: 3 % (ref 0–0.5)
LYMPHOCYTES # BLD: 0.7 K/UL (ref 0.9–3.6)
LYMPHOCYTES NFR BLD: 7 % (ref 21–52)
MAGNESIUM SERPL-MCNC: 1.7 MG/DL (ref 1.6–2.6)
MCH RBC QN AUTO: 30.2 PG (ref 24–34)
MCHC RBC AUTO-ENTMCNC: 31.3 G/DL (ref 31–37)
MCV RBC AUTO: 96.3 FL (ref 78–100)
MONOCYTES # BLD: 0.9 K/UL (ref 0.05–1.2)
MONOCYTES NFR BLD: 9 % (ref 3–10)
NEUTS SEG # BLD: 7.7 K/UL (ref 1.8–8)
NEUTS SEG NFR BLD: 81 % (ref 40–73)
NRBC # BLD: 0.03 K/UL (ref 0–0.01)
NRBC BLD-RTO: 0.3 PER 100 WBC
PHOSPHATE SERPL-MCNC: 2.3 MG/DL (ref 2.5–4.9)
PLATELET # BLD AUTO: 164 K/UL (ref 135–420)
PMV BLD AUTO: 9.4 FL (ref 9.2–11.8)
POTASSIUM SERPL-SCNC: 4.7 MMOL/L (ref 3.5–5.5)
RBC # BLD AUTO: 3.78 M/UL (ref 4.35–5.65)
SODIUM SERPL-SCNC: 138 MMOL/L (ref 136–145)
WBC # BLD AUTO: 9.6 K/UL (ref 4.6–13.2)

## 2024-07-14 PROCEDURE — 87449 NOS EACH ORGANISM AG IA: CPT

## 2024-07-14 PROCEDURE — 85025 COMPLETE CBC W/AUTO DIFF WBC: CPT

## 2024-07-14 PROCEDURE — 36415 COLL VENOUS BLD VENIPUNCTURE: CPT

## 2024-07-14 PROCEDURE — 6370000000 HC RX 637 (ALT 250 FOR IP): Performed by: HOSPITALIST

## 2024-07-14 PROCEDURE — 97530 THERAPEUTIC ACTIVITIES: CPT

## 2024-07-14 PROCEDURE — 97116 GAIT TRAINING THERAPY: CPT

## 2024-07-14 PROCEDURE — 2580000003 HC RX 258: Performed by: HOSPITALIST

## 2024-07-14 PROCEDURE — 6360000002 HC RX W HCPCS: Performed by: FAMILY MEDICINE

## 2024-07-14 PROCEDURE — 6370000000 HC RX 637 (ALT 250 FOR IP): Performed by: INTERNAL MEDICINE

## 2024-07-14 PROCEDURE — 83735 ASSAY OF MAGNESIUM: CPT

## 2024-07-14 PROCEDURE — 82962 GLUCOSE BLOOD TEST: CPT

## 2024-07-14 PROCEDURE — 80069 RENAL FUNCTION PANEL: CPT

## 2024-07-14 PROCEDURE — 1100000003 HC PRIVATE W/ TELEMETRY

## 2024-07-14 PROCEDURE — 6360000002 HC RX W HCPCS: Performed by: INTERNAL MEDICINE

## 2024-07-14 PROCEDURE — 87305 ASPERGILLUS AG IA: CPT

## 2024-07-14 RX ORDER — MORPHINE SULFATE 2 MG/ML
2 INJECTION, SOLUTION INTRAMUSCULAR; INTRAVENOUS EVERY 4 HOURS PRN
Status: DISCONTINUED | OUTPATIENT
Start: 2024-07-14 | End: 2024-07-15 | Stop reason: HOSPADM

## 2024-07-14 RX ORDER — POLYETHYLENE GLYCOL 3350 17 G/17G
17 POWDER, FOR SOLUTION ORAL DAILY
Status: DISCONTINUED | OUTPATIENT
Start: 2024-07-14 | End: 2024-07-15 | Stop reason: HOSPADM

## 2024-07-14 RX ADMIN — NYSTATIN 500000 UNITS: 100000 SUSPENSION ORAL at 09:11

## 2024-07-14 RX ADMIN — TACROLIMUS 2 MG: 1 CAPSULE ORAL at 09:11

## 2024-07-14 RX ADMIN — SODIUM CHLORIDE, PRESERVATIVE FREE 10 ML: 5 INJECTION INTRAVENOUS at 09:44

## 2024-07-14 RX ADMIN — POLYETHYLENE GLYCOL 3350 17 G: 17 POWDER, FOR SOLUTION ORAL at 10:33

## 2024-07-14 RX ADMIN — TACROLIMUS 2 MG: 1 CAPSULE ORAL at 22:15

## 2024-07-14 RX ADMIN — NYSTATIN 500000 UNITS: 100000 SUSPENSION ORAL at 17:45

## 2024-07-14 RX ADMIN — SODIUM CHLORIDE, PRESERVATIVE FREE 10 ML: 5 INJECTION INTRAVENOUS at 22:25

## 2024-07-14 RX ADMIN — ACETAMINOPHEN 650 MG: 325 TABLET ORAL at 12:14

## 2024-07-14 RX ADMIN — NYSTATIN 500000 UNITS: 100000 SUSPENSION ORAL at 14:04

## 2024-07-14 RX ADMIN — MORPHINE SULFATE 2 MG: 2 INJECTION, SOLUTION INTRAMUSCULAR; INTRAVENOUS at 22:15

## 2024-07-14 RX ADMIN — MORPHINE SULFATE 2 MG: 2 INJECTION, SOLUTION INTRAMUSCULAR; INTRAVENOUS at 17:45

## 2024-07-14 RX ADMIN — LEVOTHYROXINE SODIUM 137.5 MCG: 0.1 TABLET ORAL at 06:43

## 2024-07-14 RX ADMIN — MORPHINE SULFATE 2 MG: 2 INJECTION, SOLUTION INTRAMUSCULAR; INTRAVENOUS at 09:35

## 2024-07-14 ASSESSMENT — PAIN DESCRIPTION - LOCATION
LOCATION: ARM;ABDOMEN
LOCATION: ABDOMEN;ARM
LOCATION: ABDOMEN
LOCATION: ABDOMEN;BACK

## 2024-07-14 ASSESSMENT — PAIN DESCRIPTION - DESCRIPTORS
DESCRIPTORS: SHARP
DESCRIPTORS: ACHING;BURNING;DISCOMFORT
DESCRIPTORS: ACHING;DISCOMFORT;CRAMPING
DESCRIPTORS: ACHING

## 2024-07-14 ASSESSMENT — PAIN SCALES - GENERAL
PAINLEVEL_OUTOF10: 4
PAINLEVEL_OUTOF10: 6
PAINLEVEL_OUTOF10: 0
PAINLEVEL_OUTOF10: 8
PAINLEVEL_OUTOF10: 8
PAINLEVEL_OUTOF10: 7
PAINLEVEL_OUTOF10: 7
PAINLEVEL_OUTOF10: 0
PAINLEVEL_OUTOF10: 7
PAINLEVEL_OUTOF10: 8

## 2024-07-14 ASSESSMENT — PAIN DESCRIPTION - ORIENTATION
ORIENTATION: LEFT
ORIENTATION: RIGHT

## 2024-07-14 ASSESSMENT — PAIN SCALES - WONG BAKER
WONGBAKER_NUMERICALRESPONSE: NO HURT
WONGBAKER_NUMERICALRESPONSE: HURTS EVEN MORE

## 2024-07-14 NOTE — PROGRESS NOTES
Pt resting with eyes closed and regular respirations. Afternoon vitals and blood sugar deferred in order for pt to rest.

## 2024-07-14 NOTE — PROGRESS NOTES
No significant change since yesterday.  His arm still feels pruritic, but no pain.  Will plan on skin biopsy in the morning.  I counseled the patient on the general conduction of the operation as well as the potential risks and benefits and all of his questions were answered.

## 2024-07-14 NOTE — PLAN OF CARE
Problem: Discharge Planning  Goal: Discharge to home or other facility with appropriate resources  Outcome: Progressing  Flowsheets (Taken 7/14/2024 0431)  Discharge to home or other facility with appropriate resources:   Identify barriers to discharge with patient and caregiver   Arrange for needed discharge resources and transportation as appropriate   Identify discharge learning needs (meds, wound care, etc)     Problem: Pain  Goal: Verbalizes/displays adequate comfort level or baseline comfort level  Outcome: Progressing     Problem: Skin/Tissue Integrity  Goal: Absence of new skin breakdown  Description: 1.  Monitor for areas of redness and/or skin breakdown  2.  Assess vascular access sites hourly  3.  Every 4-6 hours minimum:  Change oxygen saturation probe site  4.  Every 4-6 hours:  If on nasal continuous positive airway pressure, respiratory therapy assess nares and determine need for appliance change or resting period.  Outcome: Progressing     Problem: Chronic Conditions and Co-morbidities  Goal: Patient's chronic conditions and co-morbidity symptoms are monitored and maintained or improved  Outcome: Progressing     Problem: Death & Dying  Goal: Pt/Family communicate acceptance of impending death and feel psychological comfort and peace  Description: INTERVENTIONS:  1. Assess patient/family anxiety and grief process related to end of life issues  2. Provide emotional and spiritual support  3. Provide information about the patient's health status with consideration of family and cultural values  4. Communicate willingness to discuss death and facilitate grief process  with patient/family as appropriate  5. Emphasize sustaining relationships within family system and community, or kobe/spiritual traditions  6. Initiate Spiritual Care, Psychosocial Clinical Specialist, consult as needed  Outcome: Progressing     Problem: Decision Making  Goal: Pt/Family able to effectively weigh alternatives and

## 2024-07-14 NOTE — PROGRESS NOTES
Hospitalist Progress Note    Patient: Efrain Mayorga MRN: [id number]  [facility]: [id number]    YOB: 1955  Age: 69 y.o.  Sex: male    DOA: 7/12/2024 LOS:  LOS: 2 days            Assessment/Plan     Active Hospital Problems    Diagnosis     Bullous pemphigoid [L12.0]     Lung nodule [R91.1]     Focal segmental glomerulosclerosis [N05.1]     Cellulitis [L03.90]     History of urostomy [Z98.890]     Prostate cancer (HCC) [C61]     Anemia [D64.9]     Malignant neoplasm of urinary bladder (HCC) [C67.9]     Arteriosclerosis of coronary artery [I25.10]     Hyperlipidemia [E78.5]     Chest pain [R07.9]       Blisters to left arm for which wound culture done by me on 07/13: Possible bullous pemphigoid.  Need a biopsy for further evaluation/Plan to have it done tomorrow am.  Discussed the case with ID Dr. Meyer and surgery Dr. Ralph     Chest pain: Improved. the trend: flat and negative, no chest pain now   Last echo done in April 2024 :ef 50 - 55%. Left ventricle is mildly dilated. Normal wall thickness. Mild global hypokinesis present. Normal diastolic function   V/q scan normal      Erythema and blisters of left arm: less likely 2nd to infection.   ID consulted   Received  vanc and zosyn in [place], recommend to hold abx for now . Skin biopsy , send pus for cx   Pain control      Focal segmental glomerulosclerosis : Was on prednisone and tacrolimus.  Discussed the case with nephrology /Dr. Newton. Tacrolimus decreased to 2 mg PO BID per Dr. Newton.     Constipation: Add Miralax       Prostate cancer, bladder cancer. Urostomy   Seeing Dr. Debbie MCCLAIN oncologist  Received immunotherapy before, no active treatment right now  s/p   1. Radical cystoprostatectomy  2. Extended pelvic lymph node dissection  3. Ileal conduit urinary diversion with single-J stents bilaterally   On 03/28/2023     Lung nodule: New. Per chest ct      Anemia stable. Due to chronic illness. No bleeding reported      Hypertension: Well controlled     w/r/r.  Nml effort and diaphragmatic excursion.  Abd:  NT ND.  BS positive.  No rebound or guarding.  No masses.  Ext: + ecchymosis with diffuse blisters filled with yellow /reddish fluid to L. Forearm.       Intake and Output:  Current Shift:  No intake/output data recorded.    Last three shifts:  07/12 1901 - 07/14 0700  In: 500 [P.O.:500]  Out: 1200 [Urine:1200]    Labs: Results:       Chemistry Recent Labs     07/12/24 0945 07/13/24 0318 07/14/24  0248   GLUCOSE 258* 111* 151*    139 138   K 4.7 4.7 4.7    109 109   CO2 23 26 23   BUN 77* 69* 62*   CREATININE 1.39* 1.17 1.21   CALCIUM 9.1 9.3 8.8   BILITOT 0.6  --   --    AST 29  --   --    ALT 42  --   --    ALKPHOS 234*  --   --    GLOB 2.7  --   --    LABGLOM 55* 67 65      CBC w/Diff Recent Labs     07/12/24 0945 07/13/24 0318 07/14/24  0248   WBC 14.0* 12.1 9.6   RBC 4.02* 3.83* 3.78*   HGB 12.2* 11.5* 11.4*   HCT 38.3 36.7 36.4    180 164   LYMPHOPCT 4* 5* 7*      Cardiac Enzymes No results for input(s): \"CKTOTAL\", \"CKMB\", \"CKMBINDEX\", \"TROPONINI\" in the last 72 hours.    Invalid input(s): \"CHRISTIANO\"   Coagulation Recent Labs     07/12/24 0945   PROTIME 14.9   INR 1.1   APTT 25.5       Lipid Panel Lab Results   Component Value Date/Time    CHOL 239 03/11/2024 02:18 PM    TRIG 240 03/11/2024 02:18 PM    HDL 40 03/11/2024 02:18 PM    VLDL 48 03/11/2024 02:18 PM    CHOLHDLRATIO 6.0 03/11/2024 02:18 PM      BNP No results for input(s): \"BNP\" in the last 72 hours.   Liver Enzymes Recent Labs     07/12/24  0945   ALT 42   AST 29   ALKPHOS 234*   BILITOT 0.6      Thyroid Studies Lab Results   Component Value Date/Time    T4FREE 0.9 04/03/2024 12:35 PM    TSH 0.63 04/03/2024 12:35 PM        Procedures/imaging: see electronic medical records for all procedures/Xrays and details which were not copied into this note but were reviewed prior to creation of Plan      Medications Reviewed  Mireille Ha MD

## 2024-07-14 NOTE — PROGRESS NOTES
Nephrology Progress Note    Subjective:     Efrain Mayorga is a 69 y.o. male with a PMHx significant for anxiety, CAD, HTN, tobacco use, bladder urothelial CA and also adenoCA of prostate, seen Dr Ya for urology and then Dr Garza oncology. He underwent neoadjuvant chemo with split dose cisplatin/gemcitabine since 12/20/22, until 3/2023. S/p cystectomy/ prostatectomy on 3/23 and RLQ ileostomy with urine bag. His Nivolumab/Opdivo was the placed on hold after the last dose on 9/18/23 due to hypoalbuminemia and worsening edema. Started on levothyroxine in 9/2023 for newly dx. Pt was on NSAID for his arthritic pain which was stopped.  CT scan in 12/23 showed new retroperitoneal adenopathy on the left concerning for recurrent/metastatic disease. Bone scan showed a lesion in right third rib and at T1 spinous process. Pt was admitted to [facility] in March for edema and worsening renal function, then readmitted April, on amox for strep cellulitis. Lasix was switched to bumex.  Pt was d/c on higher dose of prednisone, 60mg po daily.  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  Since early April 24, he was started on tacrolimus 3mg po bid as Oncology was consulted and cleared to use tacrolimus. His anasarca was treated with IV bumex/ po metolazone with significant diuresis. Pt was then sent home on bumex 2mg tid and metolazone 10mg po bid.    Pt was last seen in our [facility] office in June 2024, has been off metolazone and on Bumex 2mg prn only. Stoma working ok. Pt also has been seeing endocrinology at [facility], Dr Gomez for glycemic control.  Has continued taking tacrolimus and starting prednisone slow weaning, currently on 45mg daily.       He presented to [facility] this time with fluid filled left arm pustules and leukocytosis.  Admitted to medicine, consulted ID for abx selection.  Surgery was consulted for  g Oral Daily PRN    acetaminophen (TYLENOL) tablet 650 mg  650 mg Oral Q6H PRN    Or    acetaminophen (TYLENOL) suppository 650 mg  650 mg Rectal Q6H PRN    oxyCODONE-acetaminophen (PERCOCET) 5-325 MG per tablet 1 tablet  1 tablet Oral Q4H PRN    glucose chewable tablet 16 g  4 tablet Oral PRN    dextrose bolus 10% 125 mL  125 mL IntraVENous PRN    Or    dextrose bolus 10% 250 mL  250 mL IntraVENous PRN    glucagon injection 1 mg  1 mg SubCUTAneous PRN    dextrose 10 % infusion   IntraVENous Continuous PRN    insulin lispro (HUMALOG,ADMELOG) injection vial 0-8 Units  0-8 Units SubCUTAneous TID WC    insulin lispro (HUMALOG,ADMELOG) injection vial 0-4 Units  0-4 Units SubCUTAneous Nightly    [Held by provider] piperacillin-tazobactam (ZOSYN) 3,375 mg in sodium chloride 0.9 % 50 mL extended IVPB (mini-bag)  3,375 mg IntraVENous Q6H    levothyroxine (SYNTHROID) tablet 137.5 mcg  137.5 mcg Oral Daily       Allergy:   No Known Allergies     Objective:     BP (!) 143/92   Pulse 87   Temp 97.3 °F (36.3 °C) (Temporal)   Resp 18   Ht 1.676 m (5' 6\")   Wt 60 kg (132 lb 4.4 oz)   SpO2 100%   BMI 21.35 kg/m²     Intake/Output Summary (Last 24 hours) at 7/14/2024 0804  Last data filed at 7/13/2024 1947  Gross per 24 hour   Intake 350 ml   Output 925 ml   Net -575 ml       Physical Exam:       General: No acute distress   HENT: Atraumatic and normocephalic   Eyes: Normal conjunctiva, EOMI   Neck: Supple with no mass   Cardiovascular: Normal S1 & S2, no m/r/g   Pulmonary/Chest Wall: Clear to auscultation bilaterally, no w/c   Abdominal: Soft and non-tender, normal active bowel sound   Skin: + multiple pustular non tendered bullae at his left UE   LE: No edema   Neurological: No focal neurological deficits        Data Review:    Lab Results   Component Value Date     07/14/2024    K 4.7 07/14/2024     07/14/2024    CO2 23 07/14/2024    BUN 62 (H) 07/14/2024    CREATININE 1.21 07/14/2024    GLUCOSE 151 (H)

## 2024-07-15 ENCOUNTER — ANESTHESIA (OUTPATIENT)
Facility: HOSPITAL | Age: 69
DRG: 603 | End: 2024-07-15
Payer: MEDICARE

## 2024-07-15 ENCOUNTER — ANESTHESIA EVENT (OUTPATIENT)
Facility: HOSPITAL | Age: 69
DRG: 603 | End: 2024-07-15
Payer: MEDICARE

## 2024-07-15 VITALS
DIASTOLIC BLOOD PRESSURE: 83 MMHG | HEIGHT: 66 IN | WEIGHT: 132.28 LBS | HEART RATE: 96 BPM | OXYGEN SATURATION: 99 % | RESPIRATION RATE: 18 BRPM | SYSTOLIC BLOOD PRESSURE: 120 MMHG | BODY MASS INDEX: 21.26 KG/M2 | TEMPERATURE: 97.9 F

## 2024-07-15 PROBLEM — B95.7 BULLOUS STAPHYLOCOCCAL IMPETIGO: Status: ACTIVE | Noted: 2024-07-15

## 2024-07-15 PROBLEM — L01.03 BULLOUS IMPETIGO: Status: ACTIVE | Noted: 2024-07-15

## 2024-07-15 LAB
ALBUMIN SERPL-MCNC: 2.2 G/DL (ref 3.4–5)
ANION GAP SERPL CALC-SCNC: 3 MMOL/L (ref 3–18)
BACTERIA SPEC CULT: NORMAL
BASOPHILS # BLD: 0 K/UL (ref 0–0.1)
BASOPHILS NFR BLD: 0 % (ref 0–2)
BUN SERPL-MCNC: 52 MG/DL (ref 7–18)
BUN/CREAT SERPL: 55 (ref 12–20)
CALCIUM SERPL-MCNC: 9.1 MG/DL (ref 8.5–10.1)
CHLORIDE SERPL-SCNC: 113 MMOL/L (ref 100–111)
CO2 SERPL-SCNC: 26 MMOL/L (ref 21–32)
CREAT SERPL-MCNC: 0.95 MG/DL (ref 0.6–1.3)
DIFFERENTIAL METHOD BLD: ABNORMAL
EOSINOPHIL # BLD: 0 K/UL (ref 0–0.4)
EOSINOPHIL NFR BLD: 0 % (ref 0–5)
ERYTHROCYTE [DISTWIDTH] IN BLOOD BY AUTOMATED COUNT: 15.1 % (ref 11.6–14.5)
GLUCOSE BLD STRIP.AUTO-MCNC: 146 MG/DL (ref 70–110)
GLUCOSE BLD STRIP.AUTO-MCNC: 157 MG/DL (ref 70–110)
GLUCOSE BLD STRIP.AUTO-MCNC: 168 MG/DL (ref 70–110)
GLUCOSE BLD STRIP.AUTO-MCNC: 239 MG/DL (ref 70–110)
GLUCOSE SERPL-MCNC: 152 MG/DL (ref 74–99)
HCT VFR BLD AUTO: 36.2 % (ref 36–48)
HGB BLD-MCNC: 11.1 G/DL (ref 13–16)
IMM GRANULOCYTES # BLD AUTO: 0.4 K/UL (ref 0–0.04)
IMM GRANULOCYTES NFR BLD AUTO: 4 % (ref 0–0.5)
LYMPHOCYTES # BLD: 0.7 K/UL (ref 0.9–3.6)
LYMPHOCYTES NFR BLD: 7 % (ref 21–52)
MAGNESIUM SERPL-MCNC: 1.6 MG/DL (ref 1.6–2.6)
MCH RBC QN AUTO: 30.1 PG (ref 24–34)
MCHC RBC AUTO-ENTMCNC: 30.7 G/DL (ref 31–37)
MCV RBC AUTO: 98.1 FL (ref 78–100)
MONOCYTES # BLD: 0.9 K/UL (ref 0.05–1.2)
MONOCYTES NFR BLD: 9 % (ref 3–10)
NEUTS SEG # BLD: 7.5 K/UL (ref 1.8–8)
NEUTS SEG NFR BLD: 79 % (ref 40–73)
NRBC # BLD: 0 K/UL (ref 0–0.01)
NRBC BLD-RTO: 0 PER 100 WBC
PHOSPHATE SERPL-MCNC: 2.3 MG/DL (ref 2.5–4.9)
PLATELET # BLD AUTO: 144 K/UL (ref 135–420)
PMV BLD AUTO: 9.1 FL (ref 9.2–11.8)
POTASSIUM SERPL-SCNC: 4.6 MMOL/L (ref 3.5–5.5)
RBC # BLD AUTO: 3.69 M/UL (ref 4.35–5.65)
SERVICE CMNT-IMP: NORMAL
SODIUM SERPL-SCNC: 142 MMOL/L (ref 136–145)
WBC # BLD AUTO: 9.4 K/UL (ref 4.6–13.2)

## 2024-07-15 PROCEDURE — 85025 COMPLETE CBC W/AUTO DIFF WBC: CPT

## 2024-07-15 PROCEDURE — 6370000000 HC RX 637 (ALT 250 FOR IP): Performed by: INTERNAL MEDICINE

## 2024-07-15 PROCEDURE — 80069 RENAL FUNCTION PANEL: CPT

## 2024-07-15 PROCEDURE — 82962 GLUCOSE BLOOD TEST: CPT

## 2024-07-15 PROCEDURE — 6360000002 HC RX W HCPCS: Performed by: INTERNAL MEDICINE

## 2024-07-15 PROCEDURE — 2580000003 HC RX 258: Performed by: HOSPITALIST

## 2024-07-15 PROCEDURE — 97165 OT EVAL LOW COMPLEX 30 MIN: CPT

## 2024-07-15 PROCEDURE — 6370000000 HC RX 637 (ALT 250 FOR IP): Performed by: FAMILY MEDICINE

## 2024-07-15 PROCEDURE — 97535 SELF CARE MNGMENT TRAINING: CPT

## 2024-07-15 PROCEDURE — 83735 ASSAY OF MAGNESIUM: CPT

## 2024-07-15 PROCEDURE — 6370000000 HC RX 637 (ALT 250 FOR IP): Performed by: HOSPITALIST

## 2024-07-15 PROCEDURE — 99223 1ST HOSP IP/OBS HIGH 75: CPT | Performed by: INTERNAL MEDICINE

## 2024-07-15 PROCEDURE — 36415 COLL VENOUS BLD VENIPUNCTURE: CPT

## 2024-07-15 PROCEDURE — 6360000002 HC RX W HCPCS: Performed by: FAMILY MEDICINE

## 2024-07-15 RX ORDER — PANTOPRAZOLE SODIUM 40 MG/1
40 TABLET, DELAYED RELEASE ORAL
Status: DISCONTINUED | OUTPATIENT
Start: 2024-07-16 | End: 2024-07-15 | Stop reason: HOSPADM

## 2024-07-15 RX ORDER — SODIUM CHLORIDE, SODIUM LACTATE, POTASSIUM CHLORIDE, CALCIUM CHLORIDE 600; 310; 30; 20 MG/100ML; MG/100ML; MG/100ML; MG/100ML
INJECTION, SOLUTION INTRAVENOUS CONTINUOUS
Status: DISCONTINUED | OUTPATIENT
Start: 2024-07-15 | End: 2024-07-15

## 2024-07-15 RX ORDER — PREDNISONE 20 MG/1
40 TABLET ORAL DAILY
Status: DISCONTINUED | OUTPATIENT
Start: 2024-07-15 | End: 2024-07-15 | Stop reason: HOSPADM

## 2024-07-15 RX ORDER — TACROLIMUS 1 MG/1
2 CAPSULE ORAL 2 TIMES DAILY
Qty: 60 CAPSULE | Refills: 0
Start: 2024-07-15

## 2024-07-15 RX ADMIN — TACROLIMUS 2 MG: 1 CAPSULE ORAL at 08:33

## 2024-07-15 RX ADMIN — INSULIN LISPRO 2 UNITS: 100 INJECTION, SOLUTION INTRAVENOUS; SUBCUTANEOUS at 16:34

## 2024-07-15 RX ADMIN — OXYCODONE HYDROCHLORIDE AND ACETAMINOPHEN 1 TABLET: 5; 325 TABLET ORAL at 01:07

## 2024-07-15 RX ADMIN — LEVOTHYROXINE SODIUM 137.5 MCG: 0.1 TABLET ORAL at 06:30

## 2024-07-15 RX ADMIN — NYSTATIN 500000 UNITS: 100000 SUSPENSION ORAL at 08:33

## 2024-07-15 RX ADMIN — ONDANSETRON 4 MG: 4 TABLET, ORALLY DISINTEGRATING ORAL at 16:34

## 2024-07-15 RX ADMIN — SODIUM CHLORIDE, PRESERVATIVE FREE 10 ML: 5 INJECTION INTRAVENOUS at 08:34

## 2024-07-15 RX ADMIN — NYSTATIN 500000 UNITS: 100000 SUSPENSION ORAL at 12:00

## 2024-07-15 RX ADMIN — NYSTATIN 500000 UNITS: 100000 SUSPENSION ORAL at 16:34

## 2024-07-15 RX ADMIN — MORPHINE SULFATE 2 MG: 2 INJECTION, SOLUTION INTRAMUSCULAR; INTRAVENOUS at 06:39

## 2024-07-15 RX ADMIN — POLYETHYLENE GLYCOL 3350 17 G: 17 POWDER, FOR SOLUTION ORAL at 08:33

## 2024-07-15 RX ADMIN — OXYCODONE HYDROCHLORIDE AND ACETAMINOPHEN 1 TABLET: 5; 325 TABLET ORAL at 12:02

## 2024-07-15 RX ADMIN — PREDNISONE 40 MG: 20 TABLET ORAL at 12:00

## 2024-07-15 ASSESSMENT — PAIN DESCRIPTION - LOCATION
LOCATION: ABDOMEN
LOCATION: ABDOMEN

## 2024-07-15 ASSESSMENT — PAIN DESCRIPTION - ORIENTATION: ORIENTATION: MID

## 2024-07-15 ASSESSMENT — PAIN SCALES - GENERAL
PAINLEVEL_OUTOF10: 0
PAINLEVEL_OUTOF10: 0
PAINLEVEL_OUTOF10: 8
PAINLEVEL_OUTOF10: 7
PAINLEVEL_OUTOF10: 9
PAINLEVEL_OUTOF10: 0

## 2024-07-15 ASSESSMENT — PAIN - FUNCTIONAL ASSESSMENT: PAIN_FUNCTIONAL_ASSESSMENT: ACTIVITIES ARE NOT PREVENTED

## 2024-07-15 ASSESSMENT — PAIN DESCRIPTION - PAIN TYPE: TYPE: ACUTE PAIN

## 2024-07-15 ASSESSMENT — PAIN DESCRIPTION - ONSET: ONSET: GRADUAL

## 2024-07-15 ASSESSMENT — PAIN DESCRIPTION - DESCRIPTORS
DESCRIPTORS: ACHING
DESCRIPTORS: ACHING

## 2024-07-15 ASSESSMENT — PAIN DESCRIPTION - FREQUENCY: FREQUENCY: INTERMITTENT

## 2024-07-15 NOTE — NURSE NAVIGATOR
Called nursing supervisor to  come and speak with patient.  0805 patient spoke with Dr. Ralph and case will be cancelled for today.   0810 cancelled nursing supervisor. Patient taken back to unit .

## 2024-07-15 NOTE — PROGRESS NOTES
Pt refused to have biopsy and no want to be treated. He is alert and oriented. Talked with wife-she will pick him home. Also recommend wife to bring pt back to the hospital if she changed her mind

## 2024-07-15 NOTE — ACP (ADVANCE CARE PLANNING)
Advance Care Planning     Palliative Team Advance Care Planning (ACP) Conversation    Date of Conversation: 07/15/24     ACP documents on file prior to discussion:  -Power of  for Healthcare  -Portable DNR    Healthcare Decision Maker:    Primary Decision Maker: Stacie Mayorga - Spouse - [phone]     Conversation Summary:    Visited patient for new consult along with Palliative team member Dr. CARIDAD Blanc. Patient lying in bed, awake and alert. Very pleasant and talkative with our team. Was scheduled for surgical procedure this morning, however, has declined. Feels that his arm is getting \"better\" and is anxious to get home to care for his wife.    Patient does have an Advanced Medical Directive and DDNR on file. Reviewed both forms with patient and he remains in agreement, no changes made at this time. He has been  to his wife Stacie going on 50 years. They have one son. Patient's wife is an eight year lung transplant recipient and he is her primary/sole caregiver. Son does assist as needed.    Goals of care have already been addressed. Patient became tearful as he shared that he doesn't want to \"leave his wife\" behind. He was very appreciative of our visit and information.    Palliative team remains available to provide support to Mr Mayorga during this hospital stay.    Resuscitation Status:   Code Status: DNR     Documentation Completed:  -No new documents completed.    Jacy Chavez RN  Palliative Medicine Inpatient RN  Palliative [facility] Line: [phone]

## 2024-07-15 NOTE — PROGRESS NOTES
The patient was seen in the preop holding area and.  It definitely looks better today as it is a large bulla with what appears to be old blood and only 1 or 2 pustular nodules.  I reiterated the plan for surgery which was was to perform a skin biopsy.  I informed him that I would likely leave the biopsy site open as I feel like these pustules make the risk for wound infection higher.  Subsequently the patient became very concerned about the possibility of elective, and stated that he could not deal with another infection and as such did not wish to undergo the procedure.  As a result, I canceled his procedure and let the hospitalist know about this.  At this point no further surgical involvement appears necessary.

## 2024-07-15 NOTE — PROGRESS NOTES
Occupational Therapy Goals:  Initiated 7/15/2024 to be met within 7-10 days.  Short Term Goals  Time Frame for Short Term Goals: 7 days  Short Term Goal 1: Patient will complete toilet transfer with mod I  Short Term Goal 2: Patient will complete bathing with mod I  Short Term Goal 3: Patient will complete grooming in standing at sink with mod I  Short Term Goal 4: Patient will complete dressing with mod I  Short Term Goal 5: Patient will complete toileting with mod I    OCCUPATIONAL THERAPY EVALUATION    Patient: Efrain Mayorga (69 y.o. male)  Date: 7/15/2024  Primary Diagnosis: Cellulitis [L03.90]  Skin pustule [L08.9]  Leukocytosis, unspecified type [D72.829]  Chest pain, unspecified type [R07.9]  Procedure(s) (LRB):  ARM NERVE EXPLORATION DECOMPRESSION REPAIR/SKIN BIOPSY OF THE UPPER LEFT ARM (Left) Day of Surgery   Precautions: Fall Risk,  ,  ,  ,  ,  ,  ,    PLOF: Patient completed ADLs independently    ASSESSMENT :  Patient presented seated EOB with gripper socks and cardiac monitor. Patient with no visitors present for entire session. Patient participated in assessment of ADLs and BUE strength, ROM (see details below), however unable to complete more thand grasp assessment via MMT d/t patient c/o aggravated abdominal pain. Patient completed grooming contact guard assist in bathroom at sink. Patient with increased time to complete, required standing rest break leaning forearms onto sink d/t abdominal pain. Due to deficits (listed below) patient has decreased independence with ADLs, IADLs, and functional mobility and would benefit from home health occupational therapy  ,      DEFICITS/IMPAIRMENTS:  Performance deficits / Impairments: Decreased functional mobility ;Decreased high-level IADLs;Decreased ADL status;Decreased endurance;Decreased coordination;Decreased strength;Decreased balance;Decreased safe awareness;Decreased posture    Patient will benefit from skilled intervention to address the above  deficits; it is recommended that the patient have 2-3 sessions per week of Occupational Therapy at d/c to increase the patient's independence.    This AMPAC score should be considered in conjunction with interdisciplinary team recommendations to determine the most appropriate discharge setting. Patient's social support, diagnosis, medical stability, and prior level of function should also be taken into consideration.     SUBJECTIVE:   Patient stated “My stomach hurts.”    OBJECTIVE DATA SUMMARY:     Past Medical History:   Diagnosis Date    Arthritis     Bladder cancer (HCC)     Fibromyalgia     GERD (gastroesophageal reflux disease)     Heart attack (HCC) 03/2017    Hematuria, gross     Hypertension 2017     Past Surgical History:   Procedure Laterality Date    CT BIOPSY RENAL  3/14/2024    CT BIOPSY RENAL 3/14/2024 [facility] RAD CT    HERNIA REPAIR Right     inguinal    LEFT HEART PERCUTANEOUS  03/29/2017    ERNESTO CORONARY ARTERIOGRAPH  03/29/2017    TRANSURETHRAL RESEC BLADDER NECK       Barriers to Learning/Limitations: physical  Compensate with: visual, verbal, tactile, kinesthetic cues/model    Home Situation:   Social/Functional History  Lives With: Family  Type of Home: House  Home Layout: One level  Home Access: Stairs to enter with rails  Entrance Stairs - Number of Steps: 4  Entrance Stairs - Rails: Both  Bathroom Shower/Tub: Walk-in shower  Bathroom Equipment: Grab bars in shower, Shower chair, Grab bars around toilet, Toilet raiser  Home Equipment: Walker - Rolling (transport w/c)  ADL Assistance: Independent  Homemaking Assistance: Independent  Homemaking Responsibilities: No  Ambulation Assistance: Independent  Transfer Assistance: Independent  Active : No  Occupation: Retired  []  Right hand dominant   []  Left hand dominant    Cognitive/Behavioral Status:     WFL                               Skin: dressing to L arm  Edema: in BLEs    Vision/Perceptual:    Vision: Impaired

## 2024-07-15 NOTE — PROGRESS NOTES
[facility]  (A Division of [facility])    Follow-up Note      Date of Admission: 7/12/2024       Date of Note:  7/15/2024    Summary:  Efrain Mayorga is a 69 y.o. male with PMH NS follows with renal MD, on cellcept and Prednisone, history of bladder cancer post resection/diversion and few rounds of chemo via R mediport, currently not active per patient.     Came in due to upper abd pain for about a week or so on left arm but looks worse last 1-2 days . No pain on left arm. No subjective fever or chills.  Started on doxycycline by Urgent care yesterday. Denies cough/dysphagia. Has lost much body swelling after his treatment for NS.     Denies travel/ contact with plants or activities such as gardening-skin trauma. No fish tank. No travel. No febrile sick contact.     Retired Truck-    CC:  \"wanna go home\"  Today:  Chart reviewed - pt seen  Doesn't want any abx IV or PO.  Refused OR bx this AM.   Wants to go home.  Understands implications of not getting treatment to the point of death - and accepting of that.    Tm <100  WBC 9.4k      Current Antimicrobials:    Prior Antimicrobials:  None since admission 7/12 Pip/tzb IV (7/12)  Vanco IV (7/12  Doxy PTA       Assessment: Rec / Plan:   Bullous impetigo  Growing out MSSA from admission cultures; strongly recommended treatment, but he refuses.  His right to do so.     Will sign off at this point, unless he changes his mind.   WERO on CKD Stage III    HTN    CAD    Lung nodules        Microbiology: 7/13 - Wd (+) MSSA      Lines / Catheters: peripheral        Patient Active Problem List   Diagnosis    NSTEMI (non-ST elevated myocardial infarction) (HCC)    Hyperlipidemia    Chest pain    Arteriosclerosis of coronary artery    Malignant neoplasm of urinary bladder (HCC)    Anemia    Fatigue    Prostate cancer (HCC)    Primary malignant neoplasm of prostate (HCC)    Abnormal finding on thyroid function test    Edema of  Sent Specimen: Aspirate Updated: 07/13/24 1226    Culture, Fungus [[id number]] Collected: 07/13/24 1214    Order Status: Completed Specimen: Aspirate Updated: 07/15/24 0952     Special Requests ARM PUSTULE     Culture NO FUNGUS ISOLATED 1 DAY       Culture, Body Fluid [[id number]] Collected: 07/13/24 1214    Order Status: Completed Specimen: Body Fluid from Aspirate Updated: 07/15/24 0627     Special Requests ARM PUSTULES     Gram Stain 1+ WBCS SEEN               1+ Gram positive cocci IN CLUSTERS           Culture       HEAVY Preliminary report of probable S. aureus (Negative for antigen detection) confirmation and sensitivities to follow.          Culture, Anaerobic [[id number]] Collected: 07/13/24 1214    Order Status: Sent Specimen: Abscess Updated: 07/14/24 0852              Atul Akbar MD  Cell [phone]  [facility]   7/15/2024   12:46 PM

## 2024-07-15 NOTE — PROGRESS NOTES
PHYSICAL THERAPY TREATMENT    Patient: Efrain Mayorga (69 y.o. male)  Date: 7/14/2024  Diagnosis: Cellulitis [L03.90]  Skin pustule [L08.9]  Leukocytosis, unspecified type [D72.829]  Chest pain, unspecified type [R07.9] Cellulitis      Precautions: Fall Risk,  ,  ,  ,  ,  ,  ,      ASSESSMENT:  Pt is agreeable to session after recent ly being medicated for pain. RN adds that pt required max/total assist for transfer  from chair to mbad after meal. This was not seen during PT session, where pt demonstrates ability to mobilize with SBA. Pt has no buckling or report of weakness with extended ambulation in hallway. Based on observed ability, pt is capable of being cared for at home. Due to Rns report, we will continue to assess for function and optimize strength/endurance.  .     Progression toward goals: Fair+  [x]      Improving appropriately and progressing toward goals  []      Improving slowly and progressing toward goals  []      Not making progress toward goals and plan of care will be adjusted     PLAN:  Patient continues to benefit from skilled intervention to address the above impairments.  Continue treatment per established plan of care.    Further Equipment Recommendations for Discharge: gait belt and rolling walker    [facility]:   AM-PAC Inpatient Mobility without Stair Climbing Raw Score : 16    Current research shows that an AM-PAC score of 17 (13 without stairs) or less is not associated with a discharge to the patient's home setting. Based on an AM-PAC score and their current functional mobility deficits, it is recommended that the patient have 5-7 sessions per week of Physical Therapy at d/c to increase the patient's independence.  Currently, this patient demonstrates the potential endurance, and/or tolerance for 3 hours of therapy each day at d/c.    Current research shows that an AM-PAC score of 17 (13 without stairs) or less is not associated with a discharge to the patient's home setting. Based on an

## 2024-07-15 NOTE — PERIOP NOTE
TRANSFER - IN REPORT:    Verbal report received from JUSTIN Harris on Cono Mukesh  being received from [facility]N for ordered procedure      Report consisted of patient's Situation, Background, Assessment and   Recommendations(SBAR).     Information from the following report(s) Nurse Handoff Report was reviewed with the receiving nurse.    Opportunity for questions and clarification was provided.      Assessment completed upon patient's arrival to unit and care assumed.

## 2024-07-15 NOTE — PERIOP NOTE
Pt declined to have surgery today.  Pt believes there is too much risk for infection.  Surgeon in to talk with pt, procedure cancelled.    Report called to JUSTIN Nichole,  Pt returned to floor with transport.

## 2024-07-15 NOTE — PLAN OF CARE
Problem: Discharge Planning  Goal: Discharge to home or other facility with appropriate resources  7/14/2024 1349 by Crystal Ann RN  Outcome: Progressing  Flowsheets (Taken 7/14/2024 0715)  Discharge to home or other facility with appropriate resources:   Identify barriers to discharge with patient and caregiver   Arrange for needed discharge resources and transportation as appropriate   Identify discharge learning needs (meds, wound care, etc)     Problem: Pain  Goal: Verbalizes/displays adequate comfort level or baseline comfort level  7/14/2024 1349 by Crystal Ann RN  Outcome: Progressing     Problem: Skin/Tissue Integrity  Goal: Absence of new skin breakdown  Description: 1.  Monitor for areas of redness and/or skin breakdown  2.  Assess vascular access sites hourly  3.  Every 4-6 hours minimum:  Change oxygen saturation probe site  4.  Every 4-6 hours:  If on nasal continuous positive airway pressure, respiratory therapy assess nares and determine need for appliance change or resting period.  7/14/2024 1349 by Crystal Ann RN  Outcome: Progressing     Problem: Chronic Conditions and Co-morbidities  Goal: Patient's chronic conditions and co-morbidity symptoms are monitored and maintained or improved  7/14/2024 1349 by Crystal Ann RN  Outcome: Progressing     Problem: Death & Dying  Goal: Pt/Family communicate acceptance of impending death and feel psychological comfort and peace  Description: INTERVENTIONS:  1. Assess patient/family anxiety and grief process related to end of life issues  2. Provide emotional and spiritual support  3. Provide information about the patient's health status with consideration of family and cultural values  4. Communicate willingness to discuss death and facilitate grief process  with patient/family as appropriate  5. Emphasize sustaining relationships within family system and community, or kobe/spiritual traditions  6. Initiate Spiritual Care, Psychosocial Clinical

## 2024-07-15 NOTE — PROGRESS NOTES
-Patient c/o of nausea. Patient requests Zofran prior to leaving. Zofran given along with ginger ale, saltine crackers and emesis bag. Informed patient to take his time/may wait until he feels better prior to leaving. The patient was appreciative.      -Patient left the floor around 1700.

## 2024-07-15 NOTE — PLAN OF CARE
Problem: Discharge Planning  Goal: Discharge to home or other facility with appropriate resources  Outcome: Adequate for Discharge     Problem: Pain  Goal: Verbalizes/displays adequate comfort level or baseline comfort level  7/15/2024 1537 by Elyssa Zelaya RN  Outcome: Adequate for Discharge  7/15/2024 1048 by Dayanara Carter RN  Outcome: Progressing     Problem: Skin/Tissue Integrity  Goal: Absence of new skin breakdown  Description: 1.  Monitor for areas of redness and/or skin breakdown  2.  Assess vascular access sites hourly  3.  Every 4-6 hours minimum:  Change oxygen saturation probe site  4.  Every 4-6 hours:  If on nasal continuous positive airway pressure, respiratory therapy assess nares and determine need for appliance change or resting period.  7/15/2024 1537 by Elyssa Zelaya RN  Outcome: Adequate for Discharge  7/15/2024 1048 by Dayanara Carter RN  Outcome: Progressing     Problem: Chronic Conditions and Co-morbidities  Goal: Patient's chronic conditions and co-morbidity symptoms are monitored and maintained or improved  Outcome: Adequate for Discharge     Problem: Death & Dying  Goal: Pt/Family communicate acceptance of impending death and feel psychological comfort and peace  Description: INTERVENTIONS:  1. Assess patient/family anxiety and grief process related to end of life issues  2. Provide emotional and spiritual support  3. Provide information about the patient's health status with consideration of family and cultural values  4. Communicate willingness to discuss death and facilitate grief process  with patient/family as appropriate  5. Emphasize sustaining relationships within family system and community, or kobe/spiritual traditions  6. Initiate Spiritual Care, Psychosocial Clinical Specialist, consult as needed  Outcome: Adequate for Discharge     Problem: Decision Making  Goal: Pt/Family able to effectively weigh alternatives and participate in decision  making related to treatment and care  Description: INTERVENTIONS:  1. Determine when there are differences between patient's view, family's view, and healthcare provider's view of condition  2. Facilitate patient and family articulation of goals for care  3. Help patient and family identify pros/cons of alternative solutions  4. Provide information as requested by patient/family  5. Respect patient/family right to receive or not to receive information  6. Serve as a liaison between patient and family and health care team  7. Initiate Consults from Ethics, Palliative Care or initiate Family Care Conference as is appropriate  7/15/2024 1537 by Elyssa Zelaya, RN  Outcome: Adequate for Discharge  7/15/2024 0255 by Kimberly Bailey, RN  Outcome: Progressing     Problem: Spiritual Care  Goal: Pt/Family able to move forward in process of forgiving self, others, and/or higher power  Description: INTERVENTIONS:  1. Assist patient/family to overcome blocks to healing by use of spiritual practices (prayer, meditation, guided imagery, reiki, breath work, etc).  2. De-myth guilt and help patient/family identify possible irrational spiritual/cultural beliefs and values.  3. Explore possibilities of making amends & reconciliation with self, others, and/or a greater power.  4. Guide patient/family in identifying painful feelings of guilt.  5. Help patient/famiy explore and identify spiritual beliefs, cultural understandings or values that may help or hinder letting go of issue.  6. Help patient/family explore feelings of anger, bitterness, resentment.  7. Help patient/family identify and examine the situation in which these feelings are experienced.  8. Help patient/family identify destructive displacement of feelings onto other individuals.  9. Invite use of sacraments/rituals/ceremonies as appropriate (e.g. - confession, anointing, smudging).  10. Refer patient/family to formal counseling and/or to kobe community

## 2024-07-15 NOTE — PROGRESS NOTES
AVS reviewed with pt, all questions answered. Pt educated on medication uses and side effects and follow up appointments. Pt verbalized understanding. IV removed, no complications. Pt in bed waiting for spouse, will call when ready to leave.

## 2024-07-15 NOTE — CONSULTS
Palliative Medicine Consult  [facility]: [phone]  [facility]: [phone]     Patient Name: Efrain Mayorga  YOB: 1955    Date of Initial Consult: 7/15/24  Reason for Consult: goals of care discussion/ACP/symptoms management  Requesting Provider:  Mireille Ha MD   Primary Care Physician: Arnoldo St DO      SUMMARY:     Efrain Mayorga is a 69 y.o. with a past history of coronary artery disease, prostate and bladder cancer, FSGS, hypertension, GERD and fibromyalgia as well as arthritis who was admitted on 7/12/2024 from home with a diagnosis of chest pain, left upper extremity cellulitis.  Patient initially presented to emergency room with complaint of left arm swelling, erythema and chest discomfort.  VQ scan was negative.  Echocardiogram was normal.  Did not have any more chest pain afterwards.  Patient was seen by ID started on IV antibiotics.  Skin biopsy was scheduled but patient refused to have it.  Current medical issues leading to Palliative Medicine involvement include: To establish goals of care.    7/15/24: Patient was seen in presence of palliative care RN.  Patient is awake and able to answer most of questions appropriately.  He has had some stomach pain off and on with nausea but no vomiting.  No left upper extremity pain currently.  No headaches or dizziness.  No shortness of breath or cough.  He stated he started having blister after he use Voltaren cream in left upper extremity.     HISTORY:     History obtained from: Patient    CHIEF COMPLAINT: Left upper extremity pain and erythema    HPI/SUBJECTIVE:    The patient is:   [x] Verbal and participatory  [] Non-participatory due to:         PHYSICAL EXAM:     From RN flowsheet:  Wt Readings from Last 3 Encounters:   07/14/24 60 kg (132 lb 4.4 oz)   04/11/24 (P) 73.3 kg (161 lb 9.6 oz)   03/27/24 87.9 kg (193 lb 12.6 oz)       /83   Pulse 96   Temp 97.9 °F (36.6 °C) (Temporal)   Resp 18

## 2024-07-15 NOTE — PROGRESS NOTES
Telephone report given to Olga,RN,OR nurse per primary nurse JUSTIN Harris at this time. Report given per SBAR and all questions asked/answered at this time. Currently awaiting patient's transfer to OR unit. No complaints or distress noted

## 2024-07-15 NOTE — DISCHARGE SUMMARY
Discharge Summary    Patient: Efrain Mayorga MRN: [id number]  CSN: [id number]    YOB: 1955  Age: 69 y.o.  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] 03/27/2024    Cellulitis [L03.90] 03/19/2024    History of urostomy [Z98.890] 03/19/2024    Prostate cancer (HCC) [C61] 04/07/2023    Anemia [D64.9] 03/22/2023    Malignant neoplasm of urinary bladder (HCC) [C67.9] 11/03/2022    Arteriosclerosis of coronary artery [I25.10] 03/27/2017    Hyperlipidemia [E78.5] 03/27/2017    Chest pain [R07.9] 03/25/2017       Discharge Condition: stable     Discharge Medications:        Medication List        CHANGE how you take these medications      tacrolimus 1 MG capsule  Commonly known as: PROGRAF  Take 2 capsules by mouth 2 times daily  What changed: how much to take            CONTINUE taking these medications      atorvastatin 40 MG tablet  Commonly known as: LIPITOR     Claritin 10 MG tablet  Generic drug: loratadine     enoxaparin Sodium 30 MG/0.3ML injection  Commonly known as: LOVENOX  Inject 0.3 mLs into the skin daily     famotidine 20 MG tablet  Commonly known as: PEPCID     levothyroxine 137 MCG tablet  Commonly known as: SYNTHROID            STOP taking these medications      aspirin 81 MG EC tablet     bumetanide 2 MG tablet  Commonly known as: BUMEX     metOLazone 10 MG tablet  Commonly known as: ZAROXOLYN     potassium chloride 20 MEQ extended release tablet  Commonly known as: KLOR-CON M     sevelamer 800 MG tablet  Commonly known as: RENVELA               Where to Get Your  02:18 PM      BNP Invalid input(s): \"BNPP\"   Liver Enzymes No results for input(s): \"TP\" in the last 72 hours.    Invalid input(s): \"ALB\", \"TBIL\", \"AP\", \"SGOT\", \"GPT\", \"DBIL\"   Thyroid Studies Lab Results   Component Value Date/Time    TSH 0.63 04/03/2024 12:35 PM            Significant Diagnostic Studies: CT CHEST ABDOMEN PELVIS WO CONTRAST Additional Contrast? None    Result Date: 7/13/2024  INDICATION: Evaluate for internal change. Noncontrast CT of the chest, abdomen and pelvis is performed with 5 mm collimation. Sagittal and coronal reformatted images were also performed. CT dose reduction was achieved with the use of the standardized protocol tailored for this examination and automatic exposure control for dose modulation. Direct comparison is made to prior CT dated June 2024. Chest: Tubes and lines: Central venous port catheter extends to the SVC. Lungs: There are several adjacent left lower lobe pulmonary nodules, not visualized on prior CT dated June 2024, the largest of which measures 1.1 cm in diameter. Additional subcentimeter pulmonary nodular densities are unchanged. Lymph nodes: There is no mediastinal, hilar or axillary lymphadenopathy. Pleura: There is no pleural fluid. Heart: The heart is normal in size and there is no pericardial fluid. Atherosclerotic calcifications are noted within the coronary arteries. Bones: No lytic or sclerotic osseous lesion is visualized. The osseous structures are diffusely demineralized. Abdomen/pelvis: Liver: There is a 1.2 cm hypodensity within the liver. No additional focal hepatic lesion is seen, however sensitivity is limited without intravenous contrast. Spleen: The spleen is normal on noncontrast images Pancreas: The pancreas is normal on noncontrast images. Adrenals: The adrenals are normal. Gallbladder: There is a punctate gallstone within the gallbladder; there is no CT evidence of acute cholecystitis. Kidneys: There is no significant hydronephrosis. Bowel: No  silhouette is within normal limits. New 13 mm left midlung nodule. Pleural spaces are grossly clear. Right pectoral infusion port.     New 13 mm left midlung nodule, could reflect airspace disease versus pulmonary nodule. Dedicated chest CT recommended for further evaluation.. Electronically signed by CARLEE HAQUE    CT CHEST ABDOMEN PELVIS WO CONTRAST Additional Contrast? Radiologist Recommendation    Result Date: 6/24/2024  INDICATION:   Malignant neoplasm of lateral wall of bladder; Solitary pulmonary nodule; Unspecified abdominal pain EXAM:  CT chest, abdomen, and pelvis without CONTRAST COMPARISON: 3/30/2024 TECHNIQUE:  Thin collimation axial images were obtained through the chest, abdomen, and pelvis without IV contrast administration. Coronal and sagittal reconstructions were obtained. Oral contrast was not administered.  CT dose reduction was achieved through use of a standardized protocol tailored for this examination and automatic exposure control for dose modulation. FINDINGS: Chest: LUNGS: A few new tiny nonspecific pulmonary nodules measuring up to 3 mm (3-42, 30, 28). Minimal bibasilar atelectasis versus scarring. LYMPH NODES: No thoracic lymphadenopathy. PLEURAL FLUID: No pleural effusion. PERICARDIAL FLUID: No pericardial effusion. CORONARY ARTERY CALCIFICATIONS: Present OTHER: Borderline aneurysmal dilatation of the ascending aorta measuring 3.9 cm is stable. Right chest port. Abdomen: Lack of IV contrast limits evaluation of the solid abdominal organs. LIVER: New nonspecific low-density lesion in the central left hepatic lobe measuring 1.5 cm (2-56). No biliary ductal dilatation GALLBLADDER: Contracted with tiny calcified stones SPLEEN: Unremarkable PANCREAS: No mass or ductal dilatation. ADRENALS: Unremarkable. KIDNEYS/URETERS: No abnormal mass or hydronephrosis. Right anterior abdominal ileal conduit. PERITONEUM: New and larger numerous enlarged retroperitoneal lymph nodes. A left retroperitoneal

## 2024-07-15 NOTE — PROGRESS NOTES
Nephrology Progress note    Efrain Mayorga  MRN: [id number]  : 1955    Admit Date: 2024        Impression:     Bullae/ blisters at left arm, declined Skin Bx.  Nephrotic syndrome due to FSGS, currently on pred taper and tacrolimus bid.  CKD stage 3A, improving  Leukocytosis, improving  Anemia, mild  HTN  H.o. bladder CA and prostate CA       Plan:     Continue his prednisone taper as per our outpatient nephrology plan 60mg initially  => 40mg daily currently  Continue tacrolimus at lower dose 2mg po bid and will continue this dose at home.   Abx per ID consultant  Strict I/Os  Avoid nephrotoxins  Renal panel and cbc in a.m.        Subjective:     Efrain Mayorga is a 69 y.o. male  with a PMHx significant for anxiety, CAD, HTN, tobacco use, bladder urothelial CA and also adenoCA of prostate, seen Dr Ya for urology and then Dr Garza oncology. He underwent neoadjuvant chemo with split dose cisplatin/gemcitabine since 22, until 3/2023. S/p cystectomy/ prostatectomy on 3/23 and RLQ ileostomy with urine bag. His Nivolumab/Opdivo was the placed on hold after the last dose on 23 due to hypoalbuminemia and worsening edema. Started on levothyroxine in 2023 for newly dx. Pt was on NSAID for his arthritic pain which was stopped.  CT scan in  showed new retroperitoneal adenopathy on the left concerning for recurrent/metastatic disease. Bone scan showed a lesion in right third rib and at T1 spinous process. Pt was admitted to [facility] in March for edema and worsening renal function, then readmitted April, on amox for strep cellulitis. Lasix was switched to bumex.  Pt was d/c on higher dose of prednisone, 60mg po daily.  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  Since early , he was started on tacrolimus 3mg po bid as Oncology was consulted and cleared to use

## 2024-07-16 LAB
ACID FAST STN SPEC: NEGATIVE
BACTERIA SPEC CULT: ABNORMAL
BACTERIA SPEC CULT: NORMAL
GALACTOMANNAN AG SPEC IA-ACNC: 0.04 INDEX (ref 0–0.49)
GRAM STN SPEC: ABNORMAL
GRAM STN SPEC: ABNORMAL
MYCOBACTERIUM SPEC QL CULT: NORMAL
SERVICE CMNT-IMP: ABNORMAL
SERVICE CMNT-IMP: NORMAL
SPECIMEN PREPARATION: NORMAL
SPECIMEN SOURCE: NORMAL

## 2024-07-17 LAB
FUNGUS SPEC FUNGUS STN: NORMAL
SPECIMEN SOURCE: NORMAL

## 2024-07-18 LAB
FUNGITELL INTERPRETATION: ABNORMAL
FUNGITELL: >500 PG/ML
FUNGUS STAIN: NORMAL
Lab: ABNORMAL
REFERENCE VALUE: ABNORMAL
RESULT: POSITIVE
SPECIMEN SOURCE: NORMAL

## 2024-07-29 LAB
BACTERIA SPEC CULT: NORMAL
SERVICE CMNT-IMP: NORMAL

## 2024-08-12 LAB
BACTERIA SPEC CULT: NORMAL
SERVICE CMNT-IMP: NORMAL

## 2024-08-13 NOTE — PROGRESS NOTES
24 urine collection begin per Md orders, Patient/family/staff made aware of urine collection on ice. Will continue to monitor patient   Elidel Pregnancy And Lactation Text: This medication is Pregnancy Category C. It is unknown if this medication is excreted in breast milk. Clindamycin Pregnancy And Lactation Text: This medication can be used in pregnancy if certain situations. Clindamycin is also present in breast milk. Carac Counseling:  I discussed with the patient the risks of Carac including but not limited to erythema, scaling, itching, weeping, crusting, and pain. Ivermectin Pregnancy And Lactation Text: This medication is Pregnancy Category C and it isn't known if it is safe during pregnancy. It is also excreted in breast milk. Adbry Pregnancy And Lactation Text: It is unknown if this medication will adversely affect pregnancy or breast feeding. Clofazimine Counseling:  I discussed with the patient the risks of clofazimine including but not limited to skin and eye pigmentation, liver damage, nausea/vomiting, gastrointestinal bleeding and allergy. Humira Pregnancy And Lactation Text: This medication is Pregnancy Category B and is considered safe during pregnancy. It is unknown if this medication is excreted in breast milk. Glycopyrrolate Pregnancy And Lactation Text: This medication is Pregnancy Category B and is considered safe during pregnancy. It is unknown if it is excreted breast milk. Fluconazole Counseling:  Patient counseled regarding adverse effects of fluconazole including but not limited to headache, diarrhea, nausea, upset stomach, liver function test abnormalities, taste disturbance, and stomach pain.  There is a rare possibility of liver failure that can occur when taking fluconazole.  The patient understands that monitoring of LFTs and kidney function test may be required, especially at baseline. The patient verbalized understanding of the proper use and possible adverse effects of fluconazole.  All of the patient's questions and concerns were addressed. Griseofulvin Counseling:  I discussed with the patient the risks of griseofulvin including but not limited to photosensitivity, cytopenia, liver damage, nausea/vomiting and severe allergy.  The patient understands that this medication is best absorbed when taken with a fatty meal (e.g., ice cream or french fries). Topical Retinoid counseling:  Patient advised to apply a pea-sized amount only at bedtime and wait 30 minutes after washing their face before applying.  If too drying, patient may add a non-comedogenic moisturizer. The patient verbalized understanding of the proper use and possible adverse effects of retinoids.  All of the patient's questions and concerns were addressed. Taltz Counseling: I discussed with the patient the risks of ixekizumab including but not limited to immunosuppression, serious infections, worsening of inflammatory bowel disease and drug reactions.  The patient understands that monitoring is required including a PPD at baseline and must alert us or the primary physician if symptoms of infection or other concerning signs are noted. Wartpeel Pregnancy And Lactation Text: This medication is Pregnancy Category X and contraindicated in pregnancy and in women who may become pregnant. It is unknown if this medication is excreted in breast milk. Metronidazole Pregnancy And Lactation Text: This medication is Pregnancy Category B and considered safe during pregnancy.  It is also excreted in breast milk. Siliq Pregnancy And Lactation Text: The risk during pregnancy and breastfeeding is uncertain with this medication. Oxybutynin Counseling:  I discussed with the patient the risks of oxybutynin including but not limited to skin rash, drowsiness, dry mouth, difficulty urinating, and blurred vision. Birth Control Pills Pregnancy And Lactation Text: This medication should be avoided if pregnant and for the first 30 days post-partum. Tetracycline Counseling: Patient counseled regarding possible photosensitivity and increased risk for sunburn.  Patient instructed to avoid sunlight, if possible.  When exposed to sunlight, patients should wear protective clothing, sunglasses, and sunscreen.  The patient was instructed to call the office immediately if the following severe adverse effects occur:  hearing changes, easy bruising/bleeding, severe headache, or vision changes.  The patient verbalized understanding of the proper use and possible adverse effects of tetracycline.  All of the patient's questions and concerns were addressed. Patient understands to avoid pregnancy while on therapy due to potential birth defects. Glycopyrrolate Counseling:  I discussed with the patient the risks of glycopyrrolate including but not limited to skin rash, drowsiness, dry mouth, difficulty urinating, and blurred vision. Hydroxychloroquine Pregnancy And Lactation Text: This medication has been shown to cause fetal harm but it isn't assigned a Pregnancy Risk Category. There are small amounts excreted in breast milk. Azithromycin Counseling:  I discussed with the patient the risks of azithromycin including but not limited to GI upset, allergic reaction, drug rash, diarrhea, and yeast infections. Humira Counseling:  I discussed with the patient the risks of adalimumab including but not limited to myelosuppression, immunosuppression, autoimmune hepatitis, demyelinating diseases, lymphoma, and serious infections.  The patient understands that monitoring is required including a PPD at baseline and must alert us or the primary physician if symptoms of infection or other concerning signs are noted. Cantharidin Counseling:  I discussed with the patient the risks of Cantharidin including but not limited to pain, redness, burning, itching, and blistering. Rinvoq Pregnancy And Lactation Text: Based on animal studies, Rinvoq may cause embryo-fetal harm when administered to pregnant women.  The medication should not be used in pregnancy.  Breastfeeding is not recommended during treatment and for 6 days after the last dose. Odomzo Counseling- I discussed with the patient the risks of Odomzo including but not limited to nausea, vomiting, diarrhea, constipation, weight loss, changes in the sense of taste, decreased appetite, muscle spasms, and hair loss.  The patient verbalized understanding of the proper use and possible adverse effects of Odomzo.  All of the patient's questions and concerns were addressed. Calcipotriene Pregnancy And Lactation Text: The use of this medication during pregnancy or lactation is not recommended as there is insufficient data. Dapsone Pregnancy And Lactation Text: This medication is Pregnancy Category C and is not considered safe during pregnancy or breast feeding. Tazorac Counseling:  Patient advised that medication is irritating and drying.  Patient may need to apply sparingly and wash off after an hour before eventually leaving it on overnight.  The patient verbalized understanding of the proper use and possible adverse effects of tazorac.  All of the patient's questions and concerns were addressed. Cimetidine Counseling:  I discussed with the patient the risks of Cimetidine including but not limited to gynecomastia, headache, diarrhea, nausea, drowsiness, arrhythmias, pancreatitis, skin rashes, psychosis, bone marrow suppression and kidney toxicity. Benzoyl Peroxide Pregnancy And Lactation Text: This medication is Pregnancy Category C. It is unknown if benzoyl peroxide is excreted in breast milk. Cyclosporine Pregnancy And Lactation Text: This medication is Pregnancy Category C and it isn't know if it is safe during pregnancy. This medication is excreted in breast milk. Imiquimod Counseling:  I discussed with the patient the risks of imiquimod including but not limited to erythema, scaling, itching, weeping, crusting, and pain.  Patient understands that the inflammatory response to imiquimod is variable from person to person and was educated regarded proper titration schedule.  If flu-like symptoms develop, patient knows to discontinue the medication and contact us. Rifampin Pregnancy And Lactation Text: This medication is Pregnancy Category C and it isn't know if it is safe during pregnancy. It is also excreted in breast milk and should not be used if you are breast feeding. Cellcept Pregnancy And Lactation Text: This medication is Pregnancy Category D and isn't considered safe during pregnancy. It is unknown if this medication is excreted in breast milk. Cibinqo Counseling: I discussed with the patient the risks of Cibinqo therapy including but not limited to common cold, nausea, headache, cold sores, increased blood CPK levels, dizziness, UTIs, fatigue, acne, and vomitting. Live vaccines should be avoided.  This medication has been linked to serious infections; higher rate of mortality; malignancy and lymphoproliferative disorders; major adverse cardiovascular events; thrombosis; thrombocytopenia and lymphopenia; lipid elevations; and retinal detachment. Sarecycline Pregnancy And Lactation Text: This medication is Pregnancy Category D and not consider safe during pregnancy. It is also excreted in breast milk. Rituxan Counseling:  I discussed with the patient the risks of Rituxan infusions. Side effects can include infusion reactions, severe drug rashes including mucocutaneous reactions, reactivation of latent hepatitis and other infections and rarely progressive multifocal leukoencephalopathy.  All of the patient's questions and concerns were addressed. Valtrex Counseling: I discussed with the patient the risks of valacyclovir including but not limited to kidney damage, nausea, vomiting and severe allergy.  The patient understands that if the infection seems to be worsening or is not improving, they are to call. Bimzelx Pregnancy And Lactation Text: This medication crosses the placenta and the safety is uncertain during pregnancy. It is unknown if this medication is present in breast milk. Niacinamide Pregnancy And Lactation Text: These medications are considered safe during pregnancy. Use Enhanced Medication Counseling?: No Nsaids Pregnancy And Lactation Text: These medications are considered safe up to 30 weeks gestation. It is excreted in breast milk. Hydroquinone Counseling:  Patient advised that medication may result in skin irritation, lightening (hypopigmentation), dryness, and burning.  In the event of skin irritation, the patient was advised to reduce the amount of the drug applied or use it less frequently.  Rarely, spots that are treated with hydroquinone can become darker (pseudoochronosis).  Should this occur, patient instructed to stop medication and call the office. The patient verbalized understanding of the proper use and possible adverse effects of hydroquinone.  All of the patient's questions and concerns were addressed. Calcipotriene Counseling:  I discussed with the patient the risks of calcipotriene including but not limited to erythema, scaling, itching, and irritation. VTAMA Counseling: I discussed with the patient that VTAMA is not for use in the eyes, mouth or mouth. They should call the office if they develop any signs of allergic reactions to VTAMA. The patient verbalized understanding of the proper use and possible adverse effects of VTAMA.  All of the patient's questions and concerns were addressed. Xelclarissez Pregnancy And Lactation Text: This medication is Pregnancy Category D and is not considered safe during pregnancy.  The risk during breast feeding is also uncertain. Hydroxyzine Pregnancy And Lactation Text: This medication is not safe during pregnancy and should not be taken. It is also excreted in breast milk and breast feeding isn't recommended. Ivermectin Counseling:  Patient instructed to take medication on an empty stomach with a full glass of water.  Patient informed of potential adverse effects including but not limited to nausea, diarrhea, dizziness, itching, and swelling of the extremities or lymph nodes.  The patient verbalized understanding of the proper use and possible adverse effects of ivermectin.  All of the patient's questions and concerns were addressed. Quinolones Counseling:  I discussed with the patient the risks of fluoroquinolones including but not limited to GI upset, allergic reaction, drug rash, diarrhea, dizziness, photosensitivity, yeast infections, liver function test abnormalities, tendonitis/tendon rupture. Opzelura Pregnancy And Lactation Text: There is insufficient data to evaluate drug-associated risk for major birth defects, miscarriage, or other adverse maternal or fetal outcomes.  There is a pregnancy registry that monitors pregnancy outcomes in pregnant persons exposed to the medication during pregnancy.  It is unknown if this medication is excreted in breast milk.  Do not breastfeed during treatment and for about 4 weeks after the last dose. Dupixent Pregnancy And Lactation Text: This medication likely crosses the placenta but the risk for the fetus is uncertain. This medication is excreted in breast milk. Siliq Counseling:  I discussed with the patient the risks of Siliq including but not limited to new or worsening depression, suicidal thoughts and behavior, immunosuppression, malignancy, posterior leukoencephalopathy syndrome, and serious infections.  The patient understands that monitoring is required including a PPD at baseline and must alert us or the primary physician if symptoms of infection or other concerning signs are noted. There is also a special program designed to monitor depression which is required with Siliq. Olumiant Counseling: I discussed with the patient the risks of Olumiant therapy including but not limited to upper respiratory tract infections, shingles, cold sores, and nausea. Live vaccines should be avoided.  This medication has been linked to serious infections; higher rate of mortality; malignancy and lymphoproliferative disorders; major adverse cardiovascular events; thrombosis; gastrointestinal perforations; neutropenia; lymphopenia; anemia; liver enzyme elevations; and lipid elevations. Xeljanz Counseling: I discussed with the patient the risks of Xeljanz therapy including increased risk of infection, liver issues, headache, diarrhea, or cold symptoms. Live vaccines should be avoided. They were instructed to call if they have any problems. Oxybutynin Pregnancy And Lactation Text: This medication is Pregnancy Category B and is considered safe during pregnancy. It is unknown if it is excreted in breast milk. Clindamycin Counseling: I counseled the patient regarding use of clindamycin as an antibiotic for prophylactic and/or therapeutic purposes. Clindamycin is active against numerous classes of bacteria, including skin bacteria. Side effects may include nausea, diarrhea, gastrointestinal upset, rash, hives, yeast infections, and in rare cases, colitis. Odomzo Pregnancy And Lactation Text: This medication is Pregnancy Category X and is absolutely contraindicated during pregnancy. It is unknown if it is excreted in breast milk. Low Dose Naltrexone Pregnancy And Lactation Text: Naltrexone is pregnancy category C.  There have been no adequate and well-controlled studies in pregnant women.  It should be used in pregnancy only if the potential benefit justifies the potential risk to the fetus.   Limited data indicates that naltrexone is minimally excreted into breastmilk. Tranexamic Acid Counseling:  Patient advised of the small risk of bleeding problems with tranexamic acid. They were also instructed to call if they developed any nausea, vomiting or diarrhea. All of the patient's questions and concerns were addressed. Cantharidin Pregnancy And Lactation Text: This medication has not been proven safe during pregnancy. It is unknown if this medication is excreted in breast milk. High Dose Vitamin A Pregnancy And Lactation Text: High dose vitamin A therapy is contraindicated during pregnancy and breast feeding. Simponi Counseling:  I discussed with the patient the risks of golimumab including but not limited to myelosuppression, immunosuppression, autoimmune hepatitis, demyelinating diseases, lymphoma, and serious infections.  The patient understands that monitoring is required including a PPD at baseline and must alert us or the primary physician if symptoms of infection or other concerning signs are noted. Minocycline Counseling: Patient advised regarding possible photosensitivity and discoloration of the teeth, skin, lips, tongue and gums.  Patient instructed to avoid sunlight, if possible.  When exposed to sunlight, patients should wear protective clothing, sunglasses, and sunscreen.  The patient was instructed to call the office immediately if the following severe adverse effects occur:  hearing changes, easy bruising/bleeding, severe headache, or vision changes.  The patient verbalized understanding of the proper use and possible adverse effects of minocycline.  All of the patient's questions and concerns were addressed. Prednisone Counseling:  I discussed with the patient the risks of prolonged use of prednisone including but not limited to weight gain, insomnia, osteoporosis, mood changes, diabetes, susceptibility to infection, glaucoma and high blood pressure.  In cases where prednisone use is prolonged, patients should be monitored with blood pressure checks, serum glucose levels and an eye exam.  Additionally, the patient may need to be placed on GI prophylaxis, PCP prophylaxis, and calcium and vitamin D supplementation and/or a bisphosphonate.  The patient verbalized understanding of the proper use and the possible adverse effects of prednisone.  All of the patient's questions and concerns were addressed. Itraconazole Counseling:  I discussed with the patient the risks of itraconazole including but not limited to liver damage, nausea/vomiting, neuropathy, and severe allergy.  The patient understands that this medication is best absorbed when taken with acidic beverages such as non-diet cola or ginger ale.  The patient understands that monitoring is required including baseline LFTs and repeat LFTs at intervals.  The patient understands that they are to contact us or the primary physician if concerning signs are noted. Topical Steroids Applications Pregnancy And Lactation Text: Most topical steroids are considered safe to use during pregnancy and lactation.  Any topical steroid applied to the breast or nipple should be washed off before breastfeeding. Finasteride Pregnancy And Lactation Text: This medication is absolutely contraindicated during pregnancy. It is unknown if it is excreted in breast milk. Dapsone Counseling: I discussed with the patient the risks of dapsone including but not limited to hemolytic anemia, agranulocytosis, rashes, methemoglobinemia, kidney failure, peripheral neuropathy, headaches, GI upset, and liver toxicity.  Patients who start dapsone require monitoring including baseline LFTs and weekly CBCs for the first month, then every month thereafter.  The patient verbalized understanding of the proper use and possible adverse effects of dapsone.  All of the patient's questions and concerns were addressed. Xolair Counseling:  Patient informed of potential adverse effects including but not limited to fever, muscle aches, rash and allergic reactions.  The patient verbalized understanding of the proper use and possible adverse effects of Xolair.  All of the patient's questions and concerns were addressed. Spironolactone Pregnancy And Lactation Text: This medication can cause feminization of the male fetus and should be avoided during pregnancy. The active metabolite is also found in breast milk. Azelaic Acid Pregnancy And Lactation Text: This medication is considered safe during pregnancy and breast feeding. Ilumya Counseling: I discussed with the patient the risks of tildrakizumab including but not limited to immunosuppression, malignancy, posterior leukoencephalopathy syndrome, and serious infections.  The patient understands that monitoring is required including a PPD at baseline and must alert us or the primary physician if symptoms of infection or other concerning signs are noted. Thalidomide Counseling: I discussed with the patient the risks of thalidomide including but not limited to birth defects, anxiety, weakness, chest pain, dizziness, cough and severe allergy. SSKI Counseling:  I discussed with the patient the risks of SSKI including but not limited to thyroid abnormalities, metallic taste, GI upset, fever, headache, acne, arthralgias, paraesthesias, lymphadenopathy, easy bleeding, arrhythmias, and allergic reaction. Rinvoq Counseling: I discussed with the patient the risks of Rinvoq therapy including but not limited to upper respiratory tract infections, shingles, cold sores, bronchitis, nausea, cough, fever, acne, and headache. Live vaccines should be avoided.  This medication has been linked to serious infections; higher rate of mortality; malignancy and lymphoproliferative disorders; major adverse cardiovascular events; thrombosis; thrombocytopenia, anemia, and neutropenia; lipid elevations; liver enzyme elevations; and gastrointestinal perforations. Rituxan Pregnancy And Lactation Text: This medication is Pregnancy Category C and it isn't know if it is safe during pregnancy. It is unknown if this medication is excreted in breast milk but similar antibodies are known to be excreted. Doxepin Counseling:  Patient advised that the medication is sedating and not to drive a car after taking this medication. Patient informed of potential adverse effects including but not limited to dry mouth, urinary retention, and blurry vision.  The patient verbalized understanding of the proper use and possible adverse effects of doxepin.  All of the patient's questions and concerns were addressed. Olanzapine Counseling- I discussed with the patient the common side effects of olanzapine including but are not limited to: lack of energy, dry mouth, increased appetite, sleepiness, tremor, constipation, dizziness, changes in behavior, or restlessness.  Explained that teenagers are more likely to experience headaches, abdominal pain, pain in the arms or legs, tiredness, and sleepiness.  Serious side effects include but are not limited: increased risk of death in elderly patients who are confused, have memory loss, or dementia-related psychosis; hyperglycemia; increased cholesterol and triglycerides; and weight gain. Stelara Counseling:  I discussed with the patient the risks of ustekinumab including but not limited to immunosuppression, malignancy, posterior leukoencephalopathy syndrome, and serious infections.  The patient understands that monitoring is required including a PPD at baseline and must alert us or the primary physician if symptoms of infection or other concerning signs are noted. Rhofade Counseling: Rhofade is a topical medication which can decrease superficial blood flow where applied. Side effects are uncommon and include stinging, redness and allergic reactions. Otezla Pregnancy And Lactation Text: This medication is Pregnancy Category C and it isn't known if it is safe during pregnancy. It is unknown if it is excreted in breast milk. Hydroxychloroquine Counseling:  I discussed with the patient that a baseline ophthalmologic exam is needed at the start of therapy and every year thereafter while on therapy. A CBC may also be warranted for monitoring.  The side effects of this medication were discussed with the patient, including but not limited to agranulocytosis, aplastic anemia, seizures, rashes, retinopathy, and liver toxicity. Patient instructed to call the office should any adverse effect occur.  The patient verbalized understanding of the proper use and possible adverse effects of Plaquenil.  All the patient's questions and concerns were addressed. Picato Counseling:  I discussed with the patient the risks of Picato including but not limited to erythema, scaling, itching, weeping, crusting, and pain. Cosentyx Counseling:  I discussed with the patient the risks of Cosentyx including but not limited to worsening of Crohn's disease, immunosuppression, allergic reactions and infections.  The patient understands that monitoring is required including a PPD at baseline and must alert us or the primary physician if symptoms of infection or other concerning signs are noted. Libtayo Pregnancy And Lactation Text: This medication is contraindicated in pregnancy and when breast feeding. Tremfya Counseling: I discussed with the patient the risks of guselkumab including but not limited to immunosuppression, serious infections, and drug reactions.  The patient understands that monitoring is required including a PPD at baseline and must alert us or the primary physician if symptoms of infection or other concerning signs are noted. Azithromycin Pregnancy And Lactation Text: This medication is considered safe during pregnancy and is also secreted in breast milk. Soolantra Counseling: I discussed with the patients the risks of topial Soolantra. This is a medicine which decreases the number of mites and inflammation in the skin. You experience burning, stinging, eye irritation or allergic reactions.  Please call our office if you develop any problems from using this medication. Opioid Counseling: I discussed with the patient the potential side effects of opioids including but not limited to addiction, altered mental status, and depression. I stressed avoiding alcohol, benzodiazepines, muscle relaxants and sleep aids unless specifically okayed by a physician. The patient verbalized understanding of the proper use and possible adverse effects of opioids. All of the patient's questions and concerns were addressed. They were instructed to flush the remaining pills down the toilet if they did not need them for pain. Aklief counseling:  Patient advised to apply a pea-sized amount only at bedtime and wait 30 minutes after washing their face before applying.  If too drying, patient may add a non-comedogenic moisturizer.  The most commonly reported side effects including irritation, redness, scaling, dryness, stinging, burning, itching, and increased risk of sunburn.  The patient verbalized understanding of the proper use and possible adverse effects of retinoids.  All of the patient's questions and concerns were addressed. Sski Pregnancy And Lactation Text: This medication is Pregnancy Category D and isn't considered safe during pregnancy. It is excreted in breast milk. Winlevi Pregnancy And Lactation Text: This medication is considered safe during pregnancy and breastfeeding. Azathioprine Counseling:  I discussed with the patient the risks of azathioprine including but not limited to myelosuppression, immunosuppression, hepatotoxicity, lymphoma, and infections.  The patient understands that monitoring is required including baseline LFTs, Creatinine, possible TPMP genotyping and weekly CBCs for the first month and then every 2 weeks thereafter.  The patient verbalized understanding of the proper use and possible adverse effects of azathioprine.  All of the patient's questions and concerns were addressed. Doxycycline Pregnancy And Lactation Text: This medication is Pregnancy Category D and not consider safe during pregnancy. It is also excreted in breast milk but is considered safe for shorter treatment courses. Methotrexate Pregnancy And Lactation Text: This medication is Pregnancy Category X and is known to cause fetal harm. This medication is excreted in breast milk. Acitretin Counseling:  I discussed with the patient the risks of acitretin including but not limited to hair loss, dry lips/skin/eyes, liver damage, hyperlipidemia, depression/suicidal ideation, photosensitivity.  Serious rare side effects can include but are not limited to pancreatitis, pseudotumor cerebri, bony changes, clot formation/stroke/heart attack.  Patient understands that alcohol is contraindicated since it can result in liver toxicity and significantly prolong the elimination of the drug by many years. Libtayo Counseling- I discussed with the patient the risks of Libtayo including but not limited to nausea, vomiting, diarrhea, and bone or muscle pain.  The patient verbalized understanding of the proper use and possible adverse effects of Libtayo.  All of the patient's questions and concerns were addressed. Cimzia Pregnancy And Lactation Text: This medication crosses the placenta but can be considered safe in certain situations. Cimzia may be excreted in breast milk. Topical Sulfur Applications Counseling: Topical Sulfur Counseling: Patient counseled that this medication may cause skin irritation or allergic reactions.  In the event of skin irritation, the patient was advised to reduce the amount of the drug applied or use it less frequently.   The patient verbalized understanding of the proper use and possible adverse effects of topical sulfur application.  All of the patient's questions and concerns were addressed. Cyclophosphamide Counseling:  I discussed with the patient the risks of cyclophosphamide including but not limited to hair loss, hormonal abnormalities, decreased fertility, abdominal pain, diarrhea, nausea and vomiting, bone marrow suppression and infection. The patient understands that monitoring is required while taking this medication. Opzelura Counseling:  I discussed with the patient the risks of Opzelura including but not limited to nasopharngitis, bronchitis, ear infection, eosinophila, hives, diarrhea, folliculitis, tonsillitis, and rhinorrhea.  Taken orally, this medication has been linked to serious infections; higher rate of mortality; malignancy and lymphoproliferative disorders; major adverse cardiovascular events; thrombosis; thrombocytopenia, anemia, and neutropenia; and lipid elevations. Solaraze Pregnancy And Lactation Text: This medication is Pregnancy Category B and is considered safe. There is some data to suggest avoiding during the third trimester. It is unknown if this medication is excreted in breast milk. Vtama Pregnancy And Lactation Text: It is unknown if this medication can cause problems during pregnancy and breastfeeding. Metronidazole Counseling:  I discussed with the patient the risks of metronidazole including but not limited to seizures, nausea/vomiting, a metallic taste in the mouth, nausea/vomiting and severe allergy. Sarecycline Counseling: Patient advised regarding possible photosensitivity and discoloration of the teeth, skin, lips, tongue and gums.  Patient instructed to avoid sunlight, if possible.  When exposed to sunlight, patients should wear protective clothing, sunglasses, and sunscreen.  The patient was instructed to call the office immediately if the following severe adverse effects occur:  hearing changes, easy bruising/bleeding, severe headache, or vision changes.  The patient verbalized understanding of the proper use and possible adverse effects of sarecycline.  All of the patient's questions and concerns were addressed. Drysol Counseling:  I discussed with the patient the risks of drysol/aluminum chloride including but not limited to skin rash, itching, irritation, burning. Bactrim Counseling:  I discussed with the patient the risks of sulfa antibiotics including but not limited to GI upset, allergic reaction, drug rash, diarrhea, dizziness, photosensitivity, and yeast infections.  Rarely, more serious reactions can occur including but not limited to aplastic anemia, agranulocytosis, methemoglobinemia, blood dyscrasias, liver or kidney failure, lung infiltrates or desquamative/blistering drug rashes. Topical Metronidazole Pregnancy And Lactation Text: This medication is Pregnancy Category B and considered safe during pregnancy.  It is also considered safe to use while breastfeeding. Olumiant Pregnancy And Lactation Text: Based on animal studies, Olumiant may cause embryo-fetal harm when administered to pregnant women.  The medication should not be used in pregnancy.  Breastfeeding is not recommended during treatment. Niacinamide Counseling: I recommended taking niacin or niacinamide, also know as vitamin B3, twice daily. Recent evidence suggests that taking vitamin B3 (500 mg twice daily) can reduce the risk of actinic keratoses and non-melanoma skin cancers. Side effects of vitamin B3 include flushing and headache. Eucrisa Counseling: Patient may experience a mild burning sensation during topical application. Eucrisa is not approved in children less than 3 months of age. Erythromycin Pregnancy And Lactation Text: This medication is Pregnancy Category B and is considered safe during pregnancy. It is also excreted in breast milk. Ketoconazole Pregnancy And Lactation Text: This medication is Pregnancy Category C and it isn't know if it is safe during pregnancy. It is also excreted in breast milk and breast feeding isn't recommended. Cellcept Counseling:  I discussed with the patient the risks of mycophenolate mofetil including but not limited to infection/immunosuppression, GI upset, hypokalemia, hypercholesterolemia, bone marrow suppression, lymphoproliferative disorders, malignancy, GI ulceration/bleed/perforation, colitis, interstitial lung disease, kidney failure, progressive multifocal leukoencephalopathy, and birth defects.  The patient understands that monitoring is required including a baseline creatinine and regular CBC testing. In addition, patient must alert us immediately if symptoms of infection or other concerning signs are noted. Bexarotene Counseling:  I discussed with the patient the risks of bexarotene including but not limited to hair loss, dry lips/skin/eyes, liver abnormalities, hyperlipidemia, pancreatitis, depression/suicidal ideation, photosensitivity, drug rash/allergic reactions, hypothyroidism, anemia, leukopenia, infection, cataracts, and teratogenicity.  Patient understands that they will need regular blood tests to check lipid profile, liver function tests, white blood cell count, thyroid function tests and pregnancy test if applicable. Low Dose Naltrexone Counseling- I discussed with the patient the potential risks and side effects of low dose naltrexone including but not limited to: more vivid dreams, headaches, nausea, vomiting, abdominal pain, fatigue, dizziness, and anxiety. Topical Metronidazole Counseling: Metronidazole is a topical antibiotic medication. You may experience burning, stinging, redness, or allergic reactions.  Please call our office if you develop any problems from using this medication. Elidel Counseling: Patient may experience a mild burning sensation during topical application. Elidel is not approved in children less than 2 years of age. There have been case reports of hematologic and skin malignancies in patients using topical calcineurin inhibitors although causality is questionable. Azelaic Acid Counseling: Patient counseled that medicine may cause skin irritation and to avoid applying near the eyes.  In the event of skin irritation, the patient was advised to reduce the amount of the drug applied or use it less frequently.   The patient verbalized understanding of the proper use and possible adverse effects of azelaic acid.  All of the patient's questions and concerns were addressed. Rifampin Counseling: I discussed with the patient the risks of rifampin including but not limited to liver damage, kidney damage, red-orange body fluids, nausea/vomiting and severe allergy. Solaraze Counseling:  I discussed with the patient the risks of Solaraze including but not limited to erythema, scaling, itching, weeping, crusting, and pain. Cephalexin Pregnancy And Lactation Text: This medication is Pregnancy Category B and considered safe during pregnancy.  It is also excreted in breast milk but can be used safely for shorter doses. Protopic Pregnancy And Lactation Text: This medication is Pregnancy Category C. It is unknown if this medication is excreted in breast milk when applied topically. Dupixent Counseling: I discussed with the patient the risks of dupilumab including but not limited to eye inflammation and irritation, cold sores, injection site reactions, allergic reactions and increased risk of parasitic infection. The patient understands that monitoring is required and they must alert us or the primary physician if symptoms of infection or other concerning signs are noted. Topical Sulfur Applications Pregnancy And Lactation Text: This medication is considered safe during pregnancy and breast feeding secondary to limited systemic absorption. Cephalexin Counseling: I counseled the patient regarding use of cephalexin as an antibiotic for prophylactic and/or therapeutic purposes. Cephalexin (commonly prescribed under brand name Keflex) is a cephalosporin antibiotic which is active against numerous classes of bacteria, including most skin bacteria. Side effects may include nausea, diarrhea, gastrointestinal upset, rash, hives, yeast infections, and in rare cases, hepatitis, kidney disease, seizures, fever, confusion, neurologic symptoms, and others. Patients with severe allergies to penicillin medications are cautioned that there is about a 10% incidence of cross-reactivity with cephalosporins. When possible, patients with penicillin allergies should use alternatives to cephalosporins for antibiotic therapy. Zyclara Counseling:  I discussed with the patient the risks of imiquimod including but not limited to erythema, scaling, itching, weeping, crusting, and pain.  Patient understands that the inflammatory response to imiquimod is variable from person to person and was educated regarded proper titration schedule.  If flu-like symptoms develop, patient knows to discontinue the medication and contact us. Eucrisa Pregnancy And Lactation Text: This medication has not been assigned a Pregnancy Risk Category but animal studies failed to show danger with the topical medication. It is unknown if the medication is excreted in breast milk. Infliximab Counseling:  I discussed with the patient the risks of infliximab including but not limited to myelosuppression, immunosuppression, autoimmune hepatitis, demyelinating diseases, lymphoma, and serious infections.  The patient understands that monitoring is required including a PPD at baseline and must alert us or the primary physician if symptoms of infection or other concerning signs are noted. Doxycycline Counseling:  Patient counseled regarding possible photosensitivity and increased risk for sunburn.  Patient instructed to avoid sunlight, if possible.  When exposed to sunlight, patients should wear protective clothing, sunglasses, and sunscreen.  The patient was instructed to call the office immediately if the following severe adverse effects occur:  hearing changes, easy bruising/bleeding, severe headache, or vision changes.  The patient verbalized understanding of the proper use and possible adverse effects of doxycycline.  All of the patient's questions and concerns were addressed. Protopic Counseling: Patient may experience a mild burning sensation during topical application. Protopic is not approved in children less than 2 years of age. There have been case reports of hematologic and skin malignancies in patients using topical calcineurin inhibitors although causality is questionable. Gabapentin Pregnancy And Lactation Text: This medication is Pregnancy Category C and isn't considered safe during pregnancy. It is excreted in breast milk. Qbrexza Pregnancy And Lactation Text: There is no available data on Qbrexza use in pregnant women.  There is no available data on Qbrexza use in lactation. Birth Control Pills Counseling: Birth Control Pill Counseling: I discussed with the patient the potential side effects of OCPs including but not limited to increased risk of stroke, heart attack, thrombophlebitis, deep venous thrombosis, hepatic adenomas, breast changes, GI upset, headaches, and depression.  The patient verbalized understanding of the proper use and possible adverse effects of OCPs. All of the patient's questions and concerns were addressed. Topical Steroids Counseling: I discussed with the patient that prolonged use of topical steroids can result in the increased appearance of superficial blood vessels (telangiectasias), lightening (hypopigmentation) and thinning of the skin (atrophy).  Patient understands to avoid using high potency steroids in skin folds, the groin or the face.  The patient verbalized understanding of the proper use and possible adverse effects of topical steroids.  All of the patient's questions and concerns were addressed. Zoryve Counseling:  I discussed with the patient that Zoryve is not for use in the eyes, mouth or vagina. The most commonly reported side effects include diarrhea, headache, insomnia, application site pain, upper respiratory tract infections, and urinary tract infections.  All of the patient's questions and concerns were addressed. Colchicine Counseling:  Patient counseled regarding adverse effects including but not limited to stomach upset (nausea, vomiting, stomach pain, or diarrhea).  Patient instructed to limit alcohol consumption while taking this medication.  Colchicine may reduce blood counts especially with prolonged use.  The patient understands that monitoring of kidney function and blood counts may be required, especially at baseline. The patient verbalized understanding of the proper use and possible adverse effects of colchicine.  All of the patient's questions and concerns were addressed. Erythromycin Counseling:  I discussed with the patient the risks of erythromycin including but not limited to GI upset, allergic reaction, drug rash, diarrhea, increase in liver enzymes, and yeast infections. Tranexamic Acid Pregnancy And Lactation Text: It is unknown if this medication is safe during pregnancy or breast feeding. Bexarotene Pregnancy And Lactation Text: This medication is Pregnancy Category X and should not be given to women who are pregnant or may become pregnant. This medication should not be used if you are breast feeding. Olanzapine Pregnancy And Lactation Text: This medication is pregnancy category C.   There are no adequate and well controlled trials with olanzapine in pregnant females.  Olanzapine should be used during pregnancy only if the potential benefit justifies the potential risk to the fetus.   In a study in lactating healthy women, olanzapine was excreted in breast milk.  It is recommended that women taking olanzapine should not breast feed. Propranolol Pregnancy And Lactation Text: This medication is Pregnancy Category C and it isn't known if it is safe during pregnancy. It is excreted in breast milk. Arava Counseling:  Patient counseled regarding adverse effects of Arava including but not limited to nausea, vomiting, abnormalities in liver function tests. Patients may develop mouth sores, rash, diarrhea, and abnormalities in blood counts. The patient understands that monitoring is required including LFTs and blood counts.  There is a rare possibility of scarring of the liver and lung problems that can occur when taking methotrexate. Persistent nausea, loss of appetite, pale stools, dark urine, cough, and shortness of breath should be reported immediately. Patient advised to discontinue Arava treatment and consult with a physician prior to attempting conception. The patient will have to undergo a treatment to eliminate Arava from the body prior to conception. Adbry Counseling: I discussed with the patient the risks of tralokinumab including but not limited to eye infection and irritation, cold sores, injection site reactions, worsening of asthma, allergic reactions and increased risk of parasitic infection.  Live vaccines should be avoided while taking tralokinumab. The patient understands that monitoring is required and they must alert us or the primary physician if symptoms of infection or other concerning signs are noted. Sotyktu Counseling:  I discussed the most common side effects of Sotyktu including: common cold, sore throat, sinus infections, cold sores, canker sores, folliculitis, and acne.  I also discussed more serious side effects of Sotyktu including but not limited to: serious allergic reactions; increased risk for infections such as TB; cancers such as lymphomas; rhabdomyolysis and elevated CPK; and elevated triglycerides and liver enzymes.  Detail Level: Simple Bactrim Pregnancy And Lactation Text: This medication is Pregnancy Category D and is known to cause fetal risk.  It is also excreted in breast milk. Cyclosporine Counseling:  I discussed with the patient the risks of cyclosporine including but not limited to hypertension, gingival hyperplasia,myelosuppression, immunosuppression, liver damage, kidney damage, neurotoxicity, lymphoma, and serious infections. The patient understands that monitoring is required including baseline blood pressure, CBC, CMP, lipid panel and uric acid, and then 1-2 times monthly CMP and blood pressure. Albendazole Counseling:  I discussed with the patient the risks of albendazole including but not limited to cytopenia, kidney damage, nausea/vomiting and severe allergy.  The patient understands that this medication is being used in an off-label manner. Qbrexza Counseling:  I discussed with the patient the risks of Qbrexza including but not limited to headache, mydriasis, blurred vision, dry eyes, nasal dryness, dry mouth, dry throat, dry skin, urinary hesitation, and constipation.  Local skin reactions including erythema, burning, stinging, and itching can also occur. Cibinqo Pregnancy And Lactation Text: It is unknown if this medication will adversely affect pregnancy or breast feeding.  You should not take this medication if you are currently pregnant or planning a pregnancy or while breastfeeding. Klisyri Pregnancy And Lactation Text: It is unknown if this medication can harm a developing fetus or if it is excreted in breast milk. Bimzelx Counseling:  I discussed with the patient the risks of Bimzelx including but not limited to depression, immunosuppression, allergic reactions and infections.  The patient understands that monitoring is required including a PPD at baseline and must alert us or the primary physician if symptoms of infection or other concerning signs are noted. Skyrizi Counseling: I discussed with the patient the risks of risankizumab-rzaa including but not limited to immunosuppression, and serious infections.  The patient understands that monitoring is required including a PPD at baseline and must alert us or the primary physician if symptoms of infection or other concerning signs are noted. Isotretinoin Counseling: Patient should get monthly blood tests, not donate blood, not drive at night if vision affected, not share medication, and not undergo elective surgery for 6 months after tx completed. Side effects reviewed, pt to contact office should one occur. Itraconazole Pregnancy And Lactation Text: This medication is Pregnancy Category C and it isn't know if it is safe during pregnancy. It is also excreted in breast milk. Gabapentin Counseling: I discussed with the patient the risks of gabapentin including but not limited to dizziness, somnolence, fatigue and ataxia. Topical Clindamycin Counseling: Patient counseled that this medication may cause skin irritation or allergic reactions.  In the event of skin irritation, the patient was advised to reduce the amount of the drug applied or use it less frequently.   The patient verbalized understanding of the proper use and possible adverse effects of clindamycin.  All of the patient's questions and concerns were addressed. Finasteride Female Counseling: Finasteride Counseling:  I discussed with the patient the risks of use of finasteride including but not limited to decreased libido and sexual dysfunction. Explained the teratogenic nature of the medication and stressed the importance of not getting pregnant during treatment. All of the patient's questions and concerns were addressed. Opioid Pregnancy And Lactation Text: These medications can lead to premature delivery and should be avoided during pregnancy. These medications are also present in breast milk in small amounts. Propranolol Counseling:  I discussed with the patient the risks of propranolol including but not limited to low heart rate, low blood pressure, low blood sugar, restlessness and increased cold sensitivity. They should call the office if they experience any of these side effects. Tazorac Pregnancy And Lactation Text: This medication is not safe during pregnancy. It is unknown if this medication is excreted in breast milk. Mirvaso Counseling: Mirvaso is a topical medication which can decrease superficial blood flow where applied. Side effects are uncommon and include stinging, redness and allergic reactions. Griseofulvin Pregnancy And Lactation Text: This medication is Pregnancy Category X and is known to cause serious birth defects. It is unknown if this medication is excreted in breast milk but breast feeding should be avoided. Minoxidil Counseling: Minoxidil is a topical medication which can increase blood flow where it is applied. It is uncertain how this medication increases hair growth. Side effects are uncommon and include stinging and allergic reactions. Dutasteride Male Counseling: Dustasteride Counseling:  I discussed with the patient the risks of use of dutasteride including but not limited to decreased libido, decreased ejaculate volume, and gynecomastia. Women who can become pregnant should not handle medication.  All of the patient's questions and concerns were addressed. Ketoconazole Counseling:   Patient counseled regarding improving absorption with orange juice.  Adverse effects include but are not limited to breast enlargement, headache, diarrhea, nausea, upset stomach, liver function test abnormalities, taste disturbance, and stomach pain.  There is a rare possibility of liver failure that can occur when taking ketoconazole. The patient understands that monitoring of LFTs may be required, especially at baseline. The patient verbalized understanding of the proper use and possible adverse effects of ketoconazole.  All of the patient's questions and concerns were addressed. Acitretin Pregnancy And Lactation Text: This medication is Pregnancy Category X and should not be given to women who are pregnant or may become pregnant in the future. This medication is excreted in breast milk. Sotyktu Pregnancy And Lactation Text: There is insufficient data to evaluate whether or not Sotyktu is safe to use during pregnancy.   It is not known if Sotyktu passes into breast milk and whether or not it is safe to use when breastfeeding.   Valtrex Pregnancy And Lactation Text: this medication is Pregnancy Category B and is considered safe during pregnancy. This medication is not directly found in breast milk but it's metabolite acyclovir is present. Terbinafine Counseling: Patient counseling regarding adverse effects of terbinafine including but not limited to headache, diarrhea, rash, upset stomach, liver function test abnormalities, itching, taste/smell disturbance, nausea, abdominal pain, and flatulence.  There is a rare possibility of liver failure that can occur when taking terbinafine.  The patient understands that a baseline LFT and kidney function test may be required. The patient verbalized understanding of the proper use and possible adverse effects of terbinafine.  All of the patient's questions and concerns were addressed. Litfulo Counseling: I discussed with the patient the risks of Litfulo therapy including but not limited to upper respiratory tract infections, shingles, cold sores, and nausea. Live vaccines should be avoided.  This medication has been linked to serious infections; higher rate of mortality; malignancy and lymphoproliferative disorders; major adverse cardiovascular events; thrombosis; gastrointestinal perforations; neutropenia; lymphopenia; anemia; liver enzyme elevations; and lipid elevations. Mirvaso Pregnancy And Lactation Text: This medication has not been assigned a Pregnancy Risk Category. It is unknown if the medication is excreted in breast milk. 5-Fu Counseling: 5-Fluorouracil Counseling:  I discussed with the patient the risks of 5-fluorouracil including but not limited to erythema, scaling, itching, weeping, crusting, and pain. Topical Ketoconazole Counseling: Patient counseled that this medication may cause skin irritation or allergic reactions.  In the event of skin irritation, the patient was advised to reduce the amount of the drug applied or use it less frequently.   The patient verbalized understanding of the proper use and possible adverse effects of ketoconazole.  All of the patient's questions and concerns were addressed. Doxepin Pregnancy And Lactation Text: This medication is Pregnancy Category C and it isn't known if it is safe during pregnancy. It is also excreted in breast milk and breast feeding isn't recommended. Otezla Counseling: The side effects of Otezla were discussed with the patient, including but not limited to worsening or new depression, weight loss, diarrhea, nausea, upper respiratory tract infection, and headache. Patient instructed to call the office should any adverse effect occur.  The patient verbalized understanding of the proper use and possible adverse effects of Otezla.  All the patient's questions and concerns were addressed. Oral Minoxidil Counseling- I discussed with the patient the risks of oral minoxidil including but not limited to shortness of breath, swelling of the feet or ankles, dizziness, lightheadedness, unwanted hair growth and allergic reaction.  The patient verbalized understanding of the proper use and possible adverse effects of oral minoxidil.  All of the patient's questions and concerns were addressed. Dutasteride Female Counseling: Dutasteride Counseling:  I discussed with the patient the risks of use of dutasteride including but not limited to decreased libido and sexual dysfunction. Explained the teratogenic nature of the medication and stressed the importance of not getting pregnant during treatment. All of the patient's questions and concerns were addressed. Aklief Pregnancy And Lactation Text: It is unknown if this medication is safe to use during pregnancy.  It is unknown if this medication is excreted in breast milk.  Breastfeeding women should use the topical cream on the smallest area of the skin for the shortest time needed while breastfeeding.  Do not apply to nipple and areola. Litfulo Pregnancy And Lactation Text: Based on animal studies, Lifulo may cause embryo-fetal harm when administered to pregnant women.  The medication should not be used in pregnancy.  Breastfeeding is not recommended during treatment. Dutasteride Pregnancy And Lactation Text: This medication is absolutely contraindicated in women, especially during pregnancy and breast feeding. Feminization of male fetuses is possible if taking while pregnant. Finasteride Male Counseling: Finasteride Counseling:  I discussed with the patient the risks of use of finasteride including but not limited to decreased libido, decreased ejaculate volume, gynecomastia, and depression. Women should not handle medication.  All of the patient's questions and concerns were addressed. Wartpeel Counseling:  I discussed with the patient the risks of Wartpeel including but not limited to erythema, scaling, itching, weeping, crusting, and pain. Cyclophosphamide Pregnancy And Lactation Text: This medication is Pregnancy Category D and it isn't considered safe during pregnancy. This medication is excreted in breast milk. Spironolactone Counseling: Patient advised regarding risks of diarrhea, abdominal pain, hyperkalemia, birth defects (for female patients), liver toxicity and renal toxicity. The patient may need blood work to monitor liver and kidney function and potassium levels while on therapy. The patient verbalized understanding of the proper use and possible adverse effects of spironolactone.  All of the patient's questions and concerns were addressed. Cimzia Counseling:  I discussed with the patient the risks of Cimzia including but not limited to immunosuppression, allergic reactions and infections.  The patient understands that monitoring is required including a PPD at baseline and must alert us or the primary physician if symptoms of infection or other concerning signs are noted. Erivedge Counseling- I discussed with the patient the risks of Erivedge including but not limited to nausea, vomiting, diarrhea, constipation, weight loss, changes in the sense of taste, decreased appetite, muscle spasms, and hair loss.  The patient verbalized understanding of the proper use and possible adverse effects of Erivedge.  All of the patient's questions and concerns were addressed. Methotrexate Counseling:  Patient counseled regarding adverse effects of methotrexate including but not limited to nausea, vomiting, abnormalities in liver function tests. Patients may develop mouth sores, rash, diarrhea, and abnormalities in blood counts. The patient understands that monitoring is required including LFT's and blood counts.  There is a rare possibility of scarring of the liver and lung problems that can occur when taking methotrexate. Persistent nausea, loss of appetite, pale stools, dark urine, cough, and shortness of breath should be reported immediately. Patient advised to discontinue methotrexate treatment at least three months before attempting to become pregnant.  I discussed the need for folate supplements while taking methotrexate.  These supplements can decrease side effects during methotrexate treatment. The patient verbalized understanding of the proper use and possible adverse effects of methotrexate.  All of the patient's questions and concerns were addressed. Soolantra Pregnancy And Lactation Text: This medication is Pregnancy Category C. This medication is considered safe during breast feeding. Hydroxyzine Counseling: Patient advised that the medication is sedating and not to drive a car after taking this medication.  Patient informed of potential adverse effects including but not limited to dry mouth, urinary retention, and blurry vision.  The patient verbalized understanding of the proper use and possible adverse effects of hydroxyzine.  All of the patient's questions and concerns were addressed. Xolair Pregnancy And Lactation Text: This medication is Pregnancy Category B and is considered safe during pregnancy. This medication is excreted in breast milk. Cimetidine Pregnancy And Lactation Text: This medication is Pregnancy Category B and is considered safe during pregnancy. It is also excreted in breast milk and breast feeding isn't recommended. Winlevi Counseling:  I discussed with the patient the risks of topical clascoterone including but not limited to erythema, scaling, itching, and stinging. Patient voiced their understanding. Enbrel Counseling:  I discussed with the patient the risks of etanercept including but not limited to myelosuppression, immunosuppression, autoimmune hepatitis, demyelinating diseases, lymphoma, and infections.  The patient understands that monitoring is required including a PPD at baseline and must alert us or the primary physician if symptoms of infection or other concerning signs are noted. High Dose Vitamin A Counseling: Side effects reviewed, pt to contact office should one occur. Klisyri Counseling:  I discussed with the patient the risks of Klisyri including but not limited to erythema, scaling, itching, weeping, crusting, and pain. Benzoyl Peroxide Counseling: Patient counseled that medicine may cause skin irritation and bleach clothing.  In the event of skin irritation, the patient was advised to reduce the amount of the drug applied or use it less frequently.   The patient verbalized understanding of the proper use and possible adverse effects of benzoyl peroxide.  All of the patient's questions and concerns were addressed. Oral Minoxidil Pregnancy And Lactation Text: This medication should only be used when clearly needed if you are pregnant, attempting to become pregnant or breast feeding. Nsaids Counseling: NSAID Counseling: I discussed with the patient that NSAIDs should be taken with food. Prolonged use of NSAIDs can result in the development of stomach ulcers.  Patient advised to stop taking NSAIDs if abdominal pain occurs.  The patient verbalized understanding of the proper use and possible adverse effects of NSAIDs.  All of the patient's questions and concerns were addressed. Isotretinoin Pregnancy And Lactation Text: This medication is Pregnancy Category X and is considered extremely dangerous during pregnancy. It is unknown if it is excreted in breast milk.

## 2024-08-28 LAB
ACID FAST STN SPEC: NEGATIVE
MYCOBACTERIUM SPEC QL CULT: NEGATIVE
SPECIMEN PREPARATION: NORMAL
SPECIMEN SOURCE: NORMAL

## (undated) DEVICE — SOLUTION IRRIG 3000ML H2O STRL BAG

## (undated) DEVICE — TUBING, SUCTION, 1/4" X 12', STRAIGHT: Brand: MEDLINE

## (undated) DEVICE — IMMOBILIZER SHLDR M L8X16.5IN R/L CANVS W/ WAIST STRP THMB

## (undated) DEVICE — PAD,NON-ADHERENT,3X8,STERILE,LF,1/PK: Brand: MEDLINE

## (undated) DEVICE — GARMENT,MEDLINE,DVT,INT,CALF,MED, GEN2: Brand: MEDLINE

## (undated) DEVICE — GLOVE ORANGE PI 7   MSG9070

## (undated) DEVICE — BANDAGE,ELASTIC,ESMARK,STERILE,4"X9',LF: Brand: MEDLINE

## (undated) DEVICE — REM POLYHESIVE ADULT PATIENT RETURN ELECTRODE: Brand: VALLEYLAB

## (undated) DEVICE — DRESSING MEPILEX BORDER FLEX LITE 10X10CM

## (undated) DEVICE — GOWN,AURORA,NONRNF,XL,30/CS: Brand: MEDLINE

## (undated) DEVICE — PLUG CATH TAPR GRP HNDL CAP

## (undated) DEVICE — CATH URETH FOL 3W MED 22FRX30 -- LUBRICATH

## (undated) DEVICE — STRAP,POSITIONING,KNEE/BODY,FOAM,4X60": Brand: MEDLINE

## (undated) DEVICE — CYSTO PACK: Brand: MEDLINE INDUSTRIES, INC.

## (undated) DEVICE — PACK PROCEDURE SURG EXTREMITY CUST

## (undated) DEVICE — APPLICATOR MEDICATED 26 CC SOLUTION HI LT ORNG CHLORAPREP

## (undated) DEVICE — MARKER,SKIN,WI/RULER AND LABELS: Brand: MEDLINE

## (undated) DEVICE — SPONGE GZ W4XL4IN COT 12 PLY TYP VII WVN C FLD DSGN